# Patient Record
Sex: MALE | Race: WHITE | Employment: UNEMPLOYED | URBAN - METROPOLITAN AREA
[De-identification: names, ages, dates, MRNs, and addresses within clinical notes are randomized per-mention and may not be internally consistent; named-entity substitution may affect disease eponyms.]

---

## 2017-07-30 ENCOUNTER — HOSPITAL ENCOUNTER (INPATIENT)
Age: 37
LOS: 6 days | Discharge: HOME | DRG: 753 | End: 2017-08-05
Attending: PSYCHIATRY & NEUROLOGY | Admitting: PSYCHIATRY & NEUROLOGY
Payer: MEDICAID

## 2017-07-30 PROCEDURE — 6370000000 HC RX 637 (ALT 250 FOR IP): Performed by: PSYCHIATRY & NEUROLOGY

## 2017-07-30 PROCEDURE — 1240000000 HC EMOTIONAL WELLNESS R&B

## 2017-07-30 RX ORDER — M-VIT,TX,IRON,MINS/CALC/FOLIC 27MG-0.4MG
1 TABLET ORAL DAILY
Status: DISCONTINUED | OUTPATIENT
Start: 2017-07-30 | End: 2017-08-05 | Stop reason: HOSPADM

## 2017-07-30 RX ORDER — THIAMINE MONONITRATE (VIT B1) 100 MG
100 TABLET ORAL DAILY
Status: ON HOLD | COMMUNITY
End: 2017-08-04 | Stop reason: HOSPADM

## 2017-07-30 RX ORDER — MIRTAZAPINE 30 MG/1
15 TABLET, FILM COATED ORAL NIGHTLY
Status: DISCONTINUED | OUTPATIENT
Start: 2017-07-30 | End: 2017-07-30

## 2017-07-30 RX ORDER — BENZTROPINE MESYLATE 1 MG/ML
2 INJECTION INTRAMUSCULAR; INTRAVENOUS 2 TIMES DAILY PRN
Status: DISCONTINUED | OUTPATIENT
Start: 2017-07-30 | End: 2017-08-05 | Stop reason: HOSPADM

## 2017-07-30 RX ORDER — MIRTAZAPINE 15 MG/1
15 TABLET, FILM COATED ORAL NIGHTLY
Status: ON HOLD | COMMUNITY
End: 2017-08-04 | Stop reason: HOSPADM

## 2017-07-30 RX ORDER — DIPHENHYDRAMINE HCL 25 MG
25 TABLET ORAL NIGHTLY PRN
Status: DISCONTINUED | OUTPATIENT
Start: 2017-07-30 | End: 2017-08-05 | Stop reason: HOSPADM

## 2017-07-30 RX ORDER — MAGNESIUM HYDROXIDE/ALUMINUM HYDROXICE/SIMETHICONE 120; 1200; 1200 MG/30ML; MG/30ML; MG/30ML
30 SUSPENSION ORAL PRN
Status: DISCONTINUED | OUTPATIENT
Start: 2017-07-30 | End: 2017-08-05 | Stop reason: HOSPADM

## 2017-07-30 RX ORDER — CHLORDIAZEPOXIDE HYDROCHLORIDE 25 MG/1
25 CAPSULE, GELATIN COATED ORAL 3 TIMES DAILY
Status: DISCONTINUED | OUTPATIENT
Start: 2017-07-30 | End: 2017-08-01

## 2017-07-30 RX ORDER — CHLORDIAZEPOXIDE HYDROCHLORIDE 25 MG/1
25 CAPSULE, GELATIN COATED ORAL 2 TIMES DAILY
Status: DISCONTINUED | OUTPATIENT
Start: 2017-07-30 | End: 2017-07-30

## 2017-07-30 RX ORDER — FOLIC ACID 1 MG/1
1 TABLET ORAL DAILY
Status: ON HOLD | COMMUNITY
End: 2017-08-04 | Stop reason: HOSPADM

## 2017-07-30 RX ORDER — OLANZAPINE 10 MG/1
10 TABLET ORAL NIGHTLY
Status: DISCONTINUED | OUTPATIENT
Start: 2017-07-30 | End: 2017-08-05 | Stop reason: HOSPADM

## 2017-07-30 RX ORDER — ACETAMINOPHEN 325 MG/1
650 TABLET ORAL EVERY 4 HOURS PRN
Status: DISCONTINUED | OUTPATIENT
Start: 2017-07-30 | End: 2017-08-05 | Stop reason: HOSPADM

## 2017-07-30 RX ORDER — HYDROXYZINE HYDROCHLORIDE 25 MG/1
25 TABLET, FILM COATED ORAL 3 TIMES DAILY PRN
Status: DISCONTINUED | OUTPATIENT
Start: 2017-07-30 | End: 2017-08-05 | Stop reason: HOSPADM

## 2017-07-30 RX ORDER — TRAZODONE HYDROCHLORIDE 50 MG/1
50 TABLET ORAL NIGHTLY PRN
Status: DISCONTINUED | OUTPATIENT
Start: 2017-07-30 | End: 2017-08-05 | Stop reason: HOSPADM

## 2017-07-30 RX ORDER — THIAMINE MONONITRATE (VIT B1) 100 MG
100 TABLET ORAL DAILY
Status: DISCONTINUED | OUTPATIENT
Start: 2017-07-30 | End: 2017-08-05 | Stop reason: HOSPADM

## 2017-07-30 RX ORDER — FOLIC ACID 1 MG/1
1 TABLET ORAL DAILY
Status: DISCONTINUED | OUTPATIENT
Start: 2017-07-30 | End: 2017-08-05 | Stop reason: HOSPADM

## 2017-07-30 RX ADMIN — CHLORDIAZEPOXIDE HYDROCHLORIDE 25 MG: 25 CAPSULE ORAL at 20:23

## 2017-07-30 RX ADMIN — NICOTINE POLACRILEX 2 MG: 2 GUM, CHEWING BUCCAL at 17:35

## 2017-07-30 RX ADMIN — MULTIPLE VITAMINS W/ MINERALS TAB 1 TABLET: TAB at 12:01

## 2017-07-30 RX ADMIN — OLANZAPINE 10 MG: 10 TABLET, FILM COATED ORAL at 20:23

## 2017-07-30 RX ADMIN — DIPHENHYDRAMINE HCL 25 MG: 25 TABLET ORAL at 20:23

## 2017-07-30 RX ADMIN — ACETAMINOPHEN 650 MG: 325 TABLET ORAL at 08:40

## 2017-07-30 RX ADMIN — FOLIC ACID 1 MG: 1 TABLET ORAL at 12:01

## 2017-07-30 RX ADMIN — CHLORDIAZEPOXIDE HYDROCHLORIDE 25 MG: 25 CAPSULE ORAL at 13:59

## 2017-07-30 RX ADMIN — HYDROXYZINE HYDROCHLORIDE 25 MG: 25 TABLET, FILM COATED ORAL at 17:35

## 2017-07-30 RX ADMIN — NICOTINE POLACRILEX 2 MG: 2 GUM, CHEWING BUCCAL at 20:23

## 2017-07-30 RX ADMIN — HYDROXYZINE HYDROCHLORIDE 25 MG: 25 TABLET, FILM COATED ORAL at 12:01

## 2017-07-30 RX ADMIN — TRAZODONE HYDROCHLORIDE 50 MG: 50 TABLET ORAL at 21:45

## 2017-07-30 RX ADMIN — Medication 100 MG: at 12:00

## 2017-07-30 RX ADMIN — NICOTINE POLACRILEX 2 MG: 2 GUM, CHEWING BUCCAL at 15:50

## 2017-07-30 RX ADMIN — CHLORDIAZEPOXIDE HYDROCHLORIDE 25 MG: 25 CAPSULE ORAL at 08:40

## 2017-07-30 RX ADMIN — ACETAMINOPHEN 650 MG: 325 TABLET ORAL at 13:59

## 2017-07-30 ASSESSMENT — PATIENT HEALTH QUESTIONNAIRE - PHQ9: SUM OF ALL RESPONSES TO PHQ QUESTIONS 1-9: 15

## 2017-07-30 ASSESSMENT — SLEEP AND FATIGUE QUESTIONNAIRES
DIFFICULTY FALLING ASLEEP: YES
RESTFUL SLEEP: NO
DIFFICULTY STAYING ASLEEP: YES
SLEEP PATTERN: DISTURBED/INTERRUPTED SLEEP;RESTLESSNESS
DO YOU USE A SLEEP AID: COMMENT
DO YOU HAVE DIFFICULTY SLEEPING: YES
AVERAGE NUMBER OF SLEEP HOURS: 8
DIFFICULTY ARISING: NO

## 2017-07-30 ASSESSMENT — PAIN DESCRIPTION - FREQUENCY
FREQUENCY: INTERMITTENT
FREQUENCY: INTERMITTENT

## 2017-07-30 ASSESSMENT — PAIN DESCRIPTION - DESCRIPTORS
DESCRIPTORS: ACHING;DISCOMFORT
DESCRIPTORS: ACHING;DISCOMFORT

## 2017-07-30 ASSESSMENT — PAIN SCALES - GENERAL
PAINLEVEL_OUTOF10: 3
PAINLEVEL_OUTOF10: 2
PAINLEVEL_OUTOF10: 3
PAINLEVEL_OUTOF10: 1

## 2017-07-30 ASSESSMENT — PAIN DESCRIPTION - ONSET: ONSET: ON-GOING

## 2017-07-30 ASSESSMENT — PAIN DESCRIPTION - LOCATION
LOCATION: GENERALIZED
LOCATION: GENERALIZED

## 2017-07-30 ASSESSMENT — LIFESTYLE VARIABLES: HISTORY_ALCOHOL_USE: YES

## 2017-07-30 ASSESSMENT — PAIN DESCRIPTION - PAIN TYPE
TYPE: ACUTE PAIN
TYPE: ACUTE PAIN

## 2017-07-31 LAB
ABSOLUTE EOS #: 0.3 K/UL (ref 0–0.4)
ABSOLUTE LYMPH #: 2.1 K/UL (ref 1–4.8)
ABSOLUTE MONO #: 0.6 K/UL (ref 0.1–1.3)
ALBUMIN SERPL-MCNC: 3.7 G/DL (ref 3.5–5.2)
ALBUMIN/GLOBULIN RATIO: ABNORMAL (ref 1–2.5)
ALP BLD-CCNC: 65 U/L (ref 40–129)
ALT SERPL-CCNC: 21 U/L (ref 5–41)
ANION GAP SERPL CALCULATED.3IONS-SCNC: 14 MMOL/L (ref 9–17)
AST SERPL-CCNC: 23 U/L
BASOPHILS # BLD: 1 %
BASOPHILS ABSOLUTE: 0 K/UL (ref 0–0.2)
BILIRUB SERPL-MCNC: 1.17 MG/DL (ref 0.3–1.2)
BUN BLDV-MCNC: 9 MG/DL (ref 6–20)
BUN/CREAT BLD: ABNORMAL (ref 9–20)
CALCIUM SERPL-MCNC: 8.6 MG/DL (ref 8.6–10.4)
CHLORIDE BLD-SCNC: 101 MMOL/L (ref 98–107)
CO2: 21 MMOL/L (ref 20–31)
CREAT SERPL-MCNC: 0.63 MG/DL (ref 0.7–1.2)
DIFFERENTIAL TYPE: NORMAL
EOSINOPHILS RELATIVE PERCENT: 4 %
GFR AFRICAN AMERICAN: >60 ML/MIN
GFR NON-AFRICAN AMERICAN: >60 ML/MIN
GFR SERPL CREATININE-BSD FRML MDRD: ABNORMAL ML/MIN/{1.73_M2}
GFR SERPL CREATININE-BSD FRML MDRD: ABNORMAL ML/MIN/{1.73_M2}
GLUCOSE BLD-MCNC: 106 MG/DL (ref 70–99)
HCT VFR BLD CALC: 44.5 % (ref 41–53)
HEMOGLOBIN: 15.3 G/DL (ref 13.5–17.5)
LYMPHOCYTES # BLD: 33 %
MCH RBC QN AUTO: 31.7 PG (ref 26–34)
MCHC RBC AUTO-ENTMCNC: 34.4 G/DL (ref 31–37)
MCV RBC AUTO: 92.1 FL (ref 80–100)
MONOCYTES # BLD: 10 %
PDW BLD-RTO: 14.6 % (ref 11.5–14.9)
PLATELET # BLD: 197 K/UL (ref 150–450)
PLATELET ESTIMATE: NORMAL
PMV BLD AUTO: 7.7 FL (ref 6–12)
POTASSIUM SERPL-SCNC: 3.4 MMOL/L (ref 3.7–5.3)
RBC # BLD: 4.84 M/UL (ref 4.5–5.9)
RBC # BLD: NORMAL 10*6/UL
SEG NEUTROPHILS: 52 %
SEGMENTED NEUTROPHILS ABSOLUTE COUNT: 3.3 K/UL (ref 1.3–9.1)
SODIUM BLD-SCNC: 136 MMOL/L (ref 135–144)
TOTAL PROTEIN: 7.1 G/DL (ref 6.4–8.3)
WBC # BLD: 6.3 K/UL (ref 3.5–11)
WBC # BLD: NORMAL 10*3/UL

## 2017-07-31 PROCEDURE — 80053 COMPREHEN METABOLIC PANEL: CPT

## 2017-07-31 PROCEDURE — 36415 COLL VENOUS BLD VENIPUNCTURE: CPT

## 2017-07-31 PROCEDURE — 6370000000 HC RX 637 (ALT 250 FOR IP): Performed by: PSYCHIATRY & NEUROLOGY

## 2017-07-31 PROCEDURE — 1240000000 HC EMOTIONAL WELLNESS R&B

## 2017-07-31 PROCEDURE — 85025 COMPLETE CBC W/AUTO DIFF WBC: CPT

## 2017-07-31 RX ADMIN — NICOTINE POLACRILEX 2 MG: 2 GUM, CHEWING BUCCAL at 14:30

## 2017-07-31 RX ADMIN — MULTIPLE VITAMINS W/ MINERALS TAB 1 TABLET: TAB at 08:48

## 2017-07-31 RX ADMIN — HYDROXYZINE HYDROCHLORIDE 25 MG: 25 TABLET, FILM COATED ORAL at 14:30

## 2017-07-31 RX ADMIN — NICOTINE POLACRILEX 2 MG: 2 GUM, CHEWING BUCCAL at 19:06

## 2017-07-31 RX ADMIN — ACETAMINOPHEN 650 MG: 325 TABLET ORAL at 19:33

## 2017-07-31 RX ADMIN — NICOTINE POLACRILEX 2 MG: 2 GUM, CHEWING BUCCAL at 10:49

## 2017-07-31 RX ADMIN — HYDROXYZINE HYDROCHLORIDE 25 MG: 25 TABLET, FILM COATED ORAL at 21:07

## 2017-07-31 RX ADMIN — Medication 100 MG: at 08:48

## 2017-07-31 RX ADMIN — NICOTINE POLACRILEX 2 MG: 2 GUM, CHEWING BUCCAL at 21:09

## 2017-07-31 RX ADMIN — OLANZAPINE 10 MG: 10 TABLET, FILM COATED ORAL at 21:07

## 2017-07-31 RX ADMIN — CHLORDIAZEPOXIDE HYDROCHLORIDE 25 MG: 25 CAPSULE ORAL at 14:30

## 2017-07-31 RX ADMIN — CHLORDIAZEPOXIDE HYDROCHLORIDE 25 MG: 25 CAPSULE ORAL at 21:07

## 2017-07-31 RX ADMIN — FOLIC ACID 1 MG: 1 TABLET ORAL at 08:48

## 2017-07-31 RX ADMIN — HYDROXYZINE HYDROCHLORIDE 25 MG: 25 TABLET, FILM COATED ORAL at 08:48

## 2017-07-31 RX ADMIN — NICOTINE POLACRILEX 2 MG: 2 GUM, CHEWING BUCCAL at 19:33

## 2017-07-31 RX ADMIN — CHLORDIAZEPOXIDE HYDROCHLORIDE 25 MG: 25 CAPSULE ORAL at 08:48

## 2017-07-31 ASSESSMENT — PAIN DESCRIPTION - ORIENTATION: ORIENTATION: UPPER

## 2017-07-31 ASSESSMENT — PAIN SCALES - GENERAL
PAINLEVEL_OUTOF10: 4
PAINLEVEL_OUTOF10: 5

## 2017-07-31 ASSESSMENT — PAIN DESCRIPTION - LOCATION: LOCATION: BACK

## 2017-07-31 ASSESSMENT — PAIN DESCRIPTION - PAIN TYPE: TYPE: ACUTE PAIN

## 2017-08-01 PROCEDURE — 6370000000 HC RX 637 (ALT 250 FOR IP): Performed by: PSYCHIATRY & NEUROLOGY

## 2017-08-01 PROCEDURE — 1240000000 HC EMOTIONAL WELLNESS R&B

## 2017-08-01 RX ORDER — BUPROPION HYDROCHLORIDE 150 MG/1
150 TABLET ORAL DAILY
Status: DISCONTINUED | OUTPATIENT
Start: 2017-08-02 | End: 2017-08-05 | Stop reason: HOSPADM

## 2017-08-01 RX ORDER — CHLORDIAZEPOXIDE HYDROCHLORIDE 25 MG/1
25 CAPSULE, GELATIN COATED ORAL 3 TIMES DAILY
Status: COMPLETED | OUTPATIENT
Start: 2017-08-01 | End: 2017-08-01

## 2017-08-01 RX ORDER — CHLORDIAZEPOXIDE HYDROCHLORIDE 25 MG/1
25 CAPSULE, GELATIN COATED ORAL 2 TIMES DAILY
Status: COMPLETED | OUTPATIENT
Start: 2017-08-02 | End: 2017-08-04

## 2017-08-01 RX ADMIN — NICOTINE POLACRILEX 2 MG: 2 GUM, CHEWING BUCCAL at 21:38

## 2017-08-01 RX ADMIN — NICOTINE POLACRILEX 2 MG: 2 GUM, CHEWING BUCCAL at 19:29

## 2017-08-01 RX ADMIN — CHLORDIAZEPOXIDE HYDROCHLORIDE 25 MG: 25 CAPSULE ORAL at 14:17

## 2017-08-01 RX ADMIN — DIPHENHYDRAMINE HCL 25 MG: 25 TABLET ORAL at 21:38

## 2017-08-01 RX ADMIN — Medication 100 MG: at 09:25

## 2017-08-01 RX ADMIN — CHLORDIAZEPOXIDE HYDROCHLORIDE 25 MG: 25 CAPSULE ORAL at 20:57

## 2017-08-01 RX ADMIN — MULTIPLE VITAMINS W/ MINERALS TAB 1 TABLET: TAB at 09:25

## 2017-08-01 RX ADMIN — ACETAMINOPHEN 650 MG: 325 TABLET ORAL at 19:29

## 2017-08-01 RX ADMIN — HYDROXYZINE HYDROCHLORIDE 25 MG: 25 TABLET, FILM COATED ORAL at 14:16

## 2017-08-01 RX ADMIN — HYDROXYZINE HYDROCHLORIDE 25 MG: 25 TABLET, FILM COATED ORAL at 20:57

## 2017-08-01 RX ADMIN — CHLORDIAZEPOXIDE HYDROCHLORIDE 25 MG: 25 CAPSULE ORAL at 09:25

## 2017-08-01 RX ADMIN — FOLIC ACID 1 MG: 1 TABLET ORAL at 09:25

## 2017-08-01 RX ADMIN — NICOTINE POLACRILEX 2 MG: 2 GUM, CHEWING BUCCAL at 14:30

## 2017-08-01 RX ADMIN — OLANZAPINE 10 MG: 10 TABLET, FILM COATED ORAL at 20:57

## 2017-08-01 ASSESSMENT — PAIN SCALES - GENERAL
PAINLEVEL_OUTOF10: 5
PAINLEVEL_OUTOF10: 4
PAINLEVEL_OUTOF10: 0

## 2017-08-01 ASSESSMENT — PAIN DESCRIPTION - LOCATION: LOCATION: NECK

## 2017-08-02 PROCEDURE — 6370000000 HC RX 637 (ALT 250 FOR IP): Performed by: PSYCHIATRY & NEUROLOGY

## 2017-08-02 PROCEDURE — 1240000000 HC EMOTIONAL WELLNESS R&B

## 2017-08-02 RX ADMIN — NICOTINE POLACRILEX 2 MG: 2 GUM, CHEWING BUCCAL at 20:43

## 2017-08-02 RX ADMIN — OLANZAPINE 10 MG: 10 TABLET, FILM COATED ORAL at 20:43

## 2017-08-02 RX ADMIN — NICOTINE POLACRILEX 2 MG: 2 GUM, CHEWING BUCCAL at 17:51

## 2017-08-02 RX ADMIN — CHLORDIAZEPOXIDE HYDROCHLORIDE 25 MG: 25 CAPSULE ORAL at 08:33

## 2017-08-02 RX ADMIN — BUPROPION HYDROCHLORIDE 150 MG: 150 TABLET, FILM COATED, EXTENDED RELEASE ORAL at 08:33

## 2017-08-02 RX ADMIN — Medication 100 MG: at 08:33

## 2017-08-02 RX ADMIN — NICOTINE POLACRILEX 2 MG: 2 GUM, CHEWING BUCCAL at 13:33

## 2017-08-02 RX ADMIN — CHLORDIAZEPOXIDE HYDROCHLORIDE 25 MG: 25 CAPSULE ORAL at 20:43

## 2017-08-02 RX ADMIN — MULTIPLE VITAMINS W/ MINERALS TAB 1 TABLET: TAB at 08:33

## 2017-08-02 RX ADMIN — HYDROXYZINE HYDROCHLORIDE 25 MG: 25 TABLET, FILM COATED ORAL at 20:43

## 2017-08-02 RX ADMIN — NICOTINE POLACRILEX 2 MG: 2 GUM, CHEWING BUCCAL at 15:38

## 2017-08-02 RX ADMIN — FOLIC ACID 1 MG: 1 TABLET ORAL at 08:33

## 2017-08-02 RX ADMIN — NICOTINE POLACRILEX 2 MG: 2 GUM, CHEWING BUCCAL at 22:02

## 2017-08-02 RX ADMIN — DIPHENHYDRAMINE HCL 25 MG: 25 TABLET ORAL at 22:01

## 2017-08-02 RX ADMIN — HYDROXYZINE HYDROCHLORIDE 25 MG: 25 TABLET, FILM COATED ORAL at 15:38

## 2017-08-03 PROCEDURE — 6370000000 HC RX 637 (ALT 250 FOR IP): Performed by: PSYCHIATRY & NEUROLOGY

## 2017-08-03 PROCEDURE — 1240000000 HC EMOTIONAL WELLNESS R&B

## 2017-08-03 RX ADMIN — CHLORDIAZEPOXIDE HYDROCHLORIDE 25 MG: 25 CAPSULE ORAL at 08:20

## 2017-08-03 RX ADMIN — HYDROXYZINE HYDROCHLORIDE 25 MG: 25 TABLET, FILM COATED ORAL at 08:20

## 2017-08-03 RX ADMIN — CHLORDIAZEPOXIDE HYDROCHLORIDE 25 MG: 25 CAPSULE ORAL at 20:29

## 2017-08-03 RX ADMIN — NICOTINE POLACRILEX 2 MG: 2 GUM, CHEWING BUCCAL at 20:30

## 2017-08-03 RX ADMIN — NICOTINE POLACRILEX 2 MG: 2 GUM, CHEWING BUCCAL at 22:06

## 2017-08-03 RX ADMIN — BUPROPION HYDROCHLORIDE 150 MG: 150 TABLET, FILM COATED, EXTENDED RELEASE ORAL at 08:20

## 2017-08-03 RX ADMIN — HYDROXYZINE HYDROCHLORIDE 25 MG: 25 TABLET, FILM COATED ORAL at 15:25

## 2017-08-03 RX ADMIN — NICOTINE POLACRILEX 2 MG: 2 GUM, CHEWING BUCCAL at 15:25

## 2017-08-03 RX ADMIN — FOLIC ACID 1 MG: 1 TABLET ORAL at 08:20

## 2017-08-03 RX ADMIN — DIPHENHYDRAMINE HCL 25 MG: 25 TABLET ORAL at 22:06

## 2017-08-03 RX ADMIN — MULTIPLE VITAMINS W/ MINERALS TAB 1 TABLET: TAB at 08:20

## 2017-08-03 RX ADMIN — NICOTINE POLACRILEX 2 MG: 2 GUM, CHEWING BUCCAL at 13:31

## 2017-08-03 RX ADMIN — OLANZAPINE 10 MG: 10 TABLET, FILM COATED ORAL at 20:30

## 2017-08-03 RX ADMIN — NICOTINE POLACRILEX 2 MG: 2 GUM, CHEWING BUCCAL at 10:46

## 2017-08-03 RX ADMIN — Medication 100 MG: at 08:20

## 2017-08-03 RX ADMIN — HYDROXYZINE HYDROCHLORIDE 25 MG: 25 TABLET, FILM COATED ORAL at 20:30

## 2017-08-04 PROCEDURE — 6370000000 HC RX 637 (ALT 250 FOR IP): Performed by: PSYCHIATRY & NEUROLOGY

## 2017-08-04 PROCEDURE — 1240000000 HC EMOTIONAL WELLNESS R&B

## 2017-08-04 RX ORDER — TRAZODONE HYDROCHLORIDE 50 MG/1
50 TABLET ORAL NIGHTLY PRN
Qty: 30 TABLET | Refills: 0 | Status: SHIPPED | OUTPATIENT
Start: 2017-08-04 | End: 2018-01-21

## 2017-08-04 RX ORDER — OLANZAPINE 10 MG/1
10 TABLET ORAL NIGHTLY
Qty: 30 TABLET | Refills: 0 | Status: SHIPPED | OUTPATIENT
Start: 2017-08-04 | End: 2018-12-06

## 2017-08-04 RX ORDER — BUPROPION HYDROCHLORIDE 150 MG/1
150 TABLET ORAL DAILY
Qty: 30 TABLET | Refills: 0 | Status: ON HOLD | OUTPATIENT
Start: 2017-08-04 | End: 2017-12-30 | Stop reason: HOSPADM

## 2017-08-04 RX ORDER — HYDROXYZINE HYDROCHLORIDE 25 MG/1
25 TABLET, FILM COATED ORAL 3 TIMES DAILY PRN
Qty: 90 TABLET | Refills: 0 | Status: SHIPPED | OUTPATIENT
Start: 2017-08-04 | End: 2018-12-06

## 2017-08-04 RX ADMIN — HYDROXYZINE HYDROCHLORIDE 25 MG: 25 TABLET, FILM COATED ORAL at 08:38

## 2017-08-04 RX ADMIN — MULTIPLE VITAMINS W/ MINERALS TAB 1 TABLET: TAB at 08:38

## 2017-08-04 RX ADMIN — CHLORDIAZEPOXIDE HYDROCHLORIDE 25 MG: 25 CAPSULE ORAL at 21:01

## 2017-08-04 RX ADMIN — NICOTINE POLACRILEX 2 MG: 2 GUM, CHEWING BUCCAL at 19:51

## 2017-08-04 RX ADMIN — ACETAMINOPHEN 650 MG: 325 TABLET ORAL at 11:03

## 2017-08-04 RX ADMIN — OLANZAPINE 10 MG: 10 TABLET, FILM COATED ORAL at 21:01

## 2017-08-04 RX ADMIN — CHLORDIAZEPOXIDE HYDROCHLORIDE 25 MG: 25 CAPSULE ORAL at 08:38

## 2017-08-04 RX ADMIN — FOLIC ACID 1 MG: 1 TABLET ORAL at 08:38

## 2017-08-04 RX ADMIN — HYDROXYZINE HYDROCHLORIDE 25 MG: 25 TABLET, FILM COATED ORAL at 21:01

## 2017-08-04 RX ADMIN — NICOTINE POLACRILEX 2 MG: 2 GUM, CHEWING BUCCAL at 15:44

## 2017-08-04 RX ADMIN — HYDROXYZINE HYDROCHLORIDE 25 MG: 25 TABLET, FILM COATED ORAL at 15:44

## 2017-08-04 RX ADMIN — BUPROPION HYDROCHLORIDE 150 MG: 150 TABLET, FILM COATED, EXTENDED RELEASE ORAL at 08:38

## 2017-08-04 RX ADMIN — DIPHENHYDRAMINE HCL 25 MG: 25 TABLET ORAL at 21:01

## 2017-08-04 RX ADMIN — NICOTINE POLACRILEX 2 MG: 2 GUM, CHEWING BUCCAL at 21:01

## 2017-08-04 RX ADMIN — NICOTINE POLACRILEX 2 MG: 2 GUM, CHEWING BUCCAL at 08:38

## 2017-08-04 RX ADMIN — Medication 100 MG: at 08:38

## 2017-08-04 RX ADMIN — NICOTINE POLACRILEX 2 MG: 2 GUM, CHEWING BUCCAL at 11:03

## 2017-08-04 ASSESSMENT — PAIN DESCRIPTION - DESCRIPTORS: DESCRIPTORS: ACHING;HEADACHE

## 2017-08-04 ASSESSMENT — PAIN SCALES - GENERAL
PAINLEVEL_OUTOF10: 1
PAINLEVEL_OUTOF10: 3

## 2017-08-04 ASSESSMENT — PAIN DESCRIPTION - PAIN TYPE: TYPE: ACUTE PAIN

## 2017-08-04 ASSESSMENT — PAIN DESCRIPTION - LOCATION: LOCATION: HEAD

## 2017-08-04 ASSESSMENT — PAIN DESCRIPTION - FREQUENCY: FREQUENCY: INTERMITTENT

## 2017-08-05 VITALS
RESPIRATION RATE: 14 BRPM | HEIGHT: 69 IN | DIASTOLIC BLOOD PRESSURE: 62 MMHG | WEIGHT: 183 LBS | TEMPERATURE: 97.5 F | OXYGEN SATURATION: 99 % | BODY MASS INDEX: 27.11 KG/M2 | SYSTOLIC BLOOD PRESSURE: 108 MMHG | HEART RATE: 73 BPM

## 2017-08-05 PROCEDURE — 6370000000 HC RX 637 (ALT 250 FOR IP): Performed by: PSYCHIATRY & NEUROLOGY

## 2017-08-05 RX ADMIN — MULTIPLE VITAMINS W/ MINERALS TAB 1 TABLET: TAB at 09:02

## 2017-08-05 RX ADMIN — Medication 100 MG: at 09:02

## 2017-08-05 RX ADMIN — FOLIC ACID 1 MG: 1 TABLET ORAL at 09:02

## 2017-08-05 RX ADMIN — BUPROPION HYDROCHLORIDE 150 MG: 150 TABLET, FILM COATED, EXTENDED RELEASE ORAL at 09:02

## 2017-08-17 ENCOUNTER — OFFICE VISIT (OUTPATIENT)
Dept: FAMILY MEDICINE CLINIC | Age: 37
End: 2017-08-17
Payer: MEDICAID

## 2017-08-17 VITALS
HEART RATE: 76 BPM | HEIGHT: 70 IN | BODY MASS INDEX: 27.63 KG/M2 | TEMPERATURE: 98.2 F | WEIGHT: 193 LBS | OXYGEN SATURATION: 97 % | SYSTOLIC BLOOD PRESSURE: 126 MMHG | DIASTOLIC BLOOD PRESSURE: 74 MMHG

## 2017-08-17 DIAGNOSIS — R21 RASH: ICD-10-CM

## 2017-08-17 DIAGNOSIS — L30.9 DERMATITIS: Primary | ICD-10-CM

## 2017-08-17 DIAGNOSIS — B86 SCABIES: ICD-10-CM

## 2017-08-17 PROCEDURE — 99213 OFFICE O/P EST LOW 20 MIN: CPT | Performed by: FAMILY MEDICINE

## 2017-08-17 RX ORDER — PERMETHRIN 50 MG/G
CREAM TOPICAL
Qty: 1 TUBE | Refills: 0 | Status: SHIPPED | OUTPATIENT
Start: 2017-08-17 | End: 2018-01-21

## 2017-08-17 RX ORDER — TRIAMCINOLONE ACETONIDE 0.25 MG/G
CREAM TOPICAL
Qty: 1 TUBE | Refills: 0 | Status: SHIPPED | OUTPATIENT
Start: 2017-08-17 | End: 2018-01-21

## 2017-08-17 RX ORDER — CLONIDINE HYDROCHLORIDE 0.2 MG/1
0.2 TABLET ORAL 3 TIMES DAILY
COMMUNITY
End: 2018-12-06 | Stop reason: ALTCHOICE

## 2017-08-17 RX ORDER — METHYLPREDNISOLONE 4 MG/1
TABLET ORAL
Qty: 1 KIT | Refills: 0 | Status: SHIPPED | OUTPATIENT
Start: 2017-08-17 | End: 2018-01-21

## 2017-08-17 ASSESSMENT — ENCOUNTER SYMPTOMS
VOICE CHANGE: 0
RHINORRHEA: 0
EYE ITCHING: 0
EYE REDNESS: 0
COUGH: 0
NAUSEA: 0
CHEST TIGHTNESS: 0
DIARRHEA: 0
TROUBLE SWALLOWING: 0
BACK PAIN: 0
VOMITING: 0
EYE DISCHARGE: 0
SINUS PRESSURE: 0
WHEEZING: 0
SORE THROAT: 0
ABDOMINAL PAIN: 0
SHORTNESS OF BREATH: 0
CONSTIPATION: 0

## 2017-09-20 ENCOUNTER — HOSPITAL ENCOUNTER (OUTPATIENT)
Age: 37
Discharge: HOME | End: 2017-09-20
Payer: MEDICAID

## 2017-09-20 LAB — HIV AG/AB: NONREACTIVE

## 2017-09-20 PROCEDURE — 36415 COLL VENOUS BLD VENIPUNCTURE: CPT

## 2017-09-20 PROCEDURE — 87522 HEPATITIS C REVRS TRNSCRPJ: CPT

## 2017-09-20 PROCEDURE — 87389 HIV-1 AG W/HIV-1&-2 AB AG IA: CPT

## 2017-09-22 LAB
DIRECT EXAM: NORMAL
Lab: NORMAL
SPECIMEN DESCRIPTION: NORMAL
STATUS: NORMAL

## 2017-12-25 ENCOUNTER — HOSPITAL ENCOUNTER (INPATIENT)
Age: 37
LOS: 4 days | Discharge: HOME | DRG: 753 | End: 2017-12-30
Attending: EMERGENCY MEDICINE | Admitting: PSYCHIATRY & NEUROLOGY
Payer: MEDICAID

## 2017-12-25 DIAGNOSIS — R11.2 NON-INTRACTABLE VOMITING WITH NAUSEA, UNSPECIFIED VOMITING TYPE: ICD-10-CM

## 2017-12-25 DIAGNOSIS — R45.851 SUICIDAL IDEATION: ICD-10-CM

## 2017-12-25 DIAGNOSIS — R10.84 GENERALIZED ABDOMINAL PAIN: Primary | ICD-10-CM
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
## 2017-12-25 LAB
ABSOLUTE EOS #: 0 K/UL (ref 0–0.4)
ABSOLUTE IMMATURE GRANULOCYTE: ABNORMAL K/UL (ref 0–0.3)
ABSOLUTE LYMPH #: 4.1 K/UL (ref 1–4.8)
ABSOLUTE MONO #: 0.55 K/UL (ref 0.1–1.3)
ALBUMIN SERPL-MCNC: 4.7 G/DL (ref 3.5–5.2)
ALBUMIN/GLOBULIN RATIO: NORMAL (ref 1–2.5)
ALP BLD-CCNC: 75 U/L (ref 40–129)
ALT SERPL-CCNC: 20 U/L (ref 5–41)
AMPHETAMINE SCREEN URINE: NEGATIVE
ANION GAP SERPL CALCULATED.3IONS-SCNC: 17 MMOL/L (ref 9–17)
AST SERPL-CCNC: 25 U/L
ATYPICAL LYMPHOCYTE ABSOLUTE COUNT: 0.18 K/UL
ATYPICAL LYMPHOCYTES: 2 %
BARBITURATE SCREEN URINE: NEGATIVE
BASOPHILS # BLD: 0 % (ref 0–2)
BASOPHILS ABSOLUTE: 0 K/UL (ref 0–0.2)
BENZODIAZEPINE SCREEN, URINE: NEGATIVE
BILIRUB SERPL-MCNC: 0.64 MG/DL (ref 0.3–1.2)
BILIRUBIN DIRECT: 0.16 MG/DL
BILIRUBIN, INDIRECT: 0.48 MG/DL (ref 0–1)
BUN BLDV-MCNC: 11 MG/DL (ref 6–20)
BUN/CREAT BLD: ABNORMAL (ref 9–20)
BUPRENORPHINE URINE: NORMAL
CALCIUM SERPL-MCNC: 9.8 MG/DL (ref 8.6–10.4)
CANNABINOID SCREEN URINE: NEGATIVE
CHLORIDE BLD-SCNC: 96 MMOL/L (ref 98–107)
CO2: 27 MMOL/L (ref 20–31)
COCAINE METABOLITE, URINE: NEGATIVE
CREAT SERPL-MCNC: 0.7 MG/DL (ref 0.7–1.2)
DIFFERENTIAL TYPE: ABNORMAL
EOSINOPHILS RELATIVE PERCENT: 0 % (ref 0–4)
ETHANOL PERCENT: 0.32 %
ETHANOL: 320 MG/DL
GFR AFRICAN AMERICAN: >60 ML/MIN
GFR NON-AFRICAN AMERICAN: >60 ML/MIN
GFR SERPL CREATININE-BSD FRML MDRD: ABNORMAL ML/MIN/{1.73_M2}
GFR SERPL CREATININE-BSD FRML MDRD: ABNORMAL ML/MIN/{1.73_M2}
GLOBULIN: NORMAL G/DL (ref 1.5–3.8)
GLUCOSE BLD-MCNC: 135 MG/DL (ref 70–99)
HCT VFR BLD CALC: 49.4 % (ref 41–53)
HEMOGLOBIN: 17.4 G/DL (ref 13.5–17.5)
IMMATURE GRANULOCYTES: ABNORMAL %
LIPASE: 12 U/L (ref 13–60)
LYMPHOCYTES # BLD: 45 % (ref 24–44)
MCH RBC QN AUTO: 31.2 PG (ref 26–34)
MCHC RBC AUTO-ENTMCNC: 35.2 G/DL (ref 31–37)
MCV RBC AUTO: 88.6 FL (ref 80–100)
MDMA URINE: NORMAL
METHADONE SCREEN, URINE: NEGATIVE
METHAMPHETAMINE, URINE: NORMAL
MONOCYTES # BLD: 6 % (ref 1–7)
MORPHOLOGY: NORMAL
OPIATES, URINE: NEGATIVE
OXYCODONE SCREEN URINE: NEGATIVE
PDW BLD-RTO: 14.3 % (ref 11.5–14.9)
PHENCYCLIDINE, URINE: NEGATIVE
PLATELET # BLD: 284 K/UL (ref 150–450)
PLATELET ESTIMATE: ABNORMAL
PMV BLD AUTO: 7.4 FL (ref 6–12)
POTASSIUM SERPL-SCNC: 3.6 MMOL/L (ref 3.7–5.3)
PROPOXYPHENE, URINE: NORMAL
RBC # BLD: 5.57 M/UL (ref 4.5–5.9)
RBC # BLD: ABNORMAL 10*6/UL
SEG NEUTROPHILS: 47 % (ref 36–66)
SEGMENTED NEUTROPHILS ABSOLUTE COUNT: 4.27 K/UL (ref 1.3–9.1)
SODIUM BLD-SCNC: 140 MMOL/L (ref 135–144)
TEST INFORMATION: NORMAL
TOTAL PROTEIN: 8.3 G/DL (ref 6.4–8.3)
TRICYCLIC ANTIDEPRESSANTS, UR: NORMAL
WBC # BLD: 9.1 K/UL (ref 3.5–11)
WBC # BLD: ABNORMAL 10*3/UL

## 2017-12-25 PROCEDURE — 80048 BASIC METABOLIC PNL TOTAL CA: CPT

## 2017-12-25 PROCEDURE — 96374 THER/PROPH/DIAG INJ IV PUSH: CPT

## 2017-12-25 PROCEDURE — 99284 EMERGENCY DEPT VISIT MOD MDM: CPT

## 2017-12-25 PROCEDURE — 6360000002 HC RX W HCPCS: Performed by: EMERGENCY MEDICINE

## 2017-12-25 PROCEDURE — 2580000003 HC RX 258: Performed by: EMERGENCY MEDICINE

## 2017-12-25 PROCEDURE — 36415 COLL VENOUS BLD VENIPUNCTURE: CPT

## 2017-12-25 PROCEDURE — 85025 COMPLETE CBC W/AUTO DIFF WBC: CPT

## 2017-12-25 PROCEDURE — 96375 TX/PRO/DX INJ NEW DRUG ADDON: CPT

## 2017-12-25 PROCEDURE — 96376 TX/PRO/DX INJ SAME DRUG ADON: CPT

## 2017-12-25 PROCEDURE — 80307 DRUG TEST PRSMV CHEM ANLYZR: CPT

## 2017-12-25 PROCEDURE — 6370000000 HC RX 637 (ALT 250 FOR IP): Performed by: EMERGENCY MEDICINE

## 2017-12-25 PROCEDURE — 80076 HEPATIC FUNCTION PANEL: CPT

## 2017-12-25 PROCEDURE — G0480 DRUG TEST DEF 1-7 CLASSES: HCPCS

## 2017-12-25 PROCEDURE — 83690 ASSAY OF LIPASE: CPT

## 2017-12-25 RX ORDER — PROMETHAZINE HYDROCHLORIDE 25 MG/ML
12.5 INJECTION, SOLUTION INTRAMUSCULAR; INTRAVENOUS ONCE
Status: COMPLETED | OUTPATIENT
Start: 2017-12-25 | End: 2017-12-25

## 2017-12-25 RX ORDER — HALOPERIDOL 5 MG/ML
5 INJECTION INTRAMUSCULAR ONCE
Status: COMPLETED | OUTPATIENT
Start: 2017-12-25 | End: 2017-12-25

## 2017-12-25 RX ORDER — DIPHENHYDRAMINE HYDROCHLORIDE 50 MG/ML
25 INJECTION INTRAMUSCULAR; INTRAVENOUS ONCE
Status: COMPLETED | OUTPATIENT
Start: 2017-12-25 | End: 2017-12-25

## 2017-12-25 RX ORDER — 0.9 % SODIUM CHLORIDE 0.9 %
1000 INTRAVENOUS SOLUTION INTRAVENOUS ONCE
Status: COMPLETED | OUTPATIENT
Start: 2017-12-25 | End: 2017-12-25

## 2017-12-25 RX ORDER — CALCIUM CARBONATE 200(500)MG
500 TABLET,CHEWABLE ORAL ONCE
Status: COMPLETED | OUTPATIENT
Start: 2017-12-25 | End: 2017-12-25

## 2017-12-25 RX ORDER — ONDANSETRON 2 MG/ML
4 INJECTION INTRAMUSCULAR; INTRAVENOUS ONCE
Status: COMPLETED | OUTPATIENT
Start: 2017-12-25 | End: 2017-12-25

## 2017-12-25 RX ORDER — 0.9 % SODIUM CHLORIDE 0.9 %
1000 INTRAVENOUS SOLUTION INTRAVENOUS ONCE
Status: COMPLETED | OUTPATIENT
Start: 2017-12-25 | End: 2017-12-26

## 2017-12-25 RX ORDER — CALCIUM CARBONATE 200(500)MG
500 TABLET,CHEWABLE ORAL 3 TIMES DAILY PRN
Status: DISCONTINUED | OUTPATIENT
Start: 2017-12-25 | End: 2017-12-25

## 2017-12-25 RX ADMIN — SODIUM CHLORIDE 1000 ML: 9 INJECTION, SOLUTION INTRAVENOUS at 16:00

## 2017-12-25 RX ADMIN — ANTACID TABLETS 500 MG: 500 TABLET, CHEWABLE ORAL at 17:58

## 2017-12-25 RX ADMIN — SODIUM CHLORIDE 1000 ML: 9 INJECTION, SOLUTION INTRAVENOUS at 23:10

## 2017-12-25 RX ADMIN — DIPHENHYDRAMINE HYDROCHLORIDE 25 MG: 50 INJECTION, SOLUTION INTRAMUSCULAR; INTRAVENOUS at 23:10

## 2017-12-25 RX ADMIN — ANTACID TABLETS 500 MG: 500 TABLET, CHEWABLE ORAL at 21:42

## 2017-12-25 RX ADMIN — PROMETHAZINE HYDROCHLORIDE 12.5 MG: 25 INJECTION INTRAMUSCULAR; INTRAVENOUS at 23:09

## 2017-12-25 RX ADMIN — ONDANSETRON 4 MG: 2 INJECTION INTRAMUSCULAR; INTRAVENOUS at 16:00

## 2017-12-25 RX ADMIN — HALOPERIDOL LACTATE 5 MG: 5 INJECTION, SOLUTION INTRAMUSCULAR at 17:40

## 2017-12-25 RX ADMIN — DIPHENHYDRAMINE HYDROCHLORIDE 25 MG: 50 INJECTION, SOLUTION INTRAMUSCULAR; INTRAVENOUS at 18:07

## 2017-12-25 ASSESSMENT — PAIN DESCRIPTION - LOCATION: LOCATION: ABDOMEN

## 2017-12-25 ASSESSMENT — ENCOUNTER SYMPTOMS
NAUSEA: 1
SHORTNESS OF BREATH: 0
ABDOMINAL PAIN: 1
VOMITING: 1
DIARRHEA: 0
RHINORRHEA: 0

## 2017-12-25 ASSESSMENT — PAIN DESCRIPTION - PAIN TYPE: TYPE: ACUTE PAIN

## 2017-12-25 ASSESSMENT — PAIN SCALES - GENERAL: PAINLEVEL_OUTOF10: 7

## 2017-12-25 ASSESSMENT — PAIN DESCRIPTION - DESCRIPTORS: DESCRIPTORS: ACHING

## 2017-12-25 NOTE — ED NOTES
Phone call to [facility] for a possible bed for pt. Zabrina Pinzon is asking for all testing/labs/notes on pt. To be faxed to [phone]. Dr. Arun Hancock notified.

## 2017-12-25 NOTE — ED PROVIDER NOTES
[address] ED  Emergency Department Encounter  Emergency Medicine Resident     Pt Name: Sofia Olmos  MRN: [id number]  [place] 1980  Date of evaluation: 12/25/17  PCP:  No primary care provider on file. CHIEF COMPLAINT       Chief Complaint   Patient presents with    Mental Health Problem    Abdominal Pain    Emesis    Nausea    Alcohol Problem       HISTORY OF PRESENT ILLNESS  (Location/Symptom, Timing/Onset, Context/Setting, Quality, Duration, Modifying Factors, Severity, Associated signs/symptoms)     Sofia Olmos is a 40 y.o. male who presents with complaints of history of generalized abdominal pain associated with nausea and vomiting. Also stating he like to be evaluated by psychiatry because he feels like very depressed. However is denying any suicidal or homicidal ideation at this time. Denies any visual or auditory hallucinations. No illicit drug use. .  Does admit that he has been drinking alcohol today as a supplementation for not taking his psychiatric medications. Is concerned that he may go into alcohol withdrawals as well. Like to be evaluated and treated for that. Denying any complete at the time including any chest pain, shortness of breath, numbness, weakness, tingling. PAST MEDICAL / SURGICAL / SOCIAL / FAMILY HISTORY      has a past medical history of Alcoholism ([address] Anxiety; Depression; Headache; Hepatitis C; History of atrial fibrillation; Mental and behavioral disorders d/t use of alcohol, acute intoxication ([address] Palpitations; and Seizures ([address] has a past surgical history that includes Tonsillectomy and Finger surgery (Left). Social History     Social History    Marital status: Single     Spouse name: N/A    Number of children: 2    Years of education: N/A     Occupational History    Not on file.      Social History Main Topics    Smoking status: Current Every Day Smoker     Packs/day: 0.50     Years: 20.00     Types: Cigarettes    Smokeless tobacco: Former User     Types: Snuff, Chew    Alcohol use No      Comment: 6 days sobriety    Drug use: Yes      Comment: history of crack cocaine abuse 8 years ago    Sexual activity: Not Currently     Other Topics Concern    Not on file     Social History Narrative    No narrative on file       Family History   Problem Relation Age of Onset    Mental Illness Mother     Depression Mother     High Blood Pressure Father     High Cholesterol Father     Substance Abuse Brother     Breast Cancer Maternal Aunt     Breast Cancer Maternal Grandmother        Allergies:  Dicloxacillin and Pcn [penicillins]    Home Medications:  Prior to Admission medications    Medication Sig Start Date End Date Taking? Authorizing Provider   cloNIDine (CATAPRES) 0.2 MG tablet Take 0.2 mg by mouth 3 times daily    Historical Provider, MD   permethrin (ELIMITE) 5 % cream Apply topically as directed 8/17/17   Shaista Carpenter MD   triamcinolone (KENALOG) 0.025 % cream Apply Topically twice a day as needed 8/17/17   Shaista Carpenter MD   methylPREDNISolone (MEDROL, GRISELDA,) 4 MG tablet Take by mouth. 8/17/17   Shaista Carpenter MD   hydrOXYzine (ATARAX) 25 MG tablet Take 1 tablet by mouth 3 times daily as needed for Anxiety (Can have sooner than every 8 hours as long as max 3 doses per day) 8/4/17   Obdulio Flores MD   buPROPion (WELLBUTRIN XL) 150 MG extended release tablet Take 1 tablet by mouth daily 8/4/17   Obdulio Flores MD   traZODone (DESYREL) 50 MG tablet Take 1 tablet by mouth nightly as needed (Difficulty staying asleep) 8/4/17   Obdulio Flores MD   OLANZapine (ZYPREXA) 10 MG tablet Take 1 tablet by mouth nightly  Patient taking differently: Take 5 mg by mouth nightly  8/4/17   Obdulio Flores MD       REVIEW OF SYSTEMS    (2-9 systems for level 4, 10 or more for level 5)      Review of Systems   Constitutional: Negative for chills and fever. HENT: Negative for congestion and rhinorrhea.     Eyes: Negative for DISCONTD: calcium carbonate (TUMS) chewable tablet 500 mg    diphenhydrAMINE (BENADRYL) injection 25 mg    calcium carbonate (TUMS) chewable tablet 500 mg    0.9 % sodium chloride bolus    promethazine (PHENERGAN) injection 12.5 mg    diphenhydrAMINE (BENADRYL) injection 25 mg     DIAGNOSTIC RESULTS / EMERGENCY DEPARTMENT COURSE / MDM     LABS:  Results for orders placed or performed during the hospital encounter of 10/21/38   Basic Metabolic Panel   Result Value Ref Range    Glucose 135 (H) 70 - 99 mg/dL    BUN 11 6 - 20 mg/dL    CREATININE 0.70 0.70 - 1.20 mg/dL    Bun/Cre Ratio NOT REPORTED 9 - 20    Calcium 9.8 8.6 - 10.4 mg/dL    Sodium 140 135 - 144 mmol/L    Potassium 3.6 (L) 3.7 - 5.3 mmol/L    Chloride 96 (L) 98 - 107 mmol/L    CO2 27 20 - 31 mmol/L    Anion Gap 17 9 - 17 mmol/L    GFR Non-African American >60 >60 mL/min    GFR African American >60 >60 mL/min    GFR Comment          GFR Staging NOT REPORTED    CBC Auto Differential   Result Value Ref Range    WBC 9.1 3.5 - 11.0 k/uL    RBC 5.57 4.5 - 5.9 m/uL    Hemoglobin 17.4 13.5 - 17.5 g/dL    Hematocrit 49.4 41 - 53 %    MCV 88.6 80 - 100 fL    MCH 31.2 26 - 34 pg    MCHC 35.2 31 - 37 g/dL    RDW 14.3 11.5 - 14.9 %    Platelets 310 848 - 959 k/uL    MPV 7.4 6.0 - 12.0 fL    Differential Type NOT REPORTED     Immature Granulocytes NOT REPORTED 0 %    Absolute Immature Granulocyte NOT REPORTED 0.00 - 0.30 k/uL    WBC Morphology NOT REPORTED     RBC Morphology NOT REPORTED     Platelet Estimate NOT REPORTED     Seg Neutrophils 47 36 - 66 %    Lymphocytes 45 (H) 24 - 44 %    Atypical Lymphocytes 2 %    Monocytes 6 1 - 7 %    Eosinophils % 0 0 - 4 %    Basophils 0 0 - 2 %    Segs Absolute 4.27 1.3 - 9.1 k/uL    Absolute Lymph # 4.10 1.0 - 4.8 k/uL    Atypical Lymphocytes Absolute 0.18 k/uL    Absolute Mono # 0.55 0.1 - 1.3 k/uL    Absolute Eos # 0.00 0.0 - 0.4 k/uL    Basophils # 0.00 0.0 - 0.2 k/uL    Morphology Normal    Hepatic Function Panel

## 2017-12-26 PROBLEM — F31.9 BIPOLAR AFFECTIVE DISORDER (HCC): Status: ACTIVE | Noted: 2017-12-26

## 2017-12-26 PROCEDURE — 1240000000 HC EMOTIONAL WELLNESS R&B

## 2017-12-26 PROCEDURE — 6370000000 HC RX 637 (ALT 250 FOR IP): Performed by: PSYCHIATRY & NEUROLOGY

## 2017-12-26 RX ORDER — BUPROPION HYDROCHLORIDE 150 MG/1
150 TABLET, EXTENDED RELEASE ORAL DAILY
Status: DISCONTINUED | OUTPATIENT
Start: 2017-12-26 | End: 2017-12-28

## 2017-12-26 RX ORDER — M-VIT,TX,IRON,MINS/CALC/FOLIC 27MG-0.4MG
1 TABLET ORAL DAILY
Status: DISCONTINUED | OUTPATIENT
Start: 2017-12-26 | End: 2017-12-30 | Stop reason: HOSPADM

## 2017-12-26 RX ORDER — TRAZODONE HYDROCHLORIDE 50 MG/1
50 TABLET ORAL NIGHTLY PRN
Status: DISCONTINUED | OUTPATIENT
Start: 2017-12-26 | End: 2017-12-30 | Stop reason: HOSPADM

## 2017-12-26 RX ORDER — MAGNESIUM HYDROXIDE/ALUMINUM HYDROXICE/SIMETHICONE 120; 1200; 1200 MG/30ML; MG/30ML; MG/30ML
30 SUSPENSION ORAL 2 TIMES DAILY PRN
Status: DISCONTINUED | OUTPATIENT
Start: 2017-12-26 | End: 2017-12-30 | Stop reason: HOSPADM

## 2017-12-26 RX ORDER — HYDROXYZINE HYDROCHLORIDE 25 MG/1
25 TABLET, FILM COATED ORAL 3 TIMES DAILY PRN
Status: DISCONTINUED | OUTPATIENT
Start: 2017-12-26 | End: 2017-12-30 | Stop reason: HOSPADM

## 2017-12-26 RX ORDER — CHLORDIAZEPOXIDE HYDROCHLORIDE 25 MG/1
25 CAPSULE, GELATIN COATED ORAL EVERY 4 HOURS PRN
Status: DISPENSED | OUTPATIENT
Start: 2017-12-26 | End: 2017-12-29

## 2017-12-26 RX ORDER — HALOPERIDOL 5 MG/ML
5 INJECTION INTRAMUSCULAR 3 TIMES DAILY PRN
Status: DISCONTINUED | OUTPATIENT
Start: 2017-12-26 | End: 2017-12-30 | Stop reason: HOSPADM

## 2017-12-26 RX ORDER — FOLIC ACID 1 MG/1
1 TABLET ORAL DAILY
Status: DISCONTINUED | OUTPATIENT
Start: 2017-12-26 | End: 2017-12-30 | Stop reason: HOSPADM

## 2017-12-26 RX ORDER — CLONIDINE HYDROCHLORIDE 0.2 MG/1
0.2 TABLET ORAL
COMMUNITY
End: 2018-12-06 | Stop reason: ALTCHOICE

## 2017-12-26 RX ORDER — ONDANSETRON 4 MG/1
4 TABLET, FILM COATED ORAL 2 TIMES DAILY PRN
Status: DISCONTINUED | OUTPATIENT
Start: 2017-12-26 | End: 2017-12-30 | Stop reason: HOSPADM

## 2017-12-26 RX ORDER — OLANZAPINE 10 MG/1
10 TABLET ORAL NIGHTLY
Status: DISCONTINUED | OUTPATIENT
Start: 2017-12-26 | End: 2017-12-28

## 2017-12-26 RX ORDER — ACETAMINOPHEN 325 MG/1
650 TABLET ORAL EVERY 6 HOURS PRN
Status: DISCONTINUED | OUTPATIENT
Start: 2017-12-26 | End: 2017-12-30 | Stop reason: HOSPADM

## 2017-12-26 RX ORDER — BENZTROPINE MESYLATE 1 MG/ML
2 INJECTION INTRAMUSCULAR; INTRAVENOUS DAILY PRN
Status: DISCONTINUED | OUTPATIENT
Start: 2017-12-26 | End: 2017-12-30 | Stop reason: HOSPADM

## 2017-12-26 RX ORDER — THIAMINE MONONITRATE (VIT B1) 100 MG
100 TABLET ORAL DAILY
Status: DISCONTINUED | OUTPATIENT
Start: 2017-12-26 | End: 2017-12-30 | Stop reason: HOSPADM

## 2017-12-26 RX ORDER — LORAZEPAM 1 MG/1
1 TABLET ORAL EVERY 6 HOURS PRN
Status: COMPLETED | OUTPATIENT
Start: 2017-12-26 | End: 2017-12-27

## 2017-12-26 RX ORDER — ONDANSETRON 2 MG/ML
4 INJECTION INTRAMUSCULAR; INTRAVENOUS 2 TIMES DAILY PRN
Status: DISCONTINUED | OUTPATIENT
Start: 2017-12-26 | End: 2017-12-30 | Stop reason: HOSPADM

## 2017-12-26 RX ORDER — HALOPERIDOL 5 MG
5 TABLET ORAL 3 TIMES DAILY PRN
Status: DISCONTINUED | OUTPATIENT
Start: 2017-12-26 | End: 2017-12-30 | Stop reason: HOSPADM

## 2017-12-26 RX ORDER — FAMOTIDINE 20 MG/1
20 TABLET, FILM COATED ORAL 2 TIMES DAILY
Status: COMPLETED | OUTPATIENT
Start: 2017-12-26 | End: 2017-12-28

## 2017-12-26 RX ADMIN — NICOTINE POLACRILEX 2 MG: 2 GUM, CHEWING BUCCAL at 13:29

## 2017-12-26 RX ADMIN — ALUMINUM HYDROXIDE, MAGNESIUM HYDROXIDE, AND SIMETHICONE 30 ML: 200; 200; 20 SUSPENSION ORAL at 10:14

## 2017-12-26 RX ADMIN — NICOTINE POLACRILEX 2 MG: 2 GUM, CHEWING BUCCAL at 19:11

## 2017-12-26 RX ADMIN — OLANZAPINE 10 MG: 10 TABLET, FILM COATED ORAL at 21:17

## 2017-12-26 RX ADMIN — FOLIC ACID 1 MG: 1 TABLET ORAL at 09:02

## 2017-12-26 RX ADMIN — CHLORDIAZEPOXIDE HYDROCHLORIDE 25 MG: 25 CAPSULE ORAL at 10:02

## 2017-12-26 RX ADMIN — HYDROXYZINE HYDROCHLORIDE 25 MG: 25 TABLET, FILM COATED ORAL at 21:17

## 2017-12-26 RX ADMIN — TRAZODONE HYDROCHLORIDE 50 MG: 50 TABLET ORAL at 21:17

## 2017-12-26 RX ADMIN — NICOTINE POLACRILEX 2 MG: 2 GUM, CHEWING BUCCAL at 21:17

## 2017-12-26 RX ADMIN — BUPROPION HYDROCHLORIDE 150 MG: 150 TABLET, FILM COATED, EXTENDED RELEASE ORAL at 09:02

## 2017-12-26 RX ADMIN — CHLORDIAZEPOXIDE HYDROCHLORIDE 25 MG: 25 CAPSULE ORAL at 02:16

## 2017-12-26 RX ADMIN — LORAZEPAM 1 MG: 1 TABLET ORAL at 21:17

## 2017-12-26 RX ADMIN — LORAZEPAM 1 MG: 1 TABLET ORAL at 02:16

## 2017-12-26 RX ADMIN — MULTIPLE VITAMINS W/ MINERALS TAB 1 TABLET: TAB at 09:02

## 2017-12-26 RX ADMIN — FAMOTIDINE 20 MG: 20 TABLET ORAL at 02:18

## 2017-12-26 RX ADMIN — NICOTINE POLACRILEX 2 MG: 2 GUM, CHEWING BUCCAL at 17:53

## 2017-12-26 RX ADMIN — FAMOTIDINE 20 MG: 20 TABLET ORAL at 09:02

## 2017-12-26 RX ADMIN — FAMOTIDINE 20 MG: 20 TABLET ORAL at 21:17

## 2017-12-26 RX ADMIN — NICOTINE POLACRILEX 2 MG: 2 GUM, CHEWING BUCCAL at 15:17

## 2017-12-26 RX ADMIN — CHLORDIAZEPOXIDE HYDROCHLORIDE 25 MG: 25 CAPSULE ORAL at 14:19

## 2017-12-26 RX ADMIN — ALUMINUM HYDROXIDE, MAGNESIUM HYDROXIDE, AND SIMETHICONE 30 ML: 200; 200; 20 SUSPENSION ORAL at 21:17

## 2017-12-26 RX ADMIN — Medication 100 MG: at 09:02

## 2017-12-26 RX ADMIN — HYDROXYZINE HYDROCHLORIDE 25 MG: 25 TABLET, FILM COATED ORAL at 02:16

## 2017-12-26 RX ADMIN — CHLORDIAZEPOXIDE HYDROCHLORIDE 25 MG: 25 CAPSULE ORAL at 18:22

## 2017-12-26 ASSESSMENT — SLEEP AND FATIGUE QUESTIONNAIRES
DO YOU HAVE DIFFICULTY SLEEPING: NO
AVERAGE NUMBER OF SLEEP HOURS: 8
DO YOU USE A SLEEP AID: NO

## 2017-12-26 ASSESSMENT — LIFESTYLE VARIABLES
HISTORY_ALCOHOL_USE: YES
HISTORY_ALCOHOL_USE: YES

## 2017-12-26 ASSESSMENT — PATIENT HEALTH QUESTIONNAIRE - PHQ9: SUM OF ALL RESPONSES TO PHQ QUESTIONS 1-9: 12

## 2017-12-26 NOTE — BH NOTE
[facility] staff present to transport patient to [facility]. INT to right anticub removed. Dry dressing applied.

## 2017-12-26 NOTE — ED NOTES
Writer talking to pt about his concerns about discharge. Pt tearful and very upset. Pt stated to writer \"No one is helping me. I am sick and suicidal and no one is listening to me. \" Writer explained that  and doctor evaluated and did not believe he had criteria for admission. Pt then stated \"If you discharge me then I will run out in traffic or I got new razors that I will use to kill myself. \" Writer notified Dr. Maryellen Pinzon. Pt medically cleared and pt being moved to [facility] for further evaluation.          Noemy Mcgraw RN  12/25/17 3100

## 2017-12-26 NOTE — PROGRESS NOTES
Psychiatric Admission Note            Referred by ED       Chief Complaint;                          can not stop drinking and unable to contract for safety \"i was thinking bad things like cutting myself\"    HX of present illness[de-identified]    Pt is a 40year old  male who presents to the ED for help with alcohol detox and rehab and he said he his friend to take him to ED because he was not feeling good physically but actually he was \"thinking bad things like cutting himself\". Pt is linked with [facility] and is not taking any psychiatric medications. Pt reports that he is working and has income. Pt reports a history of Bi-Polar Disorder. Pt is homeless. Pt reports that he drinks vodka and beer on a daily basis. . Pt denies homicidal ideations. Pt denies all hallucinations. Pt denies issues with drug use. Pt wants help to stop drinking. Rivas Steel is a 40 y.o. male who presented with general plan to harm self. . The patient also presents with feelings of being down, depressed or hopelessness. , sleep disturbance difficulty getting to sleep and feelings of guilt, worthlessness or loss of self confidence. racing thoughts and mood swings, and no hallucinations were present. Allergies:  Dicloxacillin and Pcn [penicillins]       History of Substance Abuse:    Patient did  present with substance use, being positive for  alcohol . Past Psychiatric History:     Patient admits to past psychiatric treatment. Family History of psychiatric disorders:    Family history: negative mood disorders. Medical History:     Past Medical History:   Diagnosis Date    [address]ism [address]     Anxiety     Depression     Headache     Hepatitis C     pt states he tested + for antibodies.  History of atrial fibrillation     pt states after a suicide attempt, overdose oxycontin pt developed atrial fib x 5-6 months.      Mental and behavioral disorders d/t use of alcohol, acute intoxication ([facility]    

## 2017-12-26 NOTE — BH NOTE
`[facility]  Admission Note     Admission Type:   Admission Type: Voluntary    Reason for admission:  Reason for Admission: SI to cut self with a razor    PATIENT STRENGTHS:  Strengths: Communication, Positive Support    Patient Strengths and Limitations:  Limitations: Inappropriate/potentially harmful leisure interests    Addictive Behavior:   Addictive Behavior  In the past 3 months, have you felt or has someone told you that you have a problem with:  : None  Do you have a history of Chemical Use?: No  Do you have a history of Alcohol Use?: Yes  Do you have a history of Street Drug Abuse?: No  Histroy of Prescripton Drug Abuse?: No    Medical Problems:   Past Medical History:   Diagnosis Date    [address] [address]     Anxiety     Depression     Headache     Hepatitis C     pt states he tested + for antibodies.  History of atrial fibrillation     pt states after a suicide attempt, overdose oxycontin pt developed atrial fib x 5-6 months.      Mental and behavioral disorders d/t use of alcohol, acute intoxication ([facility]     Palpitations     Seizures ([facility])     during alcohol detox        Status EXAM:  Status and Exam  Normal: No  Facial Expression: Flat  Affect: Appropriate  Level of Consciousness: Alert  Mood:Normal: No  Mood: Depressed, Anxious  Motor Activity:Normal: Yes  Interview Behavior: Cooperative  Preception: [place] to Person, Gemma Ao to Time, [place] to Place, [place] to Situation  Attention:Normal: Yes  Thought Processes: Circumstantial  Thought Content:Normal: No  Thought Content: Preoccupations  Hallucinations: None  Delusions: No  Memory:Normal: Yes  Insight and Judgment: No  Insight and Judgment: Poor Judgment, Poor Insight  Present Suicidal Ideation: Yes  Present Homicidal Ideation: No    Tobacco Screening:  Practical Counseling, on admission, esthela X, if applicable and completed (first 3 are required if patient doesn't refuse):            ( )  Recognizing danger situations

## 2017-12-26 NOTE — CARE COORDINATION
BHI Biopsychosocial Assessment    Current Level of Psychosocial Functioning     Independent   Dependent    Minimal Assist X    Comments:    Psychosocial High Risk Factors (check all that apply)    Unable to obtain meds   Chronic illness/pain    Substance abuse X  Lack of Family Support X  Financial stress   Isolation X  Inadequate Community Resources  Suicide attempt(s)  Not taking medications X  Victim of crime   Developmental Delay  Unable to manage personal needs    Age 72 or older    Homeless X  No transportation   Readmission within 30 days  Unemployment  Traumatic Event    Psychiatric Advanced Directives: none reported     Family to Involve in Treatment: lack of family support    Sexual Orientation:  CORNELIO    Patient Strengths: Pt is linked with [facility] for mental health treatment, has income, insurance    Patient Barriers: history of ETOH abuse, non-compliant with medications, homeless, poor judgement, insight, and coping skills. Presenting on admission with suicidal ideation with a plan to cut himself or run into traffic. Opiate Education Provided: N/A PT does not have any documented history of Heroin or opiate use. CMHC/mental health history: PT is linked with [facility] for mental Health treatment. Plan of Care   medication management, group/individual therapies, family meetings, psycho -education, treatment team meetings to assist with stabilization, referral to community resources. Initial Discharge Plan:  PT reports a plan to explore treatment centers and sober living facilities. SW will assist PT in exploring substance abuse treatment. Clinical Summary:  Pt is a 40year old  male who presents to the ED for help with alcohol detox and rehab. Pt is linked with [facility] and is not taking any psychiatric medications. Pt reports that he is working and has income. Pt reports a history of Bi-Polar Disorder. Pt is homeless. Pt reports that he drinks vodka and beer on a daily basis.   Pt denies suicidal ideations. Pt denies homicidal ideations. Pt denies all hallucinations. Pt denies issues with drug use. Pt wants help to stop drinking. Patient reports anxiety and depression 10/10. Tearful at times. Reports recent relapse of alcohol use. States he was living with a friend while waiting for a sober living place. States if he is discharged tonight, \"I will just go home and drink and then do something stupid. I did just buy a bunch of new razors and I think I will use them to just slit my wrists and throat. \" Denies auditory and visual hallucinations at this time. Calm and cooperative during 1:1 interview. Dr. Glory Reynolds called. Admit order given per Dr. Glory Reynolds. Beena Shah notified. JOANN MOMIN [facility] called for report.  Patient aware of admit status and signs in voluntary.

## 2017-12-26 NOTE — CARE COORDINATION
Brief Intervention and Referral to Treatment Summary    Patient was provided PHQ-9, AUDIT and DAST Screening:      PHQ-9 Score:  AUDIT Score: 16 harmful Brief Intervention/ Brief treatment  DAST Score:    Patients substance use is considered    Low Risk/Healthy  Risk  Harmful X AUDIT  Dependent    Patients depression is considered:    Minimal  Mild   Moderate X  Moderately Severe  Severe    Brief Education was provided: CORNELIO    Patient was receptive  Patient was not receptive      Brief Intervention Is Provided (Only for AUDIT or DAST, there is no brief intervention for Depression) CORNELIO    Patient reports readiness to decrease and/or stop use and a plan was discussed   Patient denies readiness to decrease and/or stop use and a plan was not discussed      Recommendations/Referrals for Brief and/or Specialized Treatment Provided to Patient  SW will provide education and intervention as needed and assist PT with exploring substance abuse treatment centers.

## 2017-12-26 NOTE — BH NOTE
Psychoeducation Group Note    Date: 12/26/17  Start Time: 1340  End Time: 1420    Number Participants in Group:  7/13    Goal:  Patient will demonstrate increased interpersonal interaction   Topic: cognitive skills group: communication    Discipline Responsible:   OT  AT  [facility]. x RT  Other       Participation Level:     None  Minimal   x Active Listener x Interactive    Monopolizing         Participation Quality:   Appropriate  Inappropriate   x       Attentive        Intrusive   x       Sharing        Resistant   x       Supportive        Lethargic       Affective:   x Congruent  Incongruent  Blunted  Flat    Constricted  Anxious  Elated  Angry    Labile  Depressed  Other x bright       Cognitive:  x Alert x Oriented PPTP     Concentration x G  F  P   Attention Span x G  F  P   Short-Term Memory  G  F  P   Long-Term Memory  G  F  P   ProblemSolving/  Decision Making  G x F  P   Ability to Process  Information x G  F  P      Contributing Factors             Delusional             Hallucinating             Flight of Ideas             Other:       Modes of Intervention:  x Education x Support x Exploration   x Clarifying x Problem Solving  Confrontation   x Socialization  Limit Setting  Reality Testing   x Activity  Movement  Media    Other:            Response to Learning:  x Able to verbalize current knowledge/experience   x Able to verbalize/acknowledge new learning   x Able to retain information    Capable of insight    Able to change behavior   x Progressing to goal    Other:        Comments:

## 2017-12-26 NOTE — BH NOTE

## 2017-12-27 LAB
ABSOLUTE EOS #: 0.2 K/UL (ref 0–0.4)
ABSOLUTE IMMATURE GRANULOCYTE: ABNORMAL K/UL (ref 0–0.3)
ABSOLUTE LYMPH #: 2.3 K/UL (ref 1–4.8)
ABSOLUTE MONO #: 0.7 K/UL (ref 0.1–1.3)
ALBUMIN SERPL-MCNC: 3.9 G/DL (ref 3.5–5.2)
ALBUMIN/GLOBULIN RATIO: ABNORMAL (ref 1–2.5)
ALP BLD-CCNC: 65 U/L (ref 40–129)
ALT SERPL-CCNC: 16 U/L (ref 5–41)
ANION GAP SERPL CALCULATED.3IONS-SCNC: 12 MMOL/L (ref 9–17)
AST SERPL-CCNC: 19 U/L
BASOPHILS # BLD: 0 % (ref 0–2)
BASOPHILS ABSOLUTE: 0 K/UL (ref 0–0.2)
BILIRUB SERPL-MCNC: 1.79 MG/DL (ref 0.3–1.2)
BUN BLDV-MCNC: 13 MG/DL (ref 6–20)
BUN/CREAT BLD: ABNORMAL (ref 9–20)
CALCIUM SERPL-MCNC: 8.5 MG/DL (ref 8.6–10.4)
CHLORIDE BLD-SCNC: 100 MMOL/L (ref 98–107)
CO2: 26 MMOL/L (ref 20–31)
CREAT SERPL-MCNC: 0.82 MG/DL (ref 0.7–1.2)
DIFFERENTIAL TYPE: ABNORMAL
EOSINOPHILS RELATIVE PERCENT: 3 % (ref 0–4)
GFR AFRICAN AMERICAN: >60 ML/MIN
GFR NON-AFRICAN AMERICAN: >60 ML/MIN
GFR SERPL CREATININE-BSD FRML MDRD: ABNORMAL ML/MIN/{1.73_M2}
GFR SERPL CREATININE-BSD FRML MDRD: ABNORMAL ML/MIN/{1.73_M2}
GLUCOSE BLD-MCNC: 112 MG/DL (ref 70–99)
HCT VFR BLD CALC: 43.4 % (ref 41–53)
HEMOGLOBIN: 15 G/DL (ref 13.5–17.5)
IMMATURE GRANULOCYTES: ABNORMAL %
LYMPHOCYTES # BLD: 37 % (ref 24–44)
MCH RBC QN AUTO: 30.9 PG (ref 26–34)
MCHC RBC AUTO-ENTMCNC: 34.5 G/DL (ref 31–37)
MCV RBC AUTO: 89.6 FL (ref 80–100)
MONOCYTES # BLD: 12 % (ref 1–7)
PDW BLD-RTO: 13.9 % (ref 11.5–14.9)
PLATELET # BLD: 164 K/UL (ref 150–450)
PLATELET ESTIMATE: ABNORMAL
PMV BLD AUTO: 7.2 FL (ref 6–12)
POTASSIUM SERPL-SCNC: 3.7 MMOL/L (ref 3.7–5.3)
RBC # BLD: 4.84 M/UL (ref 4.5–5.9)
RBC # BLD: ABNORMAL 10*6/UL
SEG NEUTROPHILS: 48 % (ref 36–66)
SEGMENTED NEUTROPHILS ABSOLUTE COUNT: 3 K/UL (ref 1.3–9.1)
SODIUM BLD-SCNC: 138 MMOL/L (ref 135–144)
TOTAL PROTEIN: 6.7 G/DL (ref 6.4–8.3)
WBC # BLD: 6.1 K/UL (ref 3.5–11)
WBC # BLD: ABNORMAL 10*3/UL

## 2017-12-27 PROCEDURE — 80053 COMPREHEN METABOLIC PANEL: CPT

## 2017-12-27 PROCEDURE — 85025 COMPLETE CBC W/AUTO DIFF WBC: CPT

## 2017-12-27 PROCEDURE — 36415 COLL VENOUS BLD VENIPUNCTURE: CPT

## 2017-12-27 PROCEDURE — 1240000000 HC EMOTIONAL WELLNESS R&B

## 2017-12-27 PROCEDURE — 6370000000 HC RX 637 (ALT 250 FOR IP): Performed by: PSYCHIATRY & NEUROLOGY

## 2017-12-27 RX ADMIN — FOLIC ACID 1 MG: 1 TABLET ORAL at 08:56

## 2017-12-27 RX ADMIN — LORAZEPAM 1 MG: 1 TABLET ORAL at 12:35

## 2017-12-27 RX ADMIN — NICOTINE POLACRILEX 2 MG: 2 GUM, CHEWING BUCCAL at 22:11

## 2017-12-27 RX ADMIN — NICOTINE POLACRILEX 2 MG: 2 GUM, CHEWING BUCCAL at 12:34

## 2017-12-27 RX ADMIN — HYDROXYZINE HYDROCHLORIDE 25 MG: 25 TABLET, FILM COATED ORAL at 22:07

## 2017-12-27 RX ADMIN — TRAZODONE HYDROCHLORIDE 50 MG: 50 TABLET ORAL at 22:07

## 2017-12-27 RX ADMIN — MULTIPLE VITAMINS W/ MINERALS TAB 1 TABLET: TAB at 08:56

## 2017-12-27 RX ADMIN — CHLORDIAZEPOXIDE HYDROCHLORIDE 25 MG: 25 CAPSULE ORAL at 13:31

## 2017-12-27 RX ADMIN — FAMOTIDINE 20 MG: 20 TABLET ORAL at 09:00

## 2017-12-27 RX ADMIN — FAMOTIDINE 20 MG: 20 TABLET ORAL at 22:07

## 2017-12-27 RX ADMIN — CHLORDIAZEPOXIDE HYDROCHLORIDE 25 MG: 25 CAPSULE ORAL at 17:51

## 2017-12-27 RX ADMIN — BUPROPION HYDROCHLORIDE 150 MG: 150 TABLET, FILM COATED, EXTENDED RELEASE ORAL at 08:56

## 2017-12-27 RX ADMIN — CHLORDIAZEPOXIDE HYDROCHLORIDE 25 MG: 25 CAPSULE ORAL at 22:07

## 2017-12-27 RX ADMIN — OLANZAPINE 10 MG: 10 TABLET, FILM COATED ORAL at 22:07

## 2017-12-27 RX ADMIN — NICOTINE POLACRILEX 2 MG: 2 GUM, CHEWING BUCCAL at 19:19

## 2017-12-27 RX ADMIN — Medication 100 MG: at 08:56

## 2017-12-27 RX ADMIN — CHLORDIAZEPOXIDE HYDROCHLORIDE 25 MG: 25 CAPSULE ORAL at 08:56

## 2017-12-28 PROCEDURE — 6370000000 HC RX 637 (ALT 250 FOR IP): Performed by: PSYCHIATRY & NEUROLOGY

## 2017-12-28 PROCEDURE — 6360000002 HC RX W HCPCS: Performed by: PSYCHIATRY & NEUROLOGY

## 2017-12-28 PROCEDURE — 1240000000 HC EMOTIONAL WELLNESS R&B

## 2017-12-28 RX ORDER — GABAPENTIN 300 MG/1
300 CAPSULE ORAL 3 TIMES DAILY
Status: DISCONTINUED | OUTPATIENT
Start: 2017-12-28 | End: 2017-12-30 | Stop reason: HOSPADM

## 2017-12-28 RX ORDER — BUPROPION HYDROCHLORIDE 100 MG/1
200 TABLET, EXTENDED RELEASE ORAL DAILY
Status: DISCONTINUED | OUTPATIENT
Start: 2017-12-29 | End: 2017-12-30 | Stop reason: HOSPADM

## 2017-12-28 RX ORDER — LOPERAMIDE HYDROCHLORIDE 2 MG/1
2 CAPSULE ORAL 4 TIMES DAILY PRN
Status: DISCONTINUED | OUTPATIENT
Start: 2017-12-28 | End: 2017-12-30 | Stop reason: HOSPADM

## 2017-12-28 RX ORDER — OLANZAPINE 15 MG/1
15 TABLET ORAL NIGHTLY
Status: DISCONTINUED | OUTPATIENT
Start: 2017-12-28 | End: 2017-12-30 | Stop reason: HOSPADM

## 2017-12-28 RX ADMIN — HALOPERIDOL 5 MG: 5 TABLET ORAL at 20:36

## 2017-12-28 RX ADMIN — HYDROXYZINE HYDROCHLORIDE 25 MG: 25 TABLET, FILM COATED ORAL at 22:20

## 2017-12-28 RX ADMIN — GABAPENTIN 300 MG: 300 CAPSULE ORAL at 14:19

## 2017-12-28 RX ADMIN — NICOTINE POLACRILEX 2 MG: 2 GUM, CHEWING BUCCAL at 20:36

## 2017-12-28 RX ADMIN — ONDANSETRON HYDROCHLORIDE 4 MG: 4 TABLET, FILM COATED ORAL at 00:42

## 2017-12-28 RX ADMIN — NICOTINE POLACRILEX 2 MG: 2 GUM, CHEWING BUCCAL at 14:19

## 2017-12-28 RX ADMIN — LOPERAMIDE HYDROCHLORIDE 2 MG: 2 CAPSULE ORAL at 17:31

## 2017-12-28 RX ADMIN — LOPERAMIDE HYDROCHLORIDE 2 MG: 2 CAPSULE ORAL at 12:52

## 2017-12-28 RX ADMIN — HYDROXYZINE HYDROCHLORIDE 25 MG: 25 TABLET, FILM COATED ORAL at 15:52

## 2017-12-28 RX ADMIN — MULTIPLE VITAMINS W/ MINERALS TAB 1 TABLET: TAB at 09:15

## 2017-12-28 RX ADMIN — TRAZODONE HYDROCHLORIDE 50 MG: 50 TABLET ORAL at 22:25

## 2017-12-28 RX ADMIN — CHLORDIAZEPOXIDE HYDROCHLORIDE 25 MG: 25 CAPSULE ORAL at 09:15

## 2017-12-28 RX ADMIN — BUPROPION HYDROCHLORIDE 150 MG: 150 TABLET, FILM COATED, EXTENDED RELEASE ORAL at 09:15

## 2017-12-28 RX ADMIN — NICOTINE POLACRILEX 2 MG: 2 GUM, CHEWING BUCCAL at 22:20

## 2017-12-28 RX ADMIN — CHLORDIAZEPOXIDE HYDROCHLORIDE 25 MG: 25 CAPSULE ORAL at 20:36

## 2017-12-28 RX ADMIN — NICOTINE POLACRILEX 2 MG: 2 GUM, CHEWING BUCCAL at 17:31

## 2017-12-28 RX ADMIN — FAMOTIDINE 20 MG: 20 TABLET ORAL at 09:15

## 2017-12-28 RX ADMIN — HYDROXYZINE HYDROCHLORIDE 25 MG: 25 TABLET, FILM COATED ORAL at 09:15

## 2017-12-28 RX ADMIN — GABAPENTIN 300 MG: 300 CAPSULE ORAL at 22:20

## 2017-12-28 RX ADMIN — CHLORDIAZEPOXIDE HYDROCHLORIDE 25 MG: 25 CAPSULE ORAL at 14:19

## 2017-12-28 RX ADMIN — OLANZAPINE 15 MG: 15 TABLET, FILM COATED ORAL at 22:20

## 2017-12-28 RX ADMIN — FOLIC ACID 1 MG: 1 TABLET ORAL at 09:15

## 2017-12-28 RX ADMIN — Medication 100 MG: at 09:15

## 2017-12-29 PROCEDURE — 1240000000 HC EMOTIONAL WELLNESS R&B

## 2017-12-29 PROCEDURE — 6370000000 HC RX 637 (ALT 250 FOR IP): Performed by: PSYCHIATRY & NEUROLOGY

## 2017-12-29 RX ADMIN — GABAPENTIN 300 MG: 300 CAPSULE ORAL at 08:51

## 2017-12-29 RX ADMIN — NICOTINE POLACRILEX 2 MG: 2 GUM, CHEWING BUCCAL at 14:34

## 2017-12-29 RX ADMIN — BUPROPION HYDROCHLORIDE 200 MG: 100 TABLET, FILM COATED, EXTENDED RELEASE ORAL at 08:54

## 2017-12-29 RX ADMIN — HALOPERIDOL 5 MG: 5 TABLET ORAL at 19:50

## 2017-12-29 RX ADMIN — GABAPENTIN 300 MG: 300 CAPSULE ORAL at 21:39

## 2017-12-29 RX ADMIN — MULTIPLE VITAMINS W/ MINERALS TAB 1 TABLET: TAB at 08:51

## 2017-12-29 RX ADMIN — HYDROXYZINE HYDROCHLORIDE 25 MG: 25 TABLET, FILM COATED ORAL at 21:39

## 2017-12-29 RX ADMIN — Medication 100 MG: at 08:51

## 2017-12-29 RX ADMIN — FOLIC ACID 1 MG: 1 TABLET ORAL at 08:51

## 2017-12-29 RX ADMIN — TRAZODONE HYDROCHLORIDE 50 MG: 50 TABLET ORAL at 22:18

## 2017-12-29 RX ADMIN — GABAPENTIN 300 MG: 300 CAPSULE ORAL at 15:27

## 2017-12-29 RX ADMIN — HYDROXYZINE HYDROCHLORIDE 25 MG: 25 TABLET, FILM COATED ORAL at 15:30

## 2017-12-29 RX ADMIN — NICOTINE POLACRILEX 2 MG: 2 GUM, CHEWING BUCCAL at 19:50

## 2017-12-29 RX ADMIN — OLANZAPINE 15 MG: 15 TABLET, FILM COATED ORAL at 21:39

## 2017-12-29 NOTE — PLAN OF CARE
Problem: Altered Mood, Depressive Behavior  Goal: LTG-Able to verbalize and/or display a decrease in depressive symptoms  Outcome: Not Met This Shift  Psychoeducation Group Note    Date: 12/28/2017  Start Time: 1100  End Time: 1145    Number Participants in Group:  7    Goal:  Patient will demonstrate increased interpersonal interaction   Topic: Leisure/ cognitive skills    Discipline Responsible:   OT  AT  [facility]. x RT  Other       Participation Level:     None  Minimal   x Active Listener x Interactive    Monopolizing         Participation Quality:  x Appropriate  Inappropriate   x       Attentive        Intrusive   x       Sharing        Resistant          Supportive        Lethargic       Affective:   x Congruent  Incongruent  Blunted  Flat    Constricted  Anxious  Elated  Angry    Labile  Depressed  Other         Cognitive:  x Alert x Oriented PPTP     Concentration x G  F  P   Attention Span x G  F  P   Short-Term Memory x G  F  P   Long-Term Memory x G  F  P   ProblemSolving/  Decision Making x G  F  P   Ability to Process  Information x G  F  P      Contributing Factors             Delusional             Hallucinating             Flight of Ideas             Other:       Modes of Intervention:  x Education x Support x Exploration   x Clarifying x Problem Solving  Confrontation   x Socialization  Limit Setting x Reality Testing   x Activity  Movement  Media    Other:            Response to Learning:  x Able to verbalize current knowledge/experience   x Able to verbalize/acknowledge new learning   x Able to retain information    Capable of insight    Able to change behavior   x Progressing to goal    Other:        Comments:
Problem: Altered Mood, Depressive Behavior  Goal: LTG-Able to verbalize and/or display a decrease in depressive symptoms  Outcome: Ongoing  PSYCHOEDUCATION GROUP NOTE    Date: 12/26/17  Start Time: 1430  End Time: 1515    Number Participants in Group:  9    Goal:  Patient will demonstrate increased interpersonal interaction   Topic: Communication and socialization    Discipline Responsible:   OT  AT  [facility]. x RT [facility] Other       Participation Level:     None  Minimal    Active Listener x Interactive    Monopolizing         Participation Quality:  x Appropriate  Inappropriate   x       Attentive        Intrusive   x       Sharing        Resistant   x       Supportive        Lethargic       Affective:   x Congruent  Incongruent  Blunted  Flat    Constricted  Anxious  Elated  Angry    Labile  Depressed  Other         Cognitive:  x Alert x Oriented PPTP     Concentration  G x F  P   Attention Span x G  F  P   Short-Term Memory x G  F  P   Long-Term Memory x G  F  P   ProblemSolving/  Decision Making  G x F  P   Ability to Process  Information x G  F  P      Contributing Factors             Delusional             Hallucinating             Flight of Ideas             Other:       Modes of Intervention:  x Education x Support x Exploration    Clarifying x Problem Solving  Confrontation   x Socialization  Limit Setting  Reality Testing   x Activity  Movement  Media    Other:            Response to Learning:  x Able to verbalize current knowledge/experience   x Able to verbalize/acknowledge new learning   x Able to retain information   x Capable of insight   x Able to change behavior   x Progressing to goal    Other:        Comments: Pt attended group and participated.
Problem: Altered Mood, Depressive Behavior  Goal: LTG-Able to verbalize and/or display a decrease in depressive symptoms  Outcome: Ongoing  Psychoeducation Group Note    Date: 12/29/2017  Start Time: 1430  End Time: 1520    Number Participants in Group:  9    Goal:  Patient will demonstrate increased interpersonal interaction   Topic: Leisure/ cognitive skills    Discipline Responsible:   OT  AT  [facility]. x RT  Other       Participation Level:     None  Minimal   x Active Listener x Interactive    Monopolizing         Participation Quality:  x Appropriate  Inappropriate   x       Attentive        Intrusive   x       Sharing        Resistant          Supportive        Lethargic       Affective:   x Congruent  Incongruent  Blunted  Flat    Constricted  Anxious  Elated  Angry    Labile  Depressed  Other         Cognitive:  x Alert x Oriented PPTP     Concentration x G  F  P   Attention Span x G  F  P   Short-Term Memory x G  F  P   Long-Term Memory x G  F  P   ProblemSolving/  Decision Making x G  F  P   Ability to Process  Information x G  F  P      Contributing Factors             Delusional             Hallucinating             Flight of Ideas             Other:       Modes of Intervention:  x Education x Support x Exploration   x Clarifying x Problem Solving  Confrontation   x Socialization  Limit Setting x Reality Testing   x Activity  Movement  Media    Other:            Response to Learning:  x Able to verbalize current knowledge/experience   x Able to verbalize/acknowledge new learning   x Able to retain information    Capable of insight    Able to change behavior   x Progressing to goal    Other:        Comments:
Problem: Altered Mood, Depressive Behavior  Goal: LTG-Able to verbalize and/or display a decrease in depressive symptoms  Outcome: Ongoing  Pt reports high level of depression and anxiety, both at 10/10 at this time. Pt states high level of depression and anxiety is due to a news that there is no available room for him at [address] [facility],  and to check back on Sunday. Denies SI, HI, denies any hallucinations. Out in the day area, social with peers, attends groups. Pleasant and cooperative, med compliant, no behavior issues. Encouraged to discuss feelings with staff and continue to attend unit group programming. Provided feedback and reassurance as needed. Goal: STG-Able to verbalize suicidal ideations  Outcome: Ongoing  Pt denies any suicidal ideation at this time. Goal: LTG-Absence of self-harm  Outcome: Ongoing  Pt remains free of harm. Safe environment maintained. Q15 minute checks for safety continued per unit policy. Will continue to monitor for safety and provide support and reassurance as needed. Problem: Suicide risk  Goal: Provide patient with safe environment  Provide patient with safe environment   Safe environment maintained. Q15 minute checks for safety continued per unit policy. Will continue to monitor for safety and provide support and reassurance as needed.
Problem: Altered Mood, Depressive Behavior  Goal: LTG-Absence of self-harm  Outcome: Ongoing  Pt did not participate in community meeting/ goals group at [address] [place] [address] despite staff encouragement to attend.
Problem: Altered Mood, Depressive Behavior  Goal: LTG-Absence of self-harm  Outcome: Ongoing  Pt remains free from self harm
Problem: Altered Mood, Depressive Behavior  Goal: STG-Able to verbalize suicidal ideations  Outcome: Ongoing  Pt denies any suicidal thoughts this shift  Goal: LTG-Absence of self-harm  Outcome: Ongoing  Pt remains free from self harm this shift and agrees to seek staff should he start to have these thoughts.  Q 15 minutes checks continue per unit policy
Problem: Altered Mood, Depressive Behavior  Intervention: Environmental safety management-behavioral health  Environment maintained for safety, non slip socks in place, visual checks maintained. Goal: LTG-Absence of self-harm  Outcome: Ongoing  Pt denies thoughts of self harm at this time, pt denies A/V hallucinations at this time. continues to report increase anxiety worrying about living situation and maintaining sobriety. Encouraged to express feelings during 1:1 and as needed. Pt is med compliant. Pt reports being able to eat more this morning, and is able to maintain fluid intake. Pt sleeping better. Appropriate ADLs. q15 minute safety checks maintained.
Problem: Altered Mood, Depressive Behavior  Intervention: Environmental safety management-behavioral health  Non slip socks in place, environment maintained for safety. Visual checks maintained. Intervention: Medication intake supervison-behavioral health  Medications tolerated is ordered. Goal: LTG-Absence of self-harm  Outcome: Ongoing  Pt reports to have fleeting thoughts of self harm, no plan. Remains flat and evasive during 1:1, reports upset about alcohol , and not taking medications and missing appointments. Instructed on and encouraged pt to participate in programming. Instructed on maintaining safety and seeking help when needed. Pt verbalizes understanding.
Problem: Altered Mood, Depressive Behavior  Intervention: Group therapy participation per treatment plan to increase activity level  Pt declined to attend psychotherapy at 1000 am despite encouragement. Pt offered 1:1 and refused.
Time, [place] to Place, [place] to Situation  Attention:Normal: No  Attention: Distractible  Thought Processes: Circumstantial  Thought Content:Normal: No  Thought Content: Preoccupations  Hallucinations: None  Delusions: No  Memory:Normal: No  Memory: Poor Recent, Poor Remote  Insight and Judgment: No  Insight and Judgment: Poor Judgment, Poor Insight  Present Suicidal Ideation: No  Present Homicidal Ideation: No    Daily Assessment Last Entry:   Daily Sleep (WDL): Within Defined Limits         Patient Currently in Pain: Denies  Daily Nutrition (WDL): Within Defined Limits    Patient Monitoring:  Frequency of Checks: 4 times per hour, close    Psychiatric Symptoms:   Depression Symptoms  Depression Symptoms: Impaired concentration  Anxiety Symptoms  Anxiety Symptoms: Generalized  Gunjan Symptoms  Gunjan Symptoms: No problems reported or observed. Psychosis Symptoms  Delusion Type: No problems reported or observed. Suicide Risk CSSR-S:  1) Within the past month, have you wished you were dead or wished you could go to sleep and not wake up? : YES  2) Within the past month, have you actually had any thoughts of killing yourself? : YES  3) Within the past month, have you been thinking about how you might kill yourself? : YES  5) Within the past month, have you started to work out or worked out the details of how to kill yourself?  Do you intend to carry out this plan? : NO  6)  Have you ever done anything, started to do anything, or prepared to do anything to end your life?: YES  Change in Result  Change in Plan of care       EDUCATION:   EDUCATION:   Learner Progress Toward Treatment Goals: Reviewed results and recommendations of this team, Reviewed group plan and strategies, Reviewed signs, symptoms and risk of self harm and violent behavior, Reviewed goals and plan of care    Method:small group, individual verbal education    Outcome:verbalized by patient, but needs reinforcement to obtain goals    PATIENT

## 2017-12-29 NOTE — PROGRESS NOTES
Psychiatry Progress  Note     CHIEF  COMPLAINT     Bipolar affective disorder ([facility]        SUBJECT FRANCISCO AND OBJECT FRANCISCO:    Lay Mendez is presenting with persistant depressed moods and less depressed mood than yesterday moods fluctuating. with no hallucinations and thoughts are goal directed. The symptoms still   present are marked in severity    . ASSESSMENT AND PLAN    Symptoms of illness and medications  discussed with pt and support provided. Progress of treatment discussed with staff     Encouraged to participate in activies and groups. His   Bipolar affective disorder ([facility]  is gradually improving.        gabapentin  300 mg Oral TID    OLANZapine  15 mg Oral Nightly    buPROPion  200 mg Oral Daily    vitamin B-1  100 mg Oral Daily    folic acid  1 mg Oral Daily    therapeutic multivitamin-minerals  1 tablet Oral Daily       loperamide, ondansetron **OR** ondansetron, acetaminophen, traZODone, benztropine mesylate, magnesium hydroxide, aluminum & magnesium hydroxide-simethicone, nicotine polacrilex, haloperidol lactate **OR** haloperidol, hydrOXYzine       Genoveva Lopez MD  Psychiatry

## 2017-12-30 VITALS
OXYGEN SATURATION: 96 % | DIASTOLIC BLOOD PRESSURE: 89 MMHG | TEMPERATURE: 97.5 F | RESPIRATION RATE: 16 BRPM | WEIGHT: 190 LBS | BODY MASS INDEX: 28.14 KG/M2 | SYSTOLIC BLOOD PRESSURE: 131 MMHG | HEART RATE: 81 BPM | HEIGHT: 69 IN

## 2017-12-30 PROCEDURE — 6370000000 HC RX 637 (ALT 250 FOR IP): Performed by: PSYCHIATRY & NEUROLOGY

## 2017-12-30 PROCEDURE — 5130000000 HC BRIDGE APPOINTMENT

## 2017-12-30 RX ORDER — HYDROXYZINE HYDROCHLORIDE 25 MG/1
25 TABLET, FILM COATED ORAL 3 TIMES DAILY PRN
Qty: 30 TABLET | Refills: 1 | Status: SHIPPED | OUTPATIENT
Start: 2017-12-30 | End: 2018-01-09

## 2017-12-30 RX ORDER — BUPROPION HYDROCHLORIDE 200 MG/1
200 TABLET, EXTENDED RELEASE ORAL DAILY
Qty: 30 TABLET | Refills: 0 | Status: SHIPPED | OUTPATIENT
Start: 2017-12-31 | End: 2018-12-06 | Stop reason: ALTCHOICE

## 2017-12-30 RX ORDER — TRAZODONE HYDROCHLORIDE 50 MG/1
50 TABLET ORAL NIGHTLY PRN
Qty: 30 TABLET | Refills: 0 | Status: SHIPPED | OUTPATIENT
Start: 2017-12-30 | End: 2018-12-06 | Stop reason: ALTCHOICE

## 2017-12-30 RX ORDER — GABAPENTIN 300 MG/1
300 CAPSULE ORAL 3 TIMES DAILY
Qty: 90 CAPSULE | Refills: 0 | Status: SHIPPED | OUTPATIENT
Start: 2017-12-30 | End: 2018-12-06 | Stop reason: ALTCHOICE

## 2017-12-30 RX ORDER — OLANZAPINE 15 MG/1
15 TABLET ORAL NIGHTLY
Qty: 30 TABLET | Refills: 0 | Status: SHIPPED | OUTPATIENT
Start: 2017-12-30 | End: 2018-12-06 | Stop reason: ALTCHOICE

## 2017-12-30 RX ADMIN — GABAPENTIN 300 MG: 300 CAPSULE ORAL at 09:20

## 2017-12-30 RX ADMIN — HYDROXYZINE HYDROCHLORIDE 25 MG: 25 TABLET, FILM COATED ORAL at 09:20

## 2017-12-30 RX ADMIN — Medication 100 MG: at 09:20

## 2017-12-30 RX ADMIN — MULTIPLE VITAMINS W/ MINERALS TAB 1 TABLET: TAB at 09:20

## 2017-12-30 RX ADMIN — FOLIC ACID 1 MG: 1 TABLET ORAL at 09:20

## 2017-12-30 RX ADMIN — NICOTINE POLACRILEX 2 MG: 2 GUM, CHEWING BUCCAL at 10:01

## 2017-12-30 RX ADMIN — BUPROPION HYDROCHLORIDE 200 MG: 100 TABLET, FILM COATED, EXTENDED RELEASE ORAL at 09:20

## 2017-12-30 NOTE — DISCHARGE INSTR - DIET

## 2017-12-30 NOTE — BH NOTE
PRN note: patient c/o anxiety AEB increased activity and complaints of racing thoughts. Patient requested prn haldol, and was given at [facility]. Patient resting quietly at this time.

## 2017-12-30 NOTE — BH NOTE
[facility]  Discharge Note    Pt discharged with followings belongings:   Dentures: None  Vision - Corrective Lenses: Glasses  Hearing Aid: None  Jewelry: Earrings, Watch  Body Piercings Removed: No  Clothing: Footwear, Jacket / coat, Pants, Shirt, Socks, Undergarments (Comment)  Were All Patient Medications Collected?: No  Other Valuables: Cell phone, Money (Comment), [employer] 1923, [address] sent home with Patient. . Valuables retrieved from safe, Security envelope number:  [id number], [id number],  and returned to patient. Patient left department with Departure Mode: Family member via Mobility at Departure: Ambulatory, discharged to Discharged to: Private Residence. Patient education on aftercare instructions: Given.  Information faxed to  [facility]  by staff Patient verbalize understanding of AVS:  Yes    Status EXAM upon discharge:  Status and Exam  Normal: No  Facial Expression: Worried  Affect: Appropriate  Level of Consciousness: Alert  Mood:Normal: No  Mood: Anxious  Motor Activity:Normal: Yes  Interview Behavior: Cooperative  Preception: [place] to Person, Mariam Care to Time, [place] to Place, [place] to Situation  Attention:Normal: No  Attention: Distractible  Thought Processes: Blocking  Thought Content:Normal: No  Thought Content: Preoccupations  Hallucinations: None  Delusions: No  Memory:Normal: No  Memory: Poor Recent, Poor Remote  Insight and Judgment: No  Insight and Judgment: Poor Judgment, Poor Insight  Present Suicidal Ideation: No  Present Homicidal Ideation: No    Ling Brody, RN

## 2017-12-31 NOTE — CARE COORDINATION
Bridge Appointment completed: Reviewed Discharge Instructions with patient. Patient verbalizes understanding and agreement with the discharge plan using the teachback method. Discharge Arrangements: [facility], [address] [facility] [place], [address] [place] [address], Both locations Monday - Friday 10:40am & 1:00pm only for Open Access for a Diagnostic Evaluation as well as other services offered. Guardian notified: PT is own guardian.   Discharge destination/address:  [facility] [place], [address] [place] [address]., [place], [address] [place] [address], Arabella Blake is approved Susan Laws can enter the [employer]  Transported by:  PT's mother picked PT up at discharge

## 2018-01-02 NOTE — CARE COORDINATION
[facility] [place]  [id number]    Go on 12/30/2017  Pratima Bullard is approved Shamar Bryant can enter the [employer]

## 2018-01-21 ENCOUNTER — APPOINTMENT (OUTPATIENT)
Dept: GENERAL RADIOLOGY | Age: 38
End: 2018-01-21
Payer: MEDICAID

## 2018-01-21 ENCOUNTER — HOSPITAL ENCOUNTER (EMERGENCY)
Age: 38
Discharge: HOME | End: 2018-01-22
Attending: EMERGENCY MEDICINE
Payer: MEDICAID

## 2018-01-21 VITALS
RESPIRATION RATE: 18 BRPM | DIASTOLIC BLOOD PRESSURE: 91 MMHG | HEART RATE: 106 BPM | SYSTOLIC BLOOD PRESSURE: 137 MMHG | OXYGEN SATURATION: 95 % | TEMPERATURE: 97.3 F

## 2018-01-21 DIAGNOSIS — F10.929 ACUTE ALCOHOLIC INTOXICATION WITH COMPLICATION (HCC): Primary | ICD-10-CM

## 2018-01-21 LAB
EKG ATRIAL RATE: 101 BPM
EKG P AXIS: 41 DEGREES
EKG P-R INTERVAL: 156 MS
EKG Q-T INTERVAL: 346 MS
EKG QRS DURATION: 94 MS
EKG QTC CALCULATION (BAZETT): 448 MS
EKG R AXIS: -20 DEGREES
EKG T AXIS: 46 DEGREES
EKG VENTRICULAR RATE: 101 BPM
ETHANOL PERCENT: 0.48 %
ETHANOL: 481 MG/DL

## 2018-01-21 PROCEDURE — 71046 X-RAY EXAM CHEST 2 VIEWS: CPT

## 2018-01-21 PROCEDURE — 93005 ELECTROCARDIOGRAM TRACING: CPT

## 2018-01-21 PROCEDURE — 96374 THER/PROPH/DIAG INJ IV PUSH: CPT

## 2018-01-21 PROCEDURE — 99284 EMERGENCY DEPT VISIT MOD MDM: CPT

## 2018-01-21 PROCEDURE — 6360000002 HC RX W HCPCS: Performed by: EMERGENCY MEDICINE

## 2018-01-21 PROCEDURE — G0480 DRUG TEST DEF 1-7 CLASSES: HCPCS

## 2018-01-21 RX ORDER — LORAZEPAM 2 MG/ML
1 INJECTION INTRAMUSCULAR ONCE
Status: COMPLETED | OUTPATIENT
Start: 2018-01-21 | End: 2018-01-21

## 2018-01-21 RX ORDER — LORAZEPAM 2 MG/ML
INJECTION INTRAMUSCULAR
Status: DISCONTINUED
Start: 2018-01-21 | End: 2018-01-22 | Stop reason: HOSPADM

## 2018-01-21 RX ORDER — LORAZEPAM 0.5 MG/1
1 TABLET ORAL ONCE
Status: DISCONTINUED | OUTPATIENT
Start: 2018-01-21 | End: 2018-01-21

## 2018-01-21 RX ADMIN — LORAZEPAM 1 MG: 2 INJECTION INTRAMUSCULAR; INTRAVENOUS at 22:00

## 2018-01-21 ASSESSMENT — ENCOUNTER SYMPTOMS
NAUSEA: 0
ABDOMINAL PAIN: 0
SHORTNESS OF BREATH: 0

## 2018-01-21 NOTE — ED NOTES
Pt came in with SI and withdrawal of ETOH. Pt states last drink today. Pt show sno signs of withdrawal. Pt states feeling hopeless. On arrivla pt admits to crack usage last night and had CP. On arrival ekg done. Blood sent off. Pt resting in bed with no CP.       Maverick Gibbs RN  01/21/18 3157

## 2018-01-21 NOTE — ED PROVIDER NOTES
day) 8/4/17   Ludy Eli MD   traZODone (DESYREL) 50 MG tablet Take 1 tablet by mouth nightly as needed (Difficulty staying asleep) 8/4/17   Ludy Eli MD   OLANZapine (ZYPREXA) 10 MG tablet Take 1 tablet by mouth nightly  Patient taking differently: Take 5 mg by mouth nightly  8/4/17   Ludy Eli MD       REVIEW OF SYSTEMS    (2-9 systems for level 4, 10 or more for level 5)      Review of Systems   Constitutional: Negative for chills, diaphoresis and fever. Eyes: Negative for visual disturbance. Respiratory: Negative for shortness of breath. Cardiovascular: Positive for chest pain. Gastrointestinal: Negative for abdominal pain and nausea. Genitourinary: Negative for difficulty urinating. Skin: Negative for rash. Neurological: Positive for tremors. Negative for light-headedness and headaches. Psychiatric/Behavioral: Positive for suicidal ideas. Negative for agitation, confusion and self-injury. The patient is nervous/anxious. PHYSICAL EXAM   (up to 7 for level 4, 8 or more for level 5)      INITIAL VITALS:   BP (!) 137/91   Pulse 106   Temp 97.3 °F (36.3 °C) (Oral)   SpO2 95%     Physical Exam   Constitutional: He is oriented to person, place, and time. He appears well-developed and well-nourished. No distress. HENT:   Head: Normocephalic and atraumatic. Eyes: Pupils are equal, round, and reactive to light. Neck: Normal range of motion. Neck supple. Cardiovascular: Regular rhythm, normal heart sounds and intact distal pulses. No murmur heard. Tachycardic. No murmurs. Pulmonary/Chest: Effort normal and breath sounds normal. No respiratory distress. He has no wheezes. He has no rales. He exhibits no tenderness. Abdominal: Soft. He exhibits no distension. There is no tenderness. There is no rebound and no guarding. Musculoskeletal: Normal range of motion. He exhibits no edema, tenderness or deformity. No midline cervical tenderness to palpation.   No step-off or deformities. No other signs of injury or trauma to his body. Neurological: He is alert and oriented to person, place, and time. Alert, speaking in full sentences. He is not slurring speech. Steady gait. Skin: Skin is warm and dry. No rash noted. He is not diaphoretic. No erythema. Psychiatric: He has a normal mood and affect. His behavior is normal.   Nursing note and vitals reviewed. DIFFERENTIAL  DIAGNOSIS     PLAN (LABS / IMAGING / EKG):  Orders Placed This Encounter   Procedures    XR CHEST STANDARD (2 VW)    ETHANOL    EKG 12 Lead    Insert peripheral IV    Suicide precautions       MEDICATIONS ORDERED:  Orders Placed This Encounter   Medications    DISCONTD: LORazepam (ATIVAN) tablet 1 mg    LORazepam (ATIVAN) 2 MG/ML injection     SWATHI LACEY: cabinet override    LORazepam (ATIVAN) injection 1 mg       DDX: alcohol intoxication, alcohol withdrawal, cocaine use, suicidal ideations    DIAGNOSTIC RESULTS / EMERGENCY DEPARTMENT COURSE / MDM     LABS:  Results for orders placed or performed during the hospital encounter of 01/21/18   ETHANOL   Result Value Ref Range    Ethanol 481 (HH) <10 mg/dL    Ethanol percent 0.481 %       IMPRESSION: 60-year-old male presenting with alcohol intoxication, suicidal ideations, chest pain. Patient states he smoked crack cocaine last night. States he has alcohol withdrawal seizures. Patient's he already  feels like he is withdrawing. He denies any trauma or falls. On arrival, blood pressure slightly elevated. He is slightly tachycardic. Patient does appear to be a little bit shaky but he is not slurring his speech, ambulatory with a steady gait. There is no signs of trauma. Physical examination otherwise benign. We'll do an EKG, ethanol level, chest x-ray. We'll reassess when sober. RADIOLOGY:  Xr Chest Standard (2 Vw)    Result Date: 1/21/2018  EXAMINATION: TWO VIEWS OF THE CHEST 1/21/2018 6:10 pm COMPARISON: None.  HISTORY: ORDERING SYSTEM PROVIDED HISTORY: CP TECHNOLOGIST PROVIDED HISTORY: Reason for exam:->CP FINDINGS: The lungs are without acute focal process. There is no effusion or pneumothorax. The cardiomediastinal silhouette is without acute process. The osseous structures are without acute process. No acute process. EKG  EKG Interpretation    Interpreted by me    Rhythm: Sinus tachycardia   Rate: Tachycardic at 101  Axis: normal  Ectopy: none  Conduction: normal  ST Segments: no acute change  T Waves: no acute change  Q Waves: none    Clinical Impression: Sinus tachycardia    EMERGENCY DEPARTMENT COURSE:  Ethanol level 481. Sober time titrated to be approximately 13 hours. Patient given Ativan because he feels he is shaking. We'll reassess suicidality when he is sober. Signed out to Dr. Agustín Burks:  None    CONSULTS:  None    CRITICAL CARE:  None    FINAL IMPRESSION      1. Acute alcoholic intoxication with complication ([facility]          DISPOSITION / PLAN     DISPOSITION  pending reassessment       PATIENT REFERRED TO:  No follow-up provider specified.     DISCHARGE MEDICATIONS:  New Prescriptions    No medications on file       Claudean Jan, MD  Emergency Medicine Resident    (Please note that portions of this note were completed with a voice recognition program.  Efforts were made to edit the dictations but occasionally words are mis-transcribed.)        Claudean Jan, MD  Resident  01/22/18 3345

## 2018-01-21 NOTE — ED PROVIDER NOTES
voltage criteria for left ventricular hypertrophy, left axis deviation    Gregory P. Leva Curling, MD, [facility]  Attending Emergency Medicine Physician        Preet Haynes MD  01/21/18 5195

## 2018-01-22 PROCEDURE — 96376 TX/PRO/DX INJ SAME DRUG ADON: CPT

## 2018-01-22 PROCEDURE — 6360000002 HC RX W HCPCS: Performed by: EMERGENCY MEDICINE

## 2018-01-22 RX ORDER — LORAZEPAM 2 MG/ML
1 INJECTION INTRAMUSCULAR ONCE
Status: COMPLETED | OUTPATIENT
Start: 2018-01-22 | End: 2018-01-22

## 2018-01-22 RX ORDER — CHLORDIAZEPOXIDE HYDROCHLORIDE 25 MG/1
25 CAPSULE, GELATIN COATED ORAL 4 TIMES DAILY PRN
Qty: 8 CAPSULE | Refills: 0 | Status: SHIPPED | OUTPATIENT
Start: 2018-01-22 | End: 2018-01-24

## 2018-01-22 RX ADMIN — LORAZEPAM 1 MG: 2 INJECTION INTRAMUSCULAR; INTRAVENOUS at 01:00

## 2018-01-22 NOTE — ED PROVIDER NOTES
this time. He states that he would like for the prescription for something to stop the shakes. Patient given a small dose of Librium. Patient discharged in stable condition    DISPOSITION:  [x]  Discharge   []  Transfer -    []  Admission -     []  Against Medical Advice   []  Eloped   FOLLOW-UP: No follow-up provider specified.    DISCHARGE MEDICATIONS: New Prescriptions    No medications on file          [address] DO  Emergency Medicine Resident  [facility]     [address] [place]  Resident  01/22/18 5520

## 2018-01-22 NOTE — ED NOTES
IV placed in PT. PT tolerated well. NAD noted. RR even and unlabored.        Denver Bermeo RN  01/22/18 9542

## 2018-01-22 NOTE — ED NOTES
PT sleeping in bed. RR even and unlabored. NAD noted. Will continue to monitor.        Denver Bermeo RN  01/22/18 6173

## 2018-10-09 ENCOUNTER — HOSPITAL ENCOUNTER (EMERGENCY)
Age: 38
Discharge: HOME | End: 2018-10-09
Attending: EMERGENCY MEDICINE
Payer: MEDICAID

## 2018-10-09 VITALS
OXYGEN SATURATION: 98 % | TEMPERATURE: 97.3 F | WEIGHT: 190 LBS | HEIGHT: 69 IN | DIASTOLIC BLOOD PRESSURE: 100 MMHG | BODY MASS INDEX: 28.14 KG/M2 | HEART RATE: 100 BPM | RESPIRATION RATE: 18 BRPM | SYSTOLIC BLOOD PRESSURE: 149 MMHG

## 2018-10-09 DIAGNOSIS — R74.8 ELEVATED CK: ICD-10-CM

## 2018-10-09 DIAGNOSIS — R31.9 HEMATURIA, UNSPECIFIED TYPE: Primary | ICD-10-CM

## 2018-10-09 LAB
-: NORMAL
ABSOLUTE EOS #: 0.35 K/UL (ref 0–0.44)
ABSOLUTE IMMATURE GRANULOCYTE: <0.03 K/UL (ref 0–0.3)
ABSOLUTE LYMPH #: 2.96 K/UL (ref 1.1–3.7)
ABSOLUTE MONO #: 1.05 K/UL (ref 0.1–1.2)
ALBUMIN SERPL-MCNC: 4.1 G/DL (ref 3.5–5.2)
ALBUMIN/GLOBULIN RATIO: 1.5 (ref 1–2.5)
ALP BLD-CCNC: 74 U/L (ref 40–129)
ALT SERPL-CCNC: 29 U/L (ref 5–41)
AMORPHOUS: NORMAL
ANION GAP SERPL CALCULATED.3IONS-SCNC: 14 MMOL/L (ref 9–17)
AST SERPL-CCNC: 31 U/L
BACTERIA: NORMAL
BASOPHILS # BLD: 0 % (ref 0–2)
BASOPHILS ABSOLUTE: 0.03 K/UL (ref 0–0.2)
BILIRUB SERPL-MCNC: 0.35 MG/DL (ref 0.3–1.2)
BILIRUBIN URINE: NEGATIVE
BUN BLDV-MCNC: 16 MG/DL (ref 6–20)
BUN/CREAT BLD: NORMAL (ref 9–20)
CALCIUM SERPL-MCNC: 8.9 MG/DL (ref 8.6–10.4)
CASTS UA: NORMAL /LPF (ref 0–8)
CHLORIDE BLD-SCNC: 102 MMOL/L (ref 98–107)
CO2: 26 MMOL/L (ref 20–31)
COLOR: YELLOW
COMMENT UA: ABNORMAL
CREAT SERPL-MCNC: 0.77 MG/DL (ref 0.7–1.2)
CRYSTALS, UA: NORMAL /HPF
DIFFERENTIAL TYPE: NORMAL
EOSINOPHILS RELATIVE PERCENT: 4 % (ref 1–4)
EPITHELIAL CELLS UA: NORMAL /HPF (ref 0–5)
GFR AFRICAN AMERICAN: >60 ML/MIN
GFR NON-AFRICAN AMERICAN: >60 ML/MIN
GFR SERPL CREATININE-BSD FRML MDRD: NORMAL ML/MIN/{1.73_M2}
GFR SERPL CREATININE-BSD FRML MDRD: NORMAL ML/MIN/{1.73_M2}
GLUCOSE BLD-MCNC: 83 MG/DL (ref 70–99)
GLUCOSE URINE: NEGATIVE
HCT VFR BLD CALC: 45.3 % (ref 40.7–50.3)
HEMOGLOBIN: 14.8 G/DL (ref 13–17)
IMMATURE GRANULOCYTES: 0 %
KETONES, URINE: NEGATIVE
LEUKOCYTE ESTERASE, URINE: NEGATIVE
LYMPHOCYTES # BLD: 31 % (ref 24–43)
MCH RBC QN AUTO: 28.6 PG (ref 25.2–33.5)
MCHC RBC AUTO-ENTMCNC: 32.7 G/DL (ref 28.4–34.8)
MCV RBC AUTO: 87.6 FL (ref 82.6–102.9)
MONOCYTES # BLD: 11 % (ref 3–12)
MUCUS: NORMAL
MYOGLOBIN: 89 NG/ML (ref 28–72)
NITRITE, URINE: NEGATIVE
NRBC AUTOMATED: 0 PER 100 WBC
OTHER OBSERVATIONS UA: NORMAL
PDW BLD-RTO: 13.3 % (ref 11.8–14.4)
PH UA: 8.5 (ref 5–8)
PLATELET # BLD: 253 K/UL (ref 138–453)
PLATELET ESTIMATE: NORMAL
PMV BLD AUTO: 10 FL (ref 8.1–13.5)
POTASSIUM SERPL-SCNC: 4.2 MMOL/L (ref 3.7–5.3)
PROTEIN UA: NEGATIVE
RBC # BLD: 5.17 M/UL (ref 4.21–5.77)
RBC # BLD: NORMAL 10*6/UL
RBC UA: NORMAL /HPF (ref 0–4)
RENAL EPITHELIAL, UA: NORMAL /HPF
SEG NEUTROPHILS: 54 % (ref 36–65)
SEGMENTED NEUTROPHILS ABSOLUTE COUNT: 5.26 K/UL (ref 1.5–8.1)
SODIUM BLD-SCNC: 142 MMOL/L (ref 135–144)
SPECIFIC GRAVITY UA: 1.01 (ref 1–1.03)
TOTAL CK: 390 U/L (ref 39–308)
TOTAL PROTEIN: 6.8 G/DL (ref 6.4–8.3)
TRICHOMONAS: NORMAL
TURBIDITY: CLEAR
URINE HGB: ABNORMAL
UROBILINOGEN, URINE: NORMAL
WBC # BLD: 9.7 K/UL (ref 3.5–11.3)
WBC # BLD: NORMAL 10*3/UL
WBC UA: NORMAL /HPF (ref 0–5)
YEAST: NORMAL

## 2018-10-09 PROCEDURE — 2580000003 HC RX 258: Performed by: STUDENT IN AN ORGANIZED HEALTH CARE EDUCATION/TRAINING PROGRAM

## 2018-10-09 PROCEDURE — 82550 ASSAY OF CK (CPK): CPT

## 2018-10-09 PROCEDURE — 96360 HYDRATION IV INFUSION INIT: CPT

## 2018-10-09 PROCEDURE — 81001 URINALYSIS AUTO W/SCOPE: CPT

## 2018-10-09 PROCEDURE — G0382 LEV 3 HOSP TYPE B ED VISIT: HCPCS

## 2018-10-09 PROCEDURE — 85025 COMPLETE CBC W/AUTO DIFF WBC: CPT

## 2018-10-09 PROCEDURE — 83874 ASSAY OF MYOGLOBIN: CPT

## 2018-10-09 PROCEDURE — 80053 COMPREHEN METABOLIC PANEL: CPT

## 2018-10-09 RX ORDER — 0.9 % SODIUM CHLORIDE 0.9 %
1000 INTRAVENOUS SOLUTION INTRAVENOUS ONCE
Status: DISCONTINUED | OUTPATIENT
Start: 2018-10-09 | End: 2018-10-09

## 2018-10-09 RX ORDER — 0.9 % SODIUM CHLORIDE 0.9 %
1000 INTRAVENOUS SOLUTION INTRAVENOUS ONCE
Status: COMPLETED | OUTPATIENT
Start: 2018-10-09 | End: 2018-10-09

## 2018-10-09 RX ADMIN — SODIUM CHLORIDE 1000 ML: 9 INJECTION, SOLUTION INTRAVENOUS at 12:52

## 2018-10-09 ASSESSMENT — ENCOUNTER SYMPTOMS
WHEEZING: 0
CHEST TIGHTNESS: 0
NAUSEA: 0
TROUBLE SWALLOWING: 0
ABDOMINAL PAIN: 0
SHORTNESS OF BREATH: 0
COUGH: 0
VOMITING: 0
BACK PAIN: 0
PHOTOPHOBIA: 0
SORE THROAT: 0

## 2018-10-09 NOTE — ED PROVIDER NOTES
pulmonary clear to auscultation bilaterally abdomen is soft nontender nondistended, no CVA tenderness.     Impression: Urine discoloration concerning for possible rhabdomyolysis, suspect dehydration    Plan: Fluids, urine and basic labs, CK and myoglobin      Fadumo Ramirez MD  Attending Emergency Physician        Gutierrez Fonseca MD  10/09/18 1099

## 2018-10-30 ENCOUNTER — OFFICE VISIT (OUTPATIENT)
Dept: INTERNAL MEDICINE | Age: 38
End: 2018-10-30
Payer: MEDICAID

## 2018-10-30 VITALS
DIASTOLIC BLOOD PRESSURE: 90 MMHG | HEIGHT: 69 IN | SYSTOLIC BLOOD PRESSURE: 130 MMHG | BODY MASS INDEX: 29.33 KG/M2 | WEIGHT: 198 LBS | HEART RATE: 89 BPM

## 2018-10-30 DIAGNOSIS — R31.9 HEMATURIA, UNSPECIFIED TYPE: Primary | ICD-10-CM

## 2018-10-30 DIAGNOSIS — F10.29 ALCOHOL DEPENDENCE WITH UNSPECIFIED ALCOHOL-INDUCED DISORDER (HCC): ICD-10-CM

## 2018-10-30 DIAGNOSIS — G44.229 CHRONIC TENSION-TYPE HEADACHE, NOT INTRACTABLE: ICD-10-CM

## 2018-10-30 DIAGNOSIS — F31.9 BIPOLAR 1 DISORDER (HCC): ICD-10-CM

## 2018-10-30 DIAGNOSIS — R03.0 ELEVATED BLOOD PRESSURE READING IN OFFICE WITHOUT DIAGNOSIS OF HYPERTENSION: ICD-10-CM

## 2018-10-30 PROCEDURE — 99211 OFF/OP EST MAY X REQ PHY/QHP: CPT | Performed by: INTERNAL MEDICINE

## 2018-10-30 PROCEDURE — G8484 FLU IMMUNIZE NO ADMIN: HCPCS | Performed by: STUDENT IN AN ORGANIZED HEALTH CARE EDUCATION/TRAINING PROGRAM

## 2018-10-30 PROCEDURE — G8427 DOCREV CUR MEDS BY ELIG CLIN: HCPCS | Performed by: STUDENT IN AN ORGANIZED HEALTH CARE EDUCATION/TRAINING PROGRAM

## 2018-10-30 PROCEDURE — 99203 OFFICE O/P NEW LOW 30 MIN: CPT | Performed by: STUDENT IN AN ORGANIZED HEALTH CARE EDUCATION/TRAINING PROGRAM

## 2018-10-30 PROCEDURE — 4004F PT TOBACCO SCREEN RCVD TLK: CPT | Performed by: STUDENT IN AN ORGANIZED HEALTH CARE EDUCATION/TRAINING PROGRAM

## 2018-10-30 PROCEDURE — G8419 CALC BMI OUT NRM PARAM NOF/U: HCPCS | Performed by: STUDENT IN AN ORGANIZED HEALTH CARE EDUCATION/TRAINING PROGRAM

## 2018-10-30 RX ORDER — MEDICAL SUPPLY, MISCELLANEOUS
EACH MISCELLANEOUS
Qty: 1 EACH | Refills: 0 | Status: SHIPPED | OUTPATIENT
Start: 2018-10-30 | End: 2018-12-18 | Stop reason: SDUPTHER

## 2018-10-30 RX ORDER — ATOMOXETINE 60 MG/1
60 CAPSULE ORAL DAILY
Status: ON HOLD | COMMUNITY
End: 2019-01-23 | Stop reason: HOSPADM

## 2018-10-30 RX ORDER — BUSPIRONE HYDROCHLORIDE 15 MG/1
7.5 TABLET ORAL 3 TIMES DAILY
Status: ON HOLD | COMMUNITY
End: 2019-01-23 | Stop reason: HOSPADM

## 2018-10-30 NOTE — PROGRESS NOTES
[facility]    New Patient Note/History and Physical    Date of patient's visit: 10/30/2018    Name:  Mariana Genao      YOB: 1980    Patient Care Team:  Sukhdeep Nieto MD as PCP - General (Internal Medicine)    REASON FOR VISIT: 1. First Visit, establish care        2. Intermittent passage of dark colored urine      3. Headache    HISTORY OF PRESENTING ILLNESS:    History was obtained from the patient. Mariana Genao is a 45 y.o. is here to establish care. He has not followed with a primary care doctor for years. He reports having dull and constant headache for the past few days without coryza, fever, neck stiffness. He also reports having intermittent passage of dark colored urine for the past 2 and half months. These episodes last a few days at a time, are painless, not associated with abdominal discomfort, fever, chills,yellowing or the eyes, nausea, vomiting, diarrhea. He denies any cold symptoms. He denies rigorous physical activity, iv drug use, sexual activity in the last year. He was found to have Hep C antibodies last year but no viral RNA. He reports receiving Hep B and Hep A vaccine in college. He has not taken alcohol in the past 9 months and only takes prescribed medications, including Buspar, atomoxetine and trazodone. He presented to the ED about two weeks ago and was found to have mildly elevated blood levels of CK and myoglobin, and hematuria with numerous RBCs and high levels of urine hemoglobin. PAST MEDICAL AND SURGICAL HISTORY:          Diagnosis Date    Alcoholism ([address]     Anxiety     Depression     Headache     Hepatitis C     pt states he tested + for antibodies.  History of atrial fibrillation     pt states after a suicide attempt, overdose oxycontin pt developed atrial fib x 5-6 months.      Mental and behavioral disorders d/t use of alcohol, acute intoxication ([address]     Palpitations     Seizures ([address] diarrhea, constipation, bloating, or abdominal pain. No bloody or black stools. · Genito-Urinary: no urinary urgency, frequency, dysuria, nocturia, hesitancy, incontinence, or pain. No hematuria  · Musculoskeletal: no arthralgia, myalgia, weakness, or morning stiffness  · Neurologic: no TIA or stroke symptoms. no paralysis, or frequent or severe headaches  · Hematologic/Lymphatic/Immunologic: no abnormal bleeding/bruising, fever, chills, night sweats orswollen glands.   · Endocrine: no heat or cold intolerance and no polyuria        PHYSICAL EXAM:      Vitals:    10/30/18 1404 10/30/18 1509 10/30/18 1532   BP: (!) 139/97 (!) 152/103 (!) 130/90   Site: Left Upper Arm Right Upper Arm    Position: Sitting Sitting    Cuff Size: Medium Adult Medium Adult    Pulse: 97 89    Weight: 198 lb (89.8 kg)     Height: 5' 9\" (1.753 m)       General appearance - alert, well appearing, and in no distress  Mental status - alert, oriented to person, place, and time  Eyes - pupils equal and reactive, extraocular eye movements intact  Nose - normal and patent, no erythema, discharge or polyps  Mouth - mucous membranes moist, pharynx normal without lesions  Lymphatics - no palpable lymphadenopathy, no hepatosplenomegaly  Chest - clear to auscultation, no wheezes, rales or rhonchi, symmetric air entry  Heart - normal rate, regular rhythm, normal S1, S2, no murmurs, rubs, clicks or gallops  Abdomen - soft, nontender, nondistended, no masses or organomegaly  Extremities - peripheral pulses normal, no pedal edema, no clubbing or cyanosis    LABORATORY FINDINGS:    CBC:   Lab Results   Component Value Date    WBC 9.7 10/09/2018    HGB 14.8 10/09/2018     10/09/2018     BMP:    Lab Results   Component Value Date     10/09/2018    K 4.2 10/09/2018     10/09/2018    CO2 26 10/09/2018    BUN 16 10/09/2018    CREATININE 0.77 10/09/2018    GLUCOSE 83 10/09/2018     Hemoglobin A1C:   Lab Results   Component Value Date    LABA1C 5.2 02/02/2017     Lipid profile: No results found for: CHOL, TRIG, HDL  Thyroid functions:   Lab Results   Component Value Date    TSH 0.82 02/02/2017      Hepatic functions:   Lab Results   Component Value Date    ALT 29 10/09/2018    AST 31 10/09/2018    PROT 6.8 10/09/2018    BILITOT 0.35 10/09/2018    BILIDIR 0.16 12/25/2017    LABALBU 4.1 10/09/2018     ASSESSMENT AND PLAN:   Juan A Santo was seen today for establish care and headache. Diagnoses and all orders for this visit:    Hematuria, unspecified type  - repeat UA and microscopy. - repeat serum CK and myoglobin to establish downward trend  - Renal ultrasound    Elevated blood pressure reading in office:  - lifestyle modification, including smoking cessation, diet and exercise. Reinforce alcohol cessation    Other orders:  Lipid panel, HbA1C. INSTRUCTIONS:   Return in about 4 weeks (around 11/27/2018). · Juan A Santo received counseling on the following healthy behaviors: tobacco cessation    · Reviewed prior labs and health maintenance. · Discussed use, benefit, and side effects of prescribed medications. Barriers to medication compliance addressed. All patient questions answered. Pt voiced understanding.      · Patient given educational materials - see patient instructions    Emanuel Stahl MD  PGY-1 Resident  Internal Medicine  [facility]    10/30/2018  3:35 PM

## 2018-11-06 ENCOUNTER — HOSPITAL ENCOUNTER (OUTPATIENT)
Age: 38
Discharge: HOME | End: 2018-11-06
Payer: MEDICAID

## 2018-11-06 DIAGNOSIS — R31.9 HEMATURIA, UNSPECIFIED TYPE: ICD-10-CM

## 2018-11-06 DIAGNOSIS — F10.29 ALCOHOL DEPENDENCE WITH UNSPECIFIED ALCOHOL-INDUCED DISORDER (HCC): ICD-10-CM

## 2018-11-06 LAB
ALBUMIN SERPL-MCNC: 4.4 G/DL (ref 3.5–5.2)
ALBUMIN/GLOBULIN RATIO: 1.5 (ref 1–2.5)
ALP BLD-CCNC: 67 U/L (ref 40–129)
ALT SERPL-CCNC: 17 U/L (ref 5–41)
ANION GAP SERPL CALCULATED.3IONS-SCNC: 17 MMOL/L (ref 9–17)
AST SERPL-CCNC: 13 U/L
BILIRUB SERPL-MCNC: 0.82 MG/DL (ref 0.3–1.2)
BILIRUBIN URINE: NEGATIVE
BUN BLDV-MCNC: 14 MG/DL (ref 6–20)
BUN/CREAT BLD: ABNORMAL (ref 9–20)
CALCIUM SERPL-MCNC: 9.3 MG/DL (ref 8.6–10.4)
CHLORIDE BLD-SCNC: 104 MMOL/L (ref 98–107)
CHOLESTEROL/HDL RATIO: 3.6
CHOLESTEROL: 129 MG/DL
CO2: 20 MMOL/L (ref 20–31)
COLOR: YELLOW
COMMENT UA: NORMAL
CREAT SERPL-MCNC: 0.92 MG/DL (ref 0.7–1.2)
ESTIMATED AVERAGE GLUCOSE: 100 MG/DL
GFR AFRICAN AMERICAN: >60 ML/MIN
GFR NON-AFRICAN AMERICAN: >60 ML/MIN
GFR SERPL CREATININE-BSD FRML MDRD: ABNORMAL ML/MIN/{1.73_M2}
GFR SERPL CREATININE-BSD FRML MDRD: ABNORMAL ML/MIN/{1.73_M2}
GLUCOSE BLD-MCNC: 100 MG/DL (ref 70–99)
GLUCOSE URINE: NEGATIVE
HBA1C MFR BLD: 5.1 % (ref 4–6)
HDLC SERPL-MCNC: 36 MG/DL
KETONES, URINE: NEGATIVE
LDL CHOLESTEROL: 75 MG/DL (ref 0–130)
LEUKOCYTE ESTERASE, URINE: NEGATIVE
NITRITE, URINE: NEGATIVE
PH UA: 5.5 (ref 5–8)
POTASSIUM SERPL-SCNC: 4.5 MMOL/L (ref 3.7–5.3)
PROTEIN UA: NEGATIVE
SODIUM BLD-SCNC: 141 MMOL/L (ref 135–144)
SPECIFIC GRAVITY UA: 1.02 (ref 1–1.03)
TOTAL CK: 168 U/L (ref 39–308)
TOTAL PROTEIN: 7.4 G/DL (ref 6.4–8.3)
TRIGL SERPL-MCNC: 89 MG/DL
TURBIDITY: CLEAR
URINE HGB: NEGATIVE
UROBILINOGEN, URINE: NORMAL
VLDLC SERPL CALC-MCNC: ABNORMAL MG/DL (ref 1–30)

## 2018-11-06 PROCEDURE — 36415 COLL VENOUS BLD VENIPUNCTURE: CPT

## 2018-11-06 PROCEDURE — 83036 HEMOGLOBIN GLYCOSYLATED A1C: CPT

## 2018-11-06 PROCEDURE — 82550 ASSAY OF CK (CPK): CPT

## 2018-11-06 PROCEDURE — 80061 LIPID PANEL: CPT

## 2018-11-06 PROCEDURE — 81003 URINALYSIS AUTO W/O SCOPE: CPT

## 2018-11-06 PROCEDURE — 80053 COMPREHEN METABOLIC PANEL: CPT

## 2018-11-07 ENCOUNTER — HOSPITAL ENCOUNTER (OUTPATIENT)
Dept: ULTRASOUND IMAGING | Age: 38
Discharge: HOME | End: 2018-11-09
Payer: MEDICAID

## 2018-11-07 DIAGNOSIS — R31.9 HEMATURIA, UNSPECIFIED TYPE: ICD-10-CM

## 2018-11-07 PROCEDURE — 76770 US EXAM ABDO BACK WALL COMP: CPT

## 2018-11-20 DIAGNOSIS — N20.0 RENAL STONE: Primary | ICD-10-CM

## 2018-12-06 ENCOUNTER — OFFICE VISIT (OUTPATIENT)
Dept: UROLOGY | Age: 38
End: 2018-12-06
Payer: MEDICAID

## 2018-12-06 ENCOUNTER — TELEPHONE (OUTPATIENT)
Dept: UROLOGY | Age: 38
End: 2018-12-06

## 2018-12-06 ENCOUNTER — HOSPITAL ENCOUNTER (OUTPATIENT)
Dept: GENERAL RADIOLOGY | Age: 38
Discharge: HOME | End: 2018-12-08
Payer: MEDICAID

## 2018-12-06 ENCOUNTER — HOSPITAL ENCOUNTER (OUTPATIENT)
Age: 38
Discharge: HOME | End: 2018-12-08
Payer: MEDICAID

## 2018-12-06 VITALS
DIASTOLIC BLOOD PRESSURE: 88 MMHG | HEIGHT: 69 IN | SYSTOLIC BLOOD PRESSURE: 138 MMHG | HEART RATE: 86 BPM | BODY MASS INDEX: 29.18 KG/M2 | WEIGHT: 197 LBS | TEMPERATURE: 97.6 F

## 2018-12-06 DIAGNOSIS — R31.0 GROSS HEMATURIA: Primary | ICD-10-CM

## 2018-12-06 DIAGNOSIS — N20.0 RENAL STONE: ICD-10-CM

## 2018-12-06 PROCEDURE — 99204 OFFICE O/P NEW MOD 45 MIN: CPT | Performed by: UROLOGY

## 2018-12-06 PROCEDURE — 4004F PT TOBACCO SCREEN RCVD TLK: CPT | Performed by: UROLOGY

## 2018-12-06 PROCEDURE — G8484 FLU IMMUNIZE NO ADMIN: HCPCS | Performed by: UROLOGY

## 2018-12-06 PROCEDURE — G8419 CALC BMI OUT NRM PARAM NOF/U: HCPCS | Performed by: UROLOGY

## 2018-12-06 PROCEDURE — 74018 RADEX ABDOMEN 1 VIEW: CPT

## 2018-12-06 PROCEDURE — G8427 DOCREV CUR MEDS BY ELIG CLIN: HCPCS | Performed by: UROLOGY

## 2018-12-06 RX ORDER — BUSPIRONE HYDROCHLORIDE 30 MG/1
1 TABLET ORAL 3 TIMES DAILY
COMMUNITY
Start: 2018-11-20 | End: 2018-12-06 | Stop reason: ALTCHOICE

## 2018-12-06 ASSESSMENT — ENCOUNTER SYMPTOMS
COLOR CHANGE: 0
EYE PAIN: 0
EYE REDNESS: 0
ABDOMINAL PAIN: 0
BACK PAIN: 0
COUGH: 0
VOMITING: 0
WHEEZING: 0
SHORTNESS OF BREATH: 0
NAUSEA: 0

## 2018-12-18 ENCOUNTER — OFFICE VISIT (OUTPATIENT)
Dept: INTERNAL MEDICINE | Age: 38
End: 2018-12-18
Payer: MEDICAID

## 2018-12-18 ENCOUNTER — TELEPHONE (OUTPATIENT)
Dept: UROLOGY | Age: 38
End: 2018-12-18

## 2018-12-18 ENCOUNTER — ANESTHESIA EVENT (OUTPATIENT)
Dept: OPERATING ROOM | Age: 38
End: 2018-12-18
Payer: MEDICAID

## 2018-12-18 VITALS
HEART RATE: 106 BPM | BODY MASS INDEX: 29.09 KG/M2 | DIASTOLIC BLOOD PRESSURE: 80 MMHG | SYSTOLIC BLOOD PRESSURE: 124 MMHG | WEIGHT: 197 LBS

## 2018-12-18 DIAGNOSIS — E66.3 OVERWEIGHT (BMI 25.0-29.9): ICD-10-CM

## 2018-12-18 DIAGNOSIS — I10 HYPERTENSION, UNSPECIFIED TYPE: Primary | ICD-10-CM

## 2018-12-18 DIAGNOSIS — R31.9 HEMATURIA OF UNDIAGNOSED CAUSE: ICD-10-CM

## 2018-12-18 PROCEDURE — 99213 OFFICE O/P EST LOW 20 MIN: CPT | Performed by: STUDENT IN AN ORGANIZED HEALTH CARE EDUCATION/TRAINING PROGRAM

## 2018-12-18 PROCEDURE — G8419 CALC BMI OUT NRM PARAM NOF/U: HCPCS | Performed by: STUDENT IN AN ORGANIZED HEALTH CARE EDUCATION/TRAINING PROGRAM

## 2018-12-18 PROCEDURE — G8484 FLU IMMUNIZE NO ADMIN: HCPCS | Performed by: STUDENT IN AN ORGANIZED HEALTH CARE EDUCATION/TRAINING PROGRAM

## 2018-12-18 PROCEDURE — 4004F PT TOBACCO SCREEN RCVD TLK: CPT | Performed by: STUDENT IN AN ORGANIZED HEALTH CARE EDUCATION/TRAINING PROGRAM

## 2018-12-18 PROCEDURE — 99211 OFF/OP EST MAY X REQ PHY/QHP: CPT | Performed by: INTERNAL MEDICINE

## 2018-12-18 PROCEDURE — G8427 DOCREV CUR MEDS BY ELIG CLIN: HCPCS | Performed by: STUDENT IN AN ORGANIZED HEALTH CARE EDUCATION/TRAINING PROGRAM

## 2018-12-18 RX ORDER — TRAZODONE HYDROCHLORIDE 50 MG/1
50 TABLET ORAL DAILY PRN
Status: ON HOLD | COMMUNITY
Start: 2018-11-20 | End: 2019-01-19

## 2018-12-18 ASSESSMENT — PATIENT HEALTH QUESTIONNAIRE - PHQ9: DEPRESSION UNABLE TO ASSESS: PT REFUSES

## 2018-12-19 ENCOUNTER — TELEPHONE (OUTPATIENT)
Dept: UROLOGY | Age: 38
End: 2018-12-19

## 2018-12-19 ENCOUNTER — ANESTHESIA (OUTPATIENT)
Dept: OPERATING ROOM | Age: 38
End: 2018-12-19
Payer: MEDICAID

## 2018-12-19 ENCOUNTER — HOSPITAL ENCOUNTER (OUTPATIENT)
Age: 38
Setting detail: OUTPATIENT SURGERY
Discharge: HOME | End: 2018-12-19
Attending: UROLOGY | Admitting: UROLOGY
Payer: MEDICAID

## 2018-12-19 ENCOUNTER — APPOINTMENT (OUTPATIENT)
Dept: GENERAL RADIOLOGY | Age: 38
End: 2018-12-19
Attending: UROLOGY
Payer: MEDICAID

## 2018-12-19 VITALS
RESPIRATION RATE: 9 BRPM | OXYGEN SATURATION: 98 % | TEMPERATURE: 95.2 F | SYSTOLIC BLOOD PRESSURE: 119 MMHG | DIASTOLIC BLOOD PRESSURE: 81 MMHG

## 2018-12-19 VITALS
HEIGHT: 69 IN | TEMPERATURE: 97 F | RESPIRATION RATE: 23 BRPM | WEIGHT: 200 LBS | SYSTOLIC BLOOD PRESSURE: 146 MMHG | HEART RATE: 87 BPM | DIASTOLIC BLOOD PRESSURE: 97 MMHG | BODY MASS INDEX: 29.62 KG/M2 | OXYGEN SATURATION: 98 %

## 2018-12-19 PROCEDURE — 3600000002 HC SURGERY LEVEL 2 BASE: Performed by: UROLOGY

## 2018-12-19 PROCEDURE — C1769 GUIDE WIRE: HCPCS | Performed by: UROLOGY

## 2018-12-19 PROCEDURE — 6360000004 HC RX CONTRAST MEDICATION: Performed by: UROLOGY

## 2018-12-19 PROCEDURE — 3700000000 HC ANESTHESIA ATTENDED CARE: Performed by: UROLOGY

## 2018-12-19 PROCEDURE — 7100000001 HC PACU RECOVERY - ADDTL 15 MIN: Performed by: UROLOGY

## 2018-12-19 PROCEDURE — 74420 UROGRAPHY RTRGR +-KUB: CPT

## 2018-12-19 PROCEDURE — 3700000001 HC ADD 15 MINUTES (ANESTHESIA): Performed by: UROLOGY

## 2018-12-19 PROCEDURE — 6360000002 HC RX W HCPCS: Performed by: NURSE ANESTHETIST, CERTIFIED REGISTERED

## 2018-12-19 PROCEDURE — 7100000010 HC PHASE II RECOVERY - FIRST 15 MIN: Performed by: UROLOGY

## 2018-12-19 PROCEDURE — 2580000003 HC RX 258: Performed by: UROLOGY

## 2018-12-19 PROCEDURE — 6360000002 HC RX W HCPCS: Performed by: UROLOGY

## 2018-12-19 PROCEDURE — 7100000011 HC PHASE II RECOVERY - ADDTL 15 MIN: Performed by: UROLOGY

## 2018-12-19 PROCEDURE — C1758 CATHETER, URETERAL: HCPCS | Performed by: UROLOGY

## 2018-12-19 PROCEDURE — 2709999900 HC NON-CHARGEABLE SUPPLY: Performed by: UROLOGY

## 2018-12-19 PROCEDURE — 7100000000 HC PACU RECOVERY - FIRST 15 MIN: Performed by: UROLOGY

## 2018-12-19 PROCEDURE — 3600000012 HC SURGERY LEVEL 2 ADDTL 15MIN: Performed by: UROLOGY

## 2018-12-19 PROCEDURE — 6360000002 HC RX W HCPCS

## 2018-12-19 PROCEDURE — 2580000003 HC RX 258: Performed by: ANESTHESIOLOGY

## 2018-12-19 PROCEDURE — 6370000000 HC RX 637 (ALT 250 FOR IP): Performed by: UROLOGY

## 2018-12-19 PROCEDURE — 2500000003 HC RX 250 WO HCPCS: Performed by: NURSE ANESTHETIST, CERTIFIED REGISTERED

## 2018-12-19 RX ORDER — SODIUM CHLORIDE, SODIUM LACTATE, POTASSIUM CHLORIDE, CALCIUM CHLORIDE 600; 310; 30; 20 MG/100ML; MG/100ML; MG/100ML; MG/100ML
INJECTION, SOLUTION INTRAVENOUS CONTINUOUS
Status: DISCONTINUED | OUTPATIENT
Start: 2018-12-19 | End: 2018-12-19 | Stop reason: HOSPADM

## 2018-12-19 RX ORDER — FENTANYL CITRATE 50 UG/ML
25 INJECTION, SOLUTION INTRAMUSCULAR; INTRAVENOUS EVERY 5 MIN PRN
Status: DISCONTINUED | OUTPATIENT
Start: 2018-12-19 | End: 2018-12-19 | Stop reason: HOSPADM

## 2018-12-19 RX ORDER — ULTRASOUND COUPLING MEDIUM
GEL (GRAM) TOPICAL PRN
Status: DISCONTINUED | OUTPATIENT
Start: 2018-12-19 | End: 2018-12-19 | Stop reason: HOSPADM

## 2018-12-19 RX ORDER — DEXAMETHASONE SODIUM PHOSPHATE 10 MG/ML
INJECTION INTRAMUSCULAR; INTRAVENOUS PRN
Status: DISCONTINUED | OUTPATIENT
Start: 2018-12-19 | End: 2018-12-19 | Stop reason: SDUPTHER

## 2018-12-19 RX ORDER — PROPOFOL 10 MG/ML
INJECTION, EMULSION INTRAVENOUS PRN
Status: DISCONTINUED | OUTPATIENT
Start: 2018-12-19 | End: 2018-12-19 | Stop reason: SDUPTHER

## 2018-12-19 RX ORDER — MIDAZOLAM HYDROCHLORIDE 1 MG/ML
INJECTION INTRAMUSCULAR; INTRAVENOUS
Status: COMPLETED
Start: 2018-12-19 | End: 2018-12-19

## 2018-12-19 RX ORDER — ONDANSETRON 2 MG/ML
4 INJECTION INTRAMUSCULAR; INTRAVENOUS
Status: DISCONTINUED | OUTPATIENT
Start: 2018-12-19 | End: 2018-12-19 | Stop reason: HOSPADM

## 2018-12-19 RX ORDER — ONDANSETRON 2 MG/ML
INJECTION INTRAMUSCULAR; INTRAVENOUS PRN
Status: DISCONTINUED | OUTPATIENT
Start: 2018-12-19 | End: 2018-12-19 | Stop reason: SDUPTHER

## 2018-12-19 RX ORDER — OXYCODONE HYDROCHLORIDE AND ACETAMINOPHEN 5; 325 MG/1; MG/1
2 TABLET ORAL PRN
Status: DISCONTINUED | OUTPATIENT
Start: 2018-12-19 | End: 2018-12-19 | Stop reason: HOSPADM

## 2018-12-19 RX ORDER — LABETALOL HYDROCHLORIDE 5 MG/ML
5 INJECTION, SOLUTION INTRAVENOUS EVERY 10 MIN PRN
Status: DISCONTINUED | OUTPATIENT
Start: 2018-12-19 | End: 2018-12-19 | Stop reason: HOSPADM

## 2018-12-19 RX ORDER — MAGNESIUM HYDROXIDE 1200 MG/15ML
LIQUID ORAL PRN
Status: DISCONTINUED | OUTPATIENT
Start: 2018-12-19 | End: 2018-12-19 | Stop reason: HOSPADM

## 2018-12-19 RX ORDER — MORPHINE SULFATE 2 MG/ML
2 INJECTION, SOLUTION INTRAMUSCULAR; INTRAVENOUS EVERY 5 MIN PRN
Status: DISCONTINUED | OUTPATIENT
Start: 2018-12-19 | End: 2018-12-19 | Stop reason: HOSPADM

## 2018-12-19 RX ORDER — DIPHENHYDRAMINE HYDROCHLORIDE 50 MG/ML
12.5 INJECTION INTRAMUSCULAR; INTRAVENOUS
Status: DISCONTINUED | OUTPATIENT
Start: 2018-12-19 | End: 2018-12-19 | Stop reason: HOSPADM

## 2018-12-19 RX ORDER — OXYCODONE HYDROCHLORIDE AND ACETAMINOPHEN 5; 325 MG/1; MG/1
1 TABLET ORAL PRN
Status: DISCONTINUED | OUTPATIENT
Start: 2018-12-19 | End: 2018-12-19 | Stop reason: HOSPADM

## 2018-12-19 RX ORDER — MEPERIDINE HYDROCHLORIDE 50 MG/ML
12.5 INJECTION INTRAMUSCULAR; INTRAVENOUS; SUBCUTANEOUS EVERY 5 MIN PRN
Status: DISCONTINUED | OUTPATIENT
Start: 2018-12-19 | End: 2018-12-19 | Stop reason: HOSPADM

## 2018-12-19 RX ORDER — HYDRALAZINE HYDROCHLORIDE 20 MG/ML
5 INJECTION INTRAMUSCULAR; INTRAVENOUS EVERY 10 MIN PRN
Status: DISCONTINUED | OUTPATIENT
Start: 2018-12-19 | End: 2018-12-19 | Stop reason: HOSPADM

## 2018-12-19 RX ORDER — FENTANYL CITRATE 50 UG/ML
50 INJECTION, SOLUTION INTRAMUSCULAR; INTRAVENOUS EVERY 5 MIN PRN
Status: DISCONTINUED | OUTPATIENT
Start: 2018-12-19 | End: 2018-12-19 | Stop reason: HOSPADM

## 2018-12-19 RX ORDER — LIDOCAINE HYDROCHLORIDE 10 MG/ML
INJECTION, SOLUTION EPIDURAL; INFILTRATION; INTRACAUDAL; PERINEURAL PRN
Status: DISCONTINUED | OUTPATIENT
Start: 2018-12-19 | End: 2018-12-19 | Stop reason: SDUPTHER

## 2018-12-19 RX ORDER — FENTANYL CITRATE 50 UG/ML
INJECTION, SOLUTION INTRAMUSCULAR; INTRAVENOUS PRN
Status: DISCONTINUED | OUTPATIENT
Start: 2018-12-19 | End: 2018-12-19 | Stop reason: SDUPTHER

## 2018-12-19 RX ADMIN — Medication 2 G: at 09:40

## 2018-12-19 RX ADMIN — FENTANYL CITRATE 100 MCG: 50 INJECTION INTRAMUSCULAR; INTRAVENOUS at 09:36

## 2018-12-19 RX ADMIN — DEXAMETHASONE SODIUM PHOSPHATE 10 MG: 10 INJECTION INTRAMUSCULAR; INTRAVENOUS at 09:40

## 2018-12-19 RX ADMIN — PROPOFOL 200 MG: 10 INJECTION, EMULSION INTRAVENOUS at 09:36

## 2018-12-19 RX ADMIN — ONDANSETRON 4 MG: 2 INJECTION INTRAMUSCULAR; INTRAVENOUS at 09:46

## 2018-12-19 RX ADMIN — SODIUM CHLORIDE, POTASSIUM CHLORIDE, SODIUM LACTATE AND CALCIUM CHLORIDE: 600; 310; 30; 20 INJECTION, SOLUTION INTRAVENOUS at 09:23

## 2018-12-19 RX ADMIN — LIDOCAINE HYDROCHLORIDE 50 MG: 10 INJECTION, SOLUTION EPIDURAL; INFILTRATION; INTRACAUDAL; PERINEURAL at 09:36

## 2018-12-19 RX ADMIN — MIDAZOLAM HYDROCHLORIDE 2 MG: 1 INJECTION, SOLUTION INTRAMUSCULAR; INTRAVENOUS at 09:26

## 2018-12-19 ASSESSMENT — PULMONARY FUNCTION TESTS
PIF_VALUE: 31
PIF_VALUE: 0
PIF_VALUE: 19
PIF_VALUE: 15
PIF_VALUE: 0
PIF_VALUE: 19
PIF_VALUE: 27
PIF_VALUE: 28
PIF_VALUE: 0
PIF_VALUE: 1
PIF_VALUE: 22
PIF_VALUE: 3
PIF_VALUE: 0
PIF_VALUE: 1
PIF_VALUE: 25
PIF_VALUE: 19
PIF_VALUE: 26
PIF_VALUE: 25
PIF_VALUE: 34
PIF_VALUE: 2
PIF_VALUE: 19
PIF_VALUE: 19
PIF_VALUE: 0
PIF_VALUE: 19
PIF_VALUE: 25
PIF_VALUE: 19
PIF_VALUE: 20
PIF_VALUE: 19
PIF_VALUE: 19

## 2018-12-19 ASSESSMENT — PAIN SCALES - GENERAL
PAINLEVEL_OUTOF10: 0

## 2018-12-19 ASSESSMENT — PAIN - FUNCTIONAL ASSESSMENT: PAIN_FUNCTIONAL_ASSESSMENT: 0-10

## 2018-12-26 ENCOUNTER — TELEPHONE (OUTPATIENT)
Dept: UROLOGY | Age: 38
End: 2018-12-26

## 2019-01-04 ENCOUNTER — TELEPHONE (OUTPATIENT)
Dept: UROLOGY | Age: 39
End: 2019-01-04

## 2019-01-18 ENCOUNTER — HOSPITAL ENCOUNTER (INPATIENT)
Age: 39
LOS: 4 days | Discharge: NURSING, REHAB OR LONG-TERM CARE | DRG: 751 | End: 2019-01-23
Attending: EMERGENCY MEDICINE | Admitting: PSYCHIATRY & NEUROLOGY
Payer: MEDICAID

## 2019-01-18 DIAGNOSIS — R45.851 SUICIDAL IDEATION: Primary | ICD-10-CM

## 2019-01-18 DIAGNOSIS — F19.10 POLYSUBSTANCE ABUSE (HCC): ICD-10-CM

## 2019-01-18 DIAGNOSIS — F32.A DEPRESSION WITH SUICIDAL IDEATION: ICD-10-CM

## 2019-01-18 DIAGNOSIS — R45.851 DEPRESSION WITH SUICIDAL IDEATION: ICD-10-CM

## 2019-01-18 LAB
-: ABNORMAL
ABSOLUTE EOS #: 0.1 K/UL (ref 0–0.4)
ABSOLUTE IMMATURE GRANULOCYTE: NORMAL K/UL (ref 0–0.3)
ABSOLUTE LYMPH #: 4.6 K/UL (ref 1–4.8)
ABSOLUTE MONO #: 0.7 K/UL (ref 0.1–1.3)
ACETAMINOPHEN LEVEL: <5 UG/ML (ref 10–30)
ALBUMIN SERPL-MCNC: 4.5 G/DL (ref 3.5–5.2)
ALBUMIN/GLOBULIN RATIO: ABNORMAL (ref 1–2.5)
ALP BLD-CCNC: 86 U/L (ref 40–129)
ALT SERPL-CCNC: 16 U/L (ref 5–41)
AMORPHOUS: ABNORMAL
AMPHETAMINE SCREEN URINE: NEGATIVE
ANION GAP SERPL CALCULATED.3IONS-SCNC: 17 MMOL/L (ref 9–17)
AST SERPL-CCNC: 13 U/L
BACTERIA: ABNORMAL
BARBITURATE SCREEN URINE: NEGATIVE
BASOPHILS # BLD: 1 % (ref 0–2)
BASOPHILS ABSOLUTE: 0.1 K/UL (ref 0–0.2)
BENZODIAZEPINE SCREEN, URINE: POSITIVE
BILIRUB SERPL-MCNC: 0.43 MG/DL (ref 0.3–1.2)
BILIRUBIN URINE: NEGATIVE
BUN BLDV-MCNC: 11 MG/DL (ref 6–20)
BUN/CREAT BLD: ABNORMAL (ref 9–20)
BUPRENORPHINE URINE: ABNORMAL
CALCIUM SERPL-MCNC: 8.6 MG/DL (ref 8.6–10.4)
CANNABINOID SCREEN URINE: NEGATIVE
CASTS UA: ABNORMAL /LPF
CHLORIDE BLD-SCNC: 99 MMOL/L (ref 98–107)
CO2: 22 MMOL/L (ref 20–31)
COCAINE METABOLITE, URINE: POSITIVE
COLOR: YELLOW
COMMENT UA: ABNORMAL
CREAT SERPL-MCNC: 0.69 MG/DL (ref 0.7–1.2)
CRYSTALS, UA: ABNORMAL /HPF
DIFFERENTIAL TYPE: NORMAL
EKG ATRIAL RATE: 108 BPM
EKG P AXIS: 65 DEGREES
EKG P-R INTERVAL: 150 MS
EKG Q-T INTERVAL: 318 MS
EKG QRS DURATION: 66 MS
EKG QTC CALCULATION (BAZETT): 426 MS
EKG R AXIS: -15 DEGREES
EKG T AXIS: 38 DEGREES
EKG VENTRICULAR RATE: 108 BPM
EOSINOPHILS RELATIVE PERCENT: 1 % (ref 0–4)
EPITHELIAL CELLS UA: ABNORMAL /HPF
ETHANOL PERCENT: 0.32 %
ETHANOL: 318 MG/DL
GFR AFRICAN AMERICAN: >60 ML/MIN
GFR NON-AFRICAN AMERICAN: >60 ML/MIN
GFR SERPL CREATININE-BSD FRML MDRD: ABNORMAL ML/MIN/{1.73_M2}
GFR SERPL CREATININE-BSD FRML MDRD: ABNORMAL ML/MIN/{1.73_M2}
GLUCOSE BLD-MCNC: 99 MG/DL (ref 70–99)
GLUCOSE URINE: NEGATIVE
HCT VFR BLD CALC: 47.4 % (ref 41–53)
HEMOGLOBIN: 16.5 G/DL (ref 13.5–17.5)
IMMATURE GRANULOCYTES: NORMAL %
KETONES, URINE: NEGATIVE
LEUKOCYTE ESTERASE, URINE: ABNORMAL
LYMPHOCYTES # BLD: 43 % (ref 24–44)
MCH RBC QN AUTO: 30.7 PG (ref 26–34)
MCHC RBC AUTO-ENTMCNC: 34.9 G/DL (ref 31–37)
MCV RBC AUTO: 88.1 FL (ref 80–100)
MDMA URINE: ABNORMAL
METHADONE SCREEN, URINE: NEGATIVE
METHAMPHETAMINE, URINE: ABNORMAL
MONOCYTES # BLD: 7 % (ref 1–7)
MUCUS: ABNORMAL
NITRITE, URINE: NEGATIVE
NRBC AUTOMATED: NORMAL PER 100 WBC
OPIATES, URINE: NEGATIVE
OTHER OBSERVATIONS UA: ABNORMAL
OXYCODONE SCREEN URINE: NEGATIVE
PDW BLD-RTO: 13.9 % (ref 11.5–14.9)
PH UA: 5.5 (ref 5–8)
PHENCYCLIDINE, URINE: NEGATIVE
PLATELET # BLD: 259 K/UL (ref 150–450)
PLATELET ESTIMATE: NORMAL
PMV BLD AUTO: 7.7 FL (ref 6–12)
POTASSIUM SERPL-SCNC: 3.6 MMOL/L (ref 3.7–5.3)
PROPOXYPHENE, URINE: ABNORMAL
PROTEIN UA: NEGATIVE
RBC # BLD: 5.38 M/UL (ref 4.5–5.9)
RBC # BLD: NORMAL 10*6/UL
RBC UA: ABNORMAL /HPF
RENAL EPITHELIAL, UA: ABNORMAL /HPF
SALICYLATE LEVEL: <1 MG/DL (ref 3–10)
SEG NEUTROPHILS: 48 % (ref 36–66)
SEGMENTED NEUTROPHILS ABSOLUTE COUNT: 5.3 K/UL (ref 1.3–9.1)
SODIUM BLD-SCNC: 138 MMOL/L (ref 135–144)
SPECIFIC GRAVITY UA: 1.01 (ref 1–1.03)
TEST INFORMATION: ABNORMAL
TOTAL PROTEIN: 8 G/DL (ref 6.4–8.3)
TRICHOMONAS: ABNORMAL
TRICYCLIC ANTIDEPRESSANTS, UR: ABNORMAL
TURBIDITY: CLEAR
URINE HGB: NEGATIVE
UROBILINOGEN, URINE: NORMAL
WBC # BLD: 10.8 K/UL (ref 3.5–11)
WBC # BLD: NORMAL 10*3/UL
WBC UA: ABNORMAL /HPF
YEAST: ABNORMAL

## 2019-01-18 PROCEDURE — 85025 COMPLETE CBC W/AUTO DIFF WBC: CPT

## 2019-01-18 PROCEDURE — G0480 DRUG TEST DEF 1-7 CLASSES: HCPCS

## 2019-01-18 PROCEDURE — 81001 URINALYSIS AUTO W/SCOPE: CPT

## 2019-01-18 PROCEDURE — 80053 COMPREHEN METABOLIC PANEL: CPT

## 2019-01-18 PROCEDURE — 80307 DRUG TEST PRSMV CHEM ANLYZR: CPT

## 2019-01-18 PROCEDURE — 99285 EMERGENCY DEPT VISIT HI MDM: CPT

## 2019-01-18 PROCEDURE — 36415 COLL VENOUS BLD VENIPUNCTURE: CPT

## 2019-01-19 PROBLEM — F19.20 POLYSUBSTANCE DEPENDENCE (HCC): Chronic | Status: ACTIVE | Noted: 2019-01-19

## 2019-01-19 PROBLEM — F32.2 MAJOR DEPRESSIVE DISORDER, SEVERE (HCC): Status: ACTIVE | Noted: 2019-01-19

## 2019-01-19 PROBLEM — F33.2 SEVERE RECURRENT MAJOR DEPRESSION WITHOUT PSYCHOTIC FEATURES (HCC): Status: ACTIVE | Noted: 2019-01-19

## 2019-01-19 PROBLEM — F32.2 MAJOR DEPRESSIVE DISORDER, SEVERE (HCC): Status: RESOLVED | Noted: 2019-01-19 | Resolved: 2019-01-19

## 2019-01-19 LAB
ETHANOL PERCENT: 0.18 %
ETHANOL: 181 MG/DL

## 2019-01-19 PROCEDURE — 6370000000 HC RX 637 (ALT 250 FOR IP): Performed by: EMERGENCY MEDICINE

## 2019-01-19 PROCEDURE — 1240000000 HC EMOTIONAL WELLNESS R&B

## 2019-01-19 PROCEDURE — 6370000000 HC RX 637 (ALT 250 FOR IP): Performed by: PSYCHIATRY & NEUROLOGY

## 2019-01-19 PROCEDURE — 93005 ELECTROCARDIOGRAM TRACING: CPT

## 2019-01-19 PROCEDURE — 36415 COLL VENOUS BLD VENIPUNCTURE: CPT

## 2019-01-19 PROCEDURE — G0480 DRUG TEST DEF 1-7 CLASSES: HCPCS

## 2019-01-19 RX ORDER — IBUPROFEN 600 MG/1
600 TABLET ORAL ONCE
Status: COMPLETED | OUTPATIENT
Start: 2019-01-19 | End: 2019-01-19

## 2019-01-19 RX ORDER — ACETAMINOPHEN 325 MG/1
650 TABLET ORAL EVERY 4 HOURS PRN
Status: DISCONTINUED | OUTPATIENT
Start: 2019-01-19 | End: 2019-01-23 | Stop reason: HOSPADM

## 2019-01-19 RX ORDER — NICOTINE 21 MG/24HR
1 PATCH, TRANSDERMAL 24 HOURS TRANSDERMAL DAILY
Status: DISCONTINUED | OUTPATIENT
Start: 2019-01-19 | End: 2019-01-19

## 2019-01-19 RX ORDER — HYDROXYZINE 50 MG/1
50 TABLET, FILM COATED ORAL 3 TIMES DAILY PRN
Status: DISCONTINUED | OUTPATIENT
Start: 2019-01-19 | End: 2019-01-23 | Stop reason: HOSPADM

## 2019-01-19 RX ORDER — CHLORDIAZEPOXIDE HYDROCHLORIDE 25 MG/1
25 CAPSULE, GELATIN COATED ORAL EVERY 6 HOURS PRN
Status: DISCONTINUED | OUTPATIENT
Start: 2019-01-19 | End: 2019-01-23 | Stop reason: HOSPADM

## 2019-01-19 RX ORDER — MAGNESIUM HYDROXIDE/ALUMINUM HYDROXICE/SIMETHICONE 120; 1200; 1200 MG/30ML; MG/30ML; MG/30ML
30 SUSPENSION ORAL EVERY 6 HOURS PRN
Status: DISCONTINUED | OUTPATIENT
Start: 2019-01-19 | End: 2019-01-23 | Stop reason: HOSPADM

## 2019-01-19 RX ORDER — THIAMINE MONONITRATE (VIT B1) 100 MG
100 TABLET ORAL DAILY
Status: DISCONTINUED | OUTPATIENT
Start: 2019-01-19 | End: 2019-01-23 | Stop reason: HOSPADM

## 2019-01-19 RX ORDER — CHLORDIAZEPOXIDE HYDROCHLORIDE 25 MG/1
50 CAPSULE, GELATIN COATED ORAL EVERY 6 HOURS PRN
Status: DISCONTINUED | OUTPATIENT
Start: 2019-01-19 | End: 2019-01-19

## 2019-01-19 RX ORDER — TRAZODONE HYDROCHLORIDE 50 MG/1
50 TABLET ORAL NIGHTLY PRN
Status: DISCONTINUED | OUTPATIENT
Start: 2019-01-19 | End: 2019-01-23 | Stop reason: HOSPADM

## 2019-01-19 RX ORDER — BENZTROPINE MESYLATE 1 MG/ML
2 INJECTION INTRAMUSCULAR; INTRAVENOUS 2 TIMES DAILY PRN
Status: DISCONTINUED | OUTPATIENT
Start: 2019-01-19 | End: 2019-01-23 | Stop reason: HOSPADM

## 2019-01-19 RX ORDER — LANOLIN ALCOHOL/MO/W.PET/CERES
1000 CREAM (GRAM) TOPICAL DAILY
COMMUNITY
End: 2019-10-28

## 2019-01-19 RX ORDER — FOLIC ACID 1 MG/1
1 TABLET ORAL DAILY
Status: DISCONTINUED | OUTPATIENT
Start: 2019-01-19 | End: 2019-01-23 | Stop reason: HOSPADM

## 2019-01-19 RX ADMIN — CHLORDIAZEPOXIDE HYDROCHLORIDE 25 MG: 25 CAPSULE ORAL at 17:03

## 2019-01-19 RX ADMIN — CHLORDIAZEPOXIDE HYDROCHLORIDE 50 MG: 25 CAPSULE ORAL at 09:04

## 2019-01-19 RX ADMIN — IBUPROFEN 600 MG: 600 TABLET ORAL at 12:30

## 2019-01-19 RX ADMIN — HYDROXYZINE HYDROCHLORIDE 50 MG: 50 TABLET, FILM COATED ORAL at 17:03

## 2019-01-19 RX ADMIN — NICOTINE POLACRILEX 2 MG: 2 GUM, CHEWING BUCCAL at 21:03

## 2019-01-19 RX ADMIN — Medication 100 MG: at 17:03

## 2019-01-19 RX ADMIN — FOLIC ACID 1 MG: 1 TABLET ORAL at 17:03

## 2019-01-19 RX ADMIN — NICOTINE POLACRILEX 2 MG: 2 GUM, CHEWING BUCCAL at 17:44

## 2019-01-19 ASSESSMENT — SLEEP AND FATIGUE QUESTIONNAIRES
AVERAGE NUMBER OF SLEEP HOURS: 4
DIFFICULTY FALLING ASLEEP: YES
RESTFUL SLEEP: NO
DO YOU USE A SLEEP AID: NO
DIFFICULTY ARISING: NO
DO YOU HAVE DIFFICULTY SLEEPING: YES
SLEEP PATTERN: DIFFICULTY FALLING ASLEEP;DISTURBED/INTERRUPTED SLEEP;EARLY AWAKENING
DIFFICULTY STAYING ASLEEP: YES

## 2019-01-19 ASSESSMENT — PAIN SCALES - GENERAL
PAINLEVEL_OUTOF10: 5
PAINLEVEL_OUTOF10: 0

## 2019-01-19 ASSESSMENT — PATIENT HEALTH QUESTIONNAIRE - PHQ9: SUM OF ALL RESPONSES TO PHQ QUESTIONS 1-9: 9

## 2019-01-19 ASSESSMENT — LIFESTYLE VARIABLES: HISTORY_ALCOHOL_USE: YES

## 2019-01-20 LAB
ABSOLUTE EOS #: 0.2 K/UL (ref 0–0.4)
ABSOLUTE IMMATURE GRANULOCYTE: ABNORMAL K/UL (ref 0–0.3)
ABSOLUTE LYMPH #: 2.8 K/UL (ref 1–4.8)
ABSOLUTE MONO #: 0.9 K/UL (ref 0.1–1.3)
ALBUMIN SERPL-MCNC: 3.7 G/DL (ref 3.5–5.2)
ALBUMIN/GLOBULIN RATIO: ABNORMAL (ref 1–2.5)
ALP BLD-CCNC: 70 U/L (ref 40–129)
ALT SERPL-CCNC: 15 U/L (ref 5–41)
ANION GAP SERPL CALCULATED.3IONS-SCNC: 10 MMOL/L (ref 9–17)
AST SERPL-CCNC: 13 U/L
BASOPHILS # BLD: 1 % (ref 0–2)
BASOPHILS ABSOLUTE: 0 K/UL (ref 0–0.2)
BILIRUB SERPL-MCNC: 0.71 MG/DL (ref 0.3–1.2)
BUN BLDV-MCNC: 20 MG/DL (ref 6–20)
BUN/CREAT BLD: ABNORMAL (ref 9–20)
CALCIUM SERPL-MCNC: 9.2 MG/DL (ref 8.6–10.4)
CHLORIDE BLD-SCNC: 102 MMOL/L (ref 98–107)
CHOLESTEROL/HDL RATIO: 2.3
CHOLESTEROL: 123 MG/DL
CO2: 26 MMOL/L (ref 20–31)
CREAT SERPL-MCNC: 0.68 MG/DL (ref 0.7–1.2)
DIFFERENTIAL TYPE: ABNORMAL
EOSINOPHILS RELATIVE PERCENT: 3 % (ref 0–4)
ESTIMATED AVERAGE GLUCOSE: 108 MG/DL
GFR AFRICAN AMERICAN: >60 ML/MIN
GFR NON-AFRICAN AMERICAN: >60 ML/MIN
GFR SERPL CREATININE-BSD FRML MDRD: ABNORMAL ML/MIN/{1.73_M2}
GFR SERPL CREATININE-BSD FRML MDRD: ABNORMAL ML/MIN/{1.73_M2}
GLUCOSE BLD-MCNC: 107 MG/DL (ref 70–99)
HBA1C MFR BLD: 5.4 % (ref 4–6)
HCT VFR BLD CALC: 46.5 % (ref 41–53)
HDLC SERPL-MCNC: 53 MG/DL
HEMOGLOBIN: 15.8 G/DL (ref 13.5–17.5)
IMMATURE GRANULOCYTES: ABNORMAL %
LDL CHOLESTEROL: 55 MG/DL (ref 0–130)
LYMPHOCYTES # BLD: 43 % (ref 24–44)
MCH RBC QN AUTO: 30.5 PG (ref 26–34)
MCHC RBC AUTO-ENTMCNC: 33.9 G/DL (ref 31–37)
MCV RBC AUTO: 90 FL (ref 80–100)
MONOCYTES # BLD: 14 % (ref 1–7)
NRBC AUTOMATED: ABNORMAL PER 100 WBC
PDW BLD-RTO: 13.8 % (ref 11.5–14.9)
PLATELET # BLD: 197 K/UL (ref 150–450)
PLATELET ESTIMATE: ABNORMAL
PMV BLD AUTO: 8.3 FL (ref 6–12)
POTASSIUM SERPL-SCNC: 4 MMOL/L (ref 3.7–5.3)
RBC # BLD: 5.17 M/UL (ref 4.5–5.9)
RBC # BLD: ABNORMAL 10*6/UL
SEG NEUTROPHILS: 39 % (ref 36–66)
SEGMENTED NEUTROPHILS ABSOLUTE COUNT: 2.5 K/UL (ref 1.3–9.1)
SODIUM BLD-SCNC: 138 MMOL/L (ref 135–144)
THYROXINE, FREE: 1.18 NG/DL (ref 0.93–1.7)
TOTAL PROTEIN: 6.7 G/DL (ref 6.4–8.3)
TRIGL SERPL-MCNC: 73 MG/DL
TSH SERPL DL<=0.05 MIU/L-ACNC: 0.76 MIU/L (ref 0.3–5)
VLDLC SERPL CALC-MCNC: NORMAL MG/DL (ref 1–30)
WBC # BLD: 6.5 K/UL (ref 3.5–11)
WBC # BLD: ABNORMAL 10*3/UL

## 2019-01-20 PROCEDURE — 80053 COMPREHEN METABOLIC PANEL: CPT

## 2019-01-20 PROCEDURE — 6370000000 HC RX 637 (ALT 250 FOR IP): Performed by: PSYCHIATRY & NEUROLOGY

## 2019-01-20 PROCEDURE — 99223 1ST HOSP IP/OBS HIGH 75: CPT | Performed by: PSYCHIATRY & NEUROLOGY

## 2019-01-20 PROCEDURE — 84443 ASSAY THYROID STIM HORMONE: CPT

## 2019-01-20 PROCEDURE — 80061 LIPID PANEL: CPT

## 2019-01-20 PROCEDURE — 83036 HEMOGLOBIN GLYCOSYLATED A1C: CPT

## 2019-01-20 PROCEDURE — 85025 COMPLETE CBC W/AUTO DIFF WBC: CPT

## 2019-01-20 PROCEDURE — 36415 COLL VENOUS BLD VENIPUNCTURE: CPT

## 2019-01-20 PROCEDURE — 84439 ASSAY OF FREE THYROXINE: CPT

## 2019-01-20 PROCEDURE — 1240000000 HC EMOTIONAL WELLNESS R&B

## 2019-01-20 RX ORDER — BUSPIRONE HYDROCHLORIDE 15 MG/1
7.5 TABLET ORAL 3 TIMES DAILY
Status: DISCONTINUED | OUTPATIENT
Start: 2019-01-20 | End: 2019-01-21 | Stop reason: CLARIF

## 2019-01-20 RX ORDER — ATOMOXETINE 60 MG/1
60 CAPSULE ORAL DAILY
Status: DISCONTINUED | OUTPATIENT
Start: 2019-01-20 | End: 2019-01-23 | Stop reason: HOSPADM

## 2019-01-20 RX ORDER — OLANZAPINE 5 MG/1
5 TABLET ORAL NIGHTLY
Status: DISCONTINUED | OUTPATIENT
Start: 2019-01-20 | End: 2019-01-23 | Stop reason: HOSPADM

## 2019-01-20 RX ADMIN — CHLORDIAZEPOXIDE HYDROCHLORIDE 25 MG: 25 CAPSULE ORAL at 17:48

## 2019-01-20 RX ADMIN — ATOMOXETINE 60 MG: 60 CAPSULE ORAL at 16:23

## 2019-01-20 RX ADMIN — NICOTINE POLACRILEX 2 MG: 2 GUM, CHEWING BUCCAL at 12:19

## 2019-01-20 RX ADMIN — NICOTINE POLACRILEX 2 MG: 2 GUM, CHEWING BUCCAL at 21:20

## 2019-01-20 RX ADMIN — HYDROXYZINE HYDROCHLORIDE 50 MG: 50 TABLET, FILM COATED ORAL at 17:48

## 2019-01-20 RX ADMIN — BUSPIRONE HYDROCHLORIDE 7.5 MG: 15 TABLET ORAL at 21:18

## 2019-01-20 RX ADMIN — CHLORDIAZEPOXIDE HYDROCHLORIDE 25 MG: 25 CAPSULE ORAL at 12:18

## 2019-01-20 RX ADMIN — NICOTINE POLACRILEX 2 MG: 2 GUM, CHEWING BUCCAL at 14:30

## 2019-01-20 RX ADMIN — NICOTINE POLACRILEX 2 MG: 2 GUM, CHEWING BUCCAL at 16:24

## 2019-01-20 RX ADMIN — FOLIC ACID 1 MG: 1 TABLET ORAL at 12:18

## 2019-01-20 RX ADMIN — Medication 100 MG: at 12:18

## 2019-01-20 RX ADMIN — HYDROXYZINE HYDROCHLORIDE 50 MG: 50 TABLET, FILM COATED ORAL at 12:18

## 2019-01-20 RX ADMIN — NICOTINE POLACRILEX 2 MG: 2 GUM, CHEWING BUCCAL at 17:48

## 2019-01-20 RX ADMIN — NICOTINE POLACRILEX 2 MG: 2 GUM, CHEWING BUCCAL at 20:43

## 2019-01-20 RX ADMIN — BUSPIRONE HYDROCHLORIDE 7.5 MG: 15 TABLET ORAL at 17:09

## 2019-01-20 RX ADMIN — OLANZAPINE 5 MG: 5 TABLET, FILM COATED ORAL at 20:41

## 2019-01-20 ASSESSMENT — ENCOUNTER SYMPTOMS
COUGH: 0
WHEEZING: 0
CHEST TIGHTNESS: 0
NAUSEA: 0
SHORTNESS OF BREATH: 0
VOMITING: 0
ABDOMINAL PAIN: 0
DIARRHEA: 0
CONSTIPATION: 0
TROUBLE SWALLOWING: 0
RHINORRHEA: 0

## 2019-01-20 ASSESSMENT — LIFESTYLE VARIABLES: HISTORY_ALCOHOL_USE: YES

## 2019-01-21 PROCEDURE — 6370000000 HC RX 637 (ALT 250 FOR IP): Performed by: PSYCHIATRY & NEUROLOGY

## 2019-01-21 PROCEDURE — 99232 SBSQ HOSP IP/OBS MODERATE 35: CPT | Performed by: PSYCHIATRY & NEUROLOGY

## 2019-01-21 PROCEDURE — 1240000000 HC EMOTIONAL WELLNESS R&B

## 2019-01-21 RX ORDER — BUSPIRONE HYDROCHLORIDE 7.5 MG/1
7.5 TABLET ORAL 3 TIMES DAILY
Status: DISCONTINUED | OUTPATIENT
Start: 2019-01-21 | End: 2019-01-22

## 2019-01-21 RX ADMIN — FOLIC ACID 1 MG: 1 TABLET ORAL at 09:58

## 2019-01-21 RX ADMIN — NICOTINE POLACRILEX 2 MG: 2 GUM, CHEWING BUCCAL at 17:52

## 2019-01-21 RX ADMIN — NICOTINE POLACRILEX 2 MG: 2 GUM, CHEWING BUCCAL at 14:09

## 2019-01-21 RX ADMIN — ATOMOXETINE 60 MG: 60 CAPSULE ORAL at 09:58

## 2019-01-21 RX ADMIN — OLANZAPINE 5 MG: 5 TABLET, FILM COATED ORAL at 21:04

## 2019-01-21 RX ADMIN — HYDROXYZINE HYDROCHLORIDE 50 MG: 50 TABLET, FILM COATED ORAL at 21:07

## 2019-01-21 RX ADMIN — BUSPIRONE HYDROCHLORIDE 7.5 MG: 7.5 TABLET ORAL at 14:09

## 2019-01-21 RX ADMIN — NICOTINE POLACRILEX 2 MG: 2 GUM, CHEWING BUCCAL at 09:58

## 2019-01-21 RX ADMIN — Medication 100 MG: at 09:58

## 2019-01-21 RX ADMIN — BUSPIRONE HYDROCHLORIDE 7.5 MG: 7.5 TABLET ORAL at 21:04

## 2019-01-21 RX ADMIN — NICOTINE POLACRILEX 2 MG: 2 GUM, CHEWING BUCCAL at 21:04

## 2019-01-21 RX ADMIN — HYDROXYZINE HYDROCHLORIDE 50 MG: 50 TABLET, FILM COATED ORAL at 09:58

## 2019-01-21 RX ADMIN — ACETAMINOPHEN 650 MG: 325 TABLET, FILM COATED ORAL at 19:06

## 2019-01-21 ASSESSMENT — PAIN SCALES - GENERAL
PAINLEVEL_OUTOF10: 4
PAINLEVEL_OUTOF10: 0

## 2019-01-22 PROCEDURE — 1240000000 HC EMOTIONAL WELLNESS R&B

## 2019-01-22 PROCEDURE — 99232 SBSQ HOSP IP/OBS MODERATE 35: CPT | Performed by: PSYCHIATRY & NEUROLOGY

## 2019-01-22 PROCEDURE — 6370000000 HC RX 637 (ALT 250 FOR IP): Performed by: PSYCHIATRY & NEUROLOGY

## 2019-01-22 RX ORDER — BUSPIRONE HYDROCHLORIDE 7.5 MG/1
15 TABLET ORAL 2 TIMES DAILY
Status: DISCONTINUED | OUTPATIENT
Start: 2019-01-22 | End: 2019-01-22

## 2019-01-22 RX ORDER — BUSPIRONE HYDROCHLORIDE 15 MG/1
15 TABLET ORAL 2 TIMES DAILY
Status: DISCONTINUED | OUTPATIENT
Start: 2019-01-22 | End: 2019-01-22 | Stop reason: SDUPTHER

## 2019-01-22 RX ORDER — BUSPIRONE HYDROCHLORIDE 15 MG/1
15 TABLET ORAL 3 TIMES DAILY
Status: DISCONTINUED | OUTPATIENT
Start: 2019-01-22 | End: 2019-01-23 | Stop reason: HOSPADM

## 2019-01-22 RX ADMIN — NICOTINE POLACRILEX 2 MG: 2 GUM, CHEWING BUCCAL at 15:59

## 2019-01-22 RX ADMIN — BUSPIRONE HYDROCHLORIDE 15 MG: 15 TABLET ORAL at 21:04

## 2019-01-22 RX ADMIN — OLANZAPINE 5 MG: 5 TABLET, FILM COATED ORAL at 21:05

## 2019-01-22 RX ADMIN — BUSPIRONE HYDROCHLORIDE 15 MG: 15 TABLET ORAL at 11:39

## 2019-01-22 RX ADMIN — FOLIC ACID 1 MG: 1 TABLET ORAL at 11:36

## 2019-01-22 RX ADMIN — NICOTINE POLACRILEX 2 MG: 2 GUM, CHEWING BUCCAL at 12:17

## 2019-01-22 RX ADMIN — HYDROXYZINE HYDROCHLORIDE 50 MG: 50 TABLET, FILM COATED ORAL at 11:38

## 2019-01-22 RX ADMIN — NICOTINE POLACRILEX 2 MG: 2 GUM, CHEWING BUCCAL at 21:07

## 2019-01-22 RX ADMIN — ATOMOXETINE 60 MG: 60 CAPSULE ORAL at 11:35

## 2019-01-22 RX ADMIN — HYDROXYZINE HYDROCHLORIDE 50 MG: 50 TABLET, FILM COATED ORAL at 21:05

## 2019-01-22 RX ADMIN — Medication 100 MG: at 11:36

## 2019-01-22 RX ADMIN — NICOTINE POLACRILEX 2 MG: 2 GUM, CHEWING BUCCAL at 18:35

## 2019-01-22 ASSESSMENT — PAIN SCALES - GENERAL: PAINLEVEL_OUTOF10: 0

## 2019-01-22 ASSESSMENT — PAIN - FUNCTIONAL ASSESSMENT: PAIN_FUNCTIONAL_ASSESSMENT: 0-10

## 2019-01-23 VITALS
OXYGEN SATURATION: 99 % | WEIGHT: 200 LBS | BODY MASS INDEX: 29.62 KG/M2 | DIASTOLIC BLOOD PRESSURE: 76 MMHG | HEIGHT: 69 IN | TEMPERATURE: 97.3 F | RESPIRATION RATE: 15 BRPM | HEART RATE: 81 BPM | SYSTOLIC BLOOD PRESSURE: 144 MMHG

## 2019-01-23 PROCEDURE — 6370000000 HC RX 637 (ALT 250 FOR IP): Performed by: PSYCHIATRY & NEUROLOGY

## 2019-01-23 PROCEDURE — 99239 HOSP IP/OBS DSCHRG MGMT >30: CPT | Performed by: PSYCHIATRY & NEUROLOGY

## 2019-01-23 PROCEDURE — 5130000000 HC BRIDGE APPOINTMENT

## 2019-01-23 RX ORDER — OLANZAPINE 5 MG/1
5 TABLET ORAL NIGHTLY
Qty: 30 TABLET | Refills: 0 | Status: SHIPPED | OUTPATIENT
Start: 2019-01-23 | End: 2019-08-16 | Stop reason: SDUPTHER

## 2019-01-23 RX ORDER — FOLIC ACID 1 MG/1
1 TABLET ORAL DAILY
Qty: 30 TABLET | Refills: 0 | Status: SHIPPED | OUTPATIENT
Start: 2019-01-24 | End: 2019-10-28

## 2019-01-23 RX ORDER — ATOMOXETINE 60 MG/1
60 CAPSULE ORAL DAILY
Qty: 30 CAPSULE | Refills: 0 | Status: SHIPPED | OUTPATIENT
Start: 2019-01-24 | End: 2019-08-16 | Stop reason: SDUPTHER

## 2019-01-23 RX ORDER — TRAZODONE HYDROCHLORIDE 50 MG/1
50 TABLET ORAL NIGHTLY PRN
Qty: 30 TABLET | Refills: 0 | Status: SHIPPED | OUTPATIENT
Start: 2019-01-23 | End: 2019-10-28

## 2019-01-23 RX ORDER — HYDROXYZINE 50 MG/1
50 TABLET, FILM COATED ORAL 3 TIMES DAILY PRN
Qty: 30 TABLET | Refills: 0 | Status: SHIPPED | OUTPATIENT
Start: 2019-01-23 | End: 2019-02-02

## 2019-01-23 RX ORDER — BUSPIRONE HYDROCHLORIDE 15 MG/1
15 TABLET ORAL 3 TIMES DAILY
Qty: 90 TABLET | Refills: 0 | Status: SHIPPED | OUTPATIENT
Start: 2019-01-23 | End: 2019-08-16 | Stop reason: SDUPTHER

## 2019-01-23 RX ADMIN — FOLIC ACID 1 MG: 1 TABLET ORAL at 08:31

## 2019-01-23 RX ADMIN — BUSPIRONE HYDROCHLORIDE 15 MG: 15 TABLET ORAL at 10:07

## 2019-01-23 RX ADMIN — ATOMOXETINE 60 MG: 60 CAPSULE ORAL at 08:31

## 2019-01-23 RX ADMIN — HYDROXYZINE HYDROCHLORIDE 50 MG: 50 TABLET, FILM COATED ORAL at 08:31

## 2019-01-23 RX ADMIN — NICOTINE POLACRILEX 2 MG: 2 GUM, CHEWING BUCCAL at 08:31

## 2019-01-23 RX ADMIN — Medication 100 MG: at 08:31

## 2019-02-27 ENCOUNTER — TELEPHONE (OUTPATIENT)
Dept: UROLOGY | Age: 39
End: 2019-02-27

## 2019-03-23 ENCOUNTER — HOSPITAL ENCOUNTER (OUTPATIENT)
Age: 39
Setting detail: SPECIMEN
Discharge: HOME | End: 2019-03-23
Payer: MEDICAID

## 2019-03-23 LAB
ABSOLUTE EOS #: 0.18 K/UL (ref 0–0.44)
ABSOLUTE IMMATURE GRANULOCYTE: 0.03 K/UL (ref 0–0.3)
ABSOLUTE LYMPH #: 2.92 K/UL (ref 1.1–3.7)
ABSOLUTE MONO #: 1.01 K/UL (ref 0.1–1.2)
ALBUMIN SERPL-MCNC: 4.2 G/DL (ref 3.5–5.2)
ALBUMIN/GLOBULIN RATIO: 1.4 (ref 1–2.5)
ALP BLD-CCNC: 107 U/L (ref 40–129)
ALT SERPL-CCNC: 34 U/L (ref 5–41)
ANION GAP SERPL CALCULATED.3IONS-SCNC: 12 MMOL/L (ref 9–17)
AST SERPL-CCNC: 18 U/L
BASOPHILS # BLD: 0 % (ref 0–2)
BASOPHILS ABSOLUTE: 0.03 K/UL (ref 0–0.2)
BILIRUB SERPL-MCNC: 1.7 MG/DL (ref 0.3–1.2)
BILIRUBIN DIRECT: 0.29 MG/DL
BILIRUBIN, INDIRECT: 1.41 MG/DL (ref 0–1)
BUN BLDV-MCNC: 13 MG/DL (ref 6–20)
CALCIUM SERPL-MCNC: 9 MG/DL (ref 8.6–10.4)
CHLORIDE BLD-SCNC: 103 MMOL/L (ref 98–107)
CO2: 25 MMOL/L (ref 20–31)
CREAT SERPL-MCNC: 0.76 MG/DL (ref 0.7–1.2)
DIFFERENTIAL TYPE: NORMAL
EOSINOPHILS RELATIVE PERCENT: 2 % (ref 1–4)
GFR AFRICAN AMERICAN: >60 ML/MIN
GFR NON-AFRICAN AMERICAN: >60 ML/MIN
GFR SERPL CREATININE-BSD FRML MDRD: ABNORMAL ML/MIN/{1.73_M2}
GFR SERPL CREATININE-BSD FRML MDRD: ABNORMAL ML/MIN/{1.73_M2}
GLUCOSE BLD-MCNC: 115 MG/DL (ref 70–99)
HAV IGM SER IA-ACNC: NONREACTIVE
HCT VFR BLD CALC: 47.6 % (ref 40.7–50.3)
HEMOGLOBIN: 16.1 G/DL (ref 13–17)
HEPATITIS B CORE IGM ANTIBODY: NONREACTIVE
HEPATITIS B SURFACE ANTIGEN: NONREACTIVE
HEPATITIS C ANTIBODY: REACTIVE
HIV AG/AB: NONREACTIVE
IMMATURE GRANULOCYTES: 0 %
LYMPHOCYTES # BLD: 32 % (ref 24–43)
MCH RBC QN AUTO: 30 PG (ref 25.2–33.5)
MCHC RBC AUTO-ENTMCNC: 33.8 G/DL (ref 28.4–34.8)
MCV RBC AUTO: 88.8 FL (ref 82.6–102.9)
MONOCYTES # BLD: 11 % (ref 3–12)
NRBC AUTOMATED: 0 PER 100 WBC
PDW BLD-RTO: 12.8 % (ref 11.8–14.4)
PLATELET # BLD: 238 K/UL (ref 138–453)
PLATELET ESTIMATE: NORMAL
PMV BLD AUTO: 11 FL (ref 8.1–13.5)
POTASSIUM SERPL-SCNC: 4.2 MMOL/L (ref 3.7–5.3)
RBC # BLD: 5.36 M/UL (ref 4.21–5.77)
RBC # BLD: NORMAL 10*6/UL
SEG NEUTROPHILS: 55 % (ref 36–65)
SEGMENTED NEUTROPHILS ABSOLUTE COUNT: 5.11 K/UL (ref 1.5–8.1)
SODIUM BLD-SCNC: 140 MMOL/L (ref 135–144)
TOTAL PROTEIN: 7.1 G/DL (ref 6.4–8.3)
WBC # BLD: 9.3 K/UL (ref 3.5–11.3)
WBC # BLD: NORMAL 10*3/UL

## 2019-03-25 LAB
QUANTI TB GOLD PLUS: NEGATIVE
QUANTI TB1 MINUS NIL: 0.11 IU/ML (ref 0–0.34)
QUANTI TB2 MINUS NIL: 0.06 IU/ML (ref 0–0.34)
QUANTIFERON MITOGEN: >10 IU/ML
QUANTIFERON NIL: 0.01 IU/ML

## 2019-08-16 ENCOUNTER — OFFICE VISIT (OUTPATIENT)
Dept: PRIMARY CARE CLINIC | Age: 39
End: 2019-08-16
Payer: MEDICAID

## 2019-08-16 VITALS
OXYGEN SATURATION: 100 % | DIASTOLIC BLOOD PRESSURE: 99 MMHG | BODY MASS INDEX: 31.01 KG/M2 | HEART RATE: 92 BPM | SYSTOLIC BLOOD PRESSURE: 142 MMHG | WEIGHT: 210 LBS

## 2019-08-16 DIAGNOSIS — Z87.898 HISTORY OF INSOMNIA: ICD-10-CM

## 2019-08-16 DIAGNOSIS — F33.2 SEVERE RECURRENT MAJOR DEPRESSION WITHOUT PSYCHOTIC FEATURES (HCC): ICD-10-CM

## 2019-08-16 DIAGNOSIS — Z86.59 HISTORY OF ADHD: ICD-10-CM

## 2019-08-16 DIAGNOSIS — Z86.59 HISTORY OF ANXIETY: ICD-10-CM

## 2019-08-16 DIAGNOSIS — Z76.0 ENCOUNTER FOR MEDICATION REFILL: Primary | ICD-10-CM

## 2019-08-16 DIAGNOSIS — Z87.19 HISTORY OF HIATAL HERNIA: ICD-10-CM

## 2019-08-16 PROCEDURE — G8417 CALC BMI ABV UP PARAM F/U: HCPCS | Performed by: NURSE PRACTITIONER

## 2019-08-16 PROCEDURE — G8427 DOCREV CUR MEDS BY ELIG CLIN: HCPCS | Performed by: NURSE PRACTITIONER

## 2019-08-16 PROCEDURE — 4004F PT TOBACCO SCREEN RCVD TLK: CPT | Performed by: NURSE PRACTITIONER

## 2019-08-16 PROCEDURE — 99212 OFFICE O/P EST SF 10 MIN: CPT | Performed by: NURSE PRACTITIONER

## 2019-08-16 RX ORDER — LANOLIN ALCOHOL/MO/W.PET/CERES
CREAM (GRAM) TOPICAL
Qty: 30 TABLET | Refills: 0 | Status: SHIPPED | OUTPATIENT
Start: 2019-08-16 | End: 2019-09-16 | Stop reason: SDUPTHER

## 2019-08-16 RX ORDER — LANOLIN ALCOHOL/MO/W.PET/CERES
1000 CREAM (GRAM) TOPICAL DAILY
Status: CANCELLED | OUTPATIENT
Start: 2019-08-16

## 2019-08-16 RX ORDER — HYDROXYZINE PAMOATE 50 MG/1
50 CAPSULE ORAL 3 TIMES DAILY PRN
Qty: 90 CAPSULE | Refills: 0 | Status: SHIPPED | OUTPATIENT
Start: 2019-08-16 | End: 2019-09-16 | Stop reason: SDUPTHER

## 2019-08-16 RX ORDER — FOLIC ACID 1 MG/1
1 TABLET ORAL DAILY
Qty: 30 TABLET | Refills: 0 | Status: CANCELLED | OUTPATIENT
Start: 2019-08-16

## 2019-08-16 RX ORDER — OLANZAPINE 5 MG/1
10 TABLET ORAL NIGHTLY
Qty: 30 TABLET | Refills: 0 | Status: SHIPPED | OUTPATIENT
Start: 2019-08-16 | End: 2019-09-16 | Stop reason: SDUPTHER

## 2019-08-16 RX ORDER — TRAZODONE HYDROCHLORIDE 50 MG/1
50 TABLET ORAL NIGHTLY PRN
Qty: 30 TABLET | Refills: 0 | Status: CANCELLED | OUTPATIENT
Start: 2019-08-16

## 2019-08-16 RX ORDER — PROPRANOLOL HYDROCHLORIDE 10 MG/1
TABLET ORAL
Refills: 0 | COMMUNITY
Start: 2019-08-06 | End: 2019-09-16 | Stop reason: SDUPTHER

## 2019-08-16 RX ORDER — LANOLIN ALCOHOL/MO/W.PET/CERES
CREAM (GRAM) TOPICAL
Refills: 0 | COMMUNITY
Start: 2019-08-06 | End: 2019-08-16 | Stop reason: SDUPTHER

## 2019-08-16 RX ORDER — PROPRANOLOL HYDROCHLORIDE 10 MG/1
TABLET ORAL
Qty: 90 TABLET | Refills: 0 | Status: CANCELLED | OUTPATIENT
Start: 2019-08-16

## 2019-08-16 RX ORDER — HYDROXYZINE PAMOATE 50 MG/1
CAPSULE ORAL
Refills: 0 | COMMUNITY
Start: 2019-08-06 | End: 2019-08-16 | Stop reason: SDUPTHER

## 2019-08-16 RX ORDER — GABAPENTIN 300 MG/1
CAPSULE ORAL
Qty: 90 CAPSULE | Refills: 0 | Status: SHIPPED | OUTPATIENT
Start: 2019-08-16 | End: 2019-09-16 | Stop reason: SDUPTHER

## 2019-08-16 RX ORDER — PANTOPRAZOLE SODIUM 40 MG/1
TABLET, DELAYED RELEASE ORAL
Refills: 0 | COMMUNITY
Start: 2019-08-06 | End: 2019-08-16 | Stop reason: SDUPTHER

## 2019-08-16 RX ORDER — BUSPIRONE HYDROCHLORIDE 15 MG/1
15 TABLET ORAL 3 TIMES DAILY
Qty: 90 TABLET | Refills: 0 | Status: SHIPPED | OUTPATIENT
Start: 2019-08-16 | End: 2019-09-16 | Stop reason: SDUPTHER

## 2019-08-16 RX ORDER — PANTOPRAZOLE SODIUM 40 MG/1
TABLET, DELAYED RELEASE ORAL
Qty: 30 TABLET | Refills: 0 | Status: SHIPPED | OUTPATIENT
Start: 2019-08-16 | End: 2019-09-16 | Stop reason: SDUPTHER

## 2019-08-16 RX ORDER — ATOMOXETINE 60 MG/1
60 CAPSULE ORAL DAILY
Qty: 30 CAPSULE | Refills: 0 | Status: SHIPPED | OUTPATIENT
Start: 2019-08-16 | End: 2019-09-16 | Stop reason: SDUPTHER

## 2019-08-16 RX ORDER — GABAPENTIN 300 MG/1
CAPSULE ORAL
Refills: 0 | COMMUNITY
Start: 2019-08-06 | End: 2019-08-16 | Stop reason: SDUPTHER

## 2019-08-16 ASSESSMENT — ENCOUNTER SYMPTOMS
COUGH: 0
ABDOMINAL PAIN: 0
BACK PAIN: 0
DIARRHEA: 0
SORE THROAT: 0
VOMITING: 0
EYE PAIN: 0
SHORTNESS OF BREATH: 0
SINUS PAIN: 0
NAUSEA: 0

## 2019-08-16 NOTE — PROGRESS NOTES
13+ 2-dose series) 08/09/1993    DTaP/Tdap/Td vaccine (1 - Tdap) 08/09/1999    Flu vaccine (1) 09/01/2019    HIV screen  Completed

## 2019-09-16 ENCOUNTER — OFFICE VISIT (OUTPATIENT)
Dept: PRIMARY CARE CLINIC | Age: 39
End: 2019-09-16
Payer: MEDICAID

## 2019-09-16 VITALS
DIASTOLIC BLOOD PRESSURE: 103 MMHG | TEMPERATURE: 97.9 F | OXYGEN SATURATION: 95 % | BODY MASS INDEX: 31.01 KG/M2 | SYSTOLIC BLOOD PRESSURE: 155 MMHG | WEIGHT: 210 LBS | HEART RATE: 104 BPM

## 2019-09-16 DIAGNOSIS — F19.20 POLYSUBSTANCE DEPENDENCE (HCC): Chronic | ICD-10-CM

## 2019-09-16 DIAGNOSIS — Z86.59 HISTORY OF ANXIETY: ICD-10-CM

## 2019-09-16 DIAGNOSIS — Z13.220 LIPID SCREENING: ICD-10-CM

## 2019-09-16 DIAGNOSIS — Z76.0 ENCOUNTER FOR MEDICATION REFILL: ICD-10-CM

## 2019-09-16 DIAGNOSIS — F33.2 SEVERE RECURRENT MAJOR DEPRESSION WITHOUT PSYCHOTIC FEATURES (HCC): Primary | ICD-10-CM

## 2019-09-16 DIAGNOSIS — Z87.898 HISTORY OF INSOMNIA: ICD-10-CM

## 2019-09-16 DIAGNOSIS — B18.2 HEP C W/O COMA, CHRONIC (HCC): ICD-10-CM

## 2019-09-16 DIAGNOSIS — F10.20 ALCOHOLISM (HCC): ICD-10-CM

## 2019-09-16 DIAGNOSIS — Z86.59 HISTORY OF ADHD: ICD-10-CM

## 2019-09-16 PROCEDURE — 4004F PT TOBACCO SCREEN RCVD TLK: CPT | Performed by: NURSE PRACTITIONER

## 2019-09-16 PROCEDURE — G8417 CALC BMI ABV UP PARAM F/U: HCPCS | Performed by: NURSE PRACTITIONER

## 2019-09-16 PROCEDURE — 99214 OFFICE O/P EST MOD 30 MIN: CPT | Performed by: NURSE PRACTITIONER

## 2019-09-16 PROCEDURE — G8427 DOCREV CUR MEDS BY ELIG CLIN: HCPCS | Performed by: NURSE PRACTITIONER

## 2019-09-16 RX ORDER — PANTOPRAZOLE SODIUM 40 MG/1
TABLET, DELAYED RELEASE ORAL
Qty: 30 TABLET | Refills: 0 | Status: SHIPPED | OUTPATIENT
Start: 2019-09-16 | End: 2019-11-05 | Stop reason: SDUPTHER

## 2019-09-16 RX ORDER — HYDROXYZINE PAMOATE 50 MG/1
50 CAPSULE ORAL 3 TIMES DAILY PRN
Qty: 90 CAPSULE | Refills: 0 | Status: SHIPPED | OUTPATIENT
Start: 2019-09-16 | End: 2020-05-27

## 2019-09-16 RX ORDER — GABAPENTIN 300 MG/1
CAPSULE ORAL
Qty: 90 CAPSULE | Refills: 0 | Status: SHIPPED | OUTPATIENT
Start: 2019-09-16 | End: 2019-10-28

## 2019-09-16 RX ORDER — ATOMOXETINE 60 MG/1
60 CAPSULE ORAL DAILY
Qty: 14 CAPSULE | Refills: 0 | Status: ON HOLD | OUTPATIENT
Start: 2019-09-16 | End: 2020-09-25 | Stop reason: HOSPADM

## 2019-09-16 RX ORDER — PROPRANOLOL HYDROCHLORIDE 10 MG/1
TABLET ORAL
Qty: 90 TABLET | Refills: 0 | Status: SHIPPED | OUTPATIENT
Start: 2019-09-16 | End: 2019-09-19 | Stop reason: SDUPTHER

## 2019-09-16 RX ORDER — OLANZAPINE 5 MG/1
10 TABLET ORAL NIGHTLY
Qty: 30 TABLET | Refills: 0 | Status: SHIPPED | OUTPATIENT
Start: 2019-09-16 | End: 2019-10-28

## 2019-09-16 RX ORDER — LANOLIN ALCOHOL/MO/W.PET/CERES
CREAM (GRAM) TOPICAL
Qty: 30 TABLET | Refills: 0 | Status: SHIPPED | OUTPATIENT
Start: 2019-09-16 | End: 2019-09-19

## 2019-09-16 RX ORDER — BUSPIRONE HYDROCHLORIDE 15 MG/1
15 TABLET ORAL 3 TIMES DAILY
Qty: 90 TABLET | Refills: 0 | Status: SHIPPED | OUTPATIENT
Start: 2019-09-16 | End: 2019-10-28 | Stop reason: DRUGHIGH

## 2019-09-16 ASSESSMENT — ENCOUNTER SYMPTOMS
TROUBLE SWALLOWING: 0
CHEST TIGHTNESS: 0
DIARRHEA: 0
VOMITING: 0
ABDOMINAL PAIN: 0
WHEEZING: 0
BLOOD IN STOOL: 0
SHORTNESS OF BREATH: 0

## 2019-09-17 ENCOUNTER — TELEPHONE (OUTPATIENT)
Dept: PRIMARY CARE CLINIC | Age: 39
End: 2019-09-17

## 2019-09-17 DIAGNOSIS — Z87.898 HISTORY OF INSOMNIA: ICD-10-CM

## 2019-09-17 DIAGNOSIS — Z76.0 ENCOUNTER FOR MEDICATION REFILL: ICD-10-CM

## 2019-09-19 ENCOUNTER — TELEPHONE (OUTPATIENT)
Dept: PRIMARY CARE CLINIC | Age: 39
End: 2019-09-19

## 2019-09-19 RX ORDER — LANOLIN ALCOHOL/MO/W.PET/CERES
CREAM (GRAM) TOPICAL
Qty: 60 TABLET | Refills: 3 | Status: SHIPPED | OUTPATIENT
Start: 2019-09-19 | End: 2020-09-24

## 2019-09-19 RX ORDER — PROPRANOLOL HYDROCHLORIDE 10 MG/1
TABLET ORAL
Qty: 30 TABLET | Refills: 0 | Status: SHIPPED | OUTPATIENT
Start: 2019-09-19 | End: 2019-11-05 | Stop reason: SDUPTHER

## 2019-09-20 ENCOUNTER — HOSPITAL ENCOUNTER (OUTPATIENT)
Age: 39
Discharge: HOME | End: 2019-09-20
Payer: MEDICAID

## 2019-09-20 DIAGNOSIS — F10.20 ALCOHOLISM (HCC): ICD-10-CM

## 2019-09-20 DIAGNOSIS — F19.20 POLYSUBSTANCE DEPENDENCE (HCC): Chronic | ICD-10-CM

## 2019-09-20 DIAGNOSIS — B18.2 HEP C W/O COMA, CHRONIC (HCC): ICD-10-CM

## 2019-09-20 LAB
ALBUMIN SERPL-MCNC: 4.3 G/DL (ref 3.5–5.2)
ALBUMIN/GLOBULIN RATIO: 1.4 (ref 1–2.5)
ALP BLD-CCNC: 62 U/L (ref 40–129)
ALT SERPL-CCNC: 18 U/L (ref 5–41)
ANION GAP SERPL CALCULATED.3IONS-SCNC: 11 MMOL/L (ref 9–17)
AST SERPL-CCNC: 13 U/L
BILIRUB SERPL-MCNC: 0.64 MG/DL (ref 0.3–1.2)
BUN BLDV-MCNC: 14 MG/DL (ref 6–20)
BUN/CREAT BLD: ABNORMAL (ref 9–20)
CALCIUM SERPL-MCNC: 8.7 MG/DL (ref 8.6–10.4)
CHLORIDE BLD-SCNC: 105 MMOL/L (ref 98–107)
CO2: 24 MMOL/L (ref 20–31)
CREAT SERPL-MCNC: 0.79 MG/DL (ref 0.7–1.2)
GFR AFRICAN AMERICAN: >60 ML/MIN
GFR NON-AFRICAN AMERICAN: >60 ML/MIN
GFR SERPL CREATININE-BSD FRML MDRD: ABNORMAL ML/MIN/{1.73_M2}
GFR SERPL CREATININE-BSD FRML MDRD: ABNORMAL ML/MIN/{1.73_M2}
GLUCOSE FASTING: 104 MG/DL (ref 70–99)
POTASSIUM SERPL-SCNC: 4.4 MMOL/L (ref 3.7–5.3)
SODIUM BLD-SCNC: 140 MMOL/L (ref 135–144)
TOTAL PROTEIN: 7.4 G/DL (ref 6.4–8.3)

## 2019-09-20 PROCEDURE — 87522 HEPATITIS C REVRS TRNSCRPJ: CPT

## 2019-09-20 PROCEDURE — 36415 COLL VENOUS BLD VENIPUNCTURE: CPT

## 2019-09-20 PROCEDURE — 80053 COMPREHEN METABOLIC PANEL: CPT

## 2019-09-20 PROCEDURE — 87902 NFCT AGT GNTYP ALYS HEP C: CPT

## 2019-09-23 LAB
HCV QUANTITATIVE: NORMAL
HEPATITIS C GENOTYPE: NORMAL

## 2019-09-24 LAB
DIRECT EXAM: NORMAL
Lab: NORMAL
SPECIMEN DESCRIPTION: NORMAL

## 2019-10-04 ENCOUNTER — TELEPHONE (OUTPATIENT)
Dept: PRIMARY CARE CLINIC | Age: 39
End: 2019-10-04

## 2019-10-28 ENCOUNTER — OFFICE VISIT (OUTPATIENT)
Dept: PRIMARY CARE CLINIC | Age: 39
End: 2019-10-28
Payer: MEDICAID

## 2019-10-28 VITALS
DIASTOLIC BLOOD PRESSURE: 98 MMHG | BODY MASS INDEX: 30.39 KG/M2 | HEART RATE: 86 BPM | WEIGHT: 205.8 LBS | SYSTOLIC BLOOD PRESSURE: 147 MMHG | TEMPERATURE: 97.9 F

## 2019-10-28 DIAGNOSIS — I10 ESSENTIAL HYPERTENSION: Primary | ICD-10-CM

## 2019-10-28 DIAGNOSIS — Z86.59 HISTORY OF ANXIETY: ICD-10-CM

## 2019-10-28 DIAGNOSIS — Z76.0 ENCOUNTER FOR MEDICATION REFILL: ICD-10-CM

## 2019-10-28 DIAGNOSIS — F33.2 SEVERE RECURRENT MAJOR DEPRESSION WITHOUT PSYCHOTIC FEATURES (HCC): ICD-10-CM

## 2019-10-28 DIAGNOSIS — B18.2 HEP C W/O COMA, CHRONIC (HCC): ICD-10-CM

## 2019-10-28 PROCEDURE — 99213 OFFICE O/P EST LOW 20 MIN: CPT | Performed by: NURSE PRACTITIONER

## 2019-10-28 PROCEDURE — G8417 CALC BMI ABV UP PARAM F/U: HCPCS | Performed by: NURSE PRACTITIONER

## 2019-10-28 PROCEDURE — G8427 DOCREV CUR MEDS BY ELIG CLIN: HCPCS | Performed by: NURSE PRACTITIONER

## 2019-10-28 PROCEDURE — 4004F PT TOBACCO SCREEN RCVD TLK: CPT | Performed by: NURSE PRACTITIONER

## 2019-10-28 PROCEDURE — G8482 FLU IMMUNIZE ORDER/ADMIN: HCPCS | Performed by: NURSE PRACTITIONER

## 2019-10-28 RX ORDER — LOSARTAN POTASSIUM 25 MG/1
25 TABLET ORAL DAILY
Qty: 30 TABLET | Refills: 2 | Status: SHIPPED | OUTPATIENT
Start: 2019-10-28 | End: 2019-12-06 | Stop reason: SDUPTHER

## 2019-10-28 RX ORDER — DULOXETIN HYDROCHLORIDE 30 MG/1
1 CAPSULE, DELAYED RELEASE ORAL DAILY
COMMUNITY
Start: 2019-10-15 | End: 2020-01-28 | Stop reason: ALTCHOICE

## 2019-10-28 RX ORDER — LOSARTAN POTASSIUM 25 MG/1
25 TABLET ORAL DAILY
Qty: 30 TABLET | Refills: 0 | Status: SHIPPED | OUTPATIENT
Start: 2019-10-28 | End: 2019-10-28

## 2019-10-28 RX ORDER — BUSPIRONE HYDROCHLORIDE 30 MG/1
30 TABLET ORAL 2 TIMES DAILY
Qty: 60 TABLET | Refills: 0 | Status: SHIPPED
Start: 2019-10-28 | End: 2020-05-27 | Stop reason: DRUGHIGH

## 2019-10-28 ASSESSMENT — ENCOUNTER SYMPTOMS
ABDOMINAL PAIN: 0
DIARRHEA: 0
TROUBLE SWALLOWING: 0
SHORTNESS OF BREATH: 0
WHEEZING: 0
VOMITING: 0
BLOOD IN STOOL: 0
CHEST TIGHTNESS: 0

## 2019-11-01 ENCOUNTER — TELEPHONE (OUTPATIENT)
Dept: PRIMARY CARE CLINIC | Age: 39
End: 2019-11-01

## 2019-11-01 DIAGNOSIS — Z76.0 ENCOUNTER FOR MEDICATION REFILL: ICD-10-CM

## 2019-11-05 RX ORDER — PANTOPRAZOLE SODIUM 40 MG/1
TABLET, DELAYED RELEASE ORAL
Qty: 30 TABLET | Refills: 2 | Status: SHIPPED | OUTPATIENT
Start: 2019-11-05 | End: 2019-12-06 | Stop reason: SDUPTHER

## 2019-11-05 RX ORDER — PROPRANOLOL HYDROCHLORIDE 10 MG/1
TABLET ORAL
Qty: 30 TABLET | Refills: 2 | Status: SHIPPED | OUTPATIENT
Start: 2019-11-05 | End: 2019-12-06 | Stop reason: SDUPTHER

## 2019-11-27 DIAGNOSIS — Z76.0 ENCOUNTER FOR MEDICATION REFILL: ICD-10-CM

## 2019-11-27 RX ORDER — DULOXETINE 40 MG/1
40 CAPSULE, DELAYED RELEASE ORAL DAILY
Status: ON HOLD | COMMUNITY
Start: 2019-11-07 | End: 2021-03-23

## 2019-11-27 RX ORDER — BUSPIRONE HYDROCHLORIDE 15 MG/1
TABLET ORAL
Qty: 57 TABLET | Refills: 0 | OUTPATIENT
Start: 2019-11-27

## 2019-11-27 RX ORDER — PANTOPRAZOLE SODIUM 40 MG/1
TABLET, DELAYED RELEASE ORAL
Qty: 19 TABLET | Refills: 0 | OUTPATIENT
Start: 2019-11-27

## 2019-11-27 RX ORDER — OLANZAPINE 5 MG/1
TABLET ORAL
Qty: 38 TABLET | Refills: 0 | OUTPATIENT
Start: 2019-11-27

## 2019-12-06 DIAGNOSIS — I10 ESSENTIAL HYPERTENSION: ICD-10-CM

## 2019-12-06 DIAGNOSIS — Z76.0 ENCOUNTER FOR MEDICATION REFILL: ICD-10-CM

## 2019-12-06 RX ORDER — PROPRANOLOL HYDROCHLORIDE 10 MG/1
TABLET ORAL
Qty: 30 TABLET | Refills: 2 | Status: SHIPPED | OUTPATIENT
Start: 2019-12-06 | End: 2019-12-16 | Stop reason: SDUPTHER

## 2019-12-06 RX ORDER — PANTOPRAZOLE SODIUM 40 MG/1
TABLET, DELAYED RELEASE ORAL
Qty: 30 TABLET | Refills: 2 | Status: SHIPPED | OUTPATIENT
Start: 2019-12-06 | End: 2019-12-16 | Stop reason: SDUPTHER

## 2019-12-06 RX ORDER — LOSARTAN POTASSIUM 25 MG/1
25 TABLET ORAL DAILY
Qty: 30 TABLET | Refills: 2 | Status: SHIPPED | OUTPATIENT
Start: 2019-12-06 | End: 2019-12-16 | Stop reason: SDUPTHER

## 2019-12-16 DIAGNOSIS — I10 ESSENTIAL HYPERTENSION: ICD-10-CM

## 2019-12-16 DIAGNOSIS — Z76.0 ENCOUNTER FOR MEDICATION REFILL: ICD-10-CM

## 2019-12-16 RX ORDER — LOSARTAN POTASSIUM 25 MG/1
25 TABLET ORAL DAILY
Qty: 30 TABLET | Refills: 2 | Status: SHIPPED | OUTPATIENT
Start: 2019-12-16 | End: 2020-01-28 | Stop reason: SDUPTHER

## 2019-12-16 RX ORDER — PROPRANOLOL HYDROCHLORIDE 10 MG/1
TABLET ORAL
Qty: 30 TABLET | Refills: 2 | Status: SHIPPED | OUTPATIENT
Start: 2019-12-16 | End: 2020-01-28 | Stop reason: SDUPTHER

## 2019-12-16 RX ORDER — LOSARTAN POTASSIUM 25 MG/1
25 TABLET ORAL DAILY
Qty: 30 TABLET | Refills: 2 | Status: CANCELLED | OUTPATIENT
Start: 2019-12-16

## 2019-12-16 RX ORDER — PANTOPRAZOLE SODIUM 40 MG/1
TABLET, DELAYED RELEASE ORAL
Qty: 30 TABLET | Refills: 2 | Status: SHIPPED | OUTPATIENT
Start: 2019-12-16 | End: 2020-04-24

## 2019-12-16 RX ORDER — PROPRANOLOL HYDROCHLORIDE 10 MG/1
TABLET ORAL
Qty: 30 TABLET | Refills: 2 | Status: CANCELLED | OUTPATIENT
Start: 2019-12-16

## 2019-12-16 RX ORDER — PANTOPRAZOLE SODIUM 40 MG/1
TABLET, DELAYED RELEASE ORAL
Qty: 30 TABLET | Refills: 2 | Status: CANCELLED | OUTPATIENT
Start: 2019-12-16

## 2020-01-28 ENCOUNTER — OFFICE VISIT (OUTPATIENT)
Dept: PRIMARY CARE CLINIC | Age: 40
End: 2020-01-28
Payer: MEDICAID

## 2020-01-28 VITALS
OXYGEN SATURATION: 97 % | WEIGHT: 209 LBS | DIASTOLIC BLOOD PRESSURE: 103 MMHG | BODY MASS INDEX: 30.86 KG/M2 | TEMPERATURE: 97.7 F | HEART RATE: 96 BPM | SYSTOLIC BLOOD PRESSURE: 143 MMHG

## 2020-01-28 PROCEDURE — G8482 FLU IMMUNIZE ORDER/ADMIN: HCPCS | Performed by: NURSE PRACTITIONER

## 2020-01-28 PROCEDURE — 4004F PT TOBACCO SCREEN RCVD TLK: CPT | Performed by: NURSE PRACTITIONER

## 2020-01-28 PROCEDURE — G8417 CALC BMI ABV UP PARAM F/U: HCPCS | Performed by: NURSE PRACTITIONER

## 2020-01-28 PROCEDURE — G8427 DOCREV CUR MEDS BY ELIG CLIN: HCPCS | Performed by: NURSE PRACTITIONER

## 2020-01-28 PROCEDURE — 99213 OFFICE O/P EST LOW 20 MIN: CPT | Performed by: NURSE PRACTITIONER

## 2020-01-28 RX ORDER — NICOTINE 21 MG/24HR
1 PATCH, TRANSDERMAL 24 HOURS TRANSDERMAL DAILY
Qty: 45 PATCH | Refills: 0 | Status: SHIPPED | OUTPATIENT
Start: 2020-01-28 | End: 2020-05-27

## 2020-01-28 RX ORDER — SUMATRIPTAN 50 MG/1
50 TABLET, FILM COATED ORAL
Qty: 9 TABLET | Refills: 5 | Status: SHIPPED | OUTPATIENT
Start: 2020-01-28 | End: 2020-04-01 | Stop reason: SDUPTHER

## 2020-01-28 RX ORDER — PROPRANOLOL HYDROCHLORIDE 10 MG/1
TABLET ORAL
Qty: 30 TABLET | Refills: 2 | Status: SHIPPED | OUTPATIENT
Start: 2020-01-28 | End: 2020-07-09 | Stop reason: SDUPTHER

## 2020-01-28 RX ORDER — LOSARTAN POTASSIUM 25 MG/1
25 TABLET ORAL DAILY
Qty: 30 TABLET | Refills: 2 | Status: SHIPPED | OUTPATIENT
Start: 2020-01-28 | End: 2020-05-27 | Stop reason: SDUPTHER

## 2020-01-28 ASSESSMENT — ENCOUNTER SYMPTOMS
TROUBLE SWALLOWING: 0
RHINORRHEA: 0
SCALP TENDERNESS: 0
VOMITING: 0
PHOTOPHOBIA: 1
NAUSEA: 0
VISUAL CHANGE: 1
BLURRED VISION: 1
EYE PAIN: 0
ABDOMINAL PAIN: 0

## 2020-01-28 NOTE — PROGRESS NOTES
[address] [facility]  [facility] [address]  Dept: [phone]  Dept Fax: [phone]    Patient Care Team:  KILLIAN Boykin CNP as PCP - General (Family Medicine)  KILLIAN Boykin CNP as PCP - [facility] Empaneled Provider    2020     Lizet Cat (:  [birth year])LW a 44 y.o. male, here for evaluation of the following medical concerns:   Chief Complaint   Patient presents with    Other     pt would like to discuss about quit smoking     Headache     pt states that he has been having HA's pt states that he has been geting them but now gets them atleast 2 days out of the week now. Headache    This is a chronic problem. The current episode started more than 1 month ago. The problem occurs intermittently (1-2 a week). The problem has been gradually worsening. The pain is located in the left unilateral region. The pain quality is similar to prior headaches. The quality of the pain is described as pulsating and throbbing. The pain is moderate. Associated symptoms include blurred vision, photophobia and a visual change. Pertinent negatives include no abdominal pain, dizziness, eye pain, fever, nausea, neck pain, numbness, rhinorrhea, scalp tenderness, tingling or vomiting. Nothing aggravates the symptoms. He has tried darkened room and Excedrin (watches his eating, getting enough sleep) for the symptoms. The treatment provided mild (caffeine does not agree with him) relief. His past medical history is significant for hypertension and migraines in the family. There is no history of cluster headaches, migraine headaches, obesity or recent head traumas. .    Review of Systems   Constitutional: Negative for chills and fever. HENT: Negative for rhinorrhea and trouble swallowing. Eyes: Positive for blurred vision and photophobia. Negative for pain. Gastrointestinal: Negative for abdominal pain, nausea and vomiting. 136/97 (!) 143/103   Site: Right Upper Arm Right Upper Arm   Position: Sitting Sitting   Cuff Size: Large Adult Large Adult   Pulse: 99 96   Temp: 97.7 °F (36.5 °C)    TempSrc: Oral    SpO2: 97%    Weight: 209 lb (94.8 kg)      Estimated body mass index is 30.86 kg/m² as calculated from the following:    Height as of 1/19/19: 5' 9\" (1.753 m). Weight as of this encounter: 209 lb (94.8 kg). DIAGNOSTIC FINDINGS:  CBC:  Lab Results   Component Value Date    WBC 9.3 03/23/2019    HGB 16.1 03/23/2019     03/23/2019       BMP:    Lab Results   Component Value Date     09/20/2019    K 4.4 09/20/2019     09/20/2019    CO2 24 09/20/2019    BUN 14 09/20/2019    CREATININE 0.79 09/20/2019    GLUCOSE 115 03/23/2019       HEMOGLOBIN A1C:   Lab Results   Component Value Date    LABA1C 5.4 01/20/2019       FASTING LIPID PANEL:  Lab Results   Component Value Date    CHOL 123 01/20/2019    HDL 53 01/20/2019    TRIG 73 01/20/2019       Physical Exam  Vitals signs and nursing note reviewed. Constitutional:       General: He is not in acute distress. Appearance: He is well-developed. HENT:      Head: Normocephalic. Eyes:      General: No scleral icterus. Pupils: Pupils are equal, round, and reactive to light. Neck:      Musculoskeletal: Normal range of motion and neck supple. Cardiovascular:      Rate and Rhythm: Normal rate and regular rhythm. Pulmonary:      Effort: Pulmonary effort is normal.      Breath sounds: Normal breath sounds. Abdominal:      Palpations: Abdomen is soft. Skin:     General: Skin is warm and dry. Neurological:      Mental Status: He is alert and oriented to person, place, and time. Coordination: Coordination normal.   Psychiatric:         Behavior: Behavior normal. Behavior is cooperative. Thought Content: Thought content normal.         Judgment: Judgment normal.         ASSESSMENT     Diagnosis Orders   1.  Chronic tension-type headache, not side effects of prescribed medications. Barriers to  medication compliance addressed. All patient questions answered. Pt  verbalized understanding of all instructions given. · Patient given educational materials - see patient instructions      · Patient advised to contact scheduling offices for any referrals or imaging orders  placed today if they have not been contacted in 48 hours. Return in about 4 weeks (around 2/25/2020) for HTN, headaches. An electronic signature was used to authenticate this note. --KILLIAN Osuna CNP on 1/28/2020 at 8:24 PM  Visit Information    Have you changed or started any medications since your last visit including any over-the-counter medicines, vitamins, or herbal medicines? no   Are you having any side effects from any of your medications? -  no  Have you stopped taking any of your medications? Is so, why? -  no    Have you seen any other physician or provider since your last visit? Yes - Records Requested  Have you had any other diagnostic tests since your last visit? No  Have you been seen in the emergency room and/or had an admission to a hospital since we last saw you? No  Have you had your routine dental cleaning in the past 6 months? no    Have you activated your [employer] account? If not, what are your barriers?  No     Patient Care Team:  KILLIAN Osuna CNP as PCP - General (Family Medicine)  KILLIAN Osuna CNP as PCP - [facility]    Medical History Review  Past Medical, Family, and Social History reviewed and does not contribute to the patient presenting condition    Health Maintenance   Topic Date Due    Hepatitis A vaccine (1 of 2 - Risk 2-dose series) 08/09/1981    Varicella Vaccine (1 of 2 - 2-dose childhood series) 08/09/1981    Pneumococcal 0-64 years Vaccine (1 of 1 - PPSV23) 08/09/1986    DTaP/Tdap/Td vaccine (1 - Tdap) 08/09/1991    Hepatitis B vaccine (1 of 3 - Risk 3-dose series) 08/09/1999   

## 2020-03-24 ENCOUNTER — HOSPITAL ENCOUNTER (OUTPATIENT)
Age: 40
Discharge: HOME | End: 2020-03-24
Payer: MEDICAID

## 2020-03-24 LAB
ABSOLUTE EOS #: 0.35 K/UL (ref 0–0.44)
ABSOLUTE IMMATURE GRANULOCYTE: 0.05 K/UL (ref 0–0.3)
ABSOLUTE LYMPH #: 3.01 K/UL (ref 1.1–3.7)
ABSOLUTE MONO #: 0.99 K/UL (ref 0.1–1.2)
ALBUMIN SERPL-MCNC: 4.3 G/DL (ref 3.5–5.2)
ALBUMIN/GLOBULIN RATIO: 1.3 (ref 1–2.5)
ALP BLD-CCNC: 61 U/L (ref 40–129)
ALT SERPL-CCNC: 19 U/L (ref 5–41)
ANION GAP SERPL CALCULATED.3IONS-SCNC: 11 MMOL/L (ref 9–17)
AST SERPL-CCNC: 12 U/L
BASOPHILS # BLD: 1 % (ref 0–2)
BASOPHILS ABSOLUTE: 0.05 K/UL (ref 0–0.2)
BILIRUB SERPL-MCNC: 0.43 MG/DL (ref 0.3–1.2)
BUN BLDV-MCNC: 11 MG/DL (ref 6–20)
BUN/CREAT BLD: NORMAL (ref 9–20)
CALCIUM SERPL-MCNC: 9.3 MG/DL (ref 8.6–10.4)
CHLORIDE BLD-SCNC: 102 MMOL/L (ref 98–107)
CHOLESTEROL, FASTING: 149 MG/DL
CHOLESTEROL/HDL RATIO: 4.3
CO2: 25 MMOL/L (ref 20–31)
CREAT SERPL-MCNC: 0.83 MG/DL (ref 0.7–1.2)
DIFFERENTIAL TYPE: ABNORMAL
EOSINOPHILS RELATIVE PERCENT: 3 % (ref 1–4)
GFR AFRICAN AMERICAN: >60 ML/MIN
GFR NON-AFRICAN AMERICAN: >60 ML/MIN
GFR SERPL CREATININE-BSD FRML MDRD: NORMAL ML/MIN/{1.73_M2}
GFR SERPL CREATININE-BSD FRML MDRD: NORMAL ML/MIN/{1.73_M2}
GLUCOSE BLD-MCNC: 94 MG/DL (ref 70–99)
HAV IGM SER IA-ACNC: NONREACTIVE
HCT VFR BLD CALC: 46.4 % (ref 40.7–50.3)
HDLC SERPL-MCNC: 35 MG/DL
HEMOGLOBIN: 15.6 G/DL (ref 13–17)
HEPATITIS B CORE IGM ANTIBODY: NONREACTIVE
HEPATITIS B SURFACE ANTIGEN: NONREACTIVE
HEPATITIS C ANTIBODY: REACTIVE
HIV AG/AB: NONREACTIVE
IMMATURE GRANULOCYTES: 1 %
LDL CHOLESTEROL: 86 MG/DL (ref 0–130)
LYMPHOCYTES # BLD: 29 % (ref 24–43)
MCH RBC QN AUTO: 30.5 PG (ref 25.2–33.5)
MCHC RBC AUTO-ENTMCNC: 33.6 G/DL (ref 28.4–34.8)
MCV RBC AUTO: 90.6 FL (ref 82.6–102.9)
MONOCYTES # BLD: 10 % (ref 3–12)
NRBC AUTOMATED: 0 PER 100 WBC
PDW BLD-RTO: 14.5 % (ref 11.8–14.4)
PLATELET # BLD: 280 K/UL (ref 138–453)
PLATELET ESTIMATE: ABNORMAL
PMV BLD AUTO: 9.9 FL (ref 8.1–13.5)
POTASSIUM SERPL-SCNC: 4.8 MMOL/L (ref 3.7–5.3)
RBC # BLD: 5.12 M/UL (ref 4.21–5.77)
RBC # BLD: ABNORMAL 10*6/UL
SEG NEUTROPHILS: 56 % (ref 36–65)
SEGMENTED NEUTROPHILS ABSOLUTE COUNT: 5.93 K/UL (ref 1.5–8.1)
SODIUM BLD-SCNC: 138 MMOL/L (ref 135–144)
T3 FREE: 3.19 PG/ML (ref 2.02–4.43)
THYROXINE, FREE: 1.34 NG/DL (ref 0.93–1.7)
TOTAL PROTEIN: 7.6 G/DL (ref 6.4–8.3)
TRIGLYCERIDE, FASTING: 139 MG/DL
TSH SERPL DL<=0.05 MIU/L-ACNC: 1.22 MIU/L (ref 0.3–5)
VLDLC SERPL CALC-MCNC: ABNORMAL MG/DL (ref 1–30)
WBC # BLD: 10.4 K/UL (ref 3.5–11.3)
WBC # BLD: ABNORMAL 10*3/UL

## 2020-03-24 PROCEDURE — 87389 HIV-1 AG W/HIV-1&-2 AB AG IA: CPT

## 2020-03-24 PROCEDURE — 80061 LIPID PANEL: CPT

## 2020-03-24 PROCEDURE — 84439 ASSAY OF FREE THYROXINE: CPT

## 2020-03-24 PROCEDURE — 80074 ACUTE HEPATITIS PANEL: CPT

## 2020-03-24 PROCEDURE — 84481 FREE ASSAY (FT-3): CPT

## 2020-03-24 PROCEDURE — 84443 ASSAY THYROID STIM HORMONE: CPT

## 2020-03-24 PROCEDURE — 93005 ELECTROCARDIOGRAM TRACING: CPT | Performed by: PSYCHIATRY & NEUROLOGY

## 2020-03-24 PROCEDURE — 85025 COMPLETE CBC W/AUTO DIFF WBC: CPT

## 2020-03-24 PROCEDURE — 36415 COLL VENOUS BLD VENIPUNCTURE: CPT

## 2020-03-24 PROCEDURE — 80053 COMPREHEN METABOLIC PANEL: CPT

## 2020-03-25 LAB
EKG ATRIAL RATE: 77 BPM
EKG P AXIS: 56 DEGREES
EKG P-R INTERVAL: 152 MS
EKG Q-T INTERVAL: 360 MS
EKG QRS DURATION: 82 MS
EKG QTC CALCULATION (BAZETT): 407 MS
EKG R AXIS: -8 DEGREES
EKG T AXIS: 24 DEGREES
EKG VENTRICULAR RATE: 77 BPM

## 2020-03-25 PROCEDURE — 93010 ELECTROCARDIOGRAM REPORT: CPT | Performed by: INTERNAL MEDICINE

## 2020-04-01 ENCOUNTER — TELEPHONE (OUTPATIENT)
Dept: PRIMARY CARE CLINIC | Age: 40
End: 2020-04-01

## 2020-04-01 DIAGNOSIS — G44.229 CHRONIC TENSION-TYPE HEADACHE, NOT INTRACTABLE: ICD-10-CM

## 2020-04-01 RX ORDER — SUMATRIPTAN 100 MG/1
100 TABLET, FILM COATED ORAL
Qty: 9 TABLET | Refills: 5 | Status: SHIPPED | OUTPATIENT
Start: 2020-04-01 | End: 2020-04-30

## 2020-04-24 RX ORDER — PANTOPRAZOLE SODIUM 40 MG/1
TABLET, DELAYED RELEASE ORAL
Qty: 30 TABLET | Refills: 2 | Status: SHIPPED | OUTPATIENT
Start: 2020-04-24 | End: 2020-08-21

## 2020-05-20 DIAGNOSIS — Z76.0 ENCOUNTER FOR MEDICATION REFILL: ICD-10-CM

## 2020-05-20 RX ORDER — PANTOPRAZOLE SODIUM 40 MG/1
TABLET, DELAYED RELEASE ORAL
Qty: 30 TABLET | Refills: 2 | OUTPATIENT
Start: 2020-05-20

## 2020-05-20 NOTE — TELEPHONE ENCOUNTER
Last visit:1/28/20   Next visit: 5/27/2020  Does patient have enough medication for 72 hours: Yes    Controlled Substance Only:     Last Refill: 4/24/20  Last medication contract documentation:(this must be documented using Rx Monitoring process to be updated)  No data recorded    Last urine drug screen: n/a  Consistent with medication(s):   Yes  @[employer]([id number])@     [place]ARRS Review for Controlled medication update  RX Monitoring 5/18/2015   Attestation The Prescription Monitoring Report for this patient was reviewed today.          All Future Testing planned in [facility]      All future appointments  Future Appointments   Date Time Provider Ramya Ritter   5/27/2020  1:00 PM Collin Piedra, APRN - CNP [address] [facility]   Topic Date Due    Hepatitis A vaccine (1 of 2 - Risk 2-dose series) 08/09/1981    Varicella vaccine (1 of 2 - 2-dose childhood series) 08/09/1981    Pneumococcal 0-64 years Vaccine (1 of 1 - PPSV23) 08/09/1986    Hepatitis B vaccine (1 of 3 - Risk 3-dose series) 08/09/1999    Potassium monitoring  03/24/2021    Creatinine monitoring  03/24/2021    DTaP/Tdap/Td vaccine (2 - Td) 03/09/2030    Flu vaccine  Completed    HIV screen  Completed    Hib vaccine  Aged Out    Meningococcal (ACWY) vaccine  Aged Out       Hemoglobin A1C (%)   Date Value   01/20/2019 5.4   11/06/2018 5.1   02/02/2017 5.2                                                                     ( goal A1C is < 7)   No results found for: LABMICR  LDL Cholesterol (mg/dL)   Date Value   03/24/2020 86                                                  (goal LDL is <100)   AST (U/L)   Date Value   03/24/2020 12     ALT (U/L)   Date Value   03/24/2020 19     BUN (mg/dL)   Date Value   03/24/2020 11     BP Readings from Last 3 Encounters:   01/28/20 (!) 143/103   10/28/19 (!) 147/98   09/16/19 (!) 155/103                                                                                     (goal 120/80)      Patient Active Problem List:     Mood disorder ([facility] [address]     Alcohol dependence ([facility] [address]     Seizures ([facility] [address]     Headache     Depression     Alcoholism ([facility] [address]     Bipolar affective disorder ([facility] [address]     Severe recurrent major depression without psychotic features ([facility] [address]     Polysubstance dependence ([facility] [address]     History of hiatal hernia     History of insomnia

## 2020-05-27 ENCOUNTER — VIRTUAL VISIT (OUTPATIENT)
Dept: INTERNAL MEDICINE | Age: 40
End: 2020-05-27
Payer: MEDICAID

## 2020-05-27 VITALS
BODY MASS INDEX: 31.01 KG/M2 | SYSTOLIC BLOOD PRESSURE: 107 MMHG | WEIGHT: 210 LBS | HEART RATE: 97 BPM | DIASTOLIC BLOOD PRESSURE: 87 MMHG

## 2020-05-27 PROCEDURE — 99213 OFFICE O/P EST LOW 20 MIN: CPT | Performed by: NURSE PRACTITIONER

## 2020-05-27 RX ORDER — BLOOD PRESSURE TEST KIT
KIT MISCELLANEOUS
Qty: 1 KIT | Refills: 0 | Status: ON HOLD | OUTPATIENT
Start: 2020-05-27 | End: 2021-03-23

## 2020-05-27 RX ORDER — LOSARTAN POTASSIUM 50 MG/1
50 TABLET ORAL DAILY
Qty: 90 TABLET | Refills: 0 | Status: SHIPPED | OUTPATIENT
Start: 2020-05-27 | End: 2020-08-25

## 2020-05-27 RX ORDER — BUSPIRONE HYDROCHLORIDE 15 MG/1
15 TABLET ORAL 3 TIMES DAILY
Qty: 90 TABLET | Refills: 0 | Status: SHIPPED
Start: 2020-05-27 | End: 2020-06-26

## 2020-05-27 ASSESSMENT — ENCOUNTER SYMPTOMS
EYE PAIN: 0
VOMITING: 0
PHOTOPHOBIA: 0
SHORTNESS OF BREATH: 0
RHINORRHEA: 0
NAUSEA: 0
ABDOMINAL PAIN: 0
CHEST TIGHTNESS: 0
TROUBLE SWALLOWING: 0

## 2020-05-27 NOTE — PROGRESS NOTES
[facility]  [facility] [address] [facility] [address] [address]  Dept: [phone]  Dept Fax: [phone]    Patient Care Team:  KILLIAN Mcmanus CNP as PCP - General (Family Medicine)  KILLIAN Mcmanus CNP as PCP - [facility]    Dorie Serra is a 44 y.o. male evaluated via telephone on 2020. Consent:  He and/or health care decision maker is aware that that he may receive a bill for this telephone service, depending on his insurance coverage, and has provided verbal consent to proceed: Yes      Documentation:  I communicated with the patient and/or health care decision maker about HA's. Details of this discussion including any medical advice provided: Dorie Reavesaugies        I affirm this is a Patient Initiated Episode with a Patient who has not had a related appointment within my department in the past 7 days or scheduled within the next 24 hours. Patient identification was verified at the start of the visit: Yes    Total Time: minutes: 11-20 minutes    Note: not billable if this call serves to triage the patient into an appointment for the relevant concern      Jazlyn Guidry       2020     Dorie Serra (:  1980)is a 44 y.o. male, here for evaluation of the following medical concerns:   Chief Complaint   Patient presents with    Headache     Patient feels blood pressure still not controlled, change in psych meds recently       Mitali Durbin is following up over the phone today for his BLANC's. Mitali Durbin states today he feels his daily headaches are related to blood pressure and are not his typical migraines. He did use a blood pressure cuff today at his counseling facility, but does not believe it is accurate. He has no cuff at home. He is taking Inderal daily and has seen a decrease in migraines, maybe 1-2 a month.   Migraines incapacitate him, cause nausea and light sensitivity    He still has frequent HA's, last one was for 10 days by mouth daily     Dispense:  90 tablet     Refill:  0         1. Nelson Domingo is following with psychiatry, just recently his anxiety medication dosing was reduced. He denies panic attacks or excess stress or worry. 2. Blood pressure readings in office remained mildly elevated, not at goal.  Based on symptoms and previous readings I will increase his losartan to 50 mg a day. Nelson Domingo was advised on signs and symptoms of low blood pressure and verbalized understanding  3. Blood pressure kit ordered for home use, we discussed checking his pressures a few times a week at different times of the day. FOLLOW UP AND INSTRUCTIONS:  Return in about 2 months (around 7/27/2020) for HTN, BLANC.    · Nelson Domingo received counseling on the following healthy behaviors:exercise and medication adherence    · Discussed use, benefit, and side effects of prescribed medications. Barriers to  medication compliance addressed. All patient questions answered. Pt  verbalized understanding of all instructions given. · Patient given educational materials - see patient instructions      · Patient advised to contact scheduling offices for any referrals or imaging orders  placed today if they have not been contacted in 48 hours. Return in about 2 months (around 7/27/2020) for HTN, HA. An electronic signature was used to authenticate this note.     --KILLIAN Atkins - CNP on 5/27/2020 at 1:17 PM

## 2020-05-27 NOTE — PROGRESS NOTES
Visit Information    Have you changed or started any medications since your last visit including any over-the-counter medicines, vitamins, or herbal medicines? yes -    Are you having any side effects from any of your medications? -  no  Have you stopped taking any of your medications? Is so, why? -  no    Have you seen any other physician or provider since your last visit? Yes - Records Obtained  Have you had any other diagnostic tests since your last visit? No  Have you been seen in the emergency room and/or had an admission to a hospital since we last saw you? No  Have you had your routine dental cleaning in the past 6 months? no    Have you activated your [employer] account? If not, what are your barriers?  No:      Patient Care Team:  KILLIAN Mcmanus CNP as PCP - General (Family Medicine)  KILLIAN Mcmanus CNP as PCP - [facility] Provider    Medical History Review  Past Medical, Family, and Social History reviewed and does contribute to the patient presenting condition    Health Maintenance   Topic Date Due    Hepatitis A vaccine (1 of 2 - Risk 2-dose series) 08/09/1981    Varicella vaccine (1 of 2 - 2-dose childhood series) 08/09/1981    Pneumococcal 0-64 years Vaccine (1 of 1 - PPSV23) 08/09/1986    Hepatitis B vaccine (1 of 3 - Risk 3-dose series) 08/09/1999    Potassium monitoring  03/24/2021    Creatinine monitoring  03/24/2021    DTaP/Tdap/Td vaccine (2 - Td) 03/09/2030    Flu vaccine  Completed    HIV screen  Completed    Hib vaccine  Aged Out    Meningococcal (ACWY) vaccine  Aged Out

## 2020-07-09 RX ORDER — PROPRANOLOL HYDROCHLORIDE 10 MG/1
TABLET ORAL
Qty: 30 TABLET | Refills: 2 | Status: ON HOLD | OUTPATIENT
Start: 2020-07-09 | End: 2021-03-23

## 2020-07-14 ENCOUNTER — HOSPITAL ENCOUNTER (OUTPATIENT)
Age: 40
Discharge: HOME | End: 2020-07-14
Payer: MEDICAID

## 2020-07-14 LAB
ABSOLUTE EOS #: 0.62 K/UL (ref 0–0.44)
ABSOLUTE IMMATURE GRANULOCYTE: 0.03 K/UL (ref 0–0.3)
ABSOLUTE LYMPH #: 2.96 K/UL (ref 1.1–3.7)
ABSOLUTE MONO #: 0.78 K/UL (ref 0.1–1.2)
ALBUMIN SERPL-MCNC: 4.4 G/DL (ref 3.5–5.2)
ALBUMIN/GLOBULIN RATIO: 1.7 (ref 1–2.5)
ALP BLD-CCNC: 64 U/L (ref 40–129)
ALT SERPL-CCNC: 16 U/L (ref 5–41)
ANION GAP SERPL CALCULATED.3IONS-SCNC: 12 MMOL/L (ref 9–17)
AST SERPL-CCNC: 11 U/L
BASOPHILS # BLD: 1 % (ref 0–2)
BASOPHILS ABSOLUTE: 0.04 K/UL (ref 0–0.2)
BILIRUB SERPL-MCNC: 0.64 MG/DL (ref 0.3–1.2)
BUN BLDV-MCNC: 14 MG/DL (ref 6–20)
BUN/CREAT BLD: ABNORMAL (ref 9–20)
CALCIUM SERPL-MCNC: 9.1 MG/DL (ref 8.6–10.4)
CHLORIDE BLD-SCNC: 102 MMOL/L (ref 98–107)
CHOLESTEROL/HDL RATIO: 4.2
CHOLESTEROL: 129 MG/DL
CO2: 26 MMOL/L (ref 20–31)
CREAT SERPL-MCNC: 0.84 MG/DL (ref 0.7–1.2)
DIFFERENTIAL TYPE: ABNORMAL
EOSINOPHILS RELATIVE PERCENT: 7 % (ref 1–4)
GFR AFRICAN AMERICAN: >60 ML/MIN
GFR NON-AFRICAN AMERICAN: >60 ML/MIN
GFR SERPL CREATININE-BSD FRML MDRD: ABNORMAL ML/MIN/{1.73_M2}
GFR SERPL CREATININE-BSD FRML MDRD: ABNORMAL ML/MIN/{1.73_M2}
GLUCOSE BLD-MCNC: 103 MG/DL (ref 70–99)
HAV IGM SER IA-ACNC: NONREACTIVE
HCT VFR BLD CALC: 45.3 % (ref 40.7–50.3)
HDLC SERPL-MCNC: 31 MG/DL
HEMOGLOBIN: 15.9 G/DL (ref 13–17)
HEPATITIS B CORE IGM ANTIBODY: NONREACTIVE
HEPATITIS B SURFACE ANTIGEN: NONREACTIVE
HEPATITIS C ANTIBODY: REACTIVE
HIV AG/AB: NONREACTIVE
IMMATURE GRANULOCYTES: 0 %
LDL CHOLESTEROL: 75 MG/DL (ref 0–130)
LYMPHOCYTES # BLD: 35 % (ref 24–43)
MCH RBC QN AUTO: 30.6 PG (ref 25.2–33.5)
MCHC RBC AUTO-ENTMCNC: 35.1 G/DL (ref 28.4–34.8)
MCV RBC AUTO: 87.1 FL (ref 82.6–102.9)
MONOCYTES # BLD: 9 % (ref 3–12)
NRBC AUTOMATED: 0 PER 100 WBC
PDW BLD-RTO: 12.7 % (ref 11.8–14.4)
PLATELET # BLD: 289 K/UL (ref 138–453)
PLATELET ESTIMATE: ABNORMAL
PMV BLD AUTO: 10.6 FL (ref 8.1–13.5)
POTASSIUM SERPL-SCNC: 4.2 MMOL/L (ref 3.7–5.3)
RBC # BLD: 5.2 M/UL (ref 4.21–5.77)
RBC # BLD: ABNORMAL 10*6/UL
SEG NEUTROPHILS: 48 % (ref 36–65)
SEGMENTED NEUTROPHILS ABSOLUTE COUNT: 4 K/UL (ref 1.5–8.1)
SODIUM BLD-SCNC: 140 MMOL/L (ref 135–144)
T3 FREE: 3.49 PG/ML (ref 2.02–4.43)
THYROXINE, FREE: 1.37 NG/DL (ref 0.93–1.7)
TOTAL PROTEIN: 7 G/DL (ref 6.4–8.3)
TRIGL SERPL-MCNC: 117 MG/DL
TSH SERPL DL<=0.05 MIU/L-ACNC: 1.27 MIU/L (ref 0.3–5)
VLDLC SERPL CALC-MCNC: ABNORMAL MG/DL (ref 1–30)
WBC # BLD: 8.4 K/UL (ref 3.5–11.3)
WBC # BLD: ABNORMAL 10*3/UL

## 2020-07-14 PROCEDURE — 80053 COMPREHEN METABOLIC PANEL: CPT

## 2020-07-14 PROCEDURE — 85025 COMPLETE CBC W/AUTO DIFF WBC: CPT

## 2020-07-14 PROCEDURE — 93005 ELECTROCARDIOGRAM TRACING: CPT | Performed by: PSYCHIATRY & NEUROLOGY

## 2020-07-14 PROCEDURE — 84481 FREE ASSAY (FT-3): CPT

## 2020-07-14 PROCEDURE — 80074 ACUTE HEPATITIS PANEL: CPT

## 2020-07-14 PROCEDURE — 87389 HIV-1 AG W/HIV-1&-2 AB AG IA: CPT

## 2020-07-14 PROCEDURE — 84443 ASSAY THYROID STIM HORMONE: CPT

## 2020-07-14 PROCEDURE — 80061 LIPID PANEL: CPT

## 2020-07-14 PROCEDURE — 36415 COLL VENOUS BLD VENIPUNCTURE: CPT

## 2020-07-14 PROCEDURE — 84439 ASSAY OF FREE THYROXINE: CPT

## 2020-07-15 LAB
EKG ATRIAL RATE: 86 BPM
EKG P AXIS: 55 DEGREES
EKG P-R INTERVAL: 146 MS
EKG Q-T INTERVAL: 348 MS
EKG QRS DURATION: 80 MS
EKG QTC CALCULATION (BAZETT): 416 MS
EKG R AXIS: -4 DEGREES
EKG T AXIS: 42 DEGREES
EKG VENTRICULAR RATE: 86 BPM

## 2020-07-15 PROCEDURE — 93010 ELECTROCARDIOGRAM REPORT: CPT | Performed by: INTERNAL MEDICINE

## 2020-07-20 ENCOUNTER — HOSPITAL ENCOUNTER (OUTPATIENT)
Age: 40
Discharge: HOME | End: 2020-07-20
Payer: MEDICAID

## 2020-07-20 ENCOUNTER — OFFICE VISIT (OUTPATIENT)
Dept: PRIMARY CARE CLINIC | Age: 40
End: 2020-07-20
Payer: MEDICAID

## 2020-07-20 VITALS
SYSTOLIC BLOOD PRESSURE: 147 MMHG | WEIGHT: 201 LBS | DIASTOLIC BLOOD PRESSURE: 103 MMHG | BODY MASS INDEX: 28.77 KG/M2 | TEMPERATURE: 95.9 F | OXYGEN SATURATION: 97 % | HEIGHT: 70 IN | HEART RATE: 95 BPM

## 2020-07-20 LAB
ALBUMIN SERPL-MCNC: 4.3 G/DL (ref 3.5–5.2)
ALBUMIN/GLOBULIN RATIO: 1.7 (ref 1–2.5)
ALP BLD-CCNC: 60 U/L (ref 40–129)
ALT SERPL-CCNC: 15 U/L (ref 5–41)
ANION GAP SERPL CALCULATED.3IONS-SCNC: 10 MMOL/L (ref 9–17)
AST SERPL-CCNC: 11 U/L
BILIRUB SERPL-MCNC: 0.76 MG/DL (ref 0.3–1.2)
BILIRUBIN, POC: ABNORMAL
BLOOD URINE, POC: ABNORMAL
BUN BLDV-MCNC: 14 MG/DL (ref 6–20)
BUN/CREAT BLD: NORMAL (ref 9–20)
CALCIUM SERPL-MCNC: 9.1 MG/DL (ref 8.6–10.4)
CHLORIDE BLD-SCNC: 102 MMOL/L (ref 98–107)
CLARITY, POC: ABNORMAL
CO2: 24 MMOL/L (ref 20–31)
COLOR, POC: YELLOW
CREAT SERPL-MCNC: 0.81 MG/DL (ref 0.7–1.2)
GFR AFRICAN AMERICAN: >60 ML/MIN
GFR NON-AFRICAN AMERICAN: >60 ML/MIN
GFR SERPL CREATININE-BSD FRML MDRD: NORMAL ML/MIN/{1.73_M2}
GFR SERPL CREATININE-BSD FRML MDRD: NORMAL ML/MIN/{1.73_M2}
GLUCOSE BLD-MCNC: 97 MG/DL (ref 70–99)
GLUCOSE URINE, POC: NEGATIVE
KETONES, POC: ABNORMAL
LEUKOCYTE EST, POC: ABNORMAL
NITRITE, POC: NEGATIVE
PH, POC: 6
POTASSIUM SERPL-SCNC: 4.2 MMOL/L (ref 3.7–5.3)
PROTEIN, POC: ABNORMAL
SODIUM BLD-SCNC: 136 MMOL/L (ref 135–144)
SPECIFIC GRAVITY, POC: 1.01
TOTAL PROTEIN: 6.8 G/DL (ref 6.4–8.3)
UROBILINOGEN, POC: NEGATIVE

## 2020-07-20 PROCEDURE — 87591 N.GONORRHOEAE DNA AMP PROB: CPT

## 2020-07-20 PROCEDURE — 99213 OFFICE O/P EST LOW 20 MIN: CPT | Performed by: NURSE PRACTITIONER

## 2020-07-20 PROCEDURE — 80053 COMPREHEN METABOLIC PANEL: CPT

## 2020-07-20 PROCEDURE — 1036F TOBACCO NON-USER: CPT | Performed by: NURSE PRACTITIONER

## 2020-07-20 PROCEDURE — 87491 CHLMYD TRACH DNA AMP PROBE: CPT

## 2020-07-20 PROCEDURE — 81001 URINALYSIS AUTO W/SCOPE: CPT

## 2020-07-20 PROCEDURE — G8427 DOCREV CUR MEDS BY ELIG CLIN: HCPCS | Performed by: NURSE PRACTITIONER

## 2020-07-20 PROCEDURE — G8417 CALC BMI ABV UP PARAM F/U: HCPCS | Performed by: NURSE PRACTITIONER

## 2020-07-20 PROCEDURE — 87522 HEPATITIS C REVRS TRNSCRPJ: CPT

## 2020-07-20 PROCEDURE — 81003 URINALYSIS AUTO W/O SCOPE: CPT | Performed by: NURSE PRACTITIONER

## 2020-07-20 PROCEDURE — 87902 NFCT AGT GNTYP ALYS HEP C: CPT

## 2020-07-20 PROCEDURE — 36415 COLL VENOUS BLD VENIPUNCTURE: CPT

## 2020-07-20 ASSESSMENT — ENCOUNTER SYMPTOMS
BLOOD IN STOOL: 0
CONSTIPATION: 0
NAUSEA: 0
DIARRHEA: 0
VOMITING: 0

## 2020-07-20 NOTE — PROGRESS NOTES
[facility] [address] PR[facility]  [facility] [address]  Dept: [phone]  Dept Fax: [phone]    Patient Care Team:  KILLIAN Chapman CNP as PCP - General (Family Medicine)  KILLIAN Chapman CNP as PCP - [facility] Empaneled Provider    2020     Gerardo Vogt (:  [id number])LW a 44 y.o. male, here for evaluation of the following medical concerns:   Chief Complaint   Patient presents with    Other     pt has concerns about his prostate and having dark urine       Worried about changes in urination  Having very dark urine about once a day, almost iced tea colored. Dark urine not always in the AM    Occurred about 2 years ago, but now more persistent. Urine in the past had proteins, they thought maybe kidney stones    Denies dehydration  Not working out, running at all, no weight lifting  Denies SOB or fatigue  Denies body aches, some occasional low back pain    He also admits to seeing semen after bearing down to have BM's    No change in urinary stream, no hesitancy, no nocturia or hematuira    Drinks about 1 L of water a day, also 16 ox decaf tea. .    Review of Systems   Constitutional: Negative for diaphoresis, fever and unexpected weight change. Cardiovascular: Negative for leg swelling. Gastrointestinal: Negative for blood in stool, constipation, diarrhea, nausea and vomiting. Genitourinary: Negative for decreased urine volume, difficulty urinating, discharge, dysuria, frequency, genital sores, hematuria, penile pain, testicular pain and urgency. Musculoskeletal: Negative for gait problem, myalgias and neck pain. Skin: Negative for rash. Allergic/Immunologic: Negative for immunocompromised state. Neurological: Negative for dizziness and headaches. Hematological: Does not bruise/bleed easily. Prior to Visit Medications    Medication Sig Taking?  Authorizing Provider   propranolol (INDERAL) 10 MG tablet TAKE ONE TABLET BY MOUTH EVERY DAY Yes KILLIAN Kapoor CNP   Blood Pressure KIT Daily as needed for blood pressure Yes KILLIAN Kapoor CNP   losartan (COZAAR) 50 MG tablet Take 1 tablet by mouth daily Yes KILLIAN Kapoor CNP   SUMAtriptan (IMITREX) 100 MG tablet TAKE 1 TAB BY MOUTH AS DIRECTED ONCE EVERY DAY FOR MIGRAINE. MAY REPEAT IN 2 HOURS IF UNRESOLVED. MAX 2 TABS/24HRS Yes KILLIAN Kapoor CNP   pantoprazole (PROTONIX) 40 MG tablet TAKE 1 TABLET BY MOUTH EVERY DAY Yes KILLIAN Kapoor CNP   DULoxetine HCl 40 MG CPEP Take 40 mg by mouth daily Yes Historical Provider, MD   atomoxetine (STRATTERA) 60 MG capsule Take 1 capsule by mouth daily for 14 days Yes KILLIAN Kapoor CNP   melatonin 3 MG TABS tablet TAKE TWO TABLETS BY MOUTH AT BEDTIME  Patient not taking: Reported on 5/27/2020  Trisha Martínez MD        Social History     Tobacco Use    Smoking status: Former Smoker     Packs/day: 1.00     Years: 20.00     Pack years: 20.00     Types: Cigarettes    Smokeless tobacco: Former User     Types: Snuff, Chew   Substance Use Topics    Alcohol use: Not Currently        Vitals:    07/20/20 1130 07/20/20 1205   BP: (!) 141/97 (!) 147/103   Site: Left Upper Arm    Position: Sitting    Cuff Size: Large Adult    Pulse: 95    Temp: 95.9 °F (35.5 °C)    SpO2: 97%    Weight: 201 lb (91.2 kg)    Height: 5' 10\" (1.778 m)      Estimated body mass index is 28.84 kg/m² as calculated from the following:    Height as of this encounter: 5' 10\" (1.778 m). Weight as of this encounter: 201 lb (91.2 kg).     DIAGNOSTIC FINDINGS:  CBC:  Lab Results   Component Value Date    WBC 8.4 07/14/2020    HGB 15.9 07/14/2020     07/14/2020       BMP:    Lab Results   Component Value Date     07/14/2020    K 4.2 07/14/2020     07/14/2020    CO2 26 07/14/2020    BUN 14 07/14/2020    CREATININE 0.84 07/14/2020    GLUCOSE 103 07/14/2020       HEMOGLOBIN A1C:   Lab Results Component Value Date    LABA1C 5.4 01/20/2019       FASTING LIPID PANEL:  Lab Results   Component Value Date    CHOL 129 07/14/2020    HDL 31 (L) 07/14/2020    TRIG 117 07/14/2020       Physical Exam  Vitals signs and nursing note reviewed. Constitutional:       General: He is not in acute distress. Appearance: He is well-developed. He is not ill-appearing or diaphoretic. HENT:      Head: Normocephalic. Right Ear: External ear normal.      Left Ear: External ear normal.   Eyes:      General: No scleral icterus. Conjunctiva/sclera: Conjunctivae normal.      Pupils: Pupils are equal, round, and reactive to light. Neck:      Musculoskeletal: Normal range of motion and neck supple. Cardiovascular:      Rate and Rhythm: Normal rate and regular rhythm. Pulmonary:      Effort: Pulmonary effort is normal.      Breath sounds: Normal breath sounds. Abdominal:      General: There is no distension. Palpations: Abdomen is soft. There is no mass. Tenderness: There is no right CVA tenderness or left CVA tenderness. Skin:     General: Skin is warm and dry. Neurological:      Mental Status: He is alert and oriented to person, place, and time. Mental status is at baseline. Coordination: Coordination normal.   Psychiatric:         Behavior: Behavior normal. Behavior is cooperative. Thought Content: Thought content normal.         Judgment: Judgment normal.         ASSESSMENT     Diagnosis Orders   1. Dark brown urine  Chlamydia/GC DNA, Urine    POCT Urinalysis No Micro (Auto)    Urinalysis Reflex to Culture   2. Screen for STD (sexually transmitted disease)  Chlamydia/GC DNA, Urine   3. Chronic hepatitis C without hepatic coma (HCC)  Comprehensive Metabolic Panel    Hepatitis C RNA, Quantitative, PCR    Hepatitis C Genotyping          PLAN:  No orders of the defined types were placed in this encounter. 1. Urine sample provided in office today, dark in color.   He 3+ red blood cells on urine dip. We will send further for urinalysis and culture. We will also test for STDs at this time. Differential diagnosis kidney stone, however patient is otherwise asymptomatic without CVA tenderness, dysuria, nausea, frequency or urinary retention. Consider Urology referral for painless hematuria if workup remains negative  2. Psychiatrist would like a recheck of Hepatitis C RNA, previously came back undetectable. FOLLOW UP AND INSTRUCTIONS:  No follow-ups on file. · Sherry Abbott received counseling on the following healthy behaviors:nutrition    · Discussed use, benefit, and side effects of prescribed medications. Barriers to  medication compliance addressed. All patient questions answered. Pt  verbalized understanding of all instructions given. · Patient given educational materials - see patient instructions      · Patient advised to contact scheduling offices for any referrals or imaging orders  placed today if they have not been contacted in 48 hours. No follow-ups on file. An electronic signature was used to authenticate this note. --KILLIAN Nicole CNP on 7/20/2020 at 12:16 PM  Visit Information    Have you changed or started any medications since your last visit including any over-the-counter medicines, vitamins, or herbal medicines? no   Are you having any side effects from any of your medications? -  no  Have you stopped taking any of your medications? Is so, why? -  no    Have you seen any other physician or provider since your last visit? No  Have you had any other diagnostic tests since your last visit? No  Have you been seen in the emergency room and/or had an admission to a hospital since we last saw you? No  Have you had your routine dental cleaning in the past 6 months? no    Have you activated your [employer] account? If not, what are your barriers?  No     Patient Care Team:  KILLIAN Nicole CNP as PCP - General (Family Medicine)  Alexei Kennedy APRN - CNP as PCP - [facility] Empaneled Provider    Medical History Review  Past Medical, Family, and Social History reviewed and does not contribute to the patient presenting condition    Health Maintenance   Topic Date Due    Hepatitis A vaccine (1 of 2 - Risk 2-dose series) 08/09/1981    Varicella vaccine (1 of 2 - 2-dose childhood series) 08/09/1981    Pneumococcal 0-64 years Vaccine (1 of 1 - PPSV23) 08/09/1986    Hepatitis B vaccine (1 of 3 - Risk 3-dose series) 08/09/1999    Flu vaccine (1) 09/01/2020    Potassium monitoring  07/14/2021    Creatinine monitoring  07/14/2021    DTaP/Tdap/Td vaccine (2 - Td) 03/09/2030    HIV screen  Completed    Hib vaccine  Aged Out    Meningococcal (ACWY) vaccine  Aged Out

## 2020-07-21 LAB
-: NORMAL
AMORPHOUS: NORMAL
BACTERIA: NORMAL
BILIRUBIN URINE: ABNORMAL
CASTS UA: NORMAL /LPF (ref 0–8)
COLOR: ABNORMAL
COMMENT UA: ABNORMAL
CRYSTALS, UA: NORMAL /HPF
EPITHELIAL CELLS UA: NORMAL /HPF (ref 0–5)
GLUCOSE URINE: NEGATIVE
KETONES, URINE: NEGATIVE
LEUKOCYTE ESTERASE, URINE: ABNORMAL
MUCUS: NORMAL
NITRITE, URINE: NEGATIVE
OTHER OBSERVATIONS UA: NORMAL
PH UA: 6.5 (ref 5–8)
PROTEIN UA: ABNORMAL
RBC UA: NORMAL /HPF (ref 0–4)
RENAL EPITHELIAL, UA: NORMAL /HPF
SPECIFIC GRAVITY UA: 1.03 (ref 1–1.03)
TRICHOMONAS: NORMAL
TURBIDITY: ABNORMAL
URINE HGB: ABNORMAL
UROBILINOGEN, URINE: NORMAL
WBC UA: NORMAL /HPF (ref 0–5)
YEAST: NORMAL

## 2020-07-22 LAB
C. TRACHOMATIS DNA ,URINE: NEGATIVE
N. GONORRHOEAE DNA, URINE: NEGATIVE
SPECIMEN DESCRIPTION: NORMAL

## 2020-07-23 LAB
HCV QUANTITATIVE: NORMAL
HEPATITIS C GENOTYPE: NORMAL

## 2020-07-24 LAB
DIRECT EXAM: NORMAL
Lab: NORMAL
SPECIMEN DESCRIPTION: NORMAL

## 2020-07-27 ENCOUNTER — HOSPITAL ENCOUNTER (EMERGENCY)
Age: 40
Discharge: HOME | End: 2020-07-27
Attending: EMERGENCY MEDICINE
Payer: MEDICAID

## 2020-07-27 ENCOUNTER — APPOINTMENT (OUTPATIENT)
Dept: CT IMAGING | Age: 40
End: 2020-07-27
Payer: MEDICAID

## 2020-07-27 VITALS
DIASTOLIC BLOOD PRESSURE: 117 MMHG | WEIGHT: 200 LBS | SYSTOLIC BLOOD PRESSURE: 168 MMHG | TEMPERATURE: 98.9 F | BODY MASS INDEX: 28.63 KG/M2 | RESPIRATION RATE: 16 BRPM | HEIGHT: 70 IN | OXYGEN SATURATION: 97 % | HEART RATE: 94 BPM

## 2020-07-27 LAB
-: NORMAL
AMORPHOUS: NORMAL
ANION GAP SERPL CALCULATED.3IONS-SCNC: 11 MMOL/L (ref 9–17)
BACTERIA: NORMAL
BILIRUBIN URINE: ABNORMAL
BUN BLDV-MCNC: 13 MG/DL (ref 6–20)
BUN/CREAT BLD: ABNORMAL (ref 9–20)
CALCIUM SERPL-MCNC: 9.1 MG/DL (ref 8.6–10.4)
CASTS UA: NORMAL /LPF (ref 0–8)
CHLORIDE BLD-SCNC: 108 MMOL/L (ref 98–107)
CO2: 23 MMOL/L (ref 20–31)
COLOR: ABNORMAL
COMMENT UA: ABNORMAL
CREAT SERPL-MCNC: 0.86 MG/DL (ref 0.7–1.2)
CRYSTALS, UA: NORMAL /HPF
EPITHELIAL CELLS UA: NORMAL /HPF (ref 0–5)
GFR AFRICAN AMERICAN: >60 ML/MIN
GFR NON-AFRICAN AMERICAN: >60 ML/MIN
GFR SERPL CREATININE-BSD FRML MDRD: ABNORMAL ML/MIN/{1.73_M2}
GFR SERPL CREATININE-BSD FRML MDRD: ABNORMAL ML/MIN/{1.73_M2}
GLUCOSE BLD-MCNC: 112 MG/DL (ref 70–99)
GLUCOSE URINE: NEGATIVE
KETONES, URINE: NEGATIVE
LEUKOCYTE ESTERASE, URINE: ABNORMAL
MUCUS: NORMAL
NITRITE, URINE: NEGATIVE
OTHER OBSERVATIONS UA: NORMAL
PH UA: 5.5 (ref 5–8)
POTASSIUM SERPL-SCNC: 4.1 MMOL/L (ref 3.7–5.3)
PROTEIN UA: ABNORMAL
RBC UA: NORMAL /HPF (ref 0–4)
RENAL EPITHELIAL, UA: NORMAL /HPF
SODIUM BLD-SCNC: 142 MMOL/L (ref 135–144)
SPECIFIC GRAVITY UA: 1.03 (ref 1–1.03)
TRICHOMONAS: NORMAL
TURBIDITY: ABNORMAL
URINE HGB: ABNORMAL
UROBILINOGEN, URINE: NORMAL
WBC UA: NORMAL /HPF (ref 0–5)
YEAST: NORMAL

## 2020-07-27 PROCEDURE — 80048 BASIC METABOLIC PNL TOTAL CA: CPT

## 2020-07-27 PROCEDURE — 81001 URINALYSIS AUTO W/SCOPE: CPT

## 2020-07-27 PROCEDURE — 96374 THER/PROPH/DIAG INJ IV PUSH: CPT

## 2020-07-27 PROCEDURE — 2580000003 HC RX 258: Performed by: STUDENT IN AN ORGANIZED HEALTH CARE EDUCATION/TRAINING PROGRAM

## 2020-07-27 PROCEDURE — 87086 URINE CULTURE/COLONY COUNT: CPT

## 2020-07-27 PROCEDURE — 6370000000 HC RX 637 (ALT 250 FOR IP): Performed by: STUDENT IN AN ORGANIZED HEALTH CARE EDUCATION/TRAINING PROGRAM

## 2020-07-27 PROCEDURE — 99284 EMERGENCY DEPT VISIT MOD MDM: CPT

## 2020-07-27 PROCEDURE — 74176 CT ABD & PELVIS W/O CONTRAST: CPT

## 2020-07-27 PROCEDURE — 6360000002 HC RX W HCPCS: Performed by: STUDENT IN AN ORGANIZED HEALTH CARE EDUCATION/TRAINING PROGRAM

## 2020-07-27 RX ORDER — KETOROLAC TROMETHAMINE 30 MG/ML
30 INJECTION, SOLUTION INTRAMUSCULAR; INTRAVENOUS ONCE
Status: COMPLETED | OUTPATIENT
Start: 2020-07-27 | End: 2020-07-27

## 2020-07-27 RX ORDER — IBUPROFEN 400 MG/1
400 TABLET ORAL EVERY 6 HOURS PRN
Qty: 20 TABLET | Refills: 0 | Status: ON HOLD | OUTPATIENT
Start: 2020-07-27 | End: 2021-03-23

## 2020-07-27 RX ORDER — 0.9 % SODIUM CHLORIDE 0.9 %
1000 INTRAVENOUS SOLUTION INTRAVENOUS ONCE
Status: COMPLETED | OUTPATIENT
Start: 2020-07-27 | End: 2020-07-27

## 2020-07-27 RX ORDER — ONDANSETRON 4 MG/1
4 TABLET, FILM COATED ORAL ONCE
Status: COMPLETED | OUTPATIENT
Start: 2020-07-27 | End: 2020-07-27

## 2020-07-27 RX ADMIN — ONDANSETRON HYDROCHLORIDE 4 MG: 4 TABLET, FILM COATED ORAL at 09:23

## 2020-07-27 RX ADMIN — KETOROLAC TROMETHAMINE 30 MG: 30 INJECTION, SOLUTION INTRAMUSCULAR at 09:23

## 2020-07-27 RX ADMIN — SODIUM CHLORIDE 1000 ML: 9 INJECTION, SOLUTION INTRAVENOUS at 09:23

## 2020-07-27 ASSESSMENT — PAIN SCALES - GENERAL
PAINLEVEL_OUTOF10: 8
PAINLEVEL_OUTOF10: 8

## 2020-07-27 ASSESSMENT — ENCOUNTER SYMPTOMS
ABDOMINAL PAIN: 0
VOMITING: 0
BACK PAIN: 1
SHORTNESS OF BREATH: 0
COUGH: 0
NAUSEA: 1

## 2020-07-27 ASSESSMENT — PAIN DESCRIPTION - ORIENTATION: ORIENTATION: LEFT;RIGHT

## 2020-07-27 ASSESSMENT — PAIN DESCRIPTION - DESCRIPTORS: DESCRIPTORS: DULL;THROBBING;SHARP

## 2020-07-27 ASSESSMENT — PAIN DESCRIPTION - LOCATION: LOCATION: FLANK

## 2020-07-27 NOTE — FLOWSHEET NOTE
was rounding in ED. Pt was open to spiritual care.  was bedside support and will follow up during hospital stay.

## 2020-07-27 NOTE — ED PROVIDER NOTES
[address] [facility]     Emergency Department     Faculty Attestation    I performed a history and physical examination of the patient and discussed management with the resident. I have reviewed and agree with the residents findings including all diagnostic interpretations, and treatment plans as written at the time of my review. Any areas of disagreement are noted on the chart. I was personally present for the key portions of any procedures. I have documented in the chart those procedures where I was not present during the key portions. For Physician Assistant/ Nurse Practitioner cases/documentation I have personally evaluated this patient and have completed at least one if not all key elements of the E/M (history, physical exam, and MDM). Additional findings are as noted. This patient was evaluated in the Emergency Department for symptoms described in the history of present illness. The patient was evaluated in the context of the global COVID-19 pandemic, which necessitated consideration that the patient might be at risk for infection with the SARS-CoV-2 virus that causes COVID-19. Institutional protocols and algorithms that pertain to the evaluation of patients at risk for COVID-19 are in a state of rapid change based on information released by regulatory bodies including the CDC and federal and state organizations. These policies and algorithms were followed during the patient's care in the ED. Primary Care Physician: Sheron Guallpa, APRN - CNP    History: This is a 44 y.o. male who presents to the Emergency Department with complaint of hematuria and flank pain. This began yesterday. There is been some associated nausea but the nausea denies vomiting. Patient states that he may have had kidney stones couple years ago but has not seen urologist or followed up with one since then. He denies any fever.     Physical:   height is 5' 10\" (1.778 m) and weight is 200 lb (90.7 kg). His oral temperature is 98.9 °F (37.2 °C). His blood pressure is 168/117 (abnormal) and his pulse is 94. His respiration is 16 and oxygen saturation is 97%. Tenderness to palpation in the left flank. There is some mild tenderness in the left lower quadrant abdomen is no guarding no rebound,    Impression: Left flank pain, groin pain, history of hematuria    Plan: CT scan, IV fluid, antiemetic, analgesia, CBC, BMP, urinalysis      (Please note that portions of this note were completed with a voice recognition program.  Efforts were made to edit the dictations but occasionally words are mis-transcribed.)    [facility].  Haritha Maldonado MD, [address] [facility]  Attending Emergency Medicine Physician        Rufino Villatoro MD  07/27/20 8309

## 2020-07-27 NOTE — ED PROVIDER NOTES
[address]  ED  Emergency Department Encounter  EmergencyMedicine Resident     Pt Honey Queen  MRN: [id number]  [place] 1980  Date of evaluation: 7/27/20  PCP:  KILLIAN Donaldson [id number]       Chief Complaint   Patient presents with    Hematuria    Flank Pain     bilateral flank pain       HISTORY OF PRESENT ILLNESS  (Location/Symptom, Timing/Onset, Context/Setting, Quality, Duration, Modifying Factors, Severity.)      Mayra Sierra is a 44 y.o. male who presents with hematuria, bilateral flank pain and urgency. States hematuria began a few weeks ago, but the pain started this morning. Patient's PCP did a UA which showed positive for Hgb and trace leukocytes. Patient describes the pain as dull, constant, and 9/10 in severity. Pain shoots down towards pelvis. Patient has not taken anything for the pain. Pain is more severe on the left side compared to the right. 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 has a past surgical history that includes Tonsillectomy (1983); Finger surgery (Left, 2004); Cystocopy (Bilateral, 12/19/2018); and Cystoscopy (Bilateral, 12/19/2018).       Social History     Socioeconomic History    Marital status:      Spouse name: Not on file    Number of children: 2    Years of education: Not on file    Highest education level: Not on file   Occupational History    Not on file   Social Needs    Financial resource strain: Not on file    Food insecurity     Worry: Not on file     Inability: Not on file   [employer] needs     Medical: Not on file     Non-medical: Not on file   Tobacco Use    Smoking status: Former Smoker     Packs/day: 1.00     Years: 20.00     Pack years: 20. 00     Types: Cigarettes    Smokeless tobacco: Former User     Types: Snuff, Chew   Substance and Sexual Activity    Alcohol use: Not Currently    Drug use: Not Currently     Types: Cocaine     Comment: reports using on 1/17/19    Sexual activity: Not Currently   Lifestyle    Physical activity     Days per week: Not on file     Minutes per session: Not on file    Stress: Not on file   Relationships    Social connections     Talks on phone: Not on file     Gets together: Not on file     Attends Sabianist service: Not on file     Active member of club or organization: Not on file     Attends meetings of clubs or organizations: Not on file     Relationship status: Not on file    Intimate partner violence     Fear of current or ex partner: Not on file     Emotionally abused: Not on file     Physically abused: Not on file     Forced sexual activity: Not on file   Other Topics Concern    Not on file   Social History Narrative    Not on file       Family History   Problem Relation Age of Onset    Mental Illness Mother     Depression Mother     High Blood Pressure Father     High Cholesterol Father     Substance Abuse Brother     Breast Cancer Maternal Grandmother        Allergies:  Dicloxacillin and Pcn [penicillins]    Home Medications:  Prior to Admission medications    Medication Sig Start Date End Date Taking? Authorizing Provider   ibuprofen (IBU) 400 MG tablet Take 1 tablet by mouth every 6 hours as needed for Pain 7/27/20  Yes Vi Tipton MD   propranolol (INDERAL) 10 MG tablet TAKE ONE TABLET BY MOUTH EVERY DAY 7/9/20   KILLIAN Suarez CNP   Blood Pressure KIT Daily as needed for blood pressure 5/27/20   KILLIAN Suarez CNP   losartan (COZAAR) 50 MG tablet Take 1 tablet by mouth daily 5/27/20 8/25/20  Juwan Snyder APRN - CNP   SUMAtriptan (IMITREX) 100 MG tablet TAKE 1 TAB BY MOUTH AS DIRECTED ONCE EVERY DAY FOR MIGRAINE. MAY REPEAT IN 2 HOURS IF UNRESOLVED.  MAX 2 TABS/24HRS 4/30/20   KILLIAN Chapman CNP   pantoprazole (PROTONIX) 40 MG tablet TAKE 1 TABLET BY MOUTH EVERY DAY 4/24/20   KILLIAN Chapman CNP   DULoxetine HCl 40 MG CPEP Take 40 mg by mouth daily 11/7/19   Historical Provider, MD   melatonin 3 MG TABS tablet TAKE TWO TABLETS BY MOUTH AT BEDTIME  Patient not taking: Reported on 5/27/2020 9/19/19   Magali Faria MD   atomoxetine (STRATTERA) 60 MG capsule Take 1 capsule by mouth daily for 14 days 9/16/19 10/28/20  KILLIAN Chapman CNP       REVIEW OF SYSTEMS    (2-9 systems for level 4, 10 or more for level 5)      Review of Systems   Constitutional: Positive for chills. Negative for fatigue and fever. Respiratory: Negative for cough and shortness of breath. Gastrointestinal: Positive for nausea. Negative for abdominal pain and vomiting. Genitourinary: Positive for dysuria, frequency, hematuria and urgency. Musculoskeletal: Positive for back pain (Left flank). PHYSICAL EXAM   (up to 7 for level 4, 8 or more for level 5)      INITIAL VITALS:   BP (!) 168/117   Pulse 94   Temp 98.9 °F (37.2 °C) (Oral)   Resp 16   Ht 5' 10\" (1.778 m)   Wt 200 lb (90.7 kg)   SpO2 97%   BMI 28.70 kg/m²     Physical Exam  Vitals signs reviewed. Constitutional:       General: He is awake. Appearance: Normal appearance. He is not ill-appearing or toxic-appearing. Cardiovascular:      Rate and Rhythm: Normal rate and regular rhythm. Heart sounds: Normal heart sounds. Pulmonary:      Effort: Pulmonary effort is normal.      Breath sounds: Normal breath sounds and air entry. Abdominal:      General: Bowel sounds are normal.      Tenderness: There is abdominal tenderness in the suprapubic area and left lower quadrant. There is left CVA tenderness. There is no right CVA tenderness, guarding or rebound. Neurological:      Mental Status: He is alert. Psychiatric:         Behavior: Behavior is cooperative.          DIFFERENTIAL DIAGNOSIS     PLAN (LABS / IMAGING / EKG):  Orders Placed This Encounter   Procedures    Culture, Urine    CT ABDOMEN PELVIS WO CONTRAST Additional Contrast? None    Urinalysis Reflex to Culture    Basic Metabolic Panel w/ Reflex to MG    CBC Auto Differential    Microscopic Urinalysis       MEDICATIONS ORDERED:  Orders Placed This Encounter   Medications    ketorolac (TORADOL) injection 30 mg    ondansetron (ZOFRAN) tablet 4 mg    0.9 % sodium chloride bolus    ibuprofen (IBU) 400 MG tablet     Sig: Take 1 tablet by mouth every 6 hours as needed for Pain     Dispense:  20 tablet     Refill:  0       DDX: Urolithiasis, Nephrolithiasis, Infected Stones    DIAGNOSTIC RESULTS / EMERGENCY DEPARTMENT COURSE / MDM   LAB RESULTS:  Results for orders placed or performed during the hospital encounter of 07/27/20   Urinalysis Reflex to Culture    Specimen: Urine, clean catch   Result Value Ref Range    Color, UA ORANGE (A) YELLOW    Turbidity UA CLOUDY (A) CLEAR    Glucose, Ur NEGATIVE NEGATIVE    Bilirubin Urine NEGATIVE  Verified by ictotest. (A) NEGATIVE    Ketones, Urine NEGATIVE NEGATIVE    Specific Gravity, UA 1.033 (H) 1.005 - 1.030    Urine Hgb LARGE (A) NEGATIVE    pH, UA 5.5 5.0 - 8.0    Protein, UA 2+ (A) NEGATIVE    Urobilinogen, Urine Normal Normal    Nitrite, Urine NEGATIVE NEGATIVE    Leukocyte Esterase, Urine SMALL (A) NEGATIVE    Urinalysis Comments NOT REPORTED    Basic Metabolic Panel w/ Reflex to MG   Result Value Ref Range    Glucose 112 (H) 70 - 99 mg/dL    BUN 13 6 - 20 mg/dL    CREATININE 0.86 0.70 - 1.20 mg/dL    Bun/Cre Ratio NOT REPORTED 9 - 20    Calcium 9.1 8.6 - 10.4 mg/dL    Sodium 142 135 - 144 mmol/L    Potassium 4.1 3.7 - 5.3 mmol/L    Chloride 108 (H) 98 - 107 mmol/L    CO2 23 20 - 31 mmol/L    Anion Gap 11 9 - 17 mmol/L    GFR Non-African American >60 >60 mL/min    GFR African American >60 >60 mL/min    GFR Comment          GFR Staging NOT REPORTED    Microscopic Urinalysis Result Value Ref Range    -          WBC, UA 10 TO 20 0 - 5 /HPF    RBC, UA TOO NUMEROUS TO COUNT 0 - 4 /HPF    Casts UA  0 - 8 /LPF     2 TO 5 HYALINE Reference range defined for non-centrifuged specimen. Crystals, UA NOT REPORTED None /HPF    Epithelial Cells UA 0 TO 2 0 - 5 /HPF    Renal Epithelial, UA NOT REPORTED 0 /HPF    Bacteria, UA NOT REPORTED None    Mucus, UA NOT REPORTED None    Trichomonas, UA NOT REPORTED None    Amorphous, UA NOT REPORTED None    Other Observations UA NOT REPORTED NOT REQ. Yeast, UA NOT REPORTED None       IMPRESSION: UA shows no sign of infection. RADIOLOGY:  CT Scan positive for a 4mm stone in the left distal ureter, an small stone in the left kidney and a 8mm non obstructing stone in the right renal pelvis. EKG      All EKG's are interpreted by the Emergency Department Physician who either signs or Co-signs this chart in the absence of a cardiologist.    EMERGENCY DEPARTMENT COURSE:  Patient was given Toradol for pain and started on IV Fluids. CT scan was done on patient which showed kidney stones and a mildly obstructing stone in the left ureter. Patient was informedabout the diagnosis and understood. Advised patient to drink more fluids and to take Motrin as needed for pain, and to follow up with primary care doctor. Patient understood and agreed. PROCEDURES:      CONSULTS:  None    CRITICAL CARE:  Please see attending note    FINAL IMPRESSION      1. Kidney stone    2.  Ureterolithiasis          DISPOSITION / PLAN     DISPOSITION Decision To Discharge 07/27/2020 10:19:21 AM      PATIENT REFERRED TO:  [facility]  [address] [facility] [address]  Schedule an appointment as soon as possible for a visit in 3 days  to follow up      DISCHARGE MEDICATIONS:  Discharge Medication List as of 7/27/2020 10:25 AM      START taking these medications    Details   ibuprofen (IBU) 400 MG tablet Take 1 tablet by mouth every 6 hours as needed for Pain,

## 2020-07-28 LAB
CULTURE: NO GROWTH
Lab: NORMAL
SPECIMEN DESCRIPTION: NORMAL

## 2020-08-21 RX ORDER — PANTOPRAZOLE SODIUM 40 MG/1
TABLET, DELAYED RELEASE ORAL
Qty: 30 TABLET | Refills: 2 | Status: ON HOLD | OUTPATIENT
Start: 2020-08-21 | End: 2021-03-23

## 2020-09-24 ENCOUNTER — HOSPITAL ENCOUNTER (OUTPATIENT)
Age: 40
Setting detail: OBSERVATION
Discharge: HOME | End: 2020-09-26
Attending: EMERGENCY MEDICINE | Admitting: INTERNAL MEDICINE
Payer: MEDICAID

## 2020-09-24 PROBLEM — F31.9 BIPOLAR AFFECTIVE DISORDER (HCC): Chronic | Status: ACTIVE | Noted: 2017-12-26

## 2020-09-24 PROBLEM — T51.2X1A ISOPROPYL ALCOHOL POISONING: Status: ACTIVE | Noted: 2020-09-24

## 2020-09-24 LAB
-: NORMAL
ABSOLUTE EOS #: 0.1 K/UL (ref 0–0.4)
ABSOLUTE IMMATURE GRANULOCYTE: ABNORMAL K/UL (ref 0–0.3)
ABSOLUTE LYMPH #: 1.5 K/UL (ref 1–4.8)
ABSOLUTE MONO #: 0.5 K/UL (ref 0.1–1.2)
ACETAMINOPHEN LEVEL: <5 UG/ML (ref 10–30)
ALBUMIN SERPL-MCNC: 4.6 G/DL (ref 3.5–5.2)
ALBUMIN/GLOBULIN RATIO: 1.6 (ref 1–2.5)
ALP BLD-CCNC: 83 U/L (ref 40–129)
ALT SERPL-CCNC: 24 U/L (ref 5–41)
ANION GAP SERPL CALCULATED.3IONS-SCNC: 15 MMOL/L (ref 9–17)
ANION GAP SERPL CALCULATED.3IONS-SCNC: 15 MMOL/L (ref 9–17)
AST SERPL-CCNC: 25 U/L
BASOPHILS # BLD: 1 % (ref 0–2)
BASOPHILS ABSOLUTE: 0 K/UL (ref 0–0.2)
BETA-HYDROXYBUTYRATE: 0.35 MMOL/L (ref 0.02–0.27)
BILIRUB SERPL-MCNC: 0.49 MG/DL (ref 0.3–1.2)
BILIRUBIN DIRECT: 0.14 MG/DL
BILIRUBIN URINE: NEGATIVE
BILIRUBIN, INDIRECT: 0.35 MG/DL (ref 0–1)
BUN BLDV-MCNC: 6 MG/DL (ref 6–20)
BUN BLDV-MCNC: 8 MG/DL (ref 6–20)
BUN/CREAT BLD: ABNORMAL (ref 9–20)
BUN/CREAT BLD: ABNORMAL (ref 9–20)
CALCIUM SERPL-MCNC: 9 MG/DL (ref 8.6–10.4)
CALCIUM SERPL-MCNC: 9.2 MG/DL (ref 8.6–10.4)
CHLORIDE BLD-SCNC: 103 MMOL/L (ref 98–107)
CHLORIDE BLD-SCNC: 103 MMOL/L (ref 98–107)
CO2: 23 MMOL/L (ref 20–31)
CO2: 24 MMOL/L (ref 20–31)
COLOR: YELLOW
COMMENT UA: NORMAL
CREAT SERPL-MCNC: 1.04 MG/DL (ref 0.7–1.2)
CREAT SERPL-MCNC: 1.19 MG/DL (ref 0.7–1.2)
DIFFERENTIAL TYPE: ABNORMAL
EOSINOPHILS RELATIVE PERCENT: 1 % (ref 1–4)
ETHANOL PERCENT: <0.01 %
ETHANOL: <10 MG/DL
GFR AFRICAN AMERICAN: >60 ML/MIN
GFR AFRICAN AMERICAN: >60 ML/MIN
GFR NON-AFRICAN AMERICAN: >60 ML/MIN
GFR NON-AFRICAN AMERICAN: >60 ML/MIN
GFR SERPL CREATININE-BSD FRML MDRD: ABNORMAL ML/MIN/{1.73_M2}
GLOBULIN: NORMAL G/DL (ref 1.5–3.8)
GLUCOSE BLD-MCNC: 136 MG/DL (ref 75–110)
GLUCOSE BLD-MCNC: 143 MG/DL (ref 70–99)
GLUCOSE BLD-MCNC: 161 MG/DL (ref 70–99)
GLUCOSE URINE: NEGATIVE
HCT VFR BLD CALC: 43.3 % (ref 41–53)
HEMOGLOBIN: 14.7 G/DL (ref 13.5–17.5)
IMMATURE GRANULOCYTES: ABNORMAL %
KETONES, URINE: NEGATIVE
LEUKOCYTE ESTERASE, URINE: NEGATIVE
LYMPHOCYTES # BLD: 16 % (ref 24–44)
MAGNESIUM: 1.7 MG/DL (ref 1.6–2.6)
MCH RBC QN AUTO: 30.3 PG (ref 26–34)
MCHC RBC AUTO-ENTMCNC: 34 G/DL (ref 31–37)
MCV RBC AUTO: 89.1 FL (ref 80–100)
MONOCYTES # BLD: 5 % (ref 2–11)
NITRITE, URINE: NEGATIVE
NRBC AUTOMATED: ABNORMAL PER 100 WBC
PDW BLD-RTO: 13.2 % (ref 12.5–15.4)
PH UA: 5.5 (ref 5–8)
PLATELET # BLD: 333 K/UL (ref 140–450)
PLATELET ESTIMATE: ABNORMAL
PMV BLD AUTO: 8.2 FL (ref 6–12)
POTASSIUM SERPL-SCNC: 3.5 MMOL/L (ref 3.7–5.3)
POTASSIUM SERPL-SCNC: 3.5 MMOL/L (ref 3.7–5.3)
PROTEIN UA: NEGATIVE
RBC # BLD: 4.86 M/UL (ref 4.5–5.9)
RBC # BLD: ABNORMAL 10*6/UL
REASON FOR REJECTION: NORMAL
SALICYLATE LEVEL: <1 MG/DL (ref 3–10)
SARS-COV-2, RAPID: NOT DETECTED
SARS-COV-2: NORMAL
SARS-COV-2: NORMAL
SEG NEUTROPHILS: 77 % (ref 36–66)
SEGMENTED NEUTROPHILS ABSOLUTE COUNT: 7.6 K/UL (ref 1.8–7.7)
SODIUM BLD-SCNC: 141 MMOL/L (ref 135–144)
SODIUM BLD-SCNC: 142 MMOL/L (ref 135–144)
SOURCE: NORMAL
SPECIFIC GRAVITY UA: 1 (ref 1–1.03)
TOTAL PROTEIN: 7.4 G/DL (ref 6.4–8.3)
TURBIDITY: CLEAR
URINE HGB: NEGATIVE
UROBILINOGEN, URINE: NORMAL
WBC # BLD: 9.7 K/UL (ref 3.5–11)
WBC # BLD: ABNORMAL 10*3/UL
ZZ NTE CLEAN UP: ORDERED TEST: NORMAL
ZZ NTE WITH NAME CLEAN UP: SPECIMEN SOURCE: NORMAL

## 2020-09-24 PROCEDURE — 6360000002 HC RX W HCPCS: Performed by: NURSE PRACTITIONER

## 2020-09-24 PROCEDURE — U0002 COVID-19 LAB TEST NON-CDC: HCPCS

## 2020-09-24 PROCEDURE — 6370000000 HC RX 637 (ALT 250 FOR IP): Performed by: NURSE PRACTITIONER

## 2020-09-24 PROCEDURE — 6370000000 HC RX 637 (ALT 250 FOR IP): Performed by: PHYSICIAN ASSISTANT

## 2020-09-24 PROCEDURE — 94761 N-INVAS EAR/PLS OXIMETRY MLT: CPT

## 2020-09-24 PROCEDURE — 36415 COLL VENOUS BLD VENIPUNCTURE: CPT

## 2020-09-24 PROCEDURE — 80307 DRUG TEST PRSMV CHEM ANLYZR: CPT

## 2020-09-24 PROCEDURE — 96360 HYDRATION IV INFUSION INIT: CPT

## 2020-09-24 PROCEDURE — G0378 HOSPITAL OBSERVATION PER HR: HCPCS

## 2020-09-24 PROCEDURE — 80048 BASIC METABOLIC PNL TOTAL CA: CPT

## 2020-09-24 PROCEDURE — 82010 KETONE BODYS QUAN: CPT

## 2020-09-24 PROCEDURE — 82947 ASSAY GLUCOSE BLOOD QUANT: CPT

## 2020-09-24 PROCEDURE — G0480 DRUG TEST DEF 1-7 CLASSES: HCPCS

## 2020-09-24 PROCEDURE — 84600 ASSAY OF VOLATILES: CPT

## 2020-09-24 PROCEDURE — 99219 PR INITIAL OBSERVATION CARE/DAY 50 MINUTES: CPT | Performed by: INTERNAL MEDICINE

## 2020-09-24 PROCEDURE — 99285 EMERGENCY DEPT VISIT HI MDM: CPT

## 2020-09-24 PROCEDURE — 85025 COMPLETE CBC W/AUTO DIFF WBC: CPT

## 2020-09-24 PROCEDURE — 83735 ASSAY OF MAGNESIUM: CPT

## 2020-09-24 PROCEDURE — 96361 HYDRATE IV INFUSION ADD-ON: CPT

## 2020-09-24 PROCEDURE — 96372 THER/PROPH/DIAG INJ SC/IM: CPT

## 2020-09-24 PROCEDURE — 2580000003 HC RX 258: Performed by: PHYSICIAN ASSISTANT

## 2020-09-24 PROCEDURE — 81003 URINALYSIS AUTO W/O SCOPE: CPT

## 2020-09-24 PROCEDURE — 2580000003 HC RX 258: Performed by: NURSE PRACTITIONER

## 2020-09-24 PROCEDURE — 93005 ELECTROCARDIOGRAM TRACING: CPT | Performed by: PHYSICIAN ASSISTANT

## 2020-09-24 PROCEDURE — 80076 HEPATIC FUNCTION PANEL: CPT

## 2020-09-24 RX ORDER — MULTIVITAMIN WITH IRON
1 TABLET ORAL DAILY
Status: DISCONTINUED | OUTPATIENT
Start: 2020-09-24 | End: 2020-09-26 | Stop reason: HOSPADM

## 2020-09-24 RX ORDER — FOLIC ACID 1 MG/1
1 TABLET ORAL DAILY
Status: DISCONTINUED | OUTPATIENT
Start: 2020-09-24 | End: 2020-09-26 | Stop reason: HOSPADM

## 2020-09-24 RX ORDER — PROMETHAZINE HYDROCHLORIDE 25 MG/1
12.5 TABLET ORAL EVERY 6 HOURS PRN
Status: DISCONTINUED | OUTPATIENT
Start: 2020-09-24 | End: 2020-09-25

## 2020-09-24 RX ORDER — POTASSIUM CHLORIDE 7.45 MG/ML
10 INJECTION INTRAVENOUS PRN
Status: DISCONTINUED | OUTPATIENT
Start: 2020-09-24 | End: 2020-09-26 | Stop reason: HOSPADM

## 2020-09-24 RX ORDER — SODIUM CHLORIDE 0.9 % (FLUSH) 0.9 %
10 SYRINGE (ML) INJECTION EVERY 12 HOURS SCHEDULED
Status: DISCONTINUED | OUTPATIENT
Start: 2020-09-24 | End: 2020-09-26 | Stop reason: HOSPADM

## 2020-09-24 RX ORDER — LORAZEPAM 0.5 MG/1
0.5 TABLET ORAL ONCE
Status: COMPLETED | OUTPATIENT
Start: 2020-09-24 | End: 2020-09-24

## 2020-09-24 RX ORDER — DEXTROSE MONOHYDRATE 50 MG/ML
100 INJECTION, SOLUTION INTRAVENOUS PRN
Status: DISCONTINUED | OUTPATIENT
Start: 2020-09-24 | End: 2020-09-26 | Stop reason: HOSPADM

## 2020-09-24 RX ORDER — 0.9 % SODIUM CHLORIDE 0.9 %
1000 INTRAVENOUS SOLUTION INTRAVENOUS ONCE
Status: COMPLETED | OUTPATIENT
Start: 2020-09-24 | End: 2020-09-24

## 2020-09-24 RX ORDER — ACETAMINOPHEN 650 MG/1
650 SUPPOSITORY RECTAL EVERY 6 HOURS PRN
Status: DISCONTINUED | OUTPATIENT
Start: 2020-09-24 | End: 2020-09-26 | Stop reason: HOSPADM

## 2020-09-24 RX ORDER — LORAZEPAM 2 MG/ML
3 INJECTION INTRAMUSCULAR
Status: DISCONTINUED | OUTPATIENT
Start: 2020-09-24 | End: 2020-09-25

## 2020-09-24 RX ORDER — LORAZEPAM 2 MG/ML
2 INJECTION INTRAMUSCULAR
Status: DISCONTINUED | OUTPATIENT
Start: 2020-09-24 | End: 2020-09-25

## 2020-09-24 RX ORDER — SODIUM CHLORIDE 0.9 % (FLUSH) 0.9 %
10 SYRINGE (ML) INJECTION PRN
Status: DISCONTINUED | OUTPATIENT
Start: 2020-09-24 | End: 2020-09-26 | Stop reason: HOSPADM

## 2020-09-24 RX ORDER — ONDANSETRON 2 MG/ML
4 INJECTION INTRAMUSCULAR; INTRAVENOUS EVERY 6 HOURS PRN
Status: DISCONTINUED | OUTPATIENT
Start: 2020-09-24 | End: 2020-09-26 | Stop reason: HOSPADM

## 2020-09-24 RX ORDER — NICOTINE 21 MG/24HR
1 PATCH, TRANSDERMAL 24 HOURS TRANSDERMAL DAILY
Status: DISCONTINUED | OUTPATIENT
Start: 2020-09-24 | End: 2020-09-26 | Stop reason: HOSPADM

## 2020-09-24 RX ORDER — MAGNESIUM SULFATE 1 G/100ML
1 INJECTION INTRAVENOUS PRN
Status: DISCONTINUED | OUTPATIENT
Start: 2020-09-24 | End: 2020-09-26 | Stop reason: HOSPADM

## 2020-09-24 RX ORDER — LORAZEPAM 1 MG/1
4 TABLET ORAL
Status: DISCONTINUED | OUTPATIENT
Start: 2020-09-24 | End: 2020-09-25

## 2020-09-24 RX ORDER — DEXTROSE MONOHYDRATE 25 G/50ML
12.5 INJECTION, SOLUTION INTRAVENOUS PRN
Status: DISCONTINUED | OUTPATIENT
Start: 2020-09-24 | End: 2020-09-26 | Stop reason: HOSPADM

## 2020-09-24 RX ORDER — LORAZEPAM 1 MG/1
2 TABLET ORAL
Status: DISCONTINUED | OUTPATIENT
Start: 2020-09-24 | End: 2020-09-25

## 2020-09-24 RX ORDER — ACETAMINOPHEN 325 MG/1
650 TABLET ORAL EVERY 6 HOURS PRN
Status: DISCONTINUED | OUTPATIENT
Start: 2020-09-24 | End: 2020-09-26 | Stop reason: HOSPADM

## 2020-09-24 RX ORDER — SODIUM CHLORIDE 9 MG/ML
INJECTION, SOLUTION INTRAVENOUS CONTINUOUS
Status: DISCONTINUED | OUTPATIENT
Start: 2020-09-24 | End: 2020-09-25

## 2020-09-24 RX ORDER — LORAZEPAM 2 MG/ML
4 INJECTION INTRAMUSCULAR
Status: DISCONTINUED | OUTPATIENT
Start: 2020-09-24 | End: 2020-09-25

## 2020-09-24 RX ORDER — POLYETHYLENE GLYCOL 3350 17 G/17G
17 POWDER, FOR SOLUTION ORAL DAILY PRN
Status: DISCONTINUED | OUTPATIENT
Start: 2020-09-24 | End: 2020-09-26 | Stop reason: HOSPADM

## 2020-09-24 RX ORDER — LORAZEPAM 1 MG/1
1 TABLET ORAL
Status: DISCONTINUED | OUTPATIENT
Start: 2020-09-24 | End: 2020-09-25

## 2020-09-24 RX ORDER — THIAMINE MONONITRATE (VIT B1) 100 MG
100 TABLET ORAL DAILY
Status: DISCONTINUED | OUTPATIENT
Start: 2020-09-24 | End: 2020-09-26 | Stop reason: HOSPADM

## 2020-09-24 RX ORDER — NICOTINE POLACRILEX 4 MG
15 LOZENGE BUCCAL PRN
Status: DISCONTINUED | OUTPATIENT
Start: 2020-09-24 | End: 2020-09-26 | Stop reason: HOSPADM

## 2020-09-24 RX ORDER — LORAZEPAM 1 MG/1
3 TABLET ORAL
Status: DISCONTINUED | OUTPATIENT
Start: 2020-09-24 | End: 2020-09-25

## 2020-09-24 RX ORDER — POTASSIUM CHLORIDE 20 MEQ/1
40 TABLET, EXTENDED RELEASE ORAL PRN
Status: DISCONTINUED | OUTPATIENT
Start: 2020-09-24 | End: 2020-09-26 | Stop reason: HOSPADM

## 2020-09-24 RX ORDER — LORAZEPAM 2 MG/ML
1 INJECTION INTRAMUSCULAR
Status: DISCONTINUED | OUTPATIENT
Start: 2020-09-24 | End: 2020-09-25

## 2020-09-24 RX ADMIN — POTASSIUM CHLORIDE 40 MEQ: 1500 TABLET, EXTENDED RELEASE ORAL at 20:21

## 2020-09-24 RX ADMIN — Medication 100 MG: at 20:04

## 2020-09-24 RX ADMIN — THERA TABS 1 TABLET: TAB at 20:04

## 2020-09-24 RX ADMIN — LORAZEPAM 2 MG: 1 TABLET ORAL at 18:58

## 2020-09-24 RX ADMIN — LORAZEPAM 0.5 MG: 0.5 TABLET ORAL at 16:30

## 2020-09-24 RX ADMIN — Medication 10 ML: at 20:22

## 2020-09-24 RX ADMIN — SODIUM CHLORIDE 1000 ML: 9 INJECTION, SOLUTION INTRAVENOUS at 14:45

## 2020-09-24 RX ADMIN — LORAZEPAM 4 MG: 1 TABLET ORAL at 20:04

## 2020-09-24 RX ADMIN — SODIUM CHLORIDE: 9 INJECTION, SOLUTION INTRAVENOUS at 20:22

## 2020-09-24 RX ADMIN — ENOXAPARIN SODIUM 40 MG: 40 INJECTION SUBCUTANEOUS at 20:04

## 2020-09-24 RX ADMIN — FOLIC ACID 1 MG: 1 TABLET ORAL at 20:04

## 2020-09-24 ASSESSMENT — PAIN SCALES - GENERAL: PAINLEVEL_OUTOF10: 0

## 2020-09-24 NOTE — ED NOTES
Perry MOMIN from poison control states to treat like ETOH. He states he will give a f/u call in a while.  Dr Andriy Izquierdo and [place], [place] both notified     Janneth Escamilla RN  09/24/20 [id number]

## 2020-09-24 NOTE — ED TRIAGE NOTES
pt states that he usually drinks 1/5 per day and yesterday he drank 1/5 of hand  and appox 6-8 shots of rubbing alcohol today

## 2020-09-24 NOTE — ED PROVIDER NOTES
[address] [facility] Surg ICU  [address] [facility] [address]  Phone: [phone]        Pt Name: Babak Alcaraz  MRN: [id number]  [place] 1980  Date of evaluation: 9/24/20    [address] [place] [address]       Chief Complaint   Patient presents with    Alcohol Problem     pt states that he drank rubbing alcohol today to alleviate detox s/s pt states that he usually drinks 1/5 per day and yesterday he drank 1/5 of hand  and appox 6-8 shots of rubbing alcohol today       HISTORY OF PRESENT ILLNESS (Location/Symptom, Timing/Onset, Context/Setting, Quality, Duration, Modifying Factors, Severity)      Babak Alcaraz is a 36 y.o. male with pertinent PMH of anxiety, hep C, alcohol abuse, who presents to the ED via private auto with ingestion of foreign substance. Patient reports that since morning he wanted alcohol, but could not pay for it so he drank approximately 6 shots of isopropyl alcohol. Patient reports that he has not done this before and was not trying to harm himself. He denied drinking any hands and has a yesterday, though told the nurse that he did drink some. Patient notes that he usually drinks half of the fifth or 1/5 of alcohol per day. He reports that he was sexually approximately 6 months clean and started drinking last Friday. He notes that he was trying to get to [place] for their detox program and that is why he called EMS to take him there, but EMS reported that they would take him to 2-hour ED because it was closer. At this time patient is complaining of some mild nausea, anxiety, and sleepiness. Denies fever, chills, tremors, hallucinations, vomiting, abdominal pain, chest pain, shortness of breath, headache, vision changes, or any other complaints at this time.     PAST MEDICAL / SURGICAL / SOCIAL / FAMILY HISTORY     PMH:  has a past medical history of Alcoholism ([facility] [address] Anxiety, Hepatitis C, History of atrial fibrillation, Mental and behavioral disorders d/t use of every 6 hours as needed for Pain 7/27/20   Temitope Armijo MD   Blood Pressure KIT Daily as needed for blood pressure 5/27/20   KILLIAN Grewal - CNP   SUMAtriptan (IMITREX) 100 MG tablet TAKE 1 TAB BY MOUTH AS DIRECTED ONCE EVERY DAY FOR MIGRAINE. MAY REPEAT IN 2 HOURS IF UNRESOLVED. MAX 2 TABS/24HRS 4/30/20   Nadeem Patton, KILLIAN - CNP       REVIEW OF SYSTEMS  (2-9 systems for level 4, 10 ormore for level 5)      Review of Systems    Constitutional: See HPI. Denies fever or chills. Eyes: Denies vision changes. HENT: Denies sore throat or neck pain. Respiratory: Denies cough or shortness of breath. Cardiovascular: Denies chest pain. GI: Denies vomiting or diarrhea. : Denies painful urination. Musculoskeletal: Denies recent trauma. Skin: Denies new rashes or wounds. Neurologic:  Denies new numbness or weakness. Psychiatric: Denies sleep disturbances. Endocrine:  Denies unexpected weight loss  Heme: Denies bleeding disorders. All other systems negative except as marked. PHYSICAL EXAM  (up to 7 for level 4, 8 or more for level 5)      INITIAL VITALS:  height is 5' 9\" (1.753 m) and weight is 90.7 kg (200 lb). His oral temperature is 98.3 °F (36.8 °C). His blood pressure is 157/86 (abnormal) and his pulse is 107. His respiration is 20 and oxygen saturation is 95%. Vital signs reviewed. Physical Exam    General:  Somnolent, responds to name, calm but not particularly cooperative, well-groomed, well-nourished, appears stated age, and is in no acute distress. Head:  Normocephalic, atraumatic, and without obvious abnormality. Eyes:  Sclerae/conjunctivae clear without injection, pallor, or icterus. Corneas clear without opacities. EOM's intact. ENT: Ears and nose are all without obvious masses lesion or deformity. No oropharynx examination performed due to aerosolization risk during COVID-19 pandemic. Neck: Supple and symmetrical. Trachea midline. No adenopathy.  No jugular venous distention. Lungs:   No respiratory distress. Clear to auscultation bilaterally. No wheezes, rhonchi, or rales. Heart:  Regular rate. Regular rhythm. No murmurs, rubs, or gallops. Abdomen:   Normoactive bowel sounds. Soft, nontender, nondistended without guarding or rebound. Extremities: Warm and dry without erythema or edema. Skin: Soft, good turgor, and well-hydrated. No obvious rashes or lesions. Neurologic: No focal deficits are appreciated. Normal gait. Grossly normal motor and sensation. Speech clear. Psychiatric:  Somnolent. Coherent thought process. DIFFERENTIAL DIAGNOSIS / MDM     Patient presents the ED with possible ingestion of rubbing alcohol and concern for alcohol withdrawal after 48 hours of not drinking. Vital signs are unremarkable other than tachycardia at 107. Patient is intermittently sleeping throughout the entire exam and was difficult to have a conversation with. He responds to his name as well as physical stimuli. Will plan to do EKG, labs, UA, IV fluids, and call poison control. Initially wanted to get a methanol level, but lab informed us that this is a test so we canceled it.     PLAN (LABS / IMAGING / EKG):  Orders Placed This Encounter   Procedures    CBC Auto Differential    Basic Metabolic Panel w/ Reflex to MG    Hepatic Function Panel    Urinalysis Reflex to Culture    Ethanol    SPECIMEN REJECTION    Magnesium    Comprehensive Metabolic Panel w/ Reflex to MG    Magnesium    Calcium, Ionized    CBC    Protime-INR    Phosphorus    TSH without Reflex    Hemoglobin A1C    Drug screen multi urine    Drug screen barbiturate urine    BASIC METABOLIC PANEL    [id number]    VOLATILE COMPOUNDS    Acetaminophen Level    Beta-Hydroxybutyrate    Salicylate    Diet NPO Effective Now    Vital signs per unit routine    Notify physician    Notify physician    Daily weights    Intake and output    Elevate Head of Bed     Place intermittent pneumatic compression device    Up with assistance    HYPOGLYCEMIA TREATMENT: blood glucose less than 50 mg/dL and patient  ALERT and TOLERATING PO    HYPOGLYCEMIA TREATMENT: blood glucose less than 70 mg/dL and patient ALERT and TOLERATING PO    HYPOGLYCEMIA TREATMENT: blood glucose less than 70 mg/dL and patient NOT ALERT or NPO    Telemetry monitoring - continuous duration    Full Code    Inpatient consult to Social Work    Initiate Oxygen Therapy Protocol    Pulse oximetry, continuous    Pulse oximetry, continuous    POCT Glucose    POCT Glucose    EKG 12 Lead    PATIENT STATUS (FROM ED OR OR/PROCEDURAL) Observation    Alcohol and or drug assessment    Seizure precautions    Fall precautions       MEDICATIONS ORDERED:  Orders Placed This Encounter   Medications    0.9 % sodium chloride bolus    0.9 % sodium chloride infusion    sodium chloride flush 0.9 % injection 10 mL    sodium chloride flush 0.9 % injection 10 mL    OR Linked Order Group     potassium chloride (KLOR-CON M) extended release tablet 40 mEq     potassium bicarb-citric acid (EFFER-K) effervescent tablet 40 mEq     potassium chloride 10 mEq/100 mL IVPB (Peripheral Line)    OR Linked Order Group     acetaminophen (TYLENOL) tablet 650 mg     acetaminophen (TYLENOL) suppository 650 mg    polyethylene glycol (GLYCOLAX) packet 17 g    OR Linked Order Group     promethazine (PHENERGAN) tablet 12.5 mg     ondansetron (ZOFRAN) injection 4 mg    enoxaparin (LOVENOX) injection 40 mg    vitamin B-1 (THIAMINE) tablet 100 mg    multivitamin 1 tablet    folic acid (FOLVITE) tablet 1 mg    nicotine (NICODERM CQ) 21 MG/24HR 1 patch    magnesium sulfate 1 g in dextrose 5% 100 mL IVPB    glucose (GLUTOSE) 40 % oral gel 15 g    dextrose 50 % IV solution    glucagon (rDNA) injection 1 mg    dextrose 5 % solution    OR Linked Order Group     LORazepam (ATIVAN) tablet 1 mg     LORazepam (ATIVAN) injection 1 mg  LORazepam (ATIVAN) tablet 2 mg     LORazepam (ATIVAN) injection 2 mg     LORazepam (ATIVAN) tablet 3 mg     LORazepam (ATIVAN) injection 3 mg     LORazepam (ATIVAN) tablet 4 mg     LORazepam (ATIVAN) injection 4 mg    LORazepam (ATIVAN) tablet 0.5 mg       Controlled Substances Monitoring:     DIAGNOSTIC RESULTS     EKG: All EKG's are interpreted by the Emergency Department Physician who either signs or Co-signs this chart in the [address] a cardiologist.    EKG Interpretation    Interpreted by emergency department physician    Rhythm: sinus tachycardia  Rate: 100-110  Axis: normal  Ectopy: none  Conduction: normal  ST Segments: no acute change  T Waves: no acute change  Q Waves: none    Clinical Impression: Sinus tachycardia    RADIOLOGY:  Non-plain film images such as CT, Ultrasound and MRI are read by the radiologist. Plain radiographic images are visualized by me and the following radiologist interpretations are reviewed. No results found.     LABS:  Results for orders placed or performed during the hospital encounter of 09/24/20   CBC Auto Differential   Result Value Ref Range    WBC 9.7 3.5 - 11.0 k/uL    RBC 4.86 4.5 - 5.9 m/uL    Hemoglobin 14.7 13.5 - 17.5 g/dL    Hematocrit 43.3 41 - 53 %    MCV 89.1 80 - 100 fL    MCH 30.3 26 - 34 pg    MCHC 34.0 31 - 37 g/dL    RDW 13.2 12.5 - 15.4 %    Platelets 221 703 - 811 k/uL    MPV 8.2 6.0 - 12.0 fL    NRBC Automated NOT REPORTED per 100 WBC    Differential Type NOT REPORTED     Seg Neutrophils 77 (H) 36 - 66 %    Lymphocytes 16 (L) 24 - 44 %    Monocytes 5 2 - 11 %    Eosinophils % 1 1 - 4 %    Basophils 1 0 - 2 %    Immature Granulocytes NOT REPORTED 0 %    Segs Absolute 7.60 1.8 - 7.7 k/uL    Absolute Lymph # 1.50 1.0 - 4.8 k/uL    Absolute Mono # 0.50 0.1 - 1.2 k/uL    Absolute Eos # 0.10 0.0 - 0.4 k/uL    Basophils Absolute 0.00 0.0 - 0.2 k/uL    Absolute Immature Granulocyte NOT REPORTED 0.00 - 0.30 k/uL    WBC Morphology NOT REPORTED     RBC Result Value Ref Range    Ethanol <10 <10 mg/dL    Ethanol percent <0.010 <0.010 %   SPECIMEN REJECTION   Result Value Ref Range    Specimen Source . BLOOD     Ordered Test VO     Reason for Rejection Unable to perform testing: No specimen received. - NOT REPORTED    Magnesium   Result Value Ref Range    Magnesium 1.7 1.6 - 2.6 mg/dL   COVID-19    Specimen: Other   Result Value Ref Range    SARS-CoV-2          SARS-CoV-2, Rapid Not Detected Not Detected    Source . NASOPHARYNGEAL SWAB     SARS-CoV-2         Acetaminophen Level   Result Value Ref Range    Acetaminophen Level <5 (L) 10 - 30 ug/mL   Beta-Hydroxybutyrate   Result Value Ref Range    Beta-Hydroxybutyrate 0.35 (H) 0.02 - 6.19 mmol/L   Salicylate   Result Value Ref Range    Salicylate Lvl <1 (L) 3 - 10 mg/dL   EKG 12 Lead   Result Value Ref Range    Ventricular Rate 113 BPM    Atrial Rate 113 BPM    P-R Interval 142 ms    QRS Duration 88 ms    Q-T Interval 352 ms    QTc Calculation (Bazett) 482 ms    P Axis 58 degrees    R Axis -11 degrees    T Axis 40 degrees       EMERGENCY DEPARTMENT COURSE       Patient's care was managed by Dr. [place] and all consults were done through him. Vitals:    Vitals:    09/24/20 1700 09/24/20 1715 09/24/20 1730 09/24/20 1745   BP: (!) 138/93 (!) 152/94 (!) 145/86 (!) 157/86   Pulse: 108 107 102 107   Resp: 24 20 22 20   Temp:       TempSrc:       SpO2:       Weight:       Height:         -------------------------  BP: (!) 157/86, Temp: 98.3 °F (36.8 °C), Pulse: 107, Resp: 20      RE-EVALUATION:  See ED Course notes above. This patient was seen by the attending physician and they agreed with the assessment and plan. CONSULTS:  Internal medicine    PROCEDURES:  None    FINAL IMPRESSION      1. Isopropyl alcohol poisoning          DISPOSITION / PLAN     CONDITION ON DISPOSITION:   Good / Stable for admission    PATIENT REFERRED TO:  No follow-up provider specified.     DISCHARGE MEDICATIONS:  Current Discharge Medication List          Flo Lala, [place]   Emergency Medicine Physician Assistant    (Please note that portions of this note were completed with a voice recognition program.  Efforts were made to edit the dictations but occasionally words aremis-transcribed.)       Danny Marin PA-C  09/24/20 1288

## 2020-09-24 NOTE — H&P
[facility]  Office: [address], DO, Isma Paredes, DO, Génesis Anaya, DO, Alisson Min, DO, Claudell Poplar, MD, Maximiliano Hernandez MD, Mynor Valera MD, Sandy Guzman MD, Brenda Alfaro MD, Ken Castanon MD, Evelyn Perez MD, Mitul Sifuentes MD, Mita Gudino MD, Benita Caputo DO, Santy Dominguez MD, Alisa Holt MD, Henrry Colbert DO, Shanika Dave MD,  Claudio Pennington DO, Devonte Trivedi MD, Silvia Don MD, Karin Dias, [facility], [facility], CNP, Danish Smyth, CNP, Scott Faria, CNS, Minda Collazo, CNP, Tania Corado, CNP, Megan Reis, CNP, Pia Jin, CNP, Bibiana Mcfarlane, CNP, Surinder Long PA-C, Ashvin Haynes, [facility], Valders Plate, CNP, Anika Mendez, CNP, Kremary Pencil, CNP, Lewdonavon Ramirez, CNP, Sinda Arch, [facility]   [address] [facility]    HISTORY AND PHYSICAL EXAMINATION            Date:   9/24/2020  Patient name:  Colette Cain  Date of admission:  9/24/2020  1:57 PM  MRN:   [id number]  Account:  [de-identified]  YOB: 1980  PCP:    KILLIAN Snyder CNP  Room:   [facility]/321-  Code Status:    Full Code    Chief Complaint:     Chief Complaint   Patient presents with    Alcohol Problem     pt states that he drank rubbing alcohol today to alleviate detox s/s pt states that he usually drinks 1/5 per day and yesterday he drank 1/5 of hand  and appox 6-8 shots of rubbing alcohol today       History Obtained From:     patient, electronic medical record    History of Present Illness:     Colette Cain is a 36 y.o. M with hx of alcohol abuse who presented with concern for alcohol withdrawal. States he has a history of heavy alcohol use, drinks 1/5 vodka per day. Last drink 2 days ago, states he drank rubbing alcohol today. Came to ER due to concern for withdrawal.     In ER ethanol level negative. Labs unremarkable.  Poison control contacted, recommending further monitoring due to rubbing alcohol ingestion. Admitted for further evaluation. States he has history of prior DTs in the past. No other complaints, denies fever/chills, nausea/vomiting, abdominal pain, dyspnea. Past Medical History:     Past Medical History:   Diagnosis Date    Alcoholism ([facility] [address] 01/31/2018    niw clean an sober   [address] Hepatitis C 2010    pt states he tested + for antibodies. no live virus detected -no treatment needed    History of atrial fibrillation 2001    pt states after a suicide attempt, overdose oxycontin pt developed atrial fib x 5-6 months. NO RECENT ISSUES    Mental and behavioral disorders d/t use of alcohol, acute intoxication ([address]     NO LONGER PERTINENT    Palpitations     DENIES    Seizures ([facility] [address] 2006    during alcohol detox     Wears glasses         Past Surgical History:     Past Surgical History:   Procedure Laterality Date    CYSTOSCOPY Bilateral 12/19/2018    Retrograde pyelogram    CYSTOSCOPY Bilateral 12/19/2018    CYSTOSCOPY, RETROGRADE PYELOGRAM performed by Candelaria Cordova MD at [address] 2004    second finger REATTACH [address]        Medications Prior to Admission:     Prior to Admission medications    Medication Sig Start Date End Date Taking?  Authorizing Provider   losartan (COZAAR) 50 MG tablet take 1 tablet by mouth once daily 8/25/20   Verlan Nine, APRN - CNP   pantoprazole (PROTONIX) 40 MG tablet TAKE 1 TABLET BY MOUTH EVERY DAY 8/21/20   Verlan Nine, APRN - CNP   ibuprofen (IBU) 400 MG tablet Take 1 tablet by mouth every 6 hours as needed for Pain 7/27/20   Michael Huynh MD   propranolol (INDERAL) 10 MG tablet TAKE ONE TABLET BY MOUTH EVERY DAY 7/9/20   Verlan Nine, APRN - CNP   Blood Pressure KIT Daily as needed for blood pressure 5/27/20   Verlan Nine, APRN - CNP   SUMAtriptan (IMITREX) 100 MG tablet TAKE 1 TAB BY MOUTH AS DIRECTED ONCE EVERY DAY FOR MIGRAINE. MAY extremities  BEHAVIOR/PSYCH:  negative for depression, anxiety    Physical Exam:   BP (!) 157/86   Pulse 107   Temp 98.3 °F (36.8 °C) (Oral)   Resp 20   Ht 5' 9\" (1.753 m)   Wt 200 lb (90.7 kg)   SpO2 95%   BMI 29.53 kg/m²   Temp (24hrs), Av.3 °F (36.8 °C), Min:98.3 °F (36.8 °C), Max:98.3 °F (36.8 °C)    No results for input(s): POCGLU in the last 72 hours.   No intake or output data in the 24 hours ending 20 1826    General Appearance: alert, well appearing, and in no acute distress, no tremors noted   Mental status: oriented to person, place, and time  Head: normocephalic, atraumatic  Eye: no icterus, redness, pupils equal and reactive, extraocular eye movements intact, conjunctiva clear  Ear: normal external ear, no discharge, hearing intact  Nose: no drainage noted  Mouth: mucous membranes moist  Lungs: Bilateral equal air entry, clear to ausculation, no wheezing  Cardiovascular: normal rate, regular rhythm, no murmur, gallop, rub  Abdomen: Soft, nontender, nondistended, normal bowel sounds, no hepatomegaly or splenomegaly  Neurologic: There are no new focal motor or sensory deficits, normal muscle tone and bulk, no abnormal sensation, normal speech, cranial nerves II through XII grossly intact  Skin: No gross lesions, rashes, bruising or bleeding on exposed skin area  Extremities: peripheral pulses palpable, no edema or calf pain with palpation  Psych: normal affect    Investigations:      Laboratory Testing:  Recent Results (from the past 24 hour(s))   CBC Auto Differential    Collection Time: 20  2:13 PM   Result Value Ref Range    WBC 9.7 3.5 - 11.0 k/uL    RBC 4.86 4.5 - 5.9 m/uL    Hemoglobin 14.7 13.5 - 17.5 g/dL    Hematocrit 43.3 41 - 53 %    MCV 89.1 80 - 100 fL    MCH 30.3 26 - 34 pg    MCHC 34.0 31 - 37 g/dL    RDW 13.2 12.5 - 15.4 %    Platelets 309 292 - 444 k/uL    MPV 8.2 6.0 - 12.0 fL    NRBC Automated NOT REPORTED per 100 WBC    Differential Type NOT REPORTED     Seg Neutrophils 77 (H) 36 - 66 %    Lymphocytes 16 (L) 24 - 44 %    Monocytes 5 2 - 11 %    Eosinophils % 1 1 - 4 %    Basophils 1 0 - 2 %    Immature Granulocytes NOT REPORTED 0 %    Segs Absolute 7.60 1.8 - 7.7 k/uL    Absolute Lymph # 1.50 1.0 - 4.8 k/uL    Absolute Mono # 0.50 0.1 - 1.2 k/uL    Absolute Eos # 0.10 0.0 - 0.4 k/uL    Basophils Absolute 0.00 0.0 - 0.2 k/uL    Absolute Immature Granulocyte NOT REPORTED 0.00 - 0.30 k/uL    WBC Morphology NOT REPORTED     RBC Morphology NOT REPORTED     Platelet Estimate NOT REPORTED    Basic Metabolic Panel w/ Reflex to MG    Collection Time: 09/24/20  2:13 PM   Result Value Ref Range    Glucose 161 (H) 70 - 99 mg/dL    BUN 8 6 - 20 mg/dL    CREATININE 1.04 0.70 - 1.20 mg/dL    Bun/Cre Ratio NOT REPORTED 9 - 20    Calcium 9.2 8.6 - 10.4 mg/dL    Sodium 141 135 - 144 mmol/L    Potassium 3.5 (L) 3.7 - 5.3 mmol/L    Chloride 103 98 - 107 mmol/L    CO2 23 20 - 31 mmol/L    Anion Gap 15 9 - 17 mmol/L    GFR Non-African American >60 >60 mL/min    GFR African American >60 >60 mL/min    GFR Comment          GFR Staging NOT REPORTED    Hepatic Function Panel    Collection Time: 09/24/20  2:13 PM   Result Value Ref Range    Alb 4.6 3.5 - 5.2 g/dL    Alkaline Phosphatase 83 40 - 129 U/L    ALT 24 5 - 41 U/L    AST 25 <40 U/L    Total Bilirubin 0.49 0.3 - 1.2 mg/dL    Bilirubin, Direct 0.14 <0.31 mg/dL    Bilirubin, Indirect 0.35 0.00 - 1.00 mg/dL    Total Protein 7.4 6.4 - 8.3 g/dL    Globulin NOT REPORTED 1.5 - 3.8 g/dL    Albumin/Globulin Ratio 1.6 1.0 - 2.5   Ethanol    Collection Time: 09/24/20  2:13 PM   Result Value Ref Range    Ethanol <10 <10 mg/dL    Ethanol percent <0.010 <0.010 %   SPECIMEN REJECTION    Collection Time: 09/24/20  2:13 PM   Result Value Ref Range    Specimen Source . BLOOD     Ordered Test VO     Reason for Rejection Unable to perform testing: No specimen received.      - NOT REPORTED    Magnesium    Collection Time: 09/24/20  2:13 PM   Result Value Ref Range    Magnesium 1.7 1.6 - 2.6 mg/dL   Acetaminophen Level    Collection Time: 09/24/20  2:13 PM   Result Value Ref Range    Acetaminophen Level <5 (L) 10 - 30 ug/mL   Beta-Hydroxybutyrate    Collection Time: 09/24/20  2:13 PM   Result Value Ref Range    Beta-Hydroxybutyrate 0.35 (H) 0.02 - 0.13 mmol/L   Salicylate    Collection Time: 09/24/20  2:13 PM   Result Value Ref Range    Salicylate Lvl <1 (L) 3 - 10 mg/dL   EKG 12 Lead    Collection Time: 09/24/20  2:13 PM   Result Value Ref Range    Ventricular Rate 113 BPM    Atrial Rate 113 BPM    P-R Interval 142 ms    QRS Duration 88 ms    Q-T Interval 352 ms    QTc Calculation (Bazett) 482 ms    P Axis 58 degrees    R Axis -11 degrees    T Axis 40 degrees   COVID-19    Collection Time: 09/24/20  4:20 PM    Specimen: Other   Result Value Ref Range    SARS-CoV-2          SARS-CoV-2, Rapid Not Detected Not Detected    Source . NASOPHARYNGEAL SWAB     SARS-CoV-2         Urinalysis Reflex to Culture    Collection Time: 09/24/20  4:45 PM    Specimen: Urine, clean catch   Result Value Ref Range    Color, UA YELLOW YELLOW    Turbidity UA CLEAR CLEAR    Glucose, Ur NEGATIVE NEGATIVE    Bilirubin Urine NEGATIVE NEGATIVE    Ketones, Urine NEGATIVE NEGATIVE    Specific [place], UA 1.005 1.005 - 1.030    Urine Hgb NEGATIVE NEGATIVE    pH, UA 5.5 5.0 - 8.0    Protein, UA NEGATIVE NEGATIVE    Urobilinogen, Urine Normal Normal    Nitrite, Urine NEGATIVE NEGATIVE    Leukocyte Esterase, Urine NEGATIVE NEGATIVE    Urinalysis Comments       Microscopic exam not performed based on chemical results unless requested in original order. Urinalysis Comments          Urinalysis Comments       Utilizing a urinalysis as the only screening method to exclude a potential uropathogen can be unreliable in many patient populations. Rapid screening tests are less sensitive than culture and if UTI is a clinical possibility, culture should be considered despite a negative urinalysis. Imaging/Diagnostics:  No results found.     Assessment :      Hospital Problems           Last Modified POA    * (Principal) Isopropyl alcohol poisoning 9/24/2020 Yes    Mood disorder ([address] (Chronic) 9/24/2020 Yes    Alcohol dependence ([address] 9/24/2020 Yes    Seizures ([address] (Chronic) 9/24/2020 Yes    Overview Signed 2/2/2017  2:13 AM by Catherine Phelps, APRN - CNP     during alcohol detox          Depression (Chronic) 9/24/2020 Yes    Bipolar affective disorder ([address] (Chronic) 9/24/2020 Yes    Polysubstance dependence ([facility] [address] (Chronic) 9/24/2020 Yes    Overview Signed 1/19/2019  4:12 PM by Jeison Casillas MD     Cocaine etoh benzodiazepines               Plan:     Patient status observation in the Med/Surge    - Vitals, labs, medications reviewed  - Poison control contacted, recommending monitoring of electrolytes  - Start CIWA  - Monitor and replace electrolytes  - Continue selected home medications  - Monitor for worsening withdrawals  - DC planning to alcohol detox center in AM if minimal ativan requirement and electrolytes stable     Consultations:   Karley Garcia MD  9/24/2020  6:26 PM    Copy sent to Dr. Stephani Borrero, APRN - CNP

## 2020-09-24 NOTE — ED PROVIDER NOTES
[address] [facility]  [address] [address] [facility] [address] [facility] [address]  Phone: [phone]  Fax: [phone]      Attending Physician Attestation    I performed a history and physical examination of the patient and discussed management with the mid level provider. I reviewed the mid level provider's note and agree with the documented findings and plan of care. Any areas of disagreement are noted on the chart. I was personally present for the key portions of any procedures. I have documented in the chart those procedures where I was not present during the key portions. I have reviewed the emergency nurses triage note. I agree with the chief complaint, past medical history, past surgical history, allergies, medications, social and family history as documented unless otherwise noted below. Documentation of the HPI, Physical Exam and Medical Decision Making performed by mid level providers is based on my personal performance of the HPI, PE and MDM. For Physician Assistant/ Nurse Practitioner cases/documentation I have personally evaluated this patient and have completed at least one if not all key elements of the E/M (history, physical exam, and MDM). Additional findings are as noted. CHIEF COMPLAINT       Chief Complaint   Patient presents with    Alcohol Problem     pt states that he drank rubbing alcohol today to alleviate detox s/s pt states that he usually drinks 1/5 per day and yesterday he drank 1/5 of hand  and appox 6-8 shots of rubbing alcohol today         HISTORY OF PRESENT ILLNESS    Keira Boyer is a 36 y.o. male who presents after he apparently drank isopropyl alcohol as well as hand . He admits that he is an alcoholic and would like to go through detox. [facility] Patient does not give me any history here as he is passed out on the bed. He does not appear to be in pain or distress. He is managing airway and secretions without difficulty.       PAST MEDICAL HISTORY    has a past medical history of Alcoholism ([facility] [address] Anxiety, Hepatitis C, History of atrial fibrillation, Mental and behavioral disorders d/t use of alcohol, acute intoxication ([facility] [address] Palpitations, Seizures ([facility] [address] and Wears glasses. SURGICAL HISTORY      has a past surgical history that includes Tonsillectomy (1983); Finger surgery (Left, 2004); Cystocopy (Bilateral, 12/19/2018); and Cystoscopy (Bilateral, 12/19/2018). CURRENT MEDICATIONS       Previous Medications    ATOMOXETINE (STRATTERA) 60 MG CAPSULE    Take 1 capsule by mouth daily for 14 days    BLOOD PRESSURE KIT    Daily as needed for blood pressure    DULOXETINE HCL 40 MG CPEP    Take 40 mg by mouth daily    IBUPROFEN (IBU) 400 MG TABLET    Take 1 tablet by mouth every 6 hours as needed for Pain    LOSARTAN (COZAAR) 50 MG TABLET    take 1 tablet by mouth once daily    PANTOPRAZOLE (PROTONIX) 40 MG TABLET    TAKE 1 TABLET BY MOUTH EVERY DAY    PROPRANOLOL (INDERAL) 10 MG TABLET    TAKE ONE TABLET BY MOUTH EVERY DAY    SUMATRIPTAN (IMITREX) 100 MG TABLET    TAKE 1 TAB BY MOUTH AS DIRECTED ONCE EVERY DAY FOR MIGRAINE. MAY REPEAT IN 2 HOURS IF UNRESOLVED. MAX 2 TABS/24HRS       ALLERGIES     is allergic to dicloxacillin and pcn [penicillins]. FAMILY HISTORY     He indicated that his mother is alive. He indicated that his father is alive. He indicated that his brother is alive. He indicated that his maternal grandmother is alive. He indicated that his maternal grandfather is alive. He indicated that his paternal grandmother is alive. He indicated that his paternal grandfather is alive. family history includes Breast Cancer in his maternal grandmother; Depression in his mother; High Blood Pressure in his father; High Cholesterol in his father; Mental Illness in his mother; Substance Abuse in his brother. SOCIAL HISTORY      reports that he has quit smoking. His smoking use included cigarettes.  He has a 20.00 pack-year smoking history. He has quit using smokeless tobacco.  His smokeless tobacco use included snuff and chew. He reports previous alcohol use. He reports previous drug use. Drug: Cocaine. PHYSICAL EXAM     INITIAL VITALS:  height is 5' 9\" (1.753 m) and weight is 90.7 kg (200 lb). His oral temperature is 98.3 °F (36.8 °C). His blood pressure is 135/96 (abnormal) and his pulse is 122. His respiration is 18 and oxygen saturation is 96%. Heart is tachycardic. Lungs are clear. Patient is managing airway and secretions without difficulty. DIAGNOSTIC RESULTS     EKG: All EKG's are interpreted by the Emergency Department Physician who either signs or Co-signs this chart in the absence of a cardiologist.    EKG is interpreted by myself showing a sinus tachycardia with abnormal R wave progression to the anterior leads but no acute ST segment changes. Axis and intervals are otherwise normal.    RADIOLOGY:   Non-plain film images such as CT, Ultrasound and MRI are read by the radiologist. Plain radiographic images are visualized and the radiologist interpretations are reviewed as follows:         LABS:  Results for orders placed or performed during the hospital encounter of 09/24/20   SPECIMEN REJECTION   Result Value Ref Range    Specimen Source . BLOOD     Ordered Test VO     Reason for Rejection Unable to perform testing: No specimen received. - NOT REPORTED            EMERGENCY DEPARTMENT COURSE:   Vitals:    Vitals:    09/24/20 1359   BP: (!) 135/96   Pulse: 122   Resp: 18   Temp: 98.3 °F (36.8 °C)   TempSrc: Oral   SpO2: 96%   Weight: 90.7 kg (200 lb)   Height: 5' 9\" (1.753 m)     -------------------------  BP: (!) 135/96, Temp: 98.3 °F (36.8 °C), Pulse: 122, Resp: 18      PERTINENT ATTENDING PHYSICIAN COMMENTS:    Poison control has been contacted. They state that methanol level is not required. They recommend managing electrolyte abnormalities. Patient will require admission.     Currently there is only minimal electrolyte abnormalities. He is in agreement with being admitted to this facility. I currently have a page out to the hospitalist for admission. 3:35 PM EDT  I have discussed this patient with the hospitalist who was kind enough to admit the patient. 5:24 PM EDT  I have discussed this patient with poison control who has called back for an update. They do recommend a volatile panel and have added this on. With this patient not urinating, they recommend a beta hydroxybutyrate. (Please note that portions of this note were completed with a voice recognition program.  Efforts were made to edit the dictations but occasionally words are mis-transcribed.)    Stewart MD, F.A.C.E.P.   Attending Emergency Medicine Physician        Essence Lara MD  09/24/20 802 [facility] Elena Nick MD  09/24/20 0483

## 2020-09-24 NOTE — ED NOTES
Pt transported upstairs to floor per Jessica RT. Belongings in bag on patient's lap.       Rajwinder Gupta, LILIANE  09/24/20 0841

## 2020-09-25 VITALS
SYSTOLIC BLOOD PRESSURE: 128 MMHG | BODY MASS INDEX: 29.59 KG/M2 | HEART RATE: 95 BPM | DIASTOLIC BLOOD PRESSURE: 88 MMHG | WEIGHT: 199.8 LBS | RESPIRATION RATE: 16 BRPM | TEMPERATURE: 98.4 F | OXYGEN SATURATION: 95 % | HEIGHT: 69 IN

## 2020-09-25 PROBLEM — T51.2X1A ISOPROPYL ALCOHOL POISONING: Status: RESOLVED | Noted: 2020-09-24 | Resolved: 2020-09-25

## 2020-09-25 LAB
ACETONE BLOOD: NORMAL
ALBUMIN SERPL-MCNC: 4 G/DL (ref 3.5–5.2)
ALBUMIN/GLOBULIN RATIO: 1.5 (ref 1–2.5)
ALP BLD-CCNC: 74 U/L (ref 40–129)
ALT SERPL-CCNC: 18 U/L (ref 5–41)
AMPHETAMINE SCREEN URINE: NEGATIVE
ANION GAP SERPL CALCULATED.3IONS-SCNC: 10 MMOL/L (ref 9–17)
ANION GAP SERPL CALCULATED.3IONS-SCNC: 11 MMOL/L (ref 9–17)
ANION GAP SERPL CALCULATED.3IONS-SCNC: 12 MMOL/L (ref 9–17)
ANION GAP SERPL CALCULATED.3IONS-SCNC: 12 MMOL/L (ref 9–17)
AST SERPL-CCNC: 16 U/L
BARBITURATE SCREEN URINE: NEGATIVE
BENZODIAZEPINE SCREEN, URINE: NEGATIVE
BILIRUB SERPL-MCNC: 0.41 MG/DL (ref 0.3–1.2)
BUN BLDV-MCNC: 3 MG/DL (ref 6–20)
BUN BLDV-MCNC: 4 MG/DL (ref 6–20)
BUN BLDV-MCNC: 5 MG/DL (ref 6–20)
BUN BLDV-MCNC: 5 MG/DL (ref 6–20)
BUN/CREAT BLD: ABNORMAL (ref 9–20)
BUPRENORPHINE URINE: NORMAL
CALCIUM IONIZED: 1.19 MMOL/L (ref 1.13–1.33)
CALCIUM SERPL-MCNC: 8.6 MG/DL (ref 8.6–10.4)
CALCIUM SERPL-MCNC: 8.7 MG/DL (ref 8.6–10.4)
CALCIUM SERPL-MCNC: 8.8 MG/DL (ref 8.6–10.4)
CALCIUM SERPL-MCNC: 8.9 MG/DL (ref 8.6–10.4)
CANNABINOID SCREEN URINE: NEGATIVE
CHLORIDE BLD-SCNC: 106 MMOL/L (ref 98–107)
CHLORIDE BLD-SCNC: 106 MMOL/L (ref 98–107)
CHLORIDE BLD-SCNC: 107 MMOL/L (ref 98–107)
CHLORIDE BLD-SCNC: 109 MMOL/L (ref 98–107)
CO2: 24 MMOL/L (ref 20–31)
CO2: 25 MMOL/L (ref 20–31)
COCAINE METABOLITE, URINE: NEGATIVE
CREAT SERPL-MCNC: 1.07 MG/DL (ref 0.7–1.2)
CREAT SERPL-MCNC: 1.17 MG/DL (ref 0.7–1.2)
CREAT SERPL-MCNC: 1.17 MG/DL (ref 0.7–1.2)
CREAT SERPL-MCNC: 1.2 MG/DL (ref 0.7–1.2)
EKG ATRIAL RATE: 113 BPM
EKG P AXIS: 58 DEGREES
EKG P-R INTERVAL: 142 MS
EKG Q-T INTERVAL: 352 MS
EKG QRS DURATION: 88 MS
EKG QTC CALCULATION (BAZETT): 482 MS
EKG R AXIS: -11 DEGREES
EKG T AXIS: 40 DEGREES
EKG VENTRICULAR RATE: 113 BPM
ETHANOL: NORMAL (ref 0–0.08)
ETHYLENE GLYCOL: NORMAL
GFR AFRICAN AMERICAN: >60 ML/MIN
GFR NON-AFRICAN AMERICAN: >60 ML/MIN
GFR SERPL CREATININE-BSD FRML MDRD: ABNORMAL ML/MIN/{1.73_M2}
GLUCOSE BLD-MCNC: 125 MG/DL (ref 70–99)
GLUCOSE BLD-MCNC: 128 MG/DL (ref 70–99)
GLUCOSE BLD-MCNC: 131 MG/DL (ref 70–99)
GLUCOSE BLD-MCNC: 133 MG/DL (ref 70–99)
HCT VFR BLD CALC: 41.4 % (ref 41–53)
HEMOGLOBIN: 13.8 G/DL (ref 13.5–17.5)
INR BLD: 1.1
ISOPROPANOL BLOOD: NORMAL
MAGNESIUM: 1.8 MG/DL (ref 1.6–2.6)
MCH RBC QN AUTO: 30.3 PG (ref 26–34)
MCHC RBC AUTO-ENTMCNC: 33.2 G/DL (ref 31–37)
MCV RBC AUTO: 91.2 FL (ref 80–100)
MDMA URINE: NORMAL
METHADONE SCREEN, URINE: NEGATIVE
METHAMPHETAMINE, URINE: NORMAL
METHANOL BLOOD: NORMAL
NRBC AUTOMATED: ABNORMAL PER 100 WBC
OPIATES, URINE: NEGATIVE
OXYCODONE SCREEN URINE: NEGATIVE
PDW BLD-RTO: 13.6 % (ref 12.5–15.4)
PHENCYCLIDINE, URINE: NEGATIVE
PHOSPHORUS: 3.2 MG/DL (ref 2.5–4.5)
PLATELET # BLD: 287 K/UL (ref 140–450)
PMV BLD AUTO: 7.3 FL (ref 6–12)
POTASSIUM SERPL-SCNC: 3.5 MMOL/L (ref 3.7–5.3)
POTASSIUM SERPL-SCNC: 3.8 MMOL/L (ref 3.7–5.3)
POTASSIUM SERPL-SCNC: 3.9 MMOL/L (ref 3.7–5.3)
POTASSIUM SERPL-SCNC: 4.2 MMOL/L (ref 3.7–5.3)
PROPOXYPHENE, URINE: NORMAL
PROTHROMBIN TIME: 11.1 SEC (ref 9.4–12.6)
RBC # BLD: 4.55 M/UL (ref 4.5–5.9)
SODIUM BLD-SCNC: 142 MMOL/L (ref 135–144)
SODIUM BLD-SCNC: 143 MMOL/L (ref 135–144)
SODIUM BLD-SCNC: 143 MMOL/L (ref 135–144)
SODIUM BLD-SCNC: 144 MMOL/L (ref 135–144)
TEST INFORMATION: NORMAL
TOTAL PROTEIN: 6.7 G/DL (ref 6.4–8.3)
TRICYCLIC ANTIDEPRESSANTS, UR: NORMAL
TSH SERPL DL<=0.05 MIU/L-ACNC: 0.46 MIU/L (ref 0.3–5)
WBC # BLD: 11.1 K/UL (ref 3.5–11)

## 2020-09-25 PROCEDURE — 99225 PR SBSQ OBSERVATION CARE/DAY 25 MINUTES: CPT | Performed by: INTERNAL MEDICINE

## 2020-09-25 PROCEDURE — 83735 ASSAY OF MAGNESIUM: CPT

## 2020-09-25 PROCEDURE — 80048 BASIC METABOLIC PNL TOTAL CA: CPT

## 2020-09-25 PROCEDURE — APPSS45 APP SPLIT SHARED TIME 31-45 MINUTES: Performed by: NURSE PRACTITIONER

## 2020-09-25 PROCEDURE — 85610 PROTHROMBIN TIME: CPT

## 2020-09-25 PROCEDURE — 36415 COLL VENOUS BLD VENIPUNCTURE: CPT

## 2020-09-25 PROCEDURE — 82330 ASSAY OF CALCIUM: CPT

## 2020-09-25 PROCEDURE — 85027 COMPLETE CBC AUTOMATED: CPT

## 2020-09-25 PROCEDURE — 6370000000 HC RX 637 (ALT 250 FOR IP): Performed by: NURSE PRACTITIONER

## 2020-09-25 PROCEDURE — 80053 COMPREHEN METABOLIC PANEL: CPT

## 2020-09-25 PROCEDURE — 84100 ASSAY OF PHOSPHORUS: CPT

## 2020-09-25 PROCEDURE — 96372 THER/PROPH/DIAG INJ SC/IM: CPT

## 2020-09-25 PROCEDURE — 6360000002 HC RX W HCPCS: Performed by: NURSE PRACTITIONER

## 2020-09-25 PROCEDURE — 83036 HEMOGLOBIN GLYCOSYLATED A1C: CPT

## 2020-09-25 PROCEDURE — G0378 HOSPITAL OBSERVATION PER HR: HCPCS

## 2020-09-25 PROCEDURE — 84443 ASSAY THYROID STIM HORMONE: CPT

## 2020-09-25 PROCEDURE — 2580000003 HC RX 258: Performed by: NURSE PRACTITIONER

## 2020-09-25 RX ORDER — MULTIVITAMIN WITH IRON
1 TABLET ORAL DAILY
Refills: 0 | Status: ON HOLD | COMMUNITY
Start: 2020-09-26 | End: 2021-03-23

## 2020-09-25 RX ORDER — LANOLIN ALCOHOL/MO/W.PET/CERES
100 CREAM (GRAM) TOPICAL DAILY
Qty: 30 TABLET | Refills: 3 | Status: ON HOLD | OUTPATIENT
Start: 2020-09-26 | End: 2021-03-23

## 2020-09-25 RX ORDER — FOLIC ACID 1 MG/1
1 TABLET ORAL DAILY
Qty: 30 TABLET | Refills: 3 | Status: ON HOLD | OUTPATIENT
Start: 2020-09-26 | End: 2021-03-23

## 2020-09-25 RX ADMIN — ENOXAPARIN SODIUM 40 MG: 40 INJECTION SUBCUTANEOUS at 11:38

## 2020-09-25 RX ADMIN — THERA TABS 1 TABLET: TAB at 11:41

## 2020-09-25 RX ADMIN — POTASSIUM CHLORIDE 40 MEQ: 1500 TABLET, EXTENDED RELEASE ORAL at 05:41

## 2020-09-25 RX ADMIN — SODIUM CHLORIDE: 9 INJECTION, SOLUTION INTRAVENOUS at 03:20

## 2020-09-25 RX ADMIN — Medication 100 MG: at 11:41

## 2020-09-25 RX ADMIN — FOLIC ACID 1 MG: 1 TABLET ORAL at 11:38

## 2020-09-25 RX ADMIN — LORAZEPAM 3 MG: 1 TABLET ORAL at 03:13

## 2020-09-25 ASSESSMENT — ENCOUNTER SYMPTOMS
WHEEZING: 0
STRIDOR: 0
COUGH: 0
NAUSEA: 0
ABDOMINAL PAIN: 0
VOMITING: 0
SHORTNESS OF BREATH: 0
BLOOD IN STOOL: 0
CONSTIPATION: 0
DIARRHEA: 0

## 2020-09-25 ASSESSMENT — PAIN SCALES - GENERAL
PAINLEVEL_OUTOF10: 0

## 2020-09-25 NOTE — PROGRESS NOTES
[facility]  Office: [address], DO, Brittany Fontanez, DO, Oanh Coombs, DO, Christian Nguyen Blood, DO, Julita Curtis MD, Nehemiah Meckel, MD, Gaurang Black MD, Hayden Sanon MD, Sharona Piper MD, Renee Alvarado MD, Jamee Henderson MD, Denisse Núñez MD, Mita Espinoza MD, Dian Sheikh DO, Anne-Marie Linares MD, Florencia Ness MD, Emigdio Mclaughlin, DO, Kalin Woodson MD,  Ray Pro DO, Shazia Currie MD, Matthew Holloway MD, Lizette Stanton [facility], [facility], CNP, Harpreet Davison, CNP, Jay Mason, CNS, Rory Denson, [facility], Rashad De Jesus, CNP, Damaris Carvajal, CNP, Lyn Iverson, CNP, Víctor Bose CNP, Anjelica Fletcher PA-C, Wild Romero DNP, Filiberto Mares, CNP, Echo Velazquez CNP, Alisa Spain, CNP, Laquita Cadena, CNP, Gerald Delgadillo [address]2    Progress Note    9/25/2020    7:19 AM    Name:   Francisco Javier Malhotra  MRN:     [id number]     Acct:      [de-identified]   Room:   [address] [place] [address] Day:  0  Admit Date:  9/24/2020  1:57 PM    PCP:   KILLIAN James CNP  Code Status:  Full Code    Subjective:     C/C:   Chief Complaint   Patient presents with    Alcohol Problem     pt states that he drank rubbing alcohol today to alleviate detox s/s pt states that he usually drinks 1/5 per day and yesterday he drank 1/5 of hand  and appox 6-8 shots of rubbing alcohol today     Interval History Status: improved. Overnight he received approximately 12 mg of Ativan. Per nursing flowsheet the patient scored a 15 on his CIWA scale at approximately 314 in the morning she notes nausea and vomiting, tactile disturbance, tremor, anxiety, agitation, and cloudy sensorium. This morning he was incontinent of urine in bed that woke him up. He was assisted out of bed to the chair while a.m. care was completed. During this time he was awake and appropriate.   States he would like some breakfast.  He denies headache, chest pain, Daily    thiamine  100 mg Oral Daily    multivitamin  1 tablet Oral Daily    folic acid  1 mg Oral Daily    nicotine  1 patch Transdermal Daily     Continuous Infusions:    sodium chloride 125 mL/hr at 20 0320    dextrose       PRN Meds: sodium chloride flush, potassium chloride **OR** potassium alternative oral replacement **OR** potassium chloride, acetaminophen **OR** acetaminophen, polyethylene glycol, promethazine **OR** ondansetron, magnesium sulfate, glucose, dextrose, glucagon (rDNA), dextrose, LORazepam **OR** LORazepam **OR** LORazepam **OR** LORazepam **OR** LORazepam **OR** LORazepam **OR** LORazepam **OR** LORazepam    Data:     Past Medical History:   has a past medical history of Alcoholism ([facility] [address] Anxiety, Hepatitis C, History of atrial fibrillation, Mental and behavioral disorders d/t use of alcohol, acute intoxication ([facility] [address] Palpitations, Seizures ([facility] [address] and Wears glasses. Social History:   reports that he has quit smoking. His smoking use included cigarettes. He has a 20.00 pack-year smoking history. He has quit using smokeless tobacco.  His smokeless tobacco use included snuff and chew. He reports previous alcohol use. He reports previous drug use. Drug: Cocaine. Family History:   Family History   Problem Relation Age of Onset    Mental Illness Mother     Depression Mother     High Blood Pressure Father     High Cholesterol Father     Substance Abuse Brother     Breast Cancer Maternal Grandmother        Vitals:  BP (!) 151/100   Pulse 120   Temp 97.6 °F (36.4 °C) (Oral)   Resp 20   Ht 5' 9\" (1.753 m)   Wt 199 lb 12.8 oz (90.6 kg)   SpO2 99%   BMI 29.51 kg/m²   Temp (24hrs), Av.9 °F (36.6 °C), Min:97.6 °F (36.4 °C), Max:98.3 °F (36.8 °C)    Recent Labs     20  2151   POCGLU 136*       I/O (24Hr):     Intake/Output Summary (Last 24 hours) at 2020 0719  Last data filed at 2020 0524  Gross per 24 hour   Intake 1279.17 ml   Output 1500 ml   Net -220.83 ml       Labs:  Hematology:  Recent Labs     09/24/20  1413 09/25/20  0359   WBC 9.7 11.1*   RBC 4.86 4.55   HGB 14.7 13.8   HCT 43.3 41.4   MCV 89.1 91.2   MCH 30.3 30.3   MCHC 34.0 33.2   RDW 13.2 13.6    287   MPV 8.2 7.3   INR  --  1.1     Chemistry:  Recent Labs     09/24/20  1413 09/24/20  1955 09/24/20  2344 09/25/20  0359    142 143 143   K 3.5* 3.5* 3.8 3.5*    103 106 107   CO2 23 24 25 24   GLUCOSE 161* 143* 133* 131*   BUN 8 6 5* 5*   CREATININE 1.04 1.19 1.17 1.20   MG 1.7  --   --  1.8   ANIONGAP 15 15 12 12   LABGLOM >60 >60 >60 >60   GFRAA >60 >60 >60 >60   CALCIUM 9.2 9.0 8.7 8.6   PHOS  --   --   --  3.2     Recent Labs     09/24/20  1413 09/24/20  2151 09/25/20  0359   PROT 7.4  --  6.7   LABALBU 4.6  --  4.0   AST 25  --  16   ALT 24  --  18   ALKPHOS 83  --  74   BILITOT 0.49  --  0.41   BILIDIR 0.14  --   --    POCGLU  --  136*  --      ABG:No results found for: POCPH, PHART, PH, POCPCO2, XCN5ZHQ, PCO2, POCPO2, PO2ART, PO2, POCHCO3, CJO8KVY, HCO3, NBEA, PBEA, BEART, BE, THGBART, THB, TSN1AGP, RAEV8NCM, Z0MKUUOD, O2SAT, FIO2  Lab Results   Component Value Date/Time    SPECIAL NOT REPORTED 07/27/2020 10:06 AM     Lab Results   Component Value Date/Time    CULTURE NO GROWTH 07/27/2020 10:06 AM       Radiology:  No results found. Physical Examination:     Physical Exam  Vitals signs and nursing note reviewed. Constitutional:       General: He is awake. HENT:      Mouth/Throat:      Mouth: Mucous membranes are moist.   Eyes:      Conjunctiva/sclera: Conjunctivae normal.   Cardiovascular:      Rate and Rhythm: Normal rate and regular rhythm. Pulses: Normal pulses. Heart sounds: Normal heart sounds. Pulmonary:      Effort: Pulmonary effort is normal.      Breath sounds: Examination of the right-middle field reveals decreased breath sounds. Examination of the left-middle field reveals decreased breath sounds.  Examination of the right-lower field reveals decreased and drug assessment. Fall and seizure precautions. Urine drug screen negative    Discharge planning to alcohol detox center today if electrolytes are stable and DTs seem to be managed    Lovenox for DVT prophylaxis    Folic acid thiamine and multivitamin daily for history of EtOH    Start diet this morning.   Decrease IV fluids    Consult to  related to history of EtOH to provide treatment center information        KILLIAN Chang - CNP  9/25/2020  7:19 AM

## 2020-09-25 NOTE — PLAN OF CARE
Problem: Falls - Risk of:  Goal: Will remain free from falls  Description: Will remain free from falls  9/25/2020 0349 by Timoteo Young RN  Outcome: Ongoing  9/25/2020 0306 by Timoteo Young RN  Outcome: Ongoing     Problem: Falls - Risk of:  Goal: Absence of physical injury  Description: Absence of physical injury  9/25/2020 0349 by Timoteo Young RN  Outcome: Ongoing  9/25/2020 0306 by Timoteo Young RN  Outcome: Ongoing      Pt remains free from falls, bed low, call light in reach, bed alarm on, side rails up x2. Problem: Infection:  Goal: Will remain free from infection  Description: Will remain free from infection  9/25/2020 0349 by Timoteo Young RN  Outcome: Ongoing  9/25/2020 0306 by Timoteo Young RN  Outcome: Ongoing     Monitor patient for s/s of infection, encourage nutritional supplements to promote healing. Assist patient w/transfers, reposition q2 hrs while in bed & q1hr while in chair. Heels elevated when in bed & pressure redistribution device in use when appropriate. Clean wound & perform drsg change as ordered.        Problem: Safety:  Goal: Free from accidental physical injury  Description: Free from accidental physical injury  9/25/2020 0349 by Timoteo Young RN  Outcome: Ongoing  9/25/2020 0306 by Timoteo Young RN  Outcome: Ongoing     Problem: Safety:  Goal: Free from accidental physical injury  Description: Free from accidental physical injury  9/25/2020 0349 by Timoteo Young RN  Outcome: Ongoing     Problem: Safety:  Goal: Free from intentional harm  Description: Free from intentional harm  9/25/2020 0349 by Timoteo Young RN  Outcome: Ongoing  9/25/2020 0306 by Timoteo Young RN  Outcome: Ongoing     Problem: Violence - Risk of, Self/Other-Directed:  Goal: Knowledge of developmental care interventions  Description: Absence of violence  9/25/2020 0349 by Timoteo Young RN  Outcome: Ongoing  9/25/2020 0306 by Timoteo Young RN  Outcome: Ongoing Pt remains free from falls, bed low, call light in reach, bed alarm on, side rails up x2.

## 2020-09-25 NOTE — PROGRESS NOTES
Patient is discharged. Patient states that he will \"try to find a ride. \" patient stated to make calls on his cell phone then fell back asleep. Serena Carias updated. Will allow patient to rest and revisit.

## 2020-09-25 NOTE — DISCHARGE SUMMARY
[facility]  Office: [address], DO, Bertha [facility], DO, Perlita Milford, DO, Chaparrita Monroy Blood, DO, Renetta Ascencio MD, Brianna Leon MD, Hans Thompson MD, Raj Jackson MD, Maddi Kwok MD, Sary Ribeiro MD, Krystle Cyr MD, Felipe Amador MD, Mita Drake MD, Sweta Estrella, DO, Farhat Kaufman MD, Rome Perez MD, Fredy Montano, DO, Daljit Smith MD,  Hellen Mcarthur, DO, Claude Cannon MD, Tamara Byrne MD, Scott Ordoñez, [facility], [facility], CNP, Pablito Rutledge, CNP, Suzette Betancur, CNS, Janet Woodard, CNP, Tamra Kumar, CNP, Williemae Lundborg, CNP, Kain Maynard, CNP, Shaun Evans, CNP, Ashwini Mejia PA-C, Candice Rivera, [facility], Shiva Room, CNP, Kelechi Jarquin, CNP, Melvin Frank, CNP, Anali Armstrong, CNP, Rei Aparicioarmond [address]    Discharge Summary     Patient ID: Johanne Andre  :  1980   MRN: [id number]     ACCOUNT:  [de-identified]   Patient's PCP: KILLIAN Goins CNP  Admit Date: 2020   Discharge Date: 2020     Length of Stay: 0  Code Status:  Full Code  Admitting Physician: Krystle Cyr MD  Discharge Physician: KILLIAN Leal CNP     Active Discharge Diagnoses:     Hospital Problem Lists:  Principal Problem (Resolved): Isopropyl alcohol poisoning  Active Problems:    Mood disorder ([facility] [address]    Alcohol dependence ([facility] [address]    Seizures ([facility] [address]    Depression    Bipolar affective disorder ([facility] [address]    [facility]      Admission Condition:  fair     Discharged Condition: fair    Hospital Stay:     Hospital Course:  Sandie Overton a 36 y.o. M with hx of alcohol abuse who presented with concern for alcohol withdrawal. States he has a history of heavy alcohol use, drinks 1/5 vodka per day. Last drink 2 days ago, states he drank rubbing alcohol today. Came to ER due to concern for withdrawal.      In ER ethanol level negative. Labs unremarkable.  Poison control contacted, recommending further monitoring due to rubbing alcohol ingestion. Admitted for further evaluation. States he has history of prior DTs in the past. No other complaints, denies fever/chills, nausea/vomiting, abdominal pain, dyspnea.      No signs of withdrawal. He has been sleepy a lot of the day but arouses easily. Ambulates in room without difficulty. Labs have been stable. SW and CM both discussed rehab issues and shelter options with the patient. He declined rehab or assist with getting to a shelter. He said he was working on a place to go. During his discharge conversation he states he still hasn't found anywhere to go. I reached out to the SW again. Waiting on response.        Significant therapeutic interventions: Ativan CIWA scale    Significant Diagnostic Studies:   Labs / Micro:  CBC:   Lab Results   Component Value Date    WBC 11.1 09/25/2020    RBC 4.55 09/25/2020    HGB 13.8 09/25/2020    HCT 41.4 09/25/2020    MCV 91.2 09/25/2020    MCH 30.3 09/25/2020    MCHC 33.2 09/25/2020    RDW 13.6 09/25/2020     09/25/2020     BMP:    Lab Results   Component Value Date    GLUCOSE 128 09/25/2020     09/25/2020    K 3.9 09/25/2020     09/25/2020    CO2 25 09/25/2020    ANIONGAP 10 09/25/2020    BUN 3 09/25/2020    CREATININE 1.07 09/25/2020    BUNCRER NOT REPORTED 09/25/2020    CALCIUM 8.9 09/25/2020    LABGLOM >60 09/25/2020    GFRAA >60 09/25/2020    GFR      09/25/2020    GFR NOT REPORTED 09/25/2020     HFP:    Lab Results   Component Value Date    PROT 6.7 09/25/2020     CMP:    Lab Results   Component Value Date    GLUCOSE 128 09/25/2020     09/25/2020    K 3.9 09/25/2020     09/25/2020    CO2 25 09/25/2020    BUN 3 09/25/2020    CREATININE 1.07 09/25/2020    ANIONGAP 10 09/25/2020    ALKPHOS 74 09/25/2020    ALT 18 09/25/2020    AST 16 09/25/2020    BILITOT 0.41 09/25/2020    LABALBU 4.0 09/25/2020    ALBUMIN 1.5 09/25/2020    LABGLOM >60 09/25/2020    GFRAA >60 09/25/2020    GFR      09/25/2020    GFR NOT REPORTED 09/25/2020    PROT 6.7 09/25/2020    CALCIUM 8.9 09/25/2020     FLP:    Lab Results   Component Value Date    CHOL 129 07/14/2020    TRIG 117 07/14/2020    HDL 31 07/14/2020     U/A:    Lab Results   Component Value Date    COLORU YELLOW 09/24/2020    TURBIDITY CLEAR 09/24/2020    SPECGRAV 1.005 09/24/2020    HGBUR NEGATIVE 09/24/2020    PHUR 5.5 09/24/2020    PROTEINU NEGATIVE 09/24/2020    GLUCOSEU NEGATIVE 09/24/2020    KETUA NEGATIVE 09/24/2020    BILIRUBINUR NEGATIVE 09/24/2020    BILIRUBINUR 1+ 07/20/2020    UROBILINOGEN Normal 09/24/2020    NITRU NEGATIVE 09/24/2020    LEUKOCYTESUR NEGATIVE 09/24/2020     TSH:    Lab Results   Component Value Date    TSH 0.46 09/25/2020        Radiology:  No results found. Consultations:    Consults:     Final Specialist Recommendations/Findings:   IP CONSULT TO SOCIAL WORK      The patient was seen and examined on day of discharge and this discharge summary is in conjunction with any daily progress note from day of discharge. Discharge plan:     Disposition: Home    Physician Follow Up:     Jorge A Ibarra, APRN - CNP  [address]  [phone]      [facility] [place] follow up in 7-10 days       Diet: cardiac diet and low fat, low cholesterol diet    Activity: As tolerated    Instructions to Patient: patient encouraged to sustain from alcohol use. He states he is very aware of his options around [place] for alcohol rehabs. I again encouraged to reach out for help. He knows to Call 911 or return to the ER if you experience a recurrence of his symptoms or starts having fever, chills, chest pain or shortness of breath.       Discharge Medications:      Medication List      START taking these medications    folic acid 1 MG tablet  Commonly known as:  FOLVITE  Take 1 tablet by mouth daily  Start taking on:  September 26, 2020     multivitamin Tabs tablet  Take 1 tablet by mouth daily  Start taking on: September 26, 2020     thiamine 100 MG tablet  Take 1 tablet by mouth daily  Start taking on:  September 26, 2020        Jessica Damian taking these medications    Blood Pressure Kit  Daily as needed for blood pressure     DULoxetine HCl 40 MG Cpep     ibuprofen 400 MG tablet  Commonly known as:  IBU  Take 1 tablet by mouth every 6 hours as needed for Pain     losartan 50 MG tablet  Commonly known as:  COZAAR  take 1 tablet by mouth once daily     pantoprazole 40 MG tablet  Commonly known as:  PROTONIX  TAKE 1 TABLET BY MOUTH EVERY DAY     propranolol 10 MG tablet  Commonly known as:  INDERAL  TAKE ONE TABLET BY MOUTH EVERY DAY     SUMAtriptan 100 MG tablet  Commonly known as:  IMITREX  TAKE 1 TAB BY MOUTH AS DIRECTED ONCE EVERY DAY FOR MIGRAINE. MAY REPEAT IN 2 HOURS IF UNRESOLVED. MAX 2 TABS/24HRS        STOP taking these medications    atomoxetine 60 MG capsule  Commonly known as:  STRATTERA           Where to Get Your Medications      [facility] [address], [address] [place], [place] [address]    Phone:  [phone]   · folic acid 1 MG tablet  · thiamine 100 MG tablet     You can get these medications from any pharmacy    You don't need a prescription for these medications  · multivitamin Tabs tablet         No discharge procedures on file. Time Spent on discharge is  31 mins in patient examination, evaluation, counseling as well as medication reconciliation, prescriptions for required medications, discharge plan and follow up. Electronically signed by   KILLIAN Sparks CNP  9/25/2020  7:04 PM      Thank you KILLIAN Lang CNP for the opportunity to be involved in this patient's care.

## 2020-09-25 NOTE — PLAN OF CARE
[facility]  Office: [address], DO, Norman Bowles, DO, Yara Berrios, DO, Brianna Godwin Blood, DO, Yuni Monsivais MD, Yobany Duggan MD, Mariela Carpenter MD, Yazan Goldman MD, Merline Mckeon MD, Cher Rome MD, Bhanu Delacruz MD, Teri Price MD, Mita Escobar MD, Jenna Bedoya DO, Vaishnavi Arellano MD, El Fitch MD, Courtney Bagley, DO, Alexsandra Ward MD,  Alessandro Collins, DO, Helga Hoff MD, Manuel Moreira MD, Mariam Alejandre, [facility], [facility], CNP, Jes Roa, CNP, Sadia Rivas, CNS, Stanton Alert, CNP, Shauna Branham, CNP, Pamella Epley, CNP, Rose Barraza, CNP, Yasmin Razo, CNP, Elza Oliveira PA-C, Frazier Schlatter, [facility], Lara Alexander, CNP, Sanju Ely, CNP, Rodrick Hernadez, CNP, Sirena Dumont, CNP, Tatiana Aldana, CNP         [facility]   [address]    Second Visit Note  For more detailed information please refer to the progress note of the day      9/25/2020    1:01 PM    Name:   Jose Magallanes  MRN:     [id number]     Acct:      [de-identified]   Room:   [address] [place] [address] Day:  0  Admit Date:  9/24/2020  1:57 PM    PCP:   KILLIAN Riggs CNP  Code Status:  Full Code        Pt vitals were reviewed   New labs were reviewed   Patient was seen    Updated plan :     1. Ativan from CIWA stopped. 2. He has slept most of the day. He woke up to toilet and take his meds but otherwise he has been sleeping  3.  No outward signs of withdrawl        KILLIAN Schreiber CNP  9/25/2020  1:01 PM

## 2020-09-25 NOTE — CARE COORDINATION
SW consulted for pt. Attempted to meet with pt in room. Pt was snoring and asleep. Pt did not respond to light touch and SW calling his name several times. SW will follow back up this afternoon.

## 2020-09-25 NOTE — CARE COORDINATION
Unable to rouse patient to participate in interview, will return as time allows. Pt independent in ADLs PTA per chart - anticipate no skilled needs. SW services consulted for ETOH abuse.

## 2020-09-26 PROCEDURE — G0378 HOSPITAL OBSERVATION PER HR: HCPCS

## 2020-09-26 NOTE — PROGRESS NOTES
Patient awake; alert/oriented; updated on discharge; hot breakfast given to patient. Pt asked if allowed to leave facility if they are walking to nearby home; NP Jess Mejia updated about pt's question/request - NP confirmed yes, that would be OK. Patient updated, and said they would let writer know what they planned on doing after making a few phone calls; continue to monitor.

## 2020-09-26 NOTE — PROGRESS NOTES
Writer received shift change report from [employer]; KITTY Alvarado also updated writer on patient's discharge status, and confirmed that writer was not to administer medications or inpatient care d/t discharge status. Informed to take to ED if medical care needed, and to confirm pt has a place to go before calling a cab for transport.  Pt currently sleeping; continue to monitor and assess situation

## 2020-09-26 NOTE — PROGRESS NOTES
Doctor from [facility] called for update on patient; answered questions to satisfaction

## 2020-09-26 NOTE — PROGRESS NOTES
Writer was providing care in another patient's room when informed by NP Yanna Alvarado that pt came out of room, and stated they were ready to leave now. Pt declined offer for cab/transportation service, told NP that he was going home to  some items, stated home was nearby/walking distance, and then informed NP that he was going to [facility] afterwards. RT Smita Gudino escorted pt to entrance/exit.

## 2020-09-26 NOTE — PROGRESS NOTES
Individual requested \"something for anxiety\". Writer explained that due to the individual's discharge status, no medications can be given. Individual understood.

## 2020-09-28 LAB
ESTIMATED AVERAGE GLUCOSE: 108 MG/DL
HBA1C MFR BLD: 5.4 % (ref 4–6)

## 2021-03-22 ENCOUNTER — HOSPITAL ENCOUNTER (INPATIENT)
Age: 41
LOS: 3 days | Discharge: HOME | End: 2021-03-26
Attending: EMERGENCY MEDICINE | Admitting: PSYCHIATRY & NEUROLOGY
Payer: MEDICAID

## 2021-03-22 DIAGNOSIS — R45.851 SUICIDAL IDEATION: Primary | ICD-10-CM

## 2021-03-22 DIAGNOSIS — F10.920 ACUTE ALCOHOLIC INTOXICATION WITHOUT COMPLICATION (HCC): ICD-10-CM

## 2021-03-22 LAB
AMPHETAMINE SCREEN URINE: NEGATIVE
BARBITURATE SCREEN URINE: NEGATIVE
BENZODIAZEPINE SCREEN, URINE: NEGATIVE
BUPRENORPHINE URINE: NORMAL
CANNABINOID SCREEN URINE: NEGATIVE
COCAINE METABOLITE, URINE: NEGATIVE
ETHANOL PERCENT: 0.28 %
ETHANOL: 285 MG/DL
MDMA URINE: NORMAL
METHADONE SCREEN, URINE: NEGATIVE
METHAMPHETAMINE, URINE: NORMAL
OPIATES, URINE: NEGATIVE
OXYCODONE SCREEN URINE: NEGATIVE
PHENCYCLIDINE, URINE: NEGATIVE
PROPOXYPHENE, URINE: NORMAL
TEST INFORMATION: NORMAL
TRICYCLIC ANTIDEPRESSANTS, UR: NORMAL

## 2021-03-22 PROCEDURE — G0480 DRUG TEST DEF 1-7 CLASSES: HCPCS

## 2021-03-22 PROCEDURE — 80307 DRUG TEST PRSMV CHEM ANLYZR: CPT

## 2021-03-22 PROCEDURE — 36415 COLL VENOUS BLD VENIPUNCTURE: CPT

## 2021-03-22 PROCEDURE — 99284 EMERGENCY DEPT VISIT MOD MDM: CPT

## 2021-03-22 PROCEDURE — 6370000000 HC RX 637 (ALT 250 FOR IP): Performed by: EMERGENCY MEDICINE

## 2021-03-22 PROCEDURE — 80053 COMPREHEN METABOLIC PANEL: CPT

## 2021-03-22 PROCEDURE — 85025 COMPLETE CBC W/AUTO DIFF WBC: CPT

## 2021-03-22 RX ORDER — LORAZEPAM 1 MG/1
1 TABLET ORAL ONCE
Status: COMPLETED | OUTPATIENT
Start: 2021-03-22 | End: 2021-03-22

## 2021-03-22 RX ADMIN — LORAZEPAM 1 MG: 1 TABLET ORAL at 22:37

## 2021-03-22 ASSESSMENT — ENCOUNTER SYMPTOMS
DIARRHEA: 0
SHORTNESS OF BREATH: 0
CHEST TIGHTNESS: 0
COUGH: 0
NAUSEA: 0
VOMITING: 0
SORE THROAT: 0
EYE PAIN: 0
ABDOMINAL PAIN: 0
CONSTIPATION: 0
BACK PAIN: 0
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
## 2021-03-23 PROBLEM — F32.A DEPRESSION WITH SUICIDAL IDEATION: Status: ACTIVE | Noted: 2021-03-23

## 2021-03-23 PROBLEM — R45.851 DEPRESSION WITH SUICIDAL IDEATION: Status: ACTIVE | Noted: 2021-03-23

## 2021-03-23 LAB
ABSOLUTE EOS #: 0.1 K/UL (ref 0–0.4)
ABSOLUTE IMMATURE GRANULOCYTE: NORMAL K/UL (ref 0–0.3)
ABSOLUTE LYMPH #: 2.9 K/UL (ref 1–4.8)
ABSOLUTE MONO #: 0.4 K/UL (ref 0.1–1.3)
ALBUMIN SERPL-MCNC: 4.7 G/DL (ref 3.5–5.2)
ALBUMIN/GLOBULIN RATIO: ABNORMAL (ref 1–2.5)
ALP BLD-CCNC: 77 U/L (ref 40–129)
ALT SERPL-CCNC: 23 U/L (ref 5–41)
ANION GAP SERPL CALCULATED.3IONS-SCNC: 12 MMOL/L (ref 9–17)
AST SERPL-CCNC: 24 U/L
BASOPHILS # BLD: 1 % (ref 0–2)
BASOPHILS ABSOLUTE: 0 K/UL (ref 0–0.2)
BILIRUB SERPL-MCNC: 0.39 MG/DL (ref 0.3–1.2)
BUN BLDV-MCNC: 6 MG/DL (ref 6–20)
BUN/CREAT BLD: ABNORMAL (ref 9–20)
CALCIUM SERPL-MCNC: 9.8 MG/DL (ref 8.6–10.4)
CHLORIDE BLD-SCNC: 104 MMOL/L (ref 98–107)
CO2: 26 MMOL/L (ref 20–31)
CREAT SERPL-MCNC: 0.89 MG/DL (ref 0.7–1.2)
DIFFERENTIAL TYPE: NORMAL
EOSINOPHILS RELATIVE PERCENT: 2 % (ref 0–4)
GFR AFRICAN AMERICAN: >60 ML/MIN
GFR NON-AFRICAN AMERICAN: >60 ML/MIN
GFR SERPL CREATININE-BSD FRML MDRD: ABNORMAL ML/MIN/{1.73_M2}
GFR SERPL CREATININE-BSD FRML MDRD: ABNORMAL ML/MIN/{1.73_M2}
GLUCOSE BLD-MCNC: 111 MG/DL (ref 70–99)
HCT VFR BLD CALC: 47.2 % (ref 41–53)
HEMOGLOBIN: 15.4 G/DL (ref 13.5–17.5)
IMMATURE GRANULOCYTES: NORMAL %
LYMPHOCYTES # BLD: 42 % (ref 24–44)
MCH RBC QN AUTO: 30.2 PG (ref 26–34)
MCHC RBC AUTO-ENTMCNC: 32.7 G/DL (ref 31–37)
MCV RBC AUTO: 92.5 FL (ref 80–100)
MONOCYTES # BLD: 7 % (ref 1–7)
NRBC AUTOMATED: NORMAL PER 100 WBC
PDW BLD-RTO: 14.5 % (ref 11.5–14.9)
PLATELET # BLD: 311 K/UL (ref 150–450)
PLATELET ESTIMATE: NORMAL
PMV BLD AUTO: 10.5 FL (ref 6–12)
POTASSIUM SERPL-SCNC: 4.7 MMOL/L (ref 3.7–5.3)
RBC # BLD: 5.1 M/UL (ref 4.5–5.9)
RBC # BLD: NORMAL 10*6/UL
SARS-COV-2, RAPID: NOT DETECTED
SEG NEUTROPHILS: 48 % (ref 36–66)
SEGMENTED NEUTROPHILS ABSOLUTE COUNT: 3.3 K/UL (ref 1.3–9.1)
SODIUM BLD-SCNC: 142 MMOL/L (ref 135–144)
SPECIMEN DESCRIPTION: NORMAL
TOTAL PROTEIN: 7.9 G/DL (ref 6.4–8.3)
WBC # BLD: 6.7 K/UL (ref 3.5–11)
WBC # BLD: NORMAL 10*3/UL

## 2021-03-23 PROCEDURE — 87635 SARS-COV-2 COVID-19 AMP PRB: CPT

## 2021-03-23 PROCEDURE — 99223 1ST HOSP IP/OBS HIGH 75: CPT | Performed by: PSYCHIATRY & NEUROLOGY

## 2021-03-23 PROCEDURE — 6370000000 HC RX 637 (ALT 250 FOR IP): Performed by: PSYCHIATRY & NEUROLOGY

## 2021-03-23 PROCEDURE — 6370000000 HC RX 637 (ALT 250 FOR IP): Performed by: NURSE PRACTITIONER

## 2021-03-23 PROCEDURE — 6370000000 HC RX 637 (ALT 250 FOR IP): Performed by: EMERGENCY MEDICINE

## 2021-03-23 PROCEDURE — 1240000000 HC EMOTIONAL WELLNESS R&B

## 2021-03-23 RX ORDER — ATOMOXETINE 60 MG/1
60 CAPSULE ORAL DAILY
Status: DISCONTINUED | OUTPATIENT
Start: 2021-03-23 | End: 2021-03-26 | Stop reason: HOSPADM

## 2021-03-23 RX ORDER — LORAZEPAM 2 MG/ML
1 INJECTION INTRAMUSCULAR
Status: DISCONTINUED | OUTPATIENT
Start: 2021-03-23 | End: 2021-03-26 | Stop reason: HOSPADM

## 2021-03-23 RX ORDER — LORAZEPAM 2 MG/ML
2 INJECTION INTRAMUSCULAR
Status: DISCONTINUED | OUTPATIENT
Start: 2021-03-23 | End: 2021-03-26 | Stop reason: HOSPADM

## 2021-03-23 RX ORDER — MAGNESIUM HYDROXIDE/ALUMINUM HYDROXICE/SIMETHICONE 120; 1200; 1200 MG/30ML; MG/30ML; MG/30ML
30 SUSPENSION ORAL EVERY 6 HOURS PRN
Status: DISCONTINUED | OUTPATIENT
Start: 2021-03-23 | End: 2021-03-26 | Stop reason: HOSPADM

## 2021-03-23 RX ORDER — LORAZEPAM 1 MG/1
2 TABLET ORAL
Status: DISCONTINUED | OUTPATIENT
Start: 2021-03-23 | End: 2021-03-26 | Stop reason: HOSPADM

## 2021-03-23 RX ORDER — M-VIT,TX,IRON,MINS/CALC/FOLIC 27MG-0.4MG
1 TABLET ORAL DAILY
Status: DISCONTINUED | OUTPATIENT
Start: 2021-03-23 | End: 2021-03-26 | Stop reason: HOSPADM

## 2021-03-23 RX ORDER — LORAZEPAM 1 MG/1
4 TABLET ORAL
Status: DISCONTINUED | OUTPATIENT
Start: 2021-03-23 | End: 2021-03-26 | Stop reason: HOSPADM

## 2021-03-23 RX ORDER — NICOTINE 21 MG/24HR
1 PATCH, TRANSDERMAL 24 HOURS TRANSDERMAL DAILY
Status: DISCONTINUED | OUTPATIENT
Start: 2021-03-23 | End: 2021-03-26 | Stop reason: HOSPADM

## 2021-03-23 RX ORDER — DULOXETIN HYDROCHLORIDE 20 MG/1
20 CAPSULE, DELAYED RELEASE ORAL DAILY
Status: DISCONTINUED | OUTPATIENT
Start: 2021-03-23 | End: 2021-03-23

## 2021-03-23 RX ORDER — ACETAMINOPHEN 325 MG/1
650 TABLET ORAL EVERY 4 HOURS PRN
Status: DISCONTINUED | OUTPATIENT
Start: 2021-03-23 | End: 2021-03-26 | Stop reason: HOSPADM

## 2021-03-23 RX ORDER — LORAZEPAM 1 MG/1
3 TABLET ORAL
Status: DISCONTINUED | OUTPATIENT
Start: 2021-03-23 | End: 2021-03-26 | Stop reason: HOSPADM

## 2021-03-23 RX ORDER — LORAZEPAM 1 MG/1
1 TABLET ORAL
Status: DISCONTINUED | OUTPATIENT
Start: 2021-03-23 | End: 2021-03-26 | Stop reason: HOSPADM

## 2021-03-23 RX ORDER — GAUZE BANDAGE 2" X 2"
100 BANDAGE TOPICAL DAILY
Status: DISCONTINUED | OUTPATIENT
Start: 2021-03-23 | End: 2021-03-26 | Stop reason: HOSPADM

## 2021-03-23 RX ORDER — LORAZEPAM 1 MG/1
1 TABLET ORAL ONCE
Status: COMPLETED | OUTPATIENT
Start: 2021-03-23 | End: 2021-03-23

## 2021-03-23 RX ORDER — TRAZODONE HYDROCHLORIDE 50 MG/1
50 TABLET ORAL NIGHTLY PRN
Status: DISCONTINUED | OUTPATIENT
Start: 2021-03-23 | End: 2021-03-26 | Stop reason: HOSPADM

## 2021-03-23 RX ORDER — IBUPROFEN 200 MG
400 TABLET ORAL EVERY 6 HOURS PRN
Status: DISCONTINUED | OUTPATIENT
Start: 2021-03-23 | End: 2021-03-26 | Stop reason: HOSPADM

## 2021-03-23 RX ORDER — BUSPIRONE HYDROCHLORIDE 10 MG/1
20 TABLET ORAL 2 TIMES DAILY
Status: ON HOLD | COMMUNITY
End: 2021-03-26 | Stop reason: HOSPADM

## 2021-03-23 RX ORDER — LORAZEPAM 2 MG/ML
3 INJECTION INTRAMUSCULAR
Status: DISCONTINUED | OUTPATIENT
Start: 2021-03-23 | End: 2021-03-26 | Stop reason: HOSPADM

## 2021-03-23 RX ORDER — BUSPIRONE HYDROCHLORIDE 10 MG/1
20 TABLET ORAL ONCE
Status: COMPLETED | OUTPATIENT
Start: 2021-03-23 | End: 2021-03-23

## 2021-03-23 RX ORDER — POLYETHYLENE GLYCOL 3350 17 G/17G
17 POWDER, FOR SOLUTION ORAL DAILY PRN
Status: DISCONTINUED | OUTPATIENT
Start: 2021-03-23 | End: 2021-03-26 | Stop reason: HOSPADM

## 2021-03-23 RX ORDER — ATOMOXETINE 60 MG/1
60 CAPSULE ORAL DAILY
Status: ON HOLD | COMMUNITY
End: 2021-03-26 | Stop reason: HOSPADM

## 2021-03-23 RX ORDER — MIRTAZAPINE 15 MG/1
7.5 TABLET, FILM COATED ORAL NIGHTLY
Status: DISCONTINUED | OUTPATIENT
Start: 2021-03-23 | End: 2021-03-24

## 2021-03-23 RX ORDER — BUSPIRONE HYDROCHLORIDE 10 MG/1
20 TABLET ORAL 2 TIMES DAILY
Status: DISCONTINUED | OUTPATIENT
Start: 2021-03-23 | End: 2021-03-25

## 2021-03-23 RX ORDER — HYDROXYZINE 50 MG/1
50 TABLET, FILM COATED ORAL 3 TIMES DAILY PRN
Status: DISCONTINUED | OUTPATIENT
Start: 2021-03-23 | End: 2021-03-26 | Stop reason: HOSPADM

## 2021-03-23 RX ORDER — LORAZEPAM 2 MG/ML
4 INJECTION INTRAMUSCULAR
Status: DISCONTINUED | OUTPATIENT
Start: 2021-03-23 | End: 2021-03-26 | Stop reason: HOSPADM

## 2021-03-23 RX ADMIN — NICOTINE POLACRILEX 2 MG: 2 GUM, CHEWING BUCCAL at 17:04

## 2021-03-23 RX ADMIN — Medication 1 TABLET: at 09:59

## 2021-03-23 RX ADMIN — BUSPIRONE HYDROCHLORIDE 20 MG: 10 TABLET ORAL at 01:00

## 2021-03-23 RX ADMIN — LORAZEPAM 1 MG: 1 TABLET ORAL at 22:58

## 2021-03-23 RX ADMIN — DULOXETINE 20 MG: 20 CAPSULE, DELAYED RELEASE ORAL at 15:42

## 2021-03-23 RX ADMIN — LORAZEPAM 2 MG: 1 TABLET ORAL at 06:01

## 2021-03-23 RX ADMIN — HYDROXYZINE HYDROCHLORIDE 50 MG: 50 TABLET, FILM COATED ORAL at 22:58

## 2021-03-23 RX ADMIN — MIRTAZAPINE 7.5 MG: 15 TABLET, FILM COATED ORAL at 22:58

## 2021-03-23 RX ADMIN — LORAZEPAM 3 MG: 1 TABLET ORAL at 10:09

## 2021-03-23 RX ADMIN — ATOMOXETINE 60 MG: 60 CAPSULE ORAL at 15:41

## 2021-03-23 RX ADMIN — LORAZEPAM 3 MG: 1 TABLET ORAL at 16:32

## 2021-03-23 RX ADMIN — HYDROXYZINE HYDROCHLORIDE 50 MG: 50 TABLET, FILM COATED ORAL at 10:00

## 2021-03-23 RX ADMIN — THIAMINE HCL TAB 100 MG 100 MG: 100 TAB at 10:00

## 2021-03-23 RX ADMIN — BUSPIRONE HYDROCHLORIDE 20 MG: 10 TABLET ORAL at 22:58

## 2021-03-23 RX ADMIN — LORAZEPAM 1 MG: 1 TABLET ORAL at 04:54

## 2021-03-23 ASSESSMENT — SLEEP AND FATIGUE QUESTIONNAIRES
DIFFICULTY STAYING ASLEEP: NO
DIFFICULTY FALLING ASLEEP: YES
DO YOU USE A SLEEP AID: YES
AVERAGE NUMBER OF SLEEP HOURS: 8
SLEEP PATTERN: DIFFICULTY FALLING ASLEEP

## 2021-03-23 ASSESSMENT — PATIENT HEALTH QUESTIONNAIRE - PHQ9: SUM OF ALL RESPONSES TO PHQ QUESTIONS 1-9: 3

## 2021-03-23 ASSESSMENT — PAIN SCALES - GENERAL: PAINLEVEL_OUTOF10: 0

## 2021-03-23 ASSESSMENT — LIFESTYLE VARIABLES: HISTORY_ALCOHOL_USE: YES

## 2021-03-23 NOTE — GROUP NOTE
Group Therapy Note    Date: 3/23/2021    Group Start Time: 1430  Group End Time: 1520  Group Topic: Recreational    EDWIN Mccoy, CTRS        Group Therapy Note    Attendees: 5/11         Pt did not participate in Recreation Therapy Group at 1430 when encouraged by RT due to resting in room. Pt was offered talk time as an alternative to group but declined.        Discipline Responsible: Psychoeducational Specialist      Signature:  Izzy Forrest

## 2021-03-23 NOTE — GROUP NOTE
Group Therapy Note    Date: 3/23/2021    Group Start Time: 0900  Group End Time: 0930  Group Topic: Community Meeting    EDWIN Mario, [place]        Group Therapy Note    Attendees: 8/19         Pt did not participate in Community Meeting/Goals Group at 900am when encouraged by RT due to resting in room. Pt was offered talk time as an alternative to group but declined.       Discipline Responsible: Psychoeducational Specialist      Signature:  Natanael Arceo

## 2021-03-23 NOTE — GROUP NOTE
Group Therapy Note    Date: 3/23/2021    Group Start Time: 1100  Group End Time: [phone]  Group Topic: Psychotherapy    [facility] D Murriel Merlin        Group Therapy Note           Patient refused to attend psychotherapy group after encouragement from staff. 1:1 talk time offered but refused. Signature:   Murriel Merlin

## 2021-03-23 NOTE — CARE COORDINATION
BHI Biopsychosocial Assessment    Current Level of Psychosocial Functioning     Independent   Dependent    Minimal Assist X      Psychosocial High Risk Factors (check all that apply)    Unable to obtain meds   Chronic illness/pain    Substance abuse X Alcohol   Lack of Family Support  X  Financial stress   Isolation X  Inadequate Community Resources  Suicide attempt(s)   Not taking medications X  Victim of crime   Developmental Delay  Unable to manage personal needs  X  Age 72 or older   Homeless  No transportation   Readmission within 30 days  Unemployment  Traumatic Event    Psychiatric Advanced Directives: none reported     Family to Involve in Treatment: lack of family support     Sexual Orientation:  Heterosexual     Patient Strengths: adequate housing, insurance, linked with outpatient Treatment at [facility] for [employer], staying at [facility] in [facility]. Patient Barriers: Pt relapsed on Alcohol, Pt presents on admission with suicicdal ideation, increase in symptoms of Depression, recent change in Psychiatric medications. Opiate Education Provided: N/A PT denies Heroin or Opiate use/abuse   Pt drug screen on admission was negative for all substances, Pt relapsed on Alcohol, Pt Ethanol level on admission to ED was 285. CMHC/mental health history:  Pt reports that he is currently staying at [facility] in [facility] and reports that he is able to return back there at discharge. PT reports that he has been following up for Outpatient treatment at [facility] with Dr. Cory Serna and would like to continue to follow up with her at discharge. Patient states he has been in ongoing therapy and states he has been receiving  EMDR therapy with Mis Solorzano at [place] [address].     Plan of Care   medication management, group/individual therapies, family meetings, psycho -education, treatment team meetings to assist with stabilization, referral to community

## 2021-03-23 NOTE — GROUP NOTE
Group Therapy Note    Date: 3/23/2021    Group Start Time: 1330  Group End Time: 7023  Group Topic: Cognitive Skills    [facility] KELTONI JOEL    Oumar Apo, CTRS    Pt did not attend 1330 cognitive skills group d/t resting in room despite staff invitation to attend. 1:1 talk time offered as alternative to group session, pt declined.             Signature:  Mary Grace Brice

## 2021-03-23 NOTE — PROGRESS NOTES
Behavioral Services  Medicare Certification Upon Admission    I certify that this patient's inpatient psychiatric hospital admission is medically necessary for:    [x] (1) Treatment which could reasonably be expected to improve this patient's condition,       [x] (2) Or for diagnostic study;     AND     [x](2) The inpatient psychiatric services are provided while the individual is under the care of a physician and are included in the individualized plan of care.     Estimated length of stay/service 3-5 days    Plan for post-hospital care -outpatient care      Electronically signed by Shawn Ruffin MD on 3/23/2021 at 4:45 PM
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
## 2021-03-23 NOTE — GROUP NOTE
Group Therapy Note    Date: 3/23/2021    Group Start Time: 1000  Group End Time: 2146  Group Topic: Cognitive Skills    EDWIN Cotton, CTRS        Group Therapy Note    Attendees: 5/9         Pt did not participate in Cognitive Skills Group at 1000am when encouraged by RT due to resting in room. Pt was offered talk time as an alternative to group but declined.        Discipline Responsible: Psychoeducational Specialist      Signature:  Mani Klein

## 2021-03-23 NOTE — GROUP NOTE
Group Therapy Note    Date: 3/23/2021    Group Start Time: 1600  Group End Time: 2379  Group Topic: Healthy Living/Wellness    EDWIN UMANA    Nguyễn Adame LPN        Group Therapy Note    Attendees: 6         Patient's Goal:  Healthy Living    Notes:      Status After Intervention:  Improved    Participation Level:  Active Listener    Participation Quality: Appropriate      Speech:  normal      Thought Process/Content: Logical      Affective Functioning: Congruent      Mood: stable      Level of consciousness:  Alert and Oriented x4      Response to Learning: Able to verbalize current knowledge/experience      Endings: None Reported    Modes of Intervention: Education and Activity      Discipline Responsible: Licensed Practical Nurse      Signature:  Nguyễn Adame LPN

## 2021-03-23 NOTE — BH NOTE
Patient given tobacco quitline number 67868891737 at this time, refusing to call at this time, states \" I just dont want to quit now\"- patient given information as to the dangers of long term tobacco use. Continue to reinforce the importance of tobacco cessation.

## 2021-03-23 NOTE — PLAN OF CARE
Problem: Depressive Behavior With or Without Suicide Precautions:  Goal: Ability to disclose and discuss suicidal ideas will improve  Description: Ability to disclose and discuss suicidal ideas will improve  3/23/2021 1146 by Shahrzad Carias LPN  Outcome: Ongoing  3/23/2021 0728 by Seb Angelo, CTRS  Outcome: Ongoing  3/23/2021 0724 by Seb Angelo CTRS  Outcome: Ongoing   Patient admits to depression and anxiety. Patient denies Suicidal Ideations and agrees to approach staff if having thoughts to harm self. Q 15 min safety checks continue at this time. Patient is medication compliant and in behavior control. Patient continues to have symptoms of alcohol withdrawals.   Problem: Tobacco Use:  Goal: Inpatient tobacco use cessation counseling participation  Description: Inpatient tobacco use cessation counseling participation  3/23/2021 1146 by Shahrzad Carias LPN  Outcome: Ongoing  3/23/2021 0728 by Seb Angelo CTRS  Outcome: Ongoing  3/23/2021 0724 by KIYA RoachS  Outcome: Ongoing

## 2021-03-23 NOTE — PLAN OF CARE
[facility]  Initial Interdisciplinary Treatment Plan NO      Original treatment plan Date & Time: 3/23/2021  0729    Admission Type:  Admission Type: Voluntary    Reason for admission:   Reason for Admission: alcohol intoxication; increase depression/anxiety/suicidal thoughts due to multiple medication changes    Estimated Length of Stay:  5-7days  Estimated Discharge Date: to be determined by physician    PATIENT STRENGTHS:  Patient Strengths:Strengths: Communication, Positive Support, Social Skills, Medication Compliance  Patient Strengths and Limitations:Limitations: Inappropriate/potentially harmful leisure interests  Addictive Behavior: Addictive Behavior  In the past 3 months, have you felt or has someone told you that you have a problem with:  : None  Do you have a history of Chemical Use?: No  Do you have a history of Alcohol Use?: Yes  Do you have a history of Street Drug Abuse?: No  Histroy of Prescripton Drug Abuse?: No  Medical Problems:  Past Medical History:   Diagnosis Date    Alcoholism ([facility] [address] 01/31/2018    niw clean an sober    [facility] [address]    Hepatitis C 2010    pt states he tested + for antibodies. no live virus detected -no treatment needed    History of atrial fibrillation 2001    pt states after a suicide attempt, overdose oxycontin pt developed atrial fib x 5-6 months.  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, acute intoxication ([address]     NO LONGER PERTINENT    Palpitations     DENIES    Seizures ([facility] [address] 2006    during alcohol detox     Wears glasses      Status EXAM:Status and Exam  Normal: No  Facial Expression: Flat  Affect: Appropriate  Level of Consciousness: Alert  Mood:Normal: No  Mood: Depressed, Anxious, Helpless  Motor Activity:Normal: Yes  Interview Behavior: Cooperative  Preception: [place] to Person, [place] to Time, [place] to Place, [place] to Situation  Attention:Normal: No  Attention: Distractible  Thought Processes: Circumstantial  Thought Content:Normal: No  Thought Content: Preoccupations  Hallucinations: None  Delusions: No  Memory:Normal: Yes  Insight and Judgment: No  Insight and Judgment: Poor Judgment, Poor Insight  Present Suicidal Ideation: No  Present Homicidal Ideation: No    EDUCATION:   Learner Progress Toward Treatment Goals: reviewed group plans and strategies for care    Method:group therapy, medication compliance, individualized assessments and care planning    Outcome: needs reinforcement    PATIENT GOALS: to be discussed with patient within 72 hours    PLAN/TREATMENT RECOMMENDATIONS:     continue group therapy , medications compliance, goal setting, individualized assessments and care, continue to monitor pt on unit      SHORT-TERM GOALS:   Time frame for Short-Term Goals: 5-7 days    LONG-TERM GOALS:  Time frame for Long-Term Goals: 6 months  Members Present in Team Meeting: See Signature Sheet    ALBINO Hernandez

## 2021-03-23 NOTE — ED PROVIDER NOTES
3:28 AM EDT  I received signout on this patient this is a 51-year-old male with a history of bipolar affective disorder who comes in today. The patient states that recently he had a decrease in his medication and he has been increasing his alcohol intake he states that he has had increase in depressive sort of symptoms and he has been having ideations that he wants to walk out in front of traffic to kill himself. Patient initially presented intoxicated he is now sober continues to have suicidal ideation he has no medical complaints at this time he is medically cleared he will be admitted for further management of his mental health disorder.     Diagnosis: Suicidal ideation, alcohol intoxication      Fabien Guiod MD  03/23/21 6324

## 2021-03-23 NOTE — PROGRESS NOTES
Pharmacy Medication History Note      List of current medications patient is taking is complete. Source of information: [facility], [facility], [facility], [place], [address] [place] [address] Rd    Changes made to medication list:  Medications removed (include reason, ex. therapy complete or physician discontinued, noncompliance):  Duloxetine  Folic acid  Ibuprofen  Losartan  Mulitvitamin  Pantoprazole  Propranolol  Sumatriptan  Vitamin B-1    Medications added/doses adjusted: Added buspirone 10mg 2 tabs BID  Added Strattera 60mg QD    Other notes (ex. Recent course of antibiotics, Coumadin dosing):  Per [facility], the only medications the patient has filled so far this year are the buspirone and Strattera  Per [facility] and [address], the patient has not filled any medications at either location since September 2020      Please let me know if you have any questions about this encounter. Thank you!     Electronically signed by Terri Espinosa on 3/23/2021 at 9:10 AM

## 2021-03-23 NOTE — ED NOTES
Patient reevaluated upon sobriety. Patient endorses suicidal thoughts with a plan to walk into traffic. Patient reports previous attempts by overdose in the past.  Patient reports feeling like he is \"not good enough\". Patient stated there was a \"period of time where I felt happy and normal and I want to get back there\". Patient reports several medications changes and the d/c of several meds as well which he feels is the cause for his increase desire to drink and decrease in mood. Patient denies HI or hallucinations. Patient reports feeling suicidal for \"a while\". This writer consulted with Dr Tesha Brower who recommended inpatient hospitalization for safety and stabilization. Patient signed application for voluntary admission to [facility].

## 2021-03-23 NOTE — ED PROVIDER NOTES
[address] ED  eMERGENCY dEPARTMENT eNCOUnter    Pt Name: Porter Simmonds  MRN: [id number]  [place] 1980  Date of evaluation: 3/22/21  CHIEF COMPLAINT       Chief Complaint   Patient presents with    Mental Health Problem     HISTORY OF PRESENT ILLNESS   HPI   Patient presenting for mental health evaluation. He reports he has had multiple medication changes recently. About 3 weeks ago he relapsed on alcohol, he repots he was drinking to self medicate for his worsening depression and anxiety. He reports he has a history of suicide attempts and states \"I'm beginning not to trust myself\". States he has had thoughts of walking into traffic. Denies intentional ingestion. REVIEW OF SYSTEMS     Review of Systems   Constitutional: Negative for chills and fever. HENT: Negative for congestion, ear pain and sore throat. Eyes: Negative for pain and visual disturbance. Respiratory: Negative for cough, chest tightness and shortness of breath. Cardiovascular: Negative for chest pain and palpitations. Gastrointestinal: Negative for abdominal pain, constipation, diarrhea, nausea and vomiting. Genitourinary: Negative for dysuria and testicular pain. Musculoskeletal: Negative for back pain. Skin: Negative for rash and wound. Neurological: Negative for seizures, syncope and headaches. Psychiatric/Behavioral: Positive for dysphoric mood. The patient is nervous/anxious. PASTMEDICAL HISTORY     Past Medical History:   Diagnosis Date    Alcoholism ([address] 01/31/2018    niw clean an sober   [address] Hepatitis C 2010    pt states he tested + for antibodies. no live virus detected -no treatment needed    History of atrial fibrillation 2001    pt states after a suicide attempt, overdose oxycontin pt developed atrial fib x 5-6 months.  NO RECENT ISSUES    Mental and behavioral disorders d/t use of alcohol, acute intoxication ([address]     NO LONGER PERTINENT    Palpitations     DENIES    Seizures ([address] 2006    during alcohol detox     Wears glasses      SURGICAL HISTORY       Past Surgical History:   Procedure Laterality Date    CYSTOSCOPY Bilateral 12/19/2018    Retrograde pyelogram    CYSTOSCOPY Bilateral 12/19/2018    CYSTOSCOPY, RETROGRADE PYELOGRAM performed by Tj Pickett MD at [address] Left 2004    second finger REATTACH LIGAMENT AND TENDONS UNDER LOCAL    TONSILLECTOMY  1983     CURRENT MEDICATIONS       Previous Medications    BLOOD PRESSURE KIT    Daily as needed for blood pressure    DULOXETINE HCL 40 MG CPEP    Take 40 mg by mouth daily    FOLIC ACID (FOLVITE) 1 MG TABLET    Take 1 tablet by mouth daily    IBUPROFEN (IBU) 400 MG TABLET    Take 1 tablet by mouth every 6 hours as needed for Pain    LOSARTAN (COZAAR) 50 MG TABLET    take 1 tablet by mouth once daily    MULTIPLE VITAMIN (MULTIVITAMIN) TABS TABLET    Take 1 tablet by mouth daily    PANTOPRAZOLE (PROTONIX) 40 MG TABLET    TAKE 1 TABLET BY MOUTH EVERY DAY    PROPRANOLOL (INDERAL) 10 MG TABLET    TAKE ONE TABLET BY MOUTH EVERY DAY    SUMATRIPTAN (IMITREX) 100 MG TABLET    TAKE 1 TAB BY MOUTH AS DIRECTED ONCE EVERY DAY FOR MIGRAINE. MAY REPEAT IN 2 HOURS IF UNRESOLVED. MAX 2 TABS/24HRS    VITAMIN B-1 100 MG TABLET    Take 1 tablet by mouth daily     ALLERGIES     is allergic to dicloxacillin and pcn [penicillins]. FAMILY HISTORY     He indicated that his mother is alive. He indicated that his father is alive. He indicated that his brother is alive. He indicated that his maternal grandmother is alive. He indicated that his maternal grandfather is alive. He indicated that his paternal grandmother is alive. He indicated that his paternal grandfather is alive. SOCIALHISTORY      reports that he has quit smoking. His smoking use included cigarettes. He has a 20.00 pack-year smoking history. He has quit using smokeless tobacco.  His smokeless tobacco use included snuff and chew.  He reports previous alcohol Procedures    DIAGNOSTIC RESULTS   EKG: All EKG's are interpreted by the Emergency Department Physician who either signs or Co-signs this chart inthe absence of a cardiologist.      RADIOLOGY:All plain film, CT, MRI, and formal ultrasound images (except ED bedside ultrasound) are read by the radiologist, see reports below, unless otherwise noted in MDM or here. No orders to display     LABS: All lab results were reviewed by myself, and all abnormals are listed below. Labs Reviewed   ETHANOL - Abnormal; Notable for the following components:       Result Value    Ethanol 285 (*)     All other components within normal limits   URINE DRUG SCREEN   CBC WITH AUTO DIFFERENTIAL   COMPREHENSIVE METABOLIC PANEL   ETHANOL     EMERGENCY DEPARTMENT COURSE:   Vitals:    Vitals:    03/22/21 1709   BP: (!) 147/94   Pulse: 126   Resp: 18   Temp: 98.2 °F (36.8 °C)   TempSrc: Oral   SpO2: 97%       The patient was given the following medications while in the emergency department:  Orders Placed This Encounter   Medications    LORazepam (ATIVAN) tablet 1 mg     CONSULTS:  None    FINAL IMPRESSION      1. Suicidal ideation    2. Acute alcoholic intoxication without complication (HCC)          DISPOSITION/PLAN   DISPOSITION        PATIENT REFERRED TO:  No follow-up provider specified.   DISCHARGE MEDICATIONS:  New Prescriptions    No medications on file     Fabiano Burgos MD  AttendingEmergency Physician                        Benedict Soria MD  03/22/21 3100

## 2021-03-23 NOTE — BH NOTE
`[facility]  Admission Note     Admission Type:   Admission Type: Voluntary    Reason for admission:  Reason for Admission: alcohol intoxication; increase depression/anxiety/suicidal thoughts due to multiple medication changes    PATIENT STRENGTHS:  Strengths: Communication, Positive Support, Social Skills, Medication Compliance    Patient Strengths and Limitations:  Limitations: Inappropriate/potentially harmful leisure interests    Addictive Behavior:   Addictive Behavior  In the past 3 months, have you felt or has someone told you that you have a problem with:  : None  Do you have a history of Chemical Use?: No  Do you have a history of Alcohol Use?: Yes  Do you have a history of Street Drug Abuse?: No  Histroy of Prescripton Drug Abuse?: No    Medical Problems:   Past Medical History:   Diagnosis Date    Alcoholism ([facility] [address] 01/31/2018    niw clean an sober   [address] Hepatitis C 2010    pt states he tested + for antibodies. no live virus detected -no treatment needed    History of atrial fibrillation 2001    pt states after a suicide attempt, overdose oxycontin pt developed atrial fib x 5-6 months.  NO RECENT ISSUES    Mental and behavioral disorders d/t use of alcohol, acute intoxication ([facility] [address]     NO LONGER PERTINENT    Palpitations     DENIES    Seizures ([facility] [address] 2006    during alcohol detox     Wears glasses        Status EXAM:  Status and Exam  Normal: No  Facial Expression: Flat  Affect: Appropriate  Level of Consciousness: Alert  Mood:Normal: No  Mood: Depressed, Anxious, Helpless  Motor Activity:Normal: Yes  Interview Behavior: Cooperative  Preception: [place] to Person, [place] to Time, [place] to Place, [place] to Situation  Attention:Normal: No  Attention: Distractible  Thought Processes: Circumstantial  Thought Content:Normal: No  Thought Content: Preoccupations  Hallucinations: None  Delusions: No  Memory:Normal: Yes  Insight and Judgment: No  Insight and Judgment: Poor Judgment, Poor Insight  Present Suicidal Ideation: No  Present Homicidal Ideation: No    Tobacco Screening:  Practical Counseling, on admission, esthela X, if applicable and completed (first 3 are required if patient doesn't refuse):            ( )  Recognizing danger situations (included triggers and roadblocks)                    ( )  Coping skills (new ways to manage stress, exercise, relaxation techniques, changing routine, distraction)                                                           ( )  Basic information about quitting (benefits of quitting, techniques in how to quit, available resources  ( ) Referral for counseling faxed to [facility]                                           (x ) Patient refused counseling  ( ) Patient has not smoked in the last 30 days    Metabolic Screening:    Lab Results   Component Value Date    LABA1C 5.4 09/25/2020       Lab Results   Component Value Date    CHOL 129 07/14/2020    CHOL 123 01/20/2019    CHOL 129 11/06/2018     Lab Results   Component Value Date    TRIG 117 07/14/2020    TRIG 73 01/20/2019    TRIG 89 11/06/2018     Lab Results   Component Value Date    HDL 31 (L) 07/14/2020    HDL 35 (L) 03/24/2020    HDL 53 01/20/2019    HDL 36 (L) 11/06/2018     No components found for: LDLCAL  No results found for: LABVLDL      Body mass index is 29.53 kg/m². BP Readings from Last 2 Encounters:   03/23/21 (!) 162/110   09/25/20 128/88           Pt admitted with followings belongings:  Dentures: None  Vision - Corrective Lenses: Glasses  Hearing Aid: None  Jewelry: None  Body Piercings Removed: N/A  Clothing: Footwear, Pants, Shirt, Socks, Undergarments (Comment)  Were All Patient Medications Collected?: Not Applicable  Other Valuables: Cell phone, Money (Comment), Wallet     Valuables sent home with N/A. Valuables placed in safe in security envelope, number:  \[phone]. Patient's home medications were Reviewed, need verified.   Patient oriented to surroundings and program expectations and copy of patient rights given. Received admission packet:  Yes. Consents reviewed, signed Yes. Patient verbalize understanding:  Yes. Patient education on precautions: Yes     [facility] with alcohol intoxication and suicidal ideations. The patient stated he has had multiple medication changes and recently relapsed on alcohol. The patient has been living in sober living for awhile. During admission the patient was having active withdrawals. The patient had tremors, increased anxiety, slight sweat perfusion, headache, and severe body aches. The patient was calm and cooperative during admission and signed all paperwork. Medications need to be verified.       Claudia Plaza RN

## 2021-03-24 PROCEDURE — 6370000000 HC RX 637 (ALT 250 FOR IP): Performed by: PSYCHIATRY & NEUROLOGY

## 2021-03-24 PROCEDURE — 99232 SBSQ HOSP IP/OBS MODERATE 35: CPT | Performed by: PSYCHIATRY & NEUROLOGY

## 2021-03-24 PROCEDURE — 6370000000 HC RX 637 (ALT 250 FOR IP): Performed by: NURSE PRACTITIONER

## 2021-03-24 PROCEDURE — 1240000000 HC EMOTIONAL WELLNESS R&B

## 2021-03-24 RX ORDER — MIRTAZAPINE 15 MG/1
15 TABLET, FILM COATED ORAL NIGHTLY
Status: DISCONTINUED | OUTPATIENT
Start: 2021-03-24 | End: 2021-03-26 | Stop reason: HOSPADM

## 2021-03-24 RX ADMIN — LORAZEPAM 1 MG: 1 TABLET ORAL at 09:14

## 2021-03-24 RX ADMIN — HYDROXYZINE HYDROCHLORIDE 50 MG: 50 TABLET, FILM COATED ORAL at 21:51

## 2021-03-24 RX ADMIN — ATOMOXETINE 60 MG: 60 CAPSULE ORAL at 09:03

## 2021-03-24 RX ADMIN — LORAZEPAM 1 MG: 1 TABLET ORAL at 11:33

## 2021-03-24 RX ADMIN — BUSPIRONE HYDROCHLORIDE 20 MG: 10 TABLET ORAL at 21:51

## 2021-03-24 RX ADMIN — MIRTAZAPINE 15 MG: 15 TABLET, FILM COATED ORAL at 21:51

## 2021-03-24 RX ADMIN — Medication 1 TABLET: at 09:03

## 2021-03-24 RX ADMIN — THIAMINE HCL TAB 100 MG 100 MG: 100 TAB at 09:03

## 2021-03-24 RX ADMIN — LORAZEPAM 1 MG: 1 TABLET ORAL at 16:22

## 2021-03-24 RX ADMIN — BUSPIRONE HYDROCHLORIDE 20 MG: 10 TABLET ORAL at 09:03

## 2021-03-24 RX ADMIN — LORAZEPAM 1 MG: 1 TABLET ORAL at 21:51

## 2021-03-24 RX ADMIN — HYDROXYZINE HYDROCHLORIDE 50 MG: 50 TABLET, FILM COATED ORAL at 15:26

## 2021-03-24 ASSESSMENT — LIFESTYLE VARIABLES: HISTORY_ALCOHOL_USE: YES

## 2021-03-24 NOTE — PLAN OF CARE
Problem: Depressive Behavior With or Without Suicide Precautions:  Goal: Ability to disclose and discuss suicidal ideas will improve  Description: Ability to disclose and discuss suicidal ideas will improve  3/24/2021 0952 by Cece Moody RN  Outcome: Ongoing  Note: Patient denies suicidal/homicidal/self harm ideations upon assessment this shift. Patient agrees to seek out staff if thoughts of self harm should arise. Will monitor. Problem: Tobacco Use:  Goal: Inpatient tobacco use cessation counseling participation  Description: Inpatient tobacco use cessation counseling participation  3/24/2021 0952 by Cece Moody RN  Outcome: Ongoing  Note: Patient given tobacco quitline number 19658269236 at this time, refusing to call at this time, states \" I just dont want to quit now\"- patient given information as to the dangers of long term tobacco use. Continue to reinforce the importance of tobacco cessation. Problem: Pain:  Goal: Pain level will decrease  Description: Pain level will decrease  3/24/2021 0952 by Cece Moody RN  Outcome: Ongoing  Note: Patient denies pain upon assessment this shift. Will monitor.

## 2021-03-24 NOTE — GROUP NOTE
Group Therapy Note    Date: 3/24/2021    Group Start Time: 1100  Group End Time: 36  Group Topic: Psychotherapy    CZ KELTONI D Murriel Merlin        Group Therapy Note           Patient refused to attend psychotherapy group after encouragement from staff. 1:1 talk time offered but refused. Signature:   Murriel Merlin

## 2021-03-24 NOTE — PLAN OF CARE
[facility]  Day 3 Interdisciplinary Treatment Plan NOTE    Review Date & Time: 3/24/2021 1312    Admission Type:   Admission Type: Voluntary    Reason for admission:  Reason for Admission: alcohol intoxication; increase depression/anxiety/suicidal thoughts due to multiple medication changes  Estimated Length of Stay: 5-7 days  Estimated Discharge Date Update: to be determined by physician    PATIENT STRENGTHS:  Patient Strengths Strengths: Communication, Connection to output provider  Patient Strengths and Limitations:Limitations: Tendency to isolate self, Inappropriate/potentially harmful leisure interests, Difficult relationships / poor social skills, Difficulty problem solving/relies on others to help solve problems  Addictive Behavior:Addictive Behavior  In the past 3 months, have you felt or has someone told you that you have a problem with:  : None  Do you have a history of Chemical Use?: No  Do you have a history of Alcohol Use?: Yes  Do you have a history of Street Drug Abuse?: No  Histroy of Prescripton Drug Abuse?: No  Medical Problems:  Past Medical History:   Diagnosis Date    Alcoholism ([facility] [address] 01/31/2018    niw clean an sober    [address] [place] [address]    Hepatitis C 2010    pt states he tested + for antibodies. no live virus detected -no treatment needed    History of atrial fibrillation 2001    pt states after a suicide attempt, overdose oxycontin pt developed atrial fib x 5-6 months.  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, acute intoxication ([facility] [address]     NO LONGER PERTINENT    Palpitations     DENIES    Seizures ([facility] [address] 2006    during alcohol detox     Wears glasses        Risk:  Fall RiskTotal: 73  Jason Scale Jason Scale Score: 22  BVC Total: 0  Change in scores no Changes to plan of Care no    Status EXAM:   Status and Exam  Normal: No  Facial Expression: Flat  Affect: Appropriate  Level of Consciousness: Alert  Mood:Normal: No  Mood: Depressed, Anxious  Motor Activity:Normal: No  Motor Activity: Decreased  Interview Behavior: Cooperative  Preception: [place] to Person, [place] to Time, [place] to Place, [place] to Situation  Attention:Normal: No  Attention: Distractible  Thought Processes: Circumstantial  Thought Content:Normal: Yes  Thought Content: Poverty of Content  Hallucinations: None  Delusions: No  Memory:Normal: Yes  Insight and Judgment: No  Insight and Judgment: Poor Judgment  Present Suicidal Ideation: No  Present Homicidal Ideation: No    Daily Assessment Last Entry:   Daily Sleep (WDL): Within Defined Limits         Patient Currently in Pain: Denies  Daily Nutrition (WDL): Within Defined Limits    Patient Monitoring:  Frequency of Checks: 4 times per hour, close    Psychiatric Symptoms:   Depression Symptoms  Depression Symptoms: Isolative, Loss of interest, Impaired concentration  Anxiety Symptoms  Anxiety Symptoms: Generalized  Gunjan Symptoms  [place] Symptoms: No problems reported or observed. Psychosis Symptoms  Delusion Type: No problems reported or observed. Suicide Risk CSSR-S:  1) Within the past month, have you wished you were dead or wished you could go to sleep and not wake up? : Yes  2) Have you actually had any thoughts of killing yourself? : Yes  3) Have you been thinking about how you might kill yourself? : Yes  5) Have you started to work out or worked out the details of how to kill yourself?  Do you intend to carry out this plan? : No  6) Have you ever done anything, started to do anything, or prepared to do anything to end your life?: No  Change in Result no Change in Plan of care no      EDUCATION:   EDUCATION:   Learner Progress Toward Treatment Goals: Reviewed results and recommendations of this team, Reviewed group plan and strategies, Reviewed signs, symptoms and risk of self harm and violent behavior, Reviewed goals and plan of care    Method:small group, individual verbal education    Outcome:verbalized by patient, but needs reinforcement to obtain goals    PATIENT GOALS:  Short term: medication stabilization   Long term: sobriety/ following a sober living plan    PLAN/TREATMENT RECOMMENDATIONS UPDATE: continue with group therapies, increased socialization, continue planning for after discharge goals, continue with medication compliance    SHORT-TERM GOALS UPDATE:   Time frame for Short-Term Goals: 5-7 days    LONG-TERM GOALS UPDATE:   Time frame for Long-Term Goals: 6 months  Members Present in Team Meeting: See Signature Sheet    ALBINO Miranda

## 2021-03-24 NOTE — GROUP NOTE
Group Therapy Note    Date: 3/24/2021    Group Start Time: 0900  Group End Time: 0920  Group Topic: Community Meeting    CINDY Savage    Pt did not attend 0900 goal setting group d/t resting in room despite staff invitation to attend. 1:1 talk time offered as alternative to group session, pt declined.             Signature:  Radha Cornelius

## 2021-03-24 NOTE — PROGRESS NOTES
Daily Progress Note  Joanne Fuentes CNP  3/24/2021      CHIEF COMPLAINT:  Depression with suicidal ideation     Reviewed patient's current plan of care and vital signs with nursing staff. Sleep:  several hours last night  Attending groups: No    SUBJECTIVE:    Mr. Maikol Malloy was seen for follow-up evaluation today. Unit staff report no overnight behavioral events. Patient has been medication compliant and behaviorally in control. He reports that his symptoms of withdrawal from alcohol are improving. He reports experiencing ongoing anxiety but denies any suicidal or homicidal ideation. He discussed all of his recent medication changes and how he feels that the abrupt discontinuation of his antidepressant led to his symptoms coming back. He is open to trying something new because the Cymbalta caused too many sexual side effects. He has not yet attended any groups and stated he just wants to rest in bed until he feels completely recovered. He stated he got up for breakfast today but has not yet showered. He made a goal to shower later in the day if he feels up for it. He is denying any current medication side effects. He stated the plan for him is to go back to [address] [place] [address] after discharge and was wondering when he would be able to leave. Patient agreed to contract for safety. He had no other concerns or questions.     Mental Status Exam  Level of consciousness:  Awake and alert  Appearance: Hospital attire, lying in bed, with fair grooming and hygiene   Behavior/Motor: No abnormalities noted  Attitude toward examiner:  Cooperative, attentive, good eye contact  Speech:  spontaneous, normal rate, normal volume and well articulated  Mood: Euthymic  Affect: Mood congruent  Thought processes:  linear, goal directed and coherent  Thought content:  denies homicidal ideation  Suicidal Ideation: Denies suicidal ideation  Delusions:  no evidence of delusions  Perceptual Disturbance:  denies any perceptual disturbance  Cognition:  Oriented to person, place, time/date, situation   Memory: age appropriate  Insight & Judgement: improving  Medication side effects: denies     Data   height is 5' 9\" (1.753 m) and weight is 200 lb (90.7 kg). His oral temperature is 97.6 °F (36.4 °C). His blood pressure is 148/90 (abnormal) and his pulse is 80. His respiration is 14 and oxygen saturation is 96%.    Labs:   Admission on 03/22/2021   Component Date Value Ref Range Status    Ethanol 03/22/2021 285* <10 mg/dL Final    Ethanol percent 03/22/2021 0.285  % Final    Amphetamine Screen, Ur 03/22/2021 NEGATIVE  NEGATIVE Final    Comment:       (Positive cutoff 1000 ng/mL)                  Barbiturate Screen, Ur 03/22/2021 NEGATIVE  NEGATIVE Final    Comment:       (Positive cutoff 200 ng/mL)                  Benzodiazepine Screen, Urine 03/22/2021 NEGATIVE  NEGATIVE Final    Comment:       (Positive cutoff 200 ng/mL)                  Cocaine Metabolite, Urine 03/22/2021 NEGATIVE  NEGATIVE Final    Comment:       (Positive cutoff 300 ng/mL)                  Methadone Screen, Urine 03/22/2021 NEGATIVE  NEGATIVE Final    Comment:       (Positive cutoff 300 ng/mL)                  Opiates, Urine 03/22/2021 NEGATIVE  NEGATIVE Final    Comment:       (Positive cutoff 300 ng/mL)                  Phencyclidine, Urine 03/22/2021 NEGATIVE  NEGATIVE Final    Comment:       (Positive cutoff 25 ng/mL)                  Propoxyphene, Urine 03/22/2021 NOT REPORTED  NEGATIVE Final    Cannabinoid Scrn, Ur 03/22/2021 NEGATIVE  NEGATIVE Final    Comment:       (Positive cutoff 50 ng/mL)                  Oxycodone Screen, Ur 03/22/2021 NEGATIVE  NEGATIVE Final    Comment:       (Positive cutoff 100 ng/mL)                  Methamphetamine, Urine 03/22/2021 NOT REPORTED  NEGATIVE Final    Tricyclic Antidepressants, Urine 03/22/2021 NOT REPORTED  NEGATIVE Final    MDMA, Urine 03/22/2021 NOT REPORTED  NEGATIVE Final    Buprenorphine Urine 03/22/2021 NOT REPORTED  NEGATIVE Final    Test Information 03/22/2021 Assay provides medical screening only. The absence of expected drug(s) and/or metabolite(s) may indicate diluted or adulterated urine, limitations of testing or timing of collection. Final    Comment: Testing for legal purposes should be confirmed by another method. To request confirmation   of test result, please call the lab within 7 days of sample submission.       WBC 03/22/2021 6.7  3.5 - 11.0 k/uL Final    RBC 03/22/2021 5.10  4.5 - 5.9 m/uL Final    Hemoglobin 03/22/2021 15.4  13.5 - 17.5 g/dL Final    Hematocrit 03/22/2021 47.2  41 - 53 % Final    MCV 03/22/2021 92.5  80 - 100 fL Final    MCH 03/22/2021 30.2  26 - 34 pg Final    MCHC 03/22/2021 32.7  31 - 37 g/dL Final    RDW 03/22/2021 14.5  11.5 - 14.9 % Final    Platelets 53/30/2189 311  150 - 450 k/uL Final    MPV 03/22/2021 10.5  6.0 - 12.0 fL Final    NRBC Automated 03/22/2021 NOT REPORTED  per 100 WBC Final    Differential Type 03/22/2021 NOT REPORTED   Final    Seg Neutrophils 03/22/2021 48  36 - 66 % Final    Lymphocytes 03/22/2021 42  24 - 44 % Final    Monocytes 03/22/2021 7  1 - 7 % Final    Eosinophils % 03/22/2021 2  0 - 4 % Final    Basophils 03/22/2021 1  0 - 2 % Final    Immature Granulocytes 03/22/2021 NOT REPORTED  0 % Final    Segs Absolute 03/22/2021 3.30  1.3 - 9.1 k/uL Final    Absolute Lymph # 03/22/2021 2.90  1.0 - 4.8 k/uL Final    Absolute Mono # 03/22/2021 0.40  0.1 - 1.3 k/uL Final    Absolute Eos # 03/22/2021 0.10  0.0 - 0.4 k/uL Final    Basophils Absolute 03/22/2021 0.00  0.0 - 0.2 k/uL Final    Absolute Immature Granulocyte 03/22/2021 NOT REPORTED  0.00 - 0.30 k/uL Final    WBC Morphology 03/22/2021 NOT REPORTED   Final    RBC Morphology 03/22/2021 NOT REPORTED   Final    Platelet Estimate 89/20/6091 NOT REPORTED   Final    Glucose 03/22/2021 111* 70 - 99 mg/dL Final    BUN 03/22/2021 6  6 - 20 mg/dL Final    CREATININE 03/22/2021 0.89  0.70 - 1.20 mg/dL Final    Bun/Cre Ratio 03/22/2021 NOT REPORTED  9 - 20 Final    Calcium 03/22/2021 9.8  8.6 - 10.4 mg/dL Final    Sodium 03/22/2021 142  135 - 144 mmol/L Final    Potassium 03/22/2021 4.7  3.7 - 5.3 mmol/L Final    Chloride 03/22/2021 104  98 - 107 mmol/L Final    CO2 03/22/2021 26  20 - 31 mmol/L Final    Anion Gap 03/22/2021 12  9 - 17 mmol/L Final    Alkaline Phosphatase 03/22/2021 77  40 - 129 U/L Final    ALT 03/22/2021 23  5 - 41 U/L Final    AST 03/22/2021 24  <40 U/L Final    Total Bilirubin 03/22/2021 0.39  0.3 - 1.2 mg/dL Final    Total Protein 03/22/2021 7.9  6.4 - 8.3 g/dL Final    Albumin 03/22/2021 4.7  3.5 - 5.2 g/dL Final    Albumin/Globulin Ratio 03/22/2021 NOT REPORTED  1.0 - 2.5 Final    GFR Non- 03/22/2021 >60  >60 mL/min Final    GFR  03/22/2021 >60  >60 mL/min Final    GFR Comment 03/22/2021        Final    Comment: Average GFR for 38-51 years old:   80 mL/min/1.73sq m  Chronic Kidney Disease:   <60 mL/min/1.73sq m  Kidney failure:   <15 mL/min/1.73sq m              eGFR calculated using average adult body mass. Additional eGFR calculator available at:        [employer]            GFR Staging 03/22/2021 NOT REPORTED   Final    Specimen Description 03/23/2021 . NASOPHARYNGEAL SWAB   Final    SARS-CoV-2, Rapid 03/23/2021 Not Detected  Not Detected Final    Comment:       Rapid NAAT:  The specimen is NEGATIVE for SARS-CoV-2, the novel coronavirus associated with   COVID-19. The ID NOW COVID-19 assay is designed to detect the virus that causes COVID-19 in patients   with signs and symptoms of infection who are suspected of COVID-19. An individual without symptoms of COVID-19 and who is not shedding SARS-CoV-2 virus would   expect to have a negative (not detected) result in this assay.   Negative results should be treated as presumptive and, if inconsistent with clinical signs   and symptoms or necessary for patient management,  should be tested with an alternative molecular assay. Negative results do not preclude   SARS-CoV-2 infection and   should not be used as the sole basis for patient management decisions.          Fact sheet for Healthcare Providers: [place]  Fact sheet for Patients: [place]          Methodology: Isothermal Nucleic Acid Amplification              Medications  Current Facility-Administered Medications: LORazepam (ATIVAN) tablet 1 mg, 1 mg, Oral, Q1H PRN **OR** LORazepam (ATIVAN) injection 1 mg, 1 mg, Intramuscular, Q1H PRN **OR** LORazepam (ATIVAN) tablet 2 mg, 2 mg, Oral, Q1H PRN **OR** LORazepam (ATIVAN) injection 2 mg, 2 mg, Intramuscular, Q1H PRN **OR** LORazepam (ATIVAN) tablet 3 mg, 3 mg, Oral, Q1H PRN **OR** LORazepam (ATIVAN) injection 3 mg, 3 mg, Intramuscular, Q1H PRN **OR** LORazepam (ATIVAN) tablet 4 mg, 4 mg, Oral, Q1H PRN **OR** LORazepam (ATIVAN) injection 4 mg, 4 mg, Intramuscular, Q1H PRN  thiamine mononitrate tablet 100 mg, 100 mg, Oral, Daily  therapeutic multivitamin-minerals 1 tablet, 1 tablet, Oral, Daily  acetaminophen (TYLENOL) tablet 650 mg, 650 mg, Oral, Q4H PRN  aluminum & magnesium hydroxide-simethicone (MAALOX) 200-200-20 MG/5ML suspension 30 mL, 30 mL, Oral, Q6H PRN  ibuprofen (ADVIL;MOTRIN) tablet 400 mg, 400 mg, Oral, Q6H PRN  nicotine (NICODERM CQ) 14 MG/24HR 1 patch, 1 patch, Transdermal, Daily  polyethylene glycol (GLYCOLAX) packet 17 g, 17 g, Oral, Daily PRN  traZODone (DESYREL) tablet 50 mg, 50 mg, Oral, Nightly PRN  hydrOXYzine (ATARAX) tablet 50 mg, 50 mg, Oral, TID PRN  busPIRone (BUSPAR) tablet 20 mg, 20 mg, Oral, BID  atomoxetine (STRATTERA) capsule 60 mg, 60 mg, Oral, Daily  nicotine polacrilex (NICORETTE) gum 2 mg, 2 mg, Oral, PRN  mirtazapine (REMERON) tablet 7.5 mg, 7.5 mg, Oral, Nightly    ASSESSMENT  Major depressive disorder, recurrent severe

## 2021-03-24 NOTE — GROUP NOTE
Group Therapy Note    Date: 3/24/2021    Group Start Time: 1000  Group End Time: 1193  Group Topic: Cognitive Skills    EDWIN Rothman, CTRS        Group Therapy Note    Attendees: 2/7       Pt did not participate in Cognitive Skills Group at 1000am when encouraged by RT due to resting in room. Pt was offered talk time as an alternative to group but declined.        Discipline Responsible: Psychoeducational Specialist      Signature:  Nydia Ely

## 2021-03-25 VITALS
RESPIRATION RATE: 14 BRPM | SYSTOLIC BLOOD PRESSURE: 118 MMHG | HEIGHT: 69 IN | HEART RATE: 102 BPM | WEIGHT: 200 LBS | OXYGEN SATURATION: 96 % | DIASTOLIC BLOOD PRESSURE: 70 MMHG | BODY MASS INDEX: 29.62 KG/M2 | TEMPERATURE: 97.2 F

## 2021-03-25 PROCEDURE — 99232 SBSQ HOSP IP/OBS MODERATE 35: CPT | Performed by: PSYCHIATRY & NEUROLOGY

## 2021-03-25 PROCEDURE — 6370000000 HC RX 637 (ALT 250 FOR IP): Performed by: NURSE PRACTITIONER

## 2021-03-25 PROCEDURE — 6370000000 HC RX 637 (ALT 250 FOR IP): Performed by: PSYCHIATRY & NEUROLOGY

## 2021-03-25 PROCEDURE — 1240000000 HC EMOTIONAL WELLNESS R&B

## 2021-03-25 RX ORDER — BUSPIRONE HYDROCHLORIDE 10 MG/1
20 TABLET ORAL 3 TIMES DAILY
Status: DISCONTINUED | OUTPATIENT
Start: 2021-03-25 | End: 2021-03-26 | Stop reason: HOSPADM

## 2021-03-25 RX ORDER — NALTREXONE HYDROCHLORIDE 50 MG/1
50 TABLET, FILM COATED ORAL ONCE
Status: DISCONTINUED | OUTPATIENT
Start: 2021-03-25 | End: 2021-03-25

## 2021-03-25 RX ORDER — NALTREXONE HYDROCHLORIDE 50 MG/1
50 TABLET, FILM COATED ORAL ONCE
Status: COMPLETED | OUTPATIENT
Start: 2021-03-26 | End: 2021-03-26

## 2021-03-25 RX ADMIN — BUSPIRONE HYDROCHLORIDE 20 MG: 10 TABLET ORAL at 20:28

## 2021-03-25 RX ADMIN — NICOTINE POLACRILEX 2 MG: 2 GUM, CHEWING BUCCAL at 11:48

## 2021-03-25 RX ADMIN — NICOTINE POLACRILEX 2 MG: 2 GUM, CHEWING BUCCAL at 13:35

## 2021-03-25 RX ADMIN — LORAZEPAM 1 MG: 1 TABLET ORAL at 14:46

## 2021-03-25 RX ADMIN — ATOMOXETINE 60 MG: 60 CAPSULE ORAL at 09:26

## 2021-03-25 RX ADMIN — LORAZEPAM 1 MG: 1 TABLET ORAL at 16:23

## 2021-03-25 RX ADMIN — HYDROXYZINE HYDROCHLORIDE 50 MG: 50 TABLET, FILM COATED ORAL at 13:35

## 2021-03-25 RX ADMIN — BUSPIRONE HYDROCHLORIDE 20 MG: 10 TABLET ORAL at 09:25

## 2021-03-25 RX ADMIN — NICOTINE POLACRILEX 2 MG: 2 GUM, CHEWING BUCCAL at 15:20

## 2021-03-25 RX ADMIN — HYDROXYZINE HYDROCHLORIDE 50 MG: 50 TABLET, FILM COATED ORAL at 21:20

## 2021-03-25 RX ADMIN — HYDROXYZINE HYDROCHLORIDE 50 MG: 50 TABLET, FILM COATED ORAL at 09:29

## 2021-03-25 RX ADMIN — NICOTINE POLACRILEX 2 MG: 2 GUM, CHEWING BUCCAL at 19:57

## 2021-03-25 RX ADMIN — MIRTAZAPINE 15 MG: 15 TABLET, FILM COATED ORAL at 21:20

## 2021-03-25 RX ADMIN — Medication 1 TABLET: at 09:26

## 2021-03-25 RX ADMIN — THIAMINE HCL TAB 100 MG 100 MG: 100 TAB at 09:26

## 2021-03-25 NOTE — GROUP NOTE
Group Therapy Note    Date: 3/25/2021    Group Start Time: 1330  Group End Time: 3465  Group Topic: Cognitive Skills    CZ BHSTACEY Reddy, CTRS      Pt did not attend 1330 cognitive skills group d/t resting in room despite staff invitation to attend. 1:1 talk time offered as alternative to group session, pt declined.               Signature:  Jada Mejia

## 2021-03-25 NOTE — PLAN OF CARE
Problem: Tobacco Use:  Goal: Inpatient tobacco use cessation counseling participation  Description: Inpatient tobacco use cessation counseling participation  Outcome: Ongoing   Patient denies any tobacco cravings at this time. Problem: Depressive Behavior With or Without Suicide Precautions:  Goal: Ability to disclose and discuss suicidal ideas will improve  Description: Ability to disclose and discuss suicidal ideas will improve  3/25/2021 1111 by Mere Ashley LPN  Outcome: Ongoing   Agustín Hsu is alert and oriented x 4 affect is flat, good eye contact. Speech is clear, patient denies any withdrawal symptoms besides some anxiety, which he feels at this time is due to anticipation of discharge. Feels hopeful about the future. Taking medications as ordered.  15 minutes safety checks continue

## 2021-03-25 NOTE — GROUP NOTE
Group Therapy Note    Date: 3/25/2021    Group Start Time: 0900  Group End Time: 0920  Group Topic: Community Meeting    [facility] D Glennie Boas, CTRS    Pt did not attend 0900 goal setting / community meeting  d/t resting in room despite staff invitation to attend. 1:1 talk time offered as alternative to group session, pt declined.           Signature:  Anita Maguire

## 2021-03-25 NOTE — PLAN OF CARE
Problem: Depressive Behavior With or Without Suicide Precautions:  Goal: Ability to disclose and discuss suicidal ideas will improve  Description: Ability to disclose and discuss suicidal ideas will improve  3/24/2021 2128 by Darcy Snow RN  Outcome: Ongoing  Note: Patient denies suicidal ideation and verbally contracts for safety. Patient remains safe during Q15 min and random safety checks. Patient remains in hospital appropriate clothing at all times and free from harmful objects. Patient is accepting of talk time with writer. Denies any thoughts to harm others, denies any hallucinations. States he is eating well and sleeping okay. Isolative to room today.

## 2021-03-25 NOTE — BH NOTE
Patient reevaluated for withdrawal symptoms, /100 P 115 anxious and restless, complains of headache, 1 mg Ativan PO given as ordered along with Gatorade.

## 2021-03-25 NOTE — GROUP NOTE
Group Therapy Note    Date: 3/25/2021    Group Start Time: 1000  Group End Time: 1030  Group Topic: Psychotherapy    EDWIN Castro        Group Therapy Note         Patient refused to attend psychotherapy group after encouragement from staff. 1:1 talk time offered but refused. Signature:   Ty Castro

## 2021-03-25 NOTE — GROUP NOTE
Group Therapy Note    Date: 3/25/2021    Group Start Time: 1100  Group End Time: 4133  Group Topic: Cognitive Skills    [facility]    Pt did not attend cognitive skills group d/t resting in room despite staff invitation to attend. 1:1 talk time offered as alternative to group session, pt declined.         Patient's Goal:  ***    Notes:  ***    Status After Intervention:  {Status After Intervention:492093587}    Participation Level: {Participation Level:041543557}    Participation Quality: {[facility] PARTICIPATION QUALITY:390982535}      Speech:  {ED  CD_SPEECH:78237}      Thought Process/Content: {Thought Process/Content:746217651}      Affective Functioning: {Affective Functionin}      Mood: {Mood:274544059}      Level of consciousness:  {Level of consciousness:058919644}      Response to Learning: {[facility] Responses to Learnin}      Endings: {[facility] Endings:25030}    Modes of Intervention: {[facility] Modes of Intervention:206083850}      Discipline Responsible: {[facility] Multidisciplinary:655122041}      Signature:  KIYA ESCAMILLAS

## 2021-03-25 NOTE — PROGRESS NOTES
Daily Progress Note  Nena Talley CNP  3/25/2021      CHIEF COMPLAINT:  Depression with suicidal ideation     Reviewed patient's current plan of care and vital signs with nursing staff. Sleep:  several hours last night  Attending groups: No    SUBJECTIVE:    Patient compliant with medications, observed socializing with select peers on the unit, he has not attended group today. Patient reports some anxiety as alcohol withdrawal but denies any other. Reviewed his labs, ALT/AST normal, urine drug screen negative. Patient reports his mood is anxious, denies suicidal ideation intent or plan, contracts for safety while in unit. Denies any auditory visual hallucinations. Reports his appetite is good, and they slept through the night. Discussed with patient Vivitrol to help with alcohol cravings. Reviewed risk benefit and alternative treatments. Patient is interested in this. We will order a one-time oral dose of naltrexone 50 mg if he tolerates well can give Vivitrol 380 mg x 1 dose every 28 days. Denies any medication side effects. Mental Status Exam  Level of consciousness:  Awake and alert  Appearance: Casually dressed, lying in bed, with fair grooming and hygiene   Behavior/Motor: No abnormalities noted  Attitude toward examiner:  Cooperative, attentive, good eye contact  Speech:  spontaneous, normal rate, normal volume and well articulated  Mood: Anxious  Affect: Mood congruent  Thought processes:  linear, goal directed and coherent  Thought content:  denies homicidal ideation  Suicidal Ideation: Denies suicidal ideation  Delusions:  no evidence of delusions  Perceptual Disturbance:  denies any perceptual disturbance  Cognition:  Oriented to person, place, time/date, situation   Memory: age appropriate  Insight & Judgement: improving  Medication side effects: denies     Data   height is 5' 9\" (1.753 m) and weight is 200 lb (90.7 kg). His oral temperature is 98.3 °F (36.8 °C).  His blood pressure is 125/100 (abnormal) and his pulse is 115. His respiration is 14 and oxygen saturation is 96%. Labs:   Admission on 03/22/2021   Component Date Value Ref Range Status    Ethanol 03/22/2021 285* <10 mg/dL Final    Ethanol percent 03/22/2021 0.285  % Final    Amphetamine Screen, Ur 03/22/2021 NEGATIVE  NEGATIVE Final    Comment:       (Positive cutoff 1000 ng/mL)                  Barbiturate Screen, Ur 03/22/2021 NEGATIVE  NEGATIVE Final    Comment:       (Positive cutoff 200 ng/mL)                  Benzodiazepine Screen, Urine 03/22/2021 NEGATIVE  NEGATIVE Final    Comment:       (Positive cutoff 200 ng/mL)                  Cocaine Metabolite, Urine 03/22/2021 NEGATIVE  NEGATIVE Final    Comment:       (Positive cutoff 300 ng/mL)                  Methadone Screen, Urine 03/22/2021 NEGATIVE  NEGATIVE Final    Comment:       (Positive cutoff 300 ng/mL)                  Opiates, Urine 03/22/2021 NEGATIVE  NEGATIVE Final    Comment:       (Positive cutoff 300 ng/mL)                  Phencyclidine, Urine 03/22/2021 NEGATIVE  NEGATIVE Final    Comment:       (Positive cutoff 25 ng/mL)                  Propoxyphene, Urine 03/22/2021 NOT REPORTED  NEGATIVE Final    Cannabinoid Scrn, Ur 03/22/2021 NEGATIVE  NEGATIVE Final    Comment:       (Positive cutoff 50 ng/mL)                  Oxycodone Screen, Ur 03/22/2021 NEGATIVE  NEGATIVE Final    Comment:       (Positive cutoff 100 ng/mL)                  Methamphetamine, Urine 03/22/2021 NOT REPORTED  NEGATIVE Final    Tricyclic Antidepressants, Urine 03/22/2021 NOT REPORTED  NEGATIVE Final    MDMA, Urine 03/22/2021 NOT REPORTED  NEGATIVE Final    Buprenorphine Urine 03/22/2021 NOT REPORTED  NEGATIVE Final    Test Information 03/22/2021 Assay provides medical screening only. The absence of expected drug(s) and/or metabolite(s) may indicate diluted or adulterated urine, limitations of testing or timing of collection.    Final    Comment: Testing for legal purposes should be confirmed by another method. To request confirmation   of test result, please call the lab within 7 days of sample submission.       WBC 03/22/2021 6.7  3.5 - 11.0 k/uL Final    RBC 03/22/2021 5.10  4.5 - 5.9 m/uL Final    Hemoglobin 03/22/2021 15.4  13.5 - 17.5 g/dL Final    Hematocrit 03/22/2021 47.2  41 - 53 % Final    MCV 03/22/2021 92.5  80 - 100 fL Final    MCH 03/22/2021 30.2  26 - 34 pg Final    MCHC 03/22/2021 32.7  31 - 37 g/dL Final    RDW 03/22/2021 14.5  11.5 - 14.9 % Final    Platelets 39/15/8845 311  150 - 450 k/uL Final    MPV 03/22/2021 10.5  6.0 - 12.0 fL Final    NRBC Automated 03/22/2021 NOT REPORTED  per 100 WBC Final    Differential Type 03/22/2021 NOT REPORTED   Final    Seg Neutrophils 03/22/2021 48  36 - 66 % Final    Lymphocytes 03/22/2021 42  24 - 44 % Final    Monocytes 03/22/2021 7  1 - 7 % Final    Eosinophils % 03/22/2021 2  0 - 4 % Final    Basophils 03/22/2021 1  0 - 2 % Final    Immature Granulocytes 03/22/2021 NOT REPORTED  0 % Final    Segs Absolute 03/22/2021 3.30  1.3 - 9.1 k/uL Final    Absolute Lymph # 03/22/2021 2.90  1.0 - 4.8 k/uL Final    Absolute Mono # 03/22/2021 0.40  0.1 - 1.3 k/uL Final    Absolute Eos # 03/22/2021 0.10  0.0 - 0.4 k/uL Final    Basophils Absolute 03/22/2021 0.00  0.0 - 0.2 k/uL Final    Absolute Immature Granulocyte 03/22/2021 NOT REPORTED  0.00 - 0.30 k/uL Final    WBC Morphology 03/22/2021 NOT REPORTED   Final    RBC Morphology 03/22/2021 NOT REPORTED   Final    Platelet Estimate 08/17/6854 NOT REPORTED   Final    Glucose 03/22/2021 111* 70 - 99 mg/dL Final    BUN 03/22/2021 6  6 - 20 mg/dL Final    CREATININE 03/22/2021 0.89  0.70 - 1.20 mg/dL Final    Bun/Cre Ratio 03/22/2021 NOT REPORTED  9 - 20 Final    Calcium 03/22/2021 9.8  8.6 - 10.4 mg/dL Final    Sodium 03/22/2021 142  135 - 144 mmol/L Final    Potassium 03/22/2021 4.7  3.7 - 5.3 mmol/L Final    Chloride 03/22/2021 104  98 - 107 mmol/L Final    CO2 03/22/2021 26  20 - 31 mmol/L Final    Anion Gap 03/22/2021 12  9 - 17 mmol/L Final    Alkaline Phosphatase 03/22/2021 77  40 - 129 U/L Final    ALT 03/22/2021 23  5 - 41 U/L Final    AST 03/22/2021 24  <40 U/L Final    Total Bilirubin 03/22/2021 0.39  0.3 - 1.2 mg/dL Final    Total Protein 03/22/2021 7.9  6.4 - 8.3 g/dL Final    Albumin 03/22/2021 4.7  3.5 - 5.2 g/dL Final    Albumin/Globulin Ratio 03/22/2021 NOT REPORTED  1.0 - 2.5 Final    GFR Non- 03/22/2021 >60  >60 mL/min Final    GFR  03/22/2021 >60  >60 mL/min Final    GFR Comment 03/22/2021        Final    Comment: Average GFR for 38-51 years old:   80 mL/min/1.73sq m  Chronic Kidney Disease:   <60 mL/min/1.73sq m  Kidney failure:   <15 mL/min/1.73sq m              eGFR calculated using average adult body mass. Additional eGFR calculator available at:        [employer]            GFR Staging 03/22/2021 NOT REPORTED   Final    Specimen Description 03/23/2021 . NASOPHARYNGEAL SWAB   Final    SARS-CoV-2, Rapid 03/23/2021 Not Detected  Not Detected Final    Comment:       Rapid NAAT:  The specimen is NEGATIVE for SARS-CoV-2, the novel coronavirus associated with   COVID-19. The ID NOW COVID-19 assay is designed to detect the virus that causes COVID-19 in patients   with signs and symptoms of infection who are suspected of COVID-19. An individual without symptoms of COVID-19 and who is not shedding SARS-CoV-2 virus would   expect to have a negative (not detected) result in this assay. Negative results should be treated as presumptive and, if inconsistent with clinical signs   and symptoms or necessary for patient management,  should be tested with an alternative molecular assay. Negative results do not preclude   SARS-CoV-2 infection and   should not be used as the sole basis for patient management decisions.          Fact sheet for Healthcare Providers: [place]aka  Fact sheet for Patients: Ayaka          Methodology: Isothermal Nucleic Acid Amplification              Medications  Current Facility-Administered Medications: naltrexone (VIVITROL) injection 380 mg, 380 mg, Intramuscular, Once  naltrexone (DEPADE) tablet 50 mg, 50 mg, Oral, Once  busPIRone (BUSPAR) tablet 20 mg, 20 mg, Oral, TID  mirtazapine (REMERON) tablet 15 mg, 15 mg, Oral, Nightly  LORazepam (ATIVAN) tablet 1 mg, 1 mg, Oral, Q1H PRN **OR** LORazepam (ATIVAN) injection 1 mg, 1 mg, Intramuscular, Q1H PRN **OR** LORazepam (ATIVAN) tablet 2 mg, 2 mg, Oral, Q1H PRN **OR** LORazepam (ATIVAN) injection 2 mg, 2 mg, Intramuscular, Q1H PRN **OR** LORazepam (ATIVAN) tablet 3 mg, 3 mg, Oral, Q1H PRN **OR** LORazepam (ATIVAN) injection 3 mg, 3 mg, Intramuscular, Q1H PRN **OR** LORazepam (ATIVAN) tablet 4 mg, 4 mg, Oral, Q1H PRN **OR** LORazepam (ATIVAN) injection 4 mg, 4 mg, Intramuscular, Q1H PRN  thiamine mononitrate tablet 100 mg, 100 mg, Oral, Daily  therapeutic multivitamin-minerals 1 tablet, 1 tablet, Oral, Daily  acetaminophen (TYLENOL) tablet 650 mg, 650 mg, Oral, Q4H PRN  aluminum & magnesium hydroxide-simethicone (MAALOX) 200-200-20 MG/5ML suspension 30 mL, 30 mL, Oral, Q6H PRN  ibuprofen (ADVIL;MOTRIN) tablet 400 mg, 400 mg, Oral, Q6H PRN  nicotine (NICODERM CQ) 14 MG/24HR 1 patch, 1 patch, Transdermal, Daily  polyethylene glycol (GLYCOLAX) packet 17 g, 17 g, Oral, Daily PRN  traZODone (DESYREL) tablet 50 mg, 50 mg, Oral, Nightly PRN  hydrOXYzine (ATARAX) tablet 50 mg, 50 mg, Oral, TID PRN  atomoxetine (STRATTERA) capsule 60 mg, 60 mg, Oral, Daily  nicotine polacrilex (NICORETTE) gum 2 mg, 2 mg, Oral, PRN    ASSESSMENT  Major depressive disorder, recurrent severe without psychotic features (HCC)  TERRIE  Alcohol dependence  Rule out borderline personality disorder    PLAN  Patients symptoms are improving  Continue current medication regimen  Increase BuSpar to 20 mg 3 times a day  Reviewed labs  One-time dose of naltrexone 50 mg orally, if tolerates give Vivitrol 380 mg IM  Attempt to develop insight  Psycho-education conducted. Supportive Therapy conducted. Probable discharge is TBD  Follow-up daily while inpatient        **This report has been created using voice recognition software. It may contain minor errors which are inherent in voice recognition technology. **      I independently saw and evaluated the patient. I reviewed the midlevel provider's documentation above. Any additional comments or changes to the midlevel provider's documentation are stated below otherwise agree with assessment. The patient's mood is improved. He continues to report anxiety symptoms. He states he has benefited from a higher dose of buspirone. This will be increased as noted above. He is tolerating the Remeron well and reports no side effects. PLAN  Medications as noted above  Attempt to develop insight  Psycho-education conducted. Supportive Therapy conducted.   Probable discharge is as noted above  Follow-up daily while on inpatient unit    Electronically signed by Becca Herbert MD on 3/25/21 at 5:20 PM EDT

## 2021-03-26 PROCEDURE — 99239 HOSP IP/OBS DSCHRG MGMT >30: CPT | Performed by: PSYCHIATRY & NEUROLOGY

## 2021-03-26 PROCEDURE — 6370000000 HC RX 637 (ALT 250 FOR IP): Performed by: NURSE PRACTITIONER

## 2021-03-26 PROCEDURE — 6370000000 HC RX 637 (ALT 250 FOR IP): Performed by: PSYCHIATRY & NEUROLOGY

## 2021-03-26 RX ORDER — MIRTAZAPINE 15 MG/1
15 TABLET, FILM COATED ORAL NIGHTLY
Qty: 30 TABLET | Refills: 3 | Status: ON HOLD | OUTPATIENT
Start: 2021-03-26 | End: 2021-09-03

## 2021-03-26 RX ORDER — M-VIT,TX,IRON,MINS/CALC/FOLIC 27MG-0.4MG
1 TABLET ORAL DAILY
Qty: 30 TABLET | Refills: 0 | Status: ON HOLD | OUTPATIENT
Start: 2021-03-27 | End: 2022-06-14 | Stop reason: SDUPTHER

## 2021-03-26 RX ORDER — GAUZE BANDAGE 2" X 2"
100 BANDAGE TOPICAL DAILY
Qty: 30 TABLET | Refills: 0 | Status: ON HOLD | OUTPATIENT
Start: 2021-03-27 | End: 2022-06-14 | Stop reason: SDUPTHER

## 2021-03-26 RX ORDER — BUSPIRONE HYDROCHLORIDE 10 MG/1
20 TABLET ORAL 3 TIMES DAILY
Qty: 180 TABLET | Refills: 0 | Status: SHIPPED | OUTPATIENT
Start: 2021-03-26 | End: 2021-09-02 | Stop reason: DRUGHIGH

## 2021-03-26 RX ORDER — ATOMOXETINE 60 MG/1
60 CAPSULE ORAL DAILY
Qty: 30 CAPSULE | Refills: 3 | Status: SHIPPED | OUTPATIENT
Start: 2021-03-27 | End: 2021-09-02 | Stop reason: DRUGHIGH

## 2021-03-26 RX ADMIN — NALTREXONE HYDROCHLORIDE 50 MG: 50 TABLET, FILM COATED ORAL at 08:50

## 2021-03-26 RX ADMIN — NICOTINE POLACRILEX 2 MG: 2 GUM, CHEWING BUCCAL at 10:22

## 2021-03-26 RX ADMIN — Medication 1 TABLET: at 08:50

## 2021-03-26 RX ADMIN — BUSPIRONE HYDROCHLORIDE 20 MG: 10 TABLET ORAL at 08:50

## 2021-03-26 RX ADMIN — HYDROXYZINE HYDROCHLORIDE 50 MG: 50 TABLET, FILM COATED ORAL at 10:50

## 2021-03-26 RX ADMIN — THIAMINE HCL TAB 100 MG 100 MG: 100 TAB at 08:50

## 2021-03-26 RX ADMIN — ATOMOXETINE 60 MG: 60 CAPSULE ORAL at 08:50

## 2021-03-26 NOTE — GROUP NOTE
Group Therapy Note    Date: 3/26/2021    Group Start Time: 0900  Group End Time: 0930  Group Topic: Community Meeting    EDWIN Abreuer, [address]         Group Therapy Note    Attendees: 5/16         Pt did not participate in Community Meeting/Goals Group at 900am when encouraged by RT due to resting in room. RT attempted to discuss goal 1:1 with pt in room but pt declined. Pt was offered talk time as an alternative to group but declined.       Discipline Responsible: Psychoeducational Specialist      Signature:  Isabelle Cox

## 2021-03-26 NOTE — PLAN OF CARE
Problem: Depressive Behavior With or Without Suicide Precautions:  Goal: Ability to disclose and discuss suicidal ideas will improve  Description: Ability to disclose and discuss suicidal ideas will improve  3/25/2021 2101 by Michael Fermin RN  Outcome: Ongoing  Note: Pt denies suicidal/homicidal ideation and visual/auditory hallucinations. Is  polite, cooperative, and medication compliant. Out in dayroom watching tv and reading. Aloof to peers     Problem: Tobacco Use:  Goal: Inpatient tobacco use cessation counseling participation  Description: Inpatient tobacco use cessation counseling participation  3/25/2021 2101 by Michael Fermin RN  Outcome: Ongoing  Note: Patient given tobacco quitline number 39782227575 at this time, refusing to call at this time, states \" I just dont want to quit now\"- patient given information as to the dangers of long term tobacco use. Continue to reinforce the importance of tobacco cessation.         Problem: Pain:  Goal: Pain level will decrease  Description: Pain level will decrease  Outcome: Ongoing  Note: Pt denies pain     Problem: Pain:  Goal: Control of acute pain  Description: Control of acute pain  Outcome: Ongoing  Note: Pt denies pain     Problem: Pain:  Goal: Control of chronic pain  Description: Control of chronic pain  Outcome: Ongoing  Note: Pt denies pain

## 2021-03-26 NOTE — CARE COORDINATION
Name: Pancho Summers    : 1980    Discharge Date: 3/26/2021    Primary Auth/Cert #: [id number]    Destination: [address] [place] [address]    Discharge Medications:      Medication List      START taking these medications    mirtazapine 15 MG tablet  Commonly known as: REMERON  Take 1 tablet by mouth nightly  Notes to patient: For sleeping/mood     naltrexone 380 MG injection  Commonly known as: VIVITROL  Inject 380 mg into the muscle every 30 days  Start taking on: 2021  Notes to patient: For alcohol addiction      therapeutic multivitamin-minerals tablet  Take 1 tablet by mouth daily  Start taking on: 2021  Notes to patient: Supplement      thiamine mononitrate 100 MG tablet  Take 1 tablet by mouth daily  Start taking on: 2021  Notes to patient: Supplement         CHANGE how you take these medications    busPIRone 10 MG tablet  Commonly known as: BUSPAR  Take 2 tablets by mouth 3 times daily  What changed: when to take this  Notes to patient: For anxiety         CONTINUE taking these medications    atomoxetine 60 MG capsule  Commonly known as: STRATTERA  Take 1 capsule by mouth daily  Start taking on: 2021  Notes to patient: To help with focus           Where to Get Your Medications      These medications were sent to [address] [place] [address] 18, [address] [facility] [address]    Hours: 24-hours Phone: [phone]   · atomoxetine 60 MG capsule  · busPIRone 10 MG tablet  · mirtazapine 15 MG tablet  · naltrexone 380 MG injection  · therapeutic multivitamin-minerals tablet  · thiamine mononitrate 100 MG tablet         Follow Up Appointment: Discharge to: [address] [place] [address]    [phone]    Go on 3/26/2021  If Johnathan Lubin doesn't have a ride, please call [facility] transportation    [employer]  for Sam Dowd.   [place], [place] [address]  Phone: [phone]  Fax: [phone] Please fax AVS    Go on 3/29/2021  You have an appointment with Dr. Chase Ocasio on Monday, March 29 at 1:00pm    [facility]  [address]   [facility], [address] [facility] [address]  [phone]  Fax: [phone]  Please fax AVS        Schedule an appointment as soon as possible for a visit  Your therapist, Harsh Garcia, will contact you directly with your next appointment

## 2021-03-26 NOTE — CARE COORDINATION
SOCIAL SERVICE NOTE: EVITA meets with PT to complete 1:1 to discuss discharge planning.  Pt is being discharged today and plans on returning to [place] [address] at [facility] in [facility] EVITA schedules PT follow up appointments with [facility] and also at [place] [address] with therapist Rocio Lamar per PT request.
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
## 2021-03-26 NOTE — BH NOTE
Patient given tobacco quitline number 76764650369 at this time, refusing to call at this time, states \" I just dont want to quit now\"- patient given information as to the dangers of long term tobacco use. Continue to reinforce the importance of tobacco cessation.

## 2021-03-26 NOTE — SUICIDE SAFETY PLAN
clergy, someone else? 1. MotherFabienne [phone]  2. Other friends, family members and social support systems    Professionals or [facility] agencies I can contact during a crisis: Who can I call for help - for example, my doctor, my psychiatrist, my psychologist, a mental health provider, a suicide hotline? 1. Colt Escudero at [facility] Therapy and Dr. Farida Noyola at [facility] outpatient  2. Suicide Prevention Lifeline: 7-510-548-TALK (6196)  3. [facility]: Emergency Services Address: [address] [facility], [facility],  [address], Phone: [phone]  4. [facility] [employer]: 2-1-1, [phone] or [address] [facility] [address] Emergency Services - for example, [address], [facility] suicide hotline,   1. [place] [address], 6-495.941.4048  2. Mental Health & Recovery Services Board [facility], Crisis line: [phone]  3. [employer] (Crisis Intervention Team - CIT), 899.580.9687 or 9-1-1  4. [facility], 0-524.834.7511  5. National Association of Mental Illness, 0-919.358.4920  6. [facility] Substance Abuse National Helpline, 3-378-076-HELP (5023)  7. Crisis Text Line, Text 4HOPE to 109092 to connect with a crisis counselor  8. [facility], 0-307.415.3214  9. [employer] ([place], [address]), 0-733.330.2018  10. [employer] (Alcohol / Drug help)  11. Call the Recovery Helpline at 07 613 505 (24 hours a day - 7 days a week)  12. COVID-19 Emotional Support Line: 101.372.7647    Making the environment safe: How can I make my environment (house/apartment/living space) safer? For example, can I remove guns, medications, and other items? 1. Remove unsafe objects  2. Keep Medications in safe and secure location  3.  Plan daily goals to help remember to stay on specific medications

## 2021-03-26 NOTE — GROUP NOTE
Group Therapy Note    Date: 3/26/2021    Group Start Time: 1100  Group End Time: 1150  Group Topic: Cognitive Skills    [facility] CINDY Barnett        Group Therapy Note    Attendees: 4/12         Patient's Goal:  To increase social interaction and to practice problem solving and communication skills. Notes: Pt participated fully in group task . Pt was able to practice problem solving and communication skills. Pt was pleasant and supportive of peers. Status After Intervention:  Improved     Participation Level: Attentive, Interactive, alert     Participation Quality:  Attentive, Sharing , Appropriate     Speech:  Normal     Thought Process/Content: Logical,linear.      Affective Functioning: congruent, brightened      Mood:  Euthymic , expressed enjoyment of task     Level of consciousness:  Alert, Oriented x4 and Attentive        Response to Learning: Able to verbalize current knowledge/experience, Able to acknowledge new learning ,and Progressing to goal        Endings: None Reported     Modes of Intervention: Education, Support, Socialization, Exploration, Clarifying and Problem-solving        Discipline Responsible: Psychoeducational Specialist        Signature:  CINDY Ward

## 2021-03-26 NOTE — BH NOTE
[facility]  Discharge Note    Pt discharged with followings belongings:   Dentures: None  Vision - Corrective Lenses: Glasses  Hearing Aid: None  Jewelry: None  Body Piercings Removed: N/A  Clothing: Footwear, Pants, Shirt, Socks, Undergarments (Comment)  Were All Patient Medications Collected?: Not Applicable  Other Valuables: Cell phone, Money (Comment), Wallet   Valuables sent home with patient  Valuables retrieved from safe, Security envelope number:  [id number] and returned to patient.  Patient education on aftercare instructions: yes  Information faxed to [facility] recovery by staff Patient verbalize understanding of AVS:  yes  Status EXAM upon discharge:  Status and Exam  Normal: No  Facial Expression: Flat  Affect: Appropriate  Level of Consciousness: Alert  Mood:Normal: No  Mood: Anxious  Motor Activity:Normal: No  Motor Activity: Decreased  Interview Behavior: Cooperative  Preception: [place] to Person, [place] to Time, [place] to Place, [place] to Situation  Attention:Normal: No  Attention: Distractible  Thought Processes: Circumstantial  Thought Content:Normal: No  Thought Content: Preoccupations  Hallucinations: None  Delusions: No  Memory:Normal: Yes  Insight and Judgment: No  Insight and Judgment: Poor Insight  Present Suicidal Ideation: No  Present Homicidal Ideation: No  Patient discharged to home address, transported by friend    Metabolic Screening:    Lab Results   Component Value Date    LABA1C 5.4 09/25/2020       Lab Results   Component Value Date    CHOL 129 07/14/2020    CHOL 123 01/20/2019    CHOL 129 11/06/2018     Lab Results   Component Value Date    TRIG 117 07/14/2020    TRIG 73 01/20/2019    TRIG 89 11/06/2018     Lab Results   Component Value Date    HDL 31 (L) 07/14/2020    HDL 35 (L) 03/24/2020    HDL 53 01/20/2019    HDL 36 (L) 11/06/2018     No components found for: LDLCAL  No results found for: LABVLDL    Mere Ashley LPN

## 2021-05-24 NOTE — PLAN OF CARE
[facility]  Initial Interdisciplinary Treatment Plan NO      Original treatment plan Date & Time: 3/23/2021  0724    Admission Type:  Admission Type: Voluntary    Reason for admission:   Reason for Admission: alcohol intoxication; increase depression/anxiety/suicidal thoughts due to multiple medication changes    Estimated Length of Stay:  5-7days  Estimated Discharge Date: to be determined by physician    PATIENT STRENGTHS:  Patient Strengths:Strengths: Communication, Positive Support, Social Skills, Medication Compliance  Patient Strengths and Limitations:Limitations: Inappropriate/potentially harmful leisure interests  Addictive Behavior: Addictive Behavior  In the past 3 months, have you felt or has someone told you that you have a problem with:  : None  Do you have a history of Chemical Use?: No  Do you have a history of Alcohol Use?: Yes  Do you have a history of Street Drug Abuse?: No  Histroy of Prescripton Drug Abuse?: No  Medical Problems:  Past Medical History:   Diagnosis Date    Alcoholism ([facility] 01/31/2018    niw clean an sober    [facility]    Hepatitis C 2010    pt states he tested + for antibodies. no live virus detected -no treatment needed    History of atrial fibrillation 2001    pt states after a suicide attempt, overdose oxycontin pt developed atrial fib x 5-6 months.  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, acute intoxication ([address]     NO LONGER PERTINENT    Palpitations     DENIES    Seizures ([facility] 2006    during alcohol detox     Wears glasses      Status EXAM:Status and Exam  Normal: No  Facial Expression: Flat  Affect: Appropriate  Level of Consciousness: Alert  Mood:Normal: No  Mood: Depressed, Anxious, Helpless  Motor Activity:Normal: Yes  Interview Behavior: Cooperative  Preception: [place] to Person, [place] to Time, [place] to Place, [place] to Situation  Attention:Normal: No  Attention: Distractible  Thought Processes: Circumstantial  Thought Content:Normal: No  Thought Content: Preoccupations  Hallucinations: None  Delusions: No  Memory:Normal: Yes  Insight and Judgment: No  Insight and Judgment: Poor Judgment, Poor Insight  Present Suicidal Ideation: No  Present Homicidal Ideation: No    EDUCATION:   Learner Progress Toward Treatment Goals: reviewed group plans and strategies for care    Method:group therapy, medication compliance, individualized assessments and care planning    Outcome: needs reinforcement    PATIENT GOALS: to be discussed with patient within 72 hours    PLAN/TREATMENT RECOMMENDATIONS:     continue group therapy , medications compliance, goal setting, individualized assessments and care, continue to monitor pt on unit      SHORT-TERM GOALS:   Time frame for Short-Term Goals: 5-7 days    LONG-TERM GOALS:  Time frame for Long-Term Goals: 6 months  Members Present in Team Meeting: See Signature Sheet    ALBINO Broussard Replaced

## 2021-07-24 ENCOUNTER — APPOINTMENT (OUTPATIENT)
Dept: GENERAL RADIOLOGY | Age: 41
End: 2021-07-24
Payer: MEDICAID

## 2021-07-24 ENCOUNTER — HOSPITAL ENCOUNTER (EMERGENCY)
Age: 41
Discharge: HOME | End: 2021-07-24
Attending: EMERGENCY MEDICINE
Payer: MEDICAID

## 2021-07-24 VITALS
BODY MASS INDEX: 28.63 KG/M2 | DIASTOLIC BLOOD PRESSURE: 81 MMHG | WEIGHT: 200 LBS | OXYGEN SATURATION: 98 % | RESPIRATION RATE: 19 BRPM | HEART RATE: 137 BPM | SYSTOLIC BLOOD PRESSURE: 126 MMHG | HEIGHT: 70 IN | TEMPERATURE: 98.2 F

## 2021-07-24 DIAGNOSIS — N17.9 AKI (ACUTE KIDNEY INJURY) (HCC): ICD-10-CM

## 2021-07-24 DIAGNOSIS — R00.0 TACHYCARDIA: ICD-10-CM

## 2021-07-24 DIAGNOSIS — S60.221A CONTUSION OF RIGHT HAND, INITIAL ENCOUNTER: Primary | ICD-10-CM

## 2021-07-24 LAB
ABSOLUTE EOS #: 0.09 K/UL (ref 0–0.44)
ABSOLUTE IMMATURE GRANULOCYTE: 0.04 K/UL (ref 0–0.3)
ABSOLUTE LYMPH #: 4.46 K/UL (ref 1.1–3.7)
ABSOLUTE MONO #: 1.06 K/UL (ref 0.1–1.2)
ANION GAP SERPL CALCULATED.3IONS-SCNC: 17 MMOL/L (ref 9–17)
BASOPHILS # BLD: 0 % (ref 0–2)
BASOPHILS ABSOLUTE: 0.03 K/UL (ref 0–0.2)
BNP INTERPRETATION: NORMAL
BUN BLDV-MCNC: 12 MG/DL (ref 6–20)
BUN/CREAT BLD: 9 (ref 9–20)
CALCIUM SERPL-MCNC: 8.4 MG/DL (ref 8.6–10.4)
CHLORIDE BLD-SCNC: 101 MMOL/L (ref 98–107)
CO2: 18 MMOL/L (ref 20–31)
CREAT SERPL-MCNC: 1.27 MG/DL (ref 0.7–1.2)
D-DIMER QUANTITATIVE: 0.42 MG/L FEU (ref 0–0.59)
DIFFERENTIAL TYPE: ABNORMAL
EOSINOPHILS RELATIVE PERCENT: 1 % (ref 1–4)
ETHANOL PERCENT: 0.29 %
ETHANOL: 287 MG/DL
GFR AFRICAN AMERICAN: >60 ML/MIN
GFR NON-AFRICAN AMERICAN: >60 ML/MIN
GFR SERPL CREATININE-BSD FRML MDRD: ABNORMAL ML/MIN/{1.73_M2}
GFR SERPL CREATININE-BSD FRML MDRD: ABNORMAL ML/MIN/{1.73_M2}
GLUCOSE BLD-MCNC: 133 MG/DL (ref 70–99)
HCT VFR BLD CALC: 40.1 % (ref 40.7–50.3)
HEMOGLOBIN: 14 G/DL (ref 13–17)
IMMATURE GRANULOCYTES: 0 %
LYMPHOCYTES # BLD: 44 % (ref 24–43)
MCH RBC QN AUTO: 30.6 PG (ref 25.2–33.5)
MCHC RBC AUTO-ENTMCNC: 34.9 G/DL (ref 28.4–34.8)
MCV RBC AUTO: 87.6 FL (ref 82.6–102.9)
MONOCYTES # BLD: 11 % (ref 3–12)
NRBC AUTOMATED: 0 PER 100 WBC
PDW BLD-RTO: 13.8 % (ref 11.8–14.4)
PLATELET # BLD: 295 K/UL (ref 138–453)
PLATELET ESTIMATE: ABNORMAL
PMV BLD AUTO: 8.8 FL (ref 8.1–13.5)
POTASSIUM SERPL-SCNC: 4.3 MMOL/L (ref 3.7–5.3)
PRO-BNP: <20 PG/ML
RBC # BLD: 4.58 M/UL (ref 4.21–5.77)
RBC # BLD: ABNORMAL 10*6/UL
SEG NEUTROPHILS: 44 % (ref 36–65)
SEGMENTED NEUTROPHILS ABSOLUTE COUNT: 4.41 K/UL (ref 1.5–8.1)
SODIUM BLD-SCNC: 136 MMOL/L (ref 135–144)
TROPONIN INTERP: NORMAL
TROPONIN T: NORMAL NG/ML
TROPONIN, HIGH SENSITIVITY: <6 NG/L (ref 0–22)
TSH SERPL DL<=0.05 MIU/L-ACNC: 2.32 MIU/L (ref 0.3–5)
WBC # BLD: 10.1 K/UL (ref 3.5–11.3)
WBC # BLD: ABNORMAL 10*3/UL

## 2021-07-24 PROCEDURE — 99284 EMERGENCY DEPT VISIT MOD MDM: CPT

## 2021-07-24 PROCEDURE — G0480 DRUG TEST DEF 1-7 CLASSES: HCPCS

## 2021-07-24 PROCEDURE — 80048 BASIC METABOLIC PNL TOTAL CA: CPT

## 2021-07-24 PROCEDURE — 83880 ASSAY OF NATRIURETIC PEPTIDE: CPT

## 2021-07-24 PROCEDURE — 84443 ASSAY THYROID STIM HORMONE: CPT

## 2021-07-24 PROCEDURE — 71046 X-RAY EXAM CHEST 2 VIEWS: CPT

## 2021-07-24 PROCEDURE — 93005 ELECTROCARDIOGRAM TRACING: CPT | Performed by: EMERGENCY MEDICINE

## 2021-07-24 PROCEDURE — 85379 FIBRIN DEGRADATION QUANT: CPT

## 2021-07-24 PROCEDURE — 73130 X-RAY EXAM OF HAND: CPT

## 2021-07-24 PROCEDURE — 85025 COMPLETE CBC W/AUTO DIFF WBC: CPT

## 2021-07-24 PROCEDURE — 73110 X-RAY EXAM OF WRIST: CPT

## 2021-07-24 PROCEDURE — 84484 ASSAY OF TROPONIN QUANT: CPT

## 2021-07-24 RX ORDER — 0.9 % SODIUM CHLORIDE 0.9 %
1000 INTRAVENOUS SOLUTION INTRAVENOUS ONCE
Status: DISCONTINUED | OUTPATIENT
Start: 2021-07-24 | End: 2021-07-25 | Stop reason: HOSPADM

## 2021-07-24 RX ORDER — ACETAMINOPHEN 500 MG
1000 TABLET ORAL EVERY 8 HOURS PRN
Qty: 20 TABLET | Refills: 0 | Status: SHIPPED | OUTPATIENT
Start: 2021-07-24 | End: 2021-09-02

## 2021-07-24 RX ORDER — KETOROLAC TROMETHAMINE 15 MG/ML
15 INJECTION, SOLUTION INTRAMUSCULAR; INTRAVENOUS ONCE
Status: DISCONTINUED | OUTPATIENT
Start: 2021-07-24 | End: 2021-07-25 | Stop reason: HOSPADM

## 2021-07-24 RX ORDER — NAPROXEN 500 MG/1
500 TABLET ORAL 2 TIMES DAILY WITH MEALS
Qty: 14 TABLET | Refills: 0 | Status: SHIPPED | OUTPATIENT
Start: 2021-07-24 | End: 2021-09-02

## 2021-07-24 ASSESSMENT — ENCOUNTER SYMPTOMS
SHORTNESS OF BREATH: 0
NAUSEA: 0
VOMITING: 0

## 2021-07-24 ASSESSMENT — PAIN SCALES - GENERAL
PAINLEVEL_OUTOF10: 2
PAINLEVEL_OUTOF10: 2

## 2021-07-24 ASSESSMENT — PAIN DESCRIPTION - PAIN TYPE: TYPE: ACUTE PAIN

## 2021-07-24 ASSESSMENT — PAIN DESCRIPTION - LOCATION: LOCATION: HAND

## 2021-07-24 ASSESSMENT — PAIN DESCRIPTION - ORIENTATION: ORIENTATION: RIGHT

## 2021-07-24 NOTE — ED PROVIDER NOTES
[facility]  Emergency Department Encounter     Pt Name: Manny Juarez  MRN: [id number]  [place] 1980  Date of evaluation: 7/24/21  PCP:  KILLIAN Jorgensen CNP    CHIEF COMPLAINT       Chief Complaint   Patient presents with    Hand Pain     Right hand pain. Pt reports that he was moving furniture a couple of days ago when furniture was falling down stairs and he stopped fall with right hand        HISTORY OF PRESENT ILLNESS  (Location/Symptom, Timing/Onset, Context/Setting, Quality, Duration, Modifying Factors, Severity.)    Manny Juarez is a 36 y.o. male who presents with right hand and wrist pain. Mostly with movement. States that today when he was trying to shift gears in his car this is what really started to bother him and he decided to come in the emergency department for evaluation. He states that a couple days ago he was helping lift a couch when they were on the stairs and the couch tipped and to prevent the catch from falling on top of them he caught the side of the couch with his hand and his wrist was bent backwards. Has tried to tough it out at home without much improvement. Has been taking Excedrin at home without much relief of the pain. Denies any numbness or tingling. I did note some abrasions, states that his tetanus is up-to-date within the last 10 years. In triage his heart rate was noted to be in the 140s. EKG obtained here which did show heart rate in the 130s. Patient admitted that he was a prior IV drug abuser, however states he is in recovery. However does state to drinking alcohol this morning after having a \"moving party \". Denies any chest pain, shortness of breath, palpitations, nausea or vomiting, lightheadedness or dizziness.     PAST MEDICAL / SURGICAL / SOCIAL / FAMILY HISTORY    has a past medical history of Alcoholism ([facility] [address] Anxiety, Hepatitis C, History of atrial fibrillation, Mental and behavioral disorders d/t use of alcohol, acute intoxication ([facility] [address] Palpitations, Seizures ([facility] [address] and Wears glasses. has a past surgical history that includes Tonsillectomy (1983); Finger surgery (Left, 2004); Cystocopy (Bilateral, 12/19/2018); and Cystoscopy (Bilateral, 12/19/2018). Social History     Socioeconomic History    Marital status:      Spouse name: Not on file    Number of children: 2    Years of education: Not on file    Highest education level: Not on file   Occupational History    Not on file   Tobacco Use    Smoking status: Former Smoker     Packs/day: 1.00     Years: 20.00     Pack years: 20.00     Types: Cigarettes    Smokeless tobacco: Former User     Types: Snuff, Chew   Vaping Use    Vaping Use: Every day   Substance and Sexual Activity    Alcohol use: Yes    Drug use: Not Currently     Types: Cocaine     Comment: reports using on 1/17/19    Sexual activity: Not Currently   Other Topics Concern    Not on file   Social History Narrative    Not on file     Social Determinants of Health     Financial Resource Strain:     Difficulty of Paying Living Expenses:    Food Insecurity:     Worried About Running Out of Food in the Last Year:     [address] in the Last Year:    Transportation Needs:     Lack of Transportation (Medical):      Lack of Transportation (Non-Medical):    Physical Activity:     Days of Exercise per Week:     Minutes of Exercise per Session:    Stress:     Feeling of Stress :    Social Connections:     Frequency of Communication with Friends and Family:     Frequency of Social Gatherings with Friends and Family:     Attends Sabianism Services:     Active Member of Clubs or Organizations:     Attends Club or Organization Meetings:     Marital Status:    Intimate Partner Violence:     Fear of Current or Ex-Partner:     Emotionally Abused:     Physically Abused:     Sexually Abused:        Family History   Problem Relation Age of Onset    Mental Illness Mother     Depression Mother     High Blood Pressure Father     High Cholesterol Father     Substance Abuse Brother     Breast Cancer Maternal Grandmother        Allergies:    Dicloxacillin and Pcn [penicillins]    Home Medications:  Prior to Admission medications    Medication Sig Start Date End Date Taking? Authorizing Provider   acetaminophen (TYLENOL) 500 MG tablet Take 2 tablets by mouth every 8 hours as needed for Pain 7/24/21  Yes Nisha Obed Coffee, DO   naproxen (NAPROSYN) 500 MG tablet Take 1 tablet by mouth 2 times daily (with meals) for 7 days 7/24/21 7/31/21 Yes Nisha Obed Coffee, DO   thiamine mononitrate 100 MG tablet Take 1 tablet by mouth daily 3/27/21   Rita Blankenship MD   atomoxetine (STRATTERA) 60 MG capsule Take 1 capsule by mouth daily 3/27/21   Rita Blankenship MD   Multiple Vitamins-Minerals (THERAPEUTIC MULTIVITAMIN-MINERALS) tablet Take 1 tablet by mouth daily 3/27/21   Rita Blankenship MD   naltrexone (VIVITROL) 380 MG injection Inject 380 mg into the muscle every 30 days 4/26/21   Rita Blankenship MD   mirtazapine (REMERON) 15 MG tablet Take 1 tablet by mouth nightly 3/26/21   Rita Blankenship MD   busPIRone (BUSPAR) 10 MG tablet Take 2 tablets by mouth 3 times daily 3/26/21   Rita Blankenship MD       REVIEW OF SYSTEMS    (2-9 systems for level 4, 10 or more for level 5)    Review of Systems   Constitutional: Negative for diaphoresis. Respiratory: Negative for shortness of breath. Cardiovascular: Negative for chest pain and palpitations. Gastrointestinal: Negative for nausea and vomiting. Musculoskeletal: Positive for arthralgias and myalgias. Skin: Positive for wound. Neurological: Negative for dizziness, weakness, light-headedness and numbness. Hematological: Does not bruise/bleed easily.        PHYSICAL EXAM   (up to 7 for level 4, 8 or more for level 5)    VITALS:   Vitals:    07/24/21 1704   BP: 126/81   Pulse: 137   Resp: 19   Temp: 98.2 °F (36.8 °C)   TempSrc: Oral   SpO2: 98%   Weight: 200 lb (90.7 kg)   Height: 5' 10\" (1.778 m)       Physical Exam  Vitals and nursing note reviewed. Constitutional:       General: He is not in acute distress. Appearance: He is well-developed. He is not diaphoretic. Comments: Smells of etoh   HENT:      Head: Normocephalic and atraumatic. Mouth/Throat:      Mouth: Mucous membranes are moist.   Eyes:      Extraocular Movements: Extraocular movements intact. Conjunctiva/sclera: Conjunctivae normal.      Pupils: Pupils are equal, round, and reactive to light. Comments: Pupils 4mm and reactive    Cardiovascular:      Rate and Rhythm: Regular rhythm. Tachycardia present. Heart sounds: Normal heart sounds. No murmur heard. Pulmonary:      Effort: Pulmonary effort is normal. No respiratory distress. Breath sounds: Normal breath sounds. No wheezing, rhonchi or rales. Abdominal:      General: There is no distension. Palpations: Abdomen is soft. Tenderness: There is no abdominal tenderness. There is no guarding or rebound. Musculoskeletal:      Right wrist: No swelling, deformity, effusion, tenderness, bony tenderness, snuff box tenderness or crepitus. Decreased range of motion. Normal pulse. Right hand: Swelling and tenderness present. No deformity, lacerations or bony tenderness. Normal range of motion. Normal strength. Normal sensation. Normal capillary refill. Normal pulse. Cervical back: Normal range of motion and neck supple. Right lower leg: No edema. Left lower leg: No edema. Skin:     General: Skin is warm and dry. Coloration: Skin is not pale. Findings: Abrasion, signs of injury and wound present. No bruising, ecchymosis or laceration. Neurological:      Mental Status: He is alert. Sensory: Sensation is intact. Motor: Motor function is intact. Psychiatric:         Speech: Speech is slurred.          Behavior: Behavior normal.         DIFFERENTIAL DIAGNOSIS   PLAN (LABS / IMAGING / EKG):  Orders Placed This Encounter   Procedures    XR CHEST (2 VW)    XR HAND RIGHT (MIN 3 VIEWS)    XR WRIST RIGHT (MIN 3 VIEWS)    CBC with DIFF    Basic Metabolic Panel    TSH w/reflex to FT4    Ethanol Alcohol    Troponin    D-Dimer Test    Brain Natriuretic Peptide    EKG 12 Lead    Insert peripheral IV       MEDICATIONS ORDERED:  Orders Placed This Encounter   Medications    0.9 % sodium chloride bolus    ketorolac (TORADOL) injection 15 mg    acetaminophen (TYLENOL) 500 MG tablet     Sig: Take 2 tablets by mouth every 8 hours as needed for Pain     Dispense:  20 tablet     Refill:  0    naproxen (NAPROSYN) 500 MG tablet     Sig: Take 1 tablet by mouth 2 times daily (with meals) for 7 days     Dispense:  14 tablet     Refill:  0     DIAGNOSTIC RESULTS / EMERGENCYDEPARTMENT COURSE / MDM   LABS:  Labs Reviewed   CBC WITH AUTO DIFFERENTIAL - Abnormal; Notable for the following components:       Result Value    Hematocrit 40.1 (*)     MCHC 34.9 (*)     Lymphocytes 44 (*)     Absolute Lymph # 4.46 (*)     All other components within normal limits   BASIC METABOLIC PANEL - Abnormal; Notable for the following components:    Glucose 133 (*)     CREATININE 1.27 (*)     Calcium 8.4 (*)     CO2 18 (*)     All other components within normal limits   ETHANOL - Abnormal; Notable for the following components:    Ethanol 287 (*)     Ethanol percent 0.287 (*)     All other components within normal limits   TSH WITH REFLEX   TROPONIN   D-DIMER, QUANTITATIVE   BRAIN NATRIURETIC PEPTIDE       RADIOLOGY:  XR CHEST (2 VW)    Result Date: 7/24/2021  EXAMINATION: TWO XRAY VIEWS OF THE CHEST 7/24/2021 5:46 pm COMPARISON: 01/21/2018 HISTORY: ORDERING SYSTEM PROVIDED HISTORY: tachycardia TECHNOLOGIST PROVIDED HISTORY: tachycardia Reason for Exam: sob Acuity: Unknown Type of Exam: Unknown Relevant Medical/Surgical History: sob x today FINDINGS: The cardiomediastinal silhouette is normal in size and contour. The lungs are clear. No pleural effusion or pneumothorax is present. No acute cardiopulmonary process     XR WRIST RIGHT (MIN 3 VIEWS)    Result Date: 7/24/2021  EXAMINATION: THREE XRAY VIEWS OF THE RIGHT HAND; 3 XRAY VIEWS OF THE RIGHT WRIST 7/24/2021 5:43 pm COMPARISON: None. HISTORY: ORDERING SYSTEM PROVIDED HISTORY: pain, swelling TECHNOLOGIST PROVIDED HISTORY: pain, swelling Reason for Exam: pain Acuity: Acute Type of Exam: Initial Relevant Medical/Surgical History: pain in rt wrist and hand, pt had and object fall on rt hand and wrist today FINDINGS: Cortical irregularity identified involving distal phalanx of the 1st digit, unclear if this represents a high a fracture or chronic finding please correlate point tenderness. . No fracture, dislocation, or focal osseous lesion is noted. No significant soft tissue abnormality seen. No fracture or dislocation of the wrist. Questionable irregularity involving the distal phalanx 1st digit. Questions could represent a nondisplaced fracture. .  Please correlate with point tenderness. XR HAND RIGHT (MIN 3 VIEWS)    Result Date: 7/24/2021  EXAMINATION: THREE XRAY VIEWS OF THE RIGHT HAND; 3 XRAY VIEWS OF THE RIGHT WRIST 7/24/2021 5:43 pm COMPARISON: None. HISTORY: ORDERING SYSTEM PROVIDED HISTORY: pain, swelling TECHNOLOGIST PROVIDED HISTORY: pain, swelling Reason for Exam: pain Acuity: Acute Type of Exam: Initial Relevant Medical/Surgical History: pain in rt wrist and hand, pt had and object fall on rt hand and wrist today FINDINGS: Cortical irregularity identified involving distal phalanx of the 1st digit, unclear if this represents a high a fracture or chronic finding please correlate point tenderness. . No fracture, dislocation, or focal osseous lesion is noted. No significant soft tissue abnormality seen. No fracture or dislocation of the wrist. Questionable irregularity involving the distal phalanx 1st digit.   Questions could represent a nondisplaced fracture. .  Please correlate with point tenderness. EKG    EKG Interpretation    Interpreted by emergency department physician    Rhythm: sinus tachycardia  Rate: normal  Axis: normal  Ectopy: none  Conduction: normal  ST Segments: no acute change  T Waves: no acute change  Q Waves: none    Clinical Impression: non-specific EKG. No change from 09/2020    All EKG's are interpreted by the Emergency Department Physician whoeither signs or Co-signs this chart in the absence of a cardiologist.    EMERGENCY DEPARTMENT COURSE:  ED Course as of Jul 24 2215   Sat Jul 24, 2021   1739 CBC with DIFF(!):    WBC 10.1   RBC 4.58   Hemoglobin Quant 14.0   Hematocrit 40. 1(!)   MCV 87.6   MCH 30.6   MCHC 34.9(!)   RDW 13.8   Platelet Count 943   MPV 8.8   NRBC Automated 0.0   Differential Type NOT REPORTED   Seg Neutrophils 44   Lymphocytes 44(!)   Monocytes 11   Eosinophils % 1   Basophils 0   Immature Granulocytes 0   Segs Absolute 4.41   Absolute Lymph # 4.46(!)   Absolute Mono # 1.06   Absolute Eos # 0.09   Basophils Absolute 0.03   Absolute Immature Granulocyte 0.04   WBC Morp. .. [AO]   1810 Troponin, High Sensitivity: <6 [AO]   1810 D-Dimer, Quant: 0.42 [AO]   1810 Pro-BNP: <20 [AO]   8[address] JUAN CARLOS   Basic Metabolic Panel(!):    Glucose 133(!)   BUN 12   Creatinine 1.27(!)   Bun/Cre Ratio 9   Calcium 8.4(!)   Sodium 136   Potassium 4.3   Chloride 101   CO2 18(!)   Anion Gap 17   GFR Non- >60   GFR  >60   GFR Comment        GFR Staging NOT REPORTED [AO]   1810 TSH: 2.32 [AO]   1810 Medical sober time 7/25 0030   Ethanol(!): 287 [AO]   1811 XR CHEST (2 VW) [AO]   1812 XR WRIST RIGHT (MIN 3 VIEWS) [AO]   1812 XR HAND RIGHT (MIN 3 VIEWS) [AO]   1839 Patient notified of negative x-rays. No tenderness over thumb where there were some abnormalities noted. Patient informed his cardiac work-up is normal.  Refuse to keep his IV and so cannot provide hydration for his JUAN CARLOS. Alcohol noted to almost be 300. Patient had admitted to drinking earlier today. Has no one to pick him up. Do not feel comfortable letting him walk home or using public transit due to the level of alcohol. Patient extremely agitated, verbally abusive. Security at bedside. I will feel more comfortable allowing patient to leave on his own accord when he is at the legal cut off of 0.08 which would be around 11:00. [AO]      ED Course User Index  [AO] Amy Amos 1721, DO       MDM  Number of Diagnoses or Management Options  JUAN CARLOS (acute kidney injury) ([facility] [address]  Contusion of right hand, initial encounter  Tachycardia  Diagnosis management comments: [address] yo M presents to the emergency department with right hand pain after trying to lift a couch and have the couch while on top of him. No head trauma. On evaluation was noted that he was tachycardic in the 140s in triage. Other vital signs stable. As patient is having a cardiac symptoms and he declined, however did admit to drinking alcohol earlier today due to having a party to celebrate moving. States he has not had anything to drink since earlier this morning, however on exam I could smell on his breath, his pupils were dilated, he was slurring his words. Plain films of his hand obtained. Cardiac work-up obtained. EKG shows sinus tachycardia but troponin, BMP, D-dimer, electrolytes and thyroid were all within normal limits. JUAN CARLOS noted. Wanted to give IVF but refused to keep IV in place. I did check an ethanol level as he states that his brother dropped him off to his house and he walked here. His alcohol level was almost 300. Medical sober time would be at [facility] on 7/25. Explained to patient for his safety that I could not him walk home or take a cab with his alcohol level being this high. Patient states that he is in recovery and was a prior chronic alcoholic and that this level was not high for him and that he was safe to go home.   Patient became extremely agitated, fighting with me and staff, raising his voice, pounding on the floor, pacing around the room. Security was called to bedside. Patient trying to reason with me. With his alcohol being 300 I do not feel comfortable letting him go home by walking himself or with a cab. We agreed on 11:00 PM he would be around legal limit, around 80, and there he could be cabbed home. I gave him the option to call if any of his family or friends to come pick him up, states that he did not want to be his main friend of that he was drinking and then they were disowned him because they think that he has been sober. Patient signed out to Dr. Carli Byrne. Amount and/or Complexity of Data Reviewed  Clinical lab tests: ordered and reviewed  Tests in the radiology section of CPT®: ordered and reviewed  Review and summarize past medical records: yes  Independent visualization of images, tracings, or specimens: yes    Patient Progress  Patient progress: stable      PROCEDURES:  Procedures     CONSULTS:  None    CRITICAL CARE:  TNONE  FINAL IMPRESSION     1. Contusion of right hand, initial encounter    2. Tachycardia    3. JUAN CARLOS (acute kidney injury) ([facility] [address]        DISPOSITION / PLAN   DISPOSITION      PENDING DISCHARGE AT 11PM    PATIENT REFERRED TO:  Ross Law, APRN - CNP  [address] ED  [address] [facility]  [phone]    As needed, If symptoms worsen      DISCHARGE MEDICATIONS:  New Prescriptions    ACETAMINOPHEN (TYLENOL) 500 MG TABLET    Take 2 tablets by mouth every 8 hours as needed for Pain    NAPROXEN (NAPROSYN) 500 MG TABLET    Take 1 tablet by mouth 2 times daily (with meals) for 7 days       Nisha Clark, [place]  Emergency Medicine Physician    (Please note that portions of this note were completed with a voice recognition program.  Efforts were made to edit the dictations but occasionally words are mis-transcribed.)

## 2021-07-26 LAB
EKG ATRIAL RATE: 129 BPM
EKG P AXIS: 60 DEGREES
EKG P-R INTERVAL: 136 MS
EKG Q-T INTERVAL: 302 MS
EKG QRS DURATION: 72 MS
EKG QTC CALCULATION (BAZETT): 442 MS
EKG R AXIS: -16 DEGREES
EKG T AXIS: 46 DEGREES
EKG VENTRICULAR RATE: 129 BPM

## 2021-07-26 PROCEDURE — 93010 ELECTROCARDIOGRAM REPORT: CPT | Performed by: INTERNAL MEDICINE

## 2021-09-02 ENCOUNTER — APPOINTMENT (OUTPATIENT)
Dept: CT IMAGING | Age: 41
End: 2021-09-02
Payer: MEDICAID

## 2021-09-02 ENCOUNTER — HOSPITAL ENCOUNTER (INPATIENT)
Age: 41
LOS: 3 days | Discharge: HOME | End: 2021-09-05
Attending: EMERGENCY MEDICINE | Admitting: INTERNAL MEDICINE
Payer: MEDICAID

## 2021-09-02 DIAGNOSIS — F10.10 ALCOHOL ABUSE: ICD-10-CM

## 2021-09-02 DIAGNOSIS — H57.02 ANISOCORIA: Primary | ICD-10-CM

## 2021-09-02 PROBLEM — I10 HYPERTENSION: Status: ACTIVE | Noted: 2021-09-02

## 2021-09-02 PROBLEM — F10.929 ALCOHOLIC INTOXICATION, WITH UNSPECIFIED COMPLICATION (HCC): Status: ACTIVE | Noted: 2021-09-02

## 2021-09-02 LAB
ABSOLUTE EOS #: 0.1 K/UL (ref 0–0.4)
ABSOLUTE IMMATURE GRANULOCYTE: ABNORMAL K/UL (ref 0–0.3)
ABSOLUTE LYMPH #: 2.6 K/UL (ref 1–4.8)
ABSOLUTE MONO #: 0.7 K/UL (ref 0.1–1.3)
ALBUMIN SERPL-MCNC: 4.5 G/DL (ref 3.5–5.2)
ALBUMIN/GLOBULIN RATIO: ABNORMAL (ref 1–2.5)
ALP BLD-CCNC: 69 U/L (ref 40–129)
ALT SERPL-CCNC: 30 U/L (ref 5–41)
AMPHETAMINE SCREEN URINE: NEGATIVE
ANION GAP SERPL CALCULATED.3IONS-SCNC: 12 MMOL/L (ref 9–17)
AST SERPL-CCNC: 39 U/L
BARBITURATE SCREEN URINE: NEGATIVE
BASOPHILS # BLD: 1 % (ref 0–2)
BASOPHILS ABSOLUTE: 0 K/UL (ref 0–0.2)
BENZODIAZEPINE SCREEN, URINE: NEGATIVE
BILIRUB SERPL-MCNC: 0.48 MG/DL (ref 0.3–1.2)
BUN BLDV-MCNC: 5 MG/DL (ref 6–20)
BUN/CREAT BLD: ABNORMAL (ref 9–20)
BUPRENORPHINE URINE: NORMAL
CALCIUM SERPL-MCNC: 8.9 MG/DL (ref 8.6–10.4)
CANNABINOID SCREEN URINE: NEGATIVE
CHLORIDE BLD-SCNC: 98 MMOL/L (ref 98–107)
CO2: 23 MMOL/L (ref 20–31)
COCAINE METABOLITE, URINE: NEGATIVE
CREAT SERPL-MCNC: 0.74 MG/DL (ref 0.7–1.2)
DIFFERENTIAL TYPE: ABNORMAL
EOSINOPHILS RELATIVE PERCENT: 1 % (ref 0–4)
ETHANOL PERCENT: 0.21 %
ETHANOL: 209 MG/DL
GFR AFRICAN AMERICAN: >60 ML/MIN
GFR NON-AFRICAN AMERICAN: >60 ML/MIN
GFR SERPL CREATININE-BSD FRML MDRD: ABNORMAL ML/MIN/{1.73_M2}
GFR SERPL CREATININE-BSD FRML MDRD: ABNORMAL ML/MIN/{1.73_M2}
GLUCOSE BLD-MCNC: 133 MG/DL (ref 70–99)
HCT VFR BLD CALC: 44.5 % (ref 41–53)
HEMOGLOBIN: 15.6 G/DL (ref 13.5–17.5)
IMMATURE GRANULOCYTES: ABNORMAL %
LYMPHOCYTES # BLD: 36 % (ref 24–44)
MCH RBC QN AUTO: 33.3 PG (ref 26–34)
MCHC RBC AUTO-ENTMCNC: 35.1 G/DL (ref 31–37)
MCV RBC AUTO: 94.9 FL (ref 80–100)
MDMA URINE: NORMAL
METHADONE SCREEN, URINE: NEGATIVE
METHAMPHETAMINE, URINE: NORMAL
MONOCYTES # BLD: 10 % (ref 1–7)
NRBC AUTOMATED: ABNORMAL PER 100 WBC
OPIATES, URINE: NEGATIVE
OXYCODONE SCREEN URINE: NEGATIVE
PDW BLD-RTO: 16.7 % (ref 11.5–14.9)
PHENCYCLIDINE, URINE: NEGATIVE
PLATELET # BLD: 234 K/UL (ref 150–450)
PLATELET ESTIMATE: ABNORMAL
PMV BLD AUTO: 6.7 FL (ref 6–12)
POTASSIUM SERPL-SCNC: 3.5 MMOL/L (ref 3.7–5.3)
PROPOXYPHENE, URINE: NORMAL
RBC # BLD: 4.69 M/UL (ref 4.5–5.9)
RBC # BLD: ABNORMAL 10*6/UL
SEG NEUTROPHILS: 52 % (ref 36–66)
SEGMENTED NEUTROPHILS ABSOLUTE COUNT: 3.8 K/UL (ref 1.3–9.1)
SODIUM BLD-SCNC: 133 MMOL/L (ref 135–144)
TEST INFORMATION: NORMAL
TOTAL PROTEIN: 8 G/DL (ref 6.4–8.3)
TRICYCLIC ANTIDEPRESSANTS, UR: NORMAL
WBC # BLD: 7.3 K/UL (ref 3.5–11)
WBC # BLD: ABNORMAL 10*3/UL

## 2021-09-02 PROCEDURE — 6360000002 HC RX W HCPCS: Performed by: EMERGENCY MEDICINE

## 2021-09-02 PROCEDURE — G0480 DRUG TEST DEF 1-7 CLASSES: HCPCS

## 2021-09-02 PROCEDURE — 2580000003 HC RX 258: Performed by: EMERGENCY MEDICINE

## 2021-09-02 PROCEDURE — 70450 CT HEAD/BRAIN W/O DYE: CPT

## 2021-09-02 PROCEDURE — 99285 EMERGENCY DEPT VISIT HI MDM: CPT

## 2021-09-02 PROCEDURE — 2500000003 HC RX 250 WO HCPCS: Performed by: EMERGENCY MEDICINE

## 2021-09-02 PROCEDURE — 85025 COMPLETE CBC W/AUTO DIFF WBC: CPT

## 2021-09-02 PROCEDURE — 2580000003 HC RX 258

## 2021-09-02 PROCEDURE — 96375 TX/PRO/DX INJ NEW DRUG ADDON: CPT

## 2021-09-02 PROCEDURE — 6370000000 HC RX 637 (ALT 250 FOR IP): Performed by: EMERGENCY MEDICINE

## 2021-09-02 PROCEDURE — 36415 COLL VENOUS BLD VENIPUNCTURE: CPT

## 2021-09-02 PROCEDURE — 6360000002 HC RX W HCPCS

## 2021-09-02 PROCEDURE — 96365 THER/PROPH/DIAG IV INF INIT: CPT

## 2021-09-02 PROCEDURE — 1200000000 HC SEMI PRIVATE

## 2021-09-02 PROCEDURE — 80307 DRUG TEST PRSMV CHEM ANLYZR: CPT

## 2021-09-02 PROCEDURE — 6370000000 HC RX 637 (ALT 250 FOR IP)

## 2021-09-02 PROCEDURE — 80053 COMPREHEN METABOLIC PANEL: CPT

## 2021-09-02 PROCEDURE — 6370000000 HC RX 637 (ALT 250 FOR IP): Performed by: NURSE PRACTITIONER

## 2021-09-02 RX ORDER — LOSARTAN POTASSIUM 100 MG/1
100 TABLET ORAL DAILY
Status: DISCONTINUED | OUTPATIENT
Start: 2021-09-02 | End: 2021-09-05 | Stop reason: HOSPADM

## 2021-09-02 RX ORDER — DULOXETIN HYDROCHLORIDE 20 MG/1
40 CAPSULE, DELAYED RELEASE ORAL DAILY
Status: ON HOLD | COMMUNITY
End: 2022-06-14 | Stop reason: HOSPADM

## 2021-09-02 RX ORDER — LORAZEPAM 1 MG/1
4 TABLET ORAL
Status: DISCONTINUED | OUTPATIENT
Start: 2021-09-02 | End: 2021-09-05 | Stop reason: HOSPADM

## 2021-09-02 RX ORDER — SODIUM CHLORIDE 9 MG/ML
25 INJECTION, SOLUTION INTRAVENOUS PRN
Status: DISCONTINUED | OUTPATIENT
Start: 2021-09-02 | End: 2021-09-05 | Stop reason: HOSPADM

## 2021-09-02 RX ORDER — ATOMOXETINE 25 MG/1
25 CAPSULE ORAL DAILY
Status: DISCONTINUED | OUTPATIENT
Start: 2021-09-02 | End: 2021-09-05 | Stop reason: HOSPADM

## 2021-09-02 RX ORDER — BUSPIRONE HYDROCHLORIDE 15 MG/1
15 TABLET ORAL 3 TIMES DAILY
Status: DISCONTINUED | OUTPATIENT
Start: 2021-09-02 | End: 2021-09-04

## 2021-09-02 RX ORDER — MAGNESIUM HYDROXIDE/ALUMINUM HYDROXICE/SIMETHICONE 120; 1200; 1200 MG/30ML; MG/30ML; MG/30ML
30 SUSPENSION ORAL ONCE
Status: COMPLETED | OUTPATIENT
Start: 2021-09-02 | End: 2021-09-02

## 2021-09-02 RX ORDER — MIRTAZAPINE 15 MG/1
15 TABLET, FILM COATED ORAL NIGHTLY
Status: DISCONTINUED | OUTPATIENT
Start: 2021-09-02 | End: 2021-09-04

## 2021-09-02 RX ORDER — CHLORTHALIDONE 25 MG/1
25 TABLET ORAL DAILY
Status: DISCONTINUED | OUTPATIENT
Start: 2021-09-02 | End: 2021-09-05 | Stop reason: HOSPADM

## 2021-09-02 RX ORDER — ONDANSETRON 4 MG/1
4 TABLET, ORALLY DISINTEGRATING ORAL EVERY 8 HOURS PRN
Status: DISCONTINUED | OUTPATIENT
Start: 2021-09-02 | End: 2021-09-05 | Stop reason: HOSPADM

## 2021-09-02 RX ORDER — LORAZEPAM 1 MG/1
2 TABLET ORAL ONCE
Status: COMPLETED | OUTPATIENT
Start: 2021-09-02 | End: 2021-09-02

## 2021-09-02 RX ORDER — ACETAMINOPHEN 650 MG/1
650 SUPPOSITORY RECTAL EVERY 6 HOURS PRN
Status: DISCONTINUED | OUTPATIENT
Start: 2021-09-02 | End: 2021-09-05 | Stop reason: HOSPADM

## 2021-09-02 RX ORDER — ONDANSETRON 2 MG/ML
4 INJECTION INTRAMUSCULAR; INTRAVENOUS EVERY 6 HOURS PRN
Status: DISCONTINUED | OUTPATIENT
Start: 2021-09-02 | End: 2021-09-05 | Stop reason: HOSPADM

## 2021-09-02 RX ORDER — LORAZEPAM 2 MG/ML
1 INJECTION INTRAMUSCULAR
Status: DISCONTINUED | OUTPATIENT
Start: 2021-09-02 | End: 2021-09-05 | Stop reason: HOSPADM

## 2021-09-02 RX ORDER — ACETAMINOPHEN 325 MG/1
650 TABLET ORAL EVERY 6 HOURS PRN
Status: DISCONTINUED | OUTPATIENT
Start: 2021-09-02 | End: 2021-09-05 | Stop reason: HOSPADM

## 2021-09-02 RX ORDER — ACETAMINOPHEN 325 MG/1
650 TABLET ORAL ONCE
Status: COMPLETED | OUTPATIENT
Start: 2021-09-02 | End: 2021-09-02

## 2021-09-02 RX ORDER — LOSARTAN POTASSIUM 100 MG/1
100 TABLET ORAL DAILY
Status: ON HOLD | COMMUNITY
End: 2022-06-14 | Stop reason: SDUPTHER

## 2021-09-02 RX ORDER — POTASSIUM CHLORIDE 20 MEQ/1
40 TABLET, EXTENDED RELEASE ORAL ONCE
Status: COMPLETED | OUTPATIENT
Start: 2021-09-02 | End: 2021-09-02

## 2021-09-02 RX ORDER — ATOMOXETINE 25 MG/1
25 CAPSULE ORAL DAILY
Status: ON HOLD | COMMUNITY
End: 2022-06-14 | Stop reason: HOSPADM

## 2021-09-02 RX ORDER — LORAZEPAM 1 MG/1
1 TABLET ORAL
Status: DISCONTINUED | OUTPATIENT
Start: 2021-09-02 | End: 2021-09-05 | Stop reason: HOSPADM

## 2021-09-02 RX ORDER — LORAZEPAM 1 MG/1
2 TABLET ORAL
Status: DISCONTINUED | OUTPATIENT
Start: 2021-09-02 | End: 2021-09-05 | Stop reason: HOSPADM

## 2021-09-02 RX ORDER — SODIUM CHLORIDE 9 MG/ML
INJECTION, SOLUTION INTRAVENOUS CONTINUOUS
Status: DISCONTINUED | OUTPATIENT
Start: 2021-09-02 | End: 2021-09-05 | Stop reason: HOSPADM

## 2021-09-02 RX ORDER — DULOXETIN HYDROCHLORIDE 20 MG/1
40 CAPSULE, DELAYED RELEASE ORAL DAILY
Status: DISCONTINUED | OUTPATIENT
Start: 2021-09-02 | End: 2021-09-04

## 2021-09-02 RX ORDER — CARVEDILOL 3.12 MG/1
3.12 TABLET ORAL 2 TIMES DAILY WITH MEALS
Status: ON HOLD | COMMUNITY
End: 2022-06-14 | Stop reason: SDUPTHER

## 2021-09-02 RX ORDER — FOLIC ACID 1 MG/1
1 TABLET ORAL DAILY
Status: DISCONTINUED | OUTPATIENT
Start: 2021-09-02 | End: 2021-09-02

## 2021-09-02 RX ORDER — LORAZEPAM 2 MG/ML
3 INJECTION INTRAMUSCULAR
Status: DISCONTINUED | OUTPATIENT
Start: 2021-09-02 | End: 2021-09-05 | Stop reason: HOSPADM

## 2021-09-02 RX ORDER — M-VIT,TX,IRON,MINS/CALC/FOLIC 27MG-0.4MG
1 TABLET ORAL DAILY
Status: DISCONTINUED | OUTPATIENT
Start: 2021-09-02 | End: 2021-09-05 | Stop reason: HOSPADM

## 2021-09-02 RX ORDER — LORAZEPAM 2 MG/ML
2 INJECTION INTRAMUSCULAR
Status: DISCONTINUED | OUTPATIENT
Start: 2021-09-02 | End: 2021-09-05 | Stop reason: HOSPADM

## 2021-09-02 RX ORDER — SODIUM CHLORIDE 0.9 % (FLUSH) 0.9 %
5-40 SYRINGE (ML) INJECTION PRN
Status: DISCONTINUED | OUTPATIENT
Start: 2021-09-02 | End: 2021-09-05 | Stop reason: HOSPADM

## 2021-09-02 RX ORDER — POTASSIUM CHLORIDE 7.45 MG/ML
10 INJECTION INTRAVENOUS PRN
Status: DISCONTINUED | OUTPATIENT
Start: 2021-09-02 | End: 2021-09-05 | Stop reason: HOSPADM

## 2021-09-02 RX ORDER — THIAMINE HYDROCHLORIDE 100 MG/ML
500 INJECTION, SOLUTION INTRAMUSCULAR; INTRAVENOUS DAILY
Status: DISCONTINUED | OUTPATIENT
Start: 2021-09-02 | End: 2021-09-02

## 2021-09-02 RX ORDER — 0.9 % SODIUM CHLORIDE 0.9 %
1000 INTRAVENOUS SOLUTION INTRAVENOUS ONCE
Status: COMPLETED | OUTPATIENT
Start: 2021-09-02 | End: 2021-09-02

## 2021-09-02 RX ORDER — POLYETHYLENE GLYCOL 3350 17 G/17G
17 POWDER, FOR SOLUTION ORAL DAILY PRN
Status: DISCONTINUED | OUTPATIENT
Start: 2021-09-02 | End: 2021-09-05 | Stop reason: HOSPADM

## 2021-09-02 RX ORDER — SODIUM CHLORIDE 0.9 % (FLUSH) 0.9 %
5-40 SYRINGE (ML) INJECTION EVERY 12 HOURS SCHEDULED
Status: DISCONTINUED | OUTPATIENT
Start: 2021-09-02 | End: 2021-09-05 | Stop reason: HOSPADM

## 2021-09-02 RX ORDER — LORAZEPAM 1 MG/1
3 TABLET ORAL
Status: DISCONTINUED | OUTPATIENT
Start: 2021-09-02 | End: 2021-09-05 | Stop reason: HOSPADM

## 2021-09-02 RX ORDER — LORAZEPAM 2 MG/ML
4 INJECTION INTRAMUSCULAR
Status: DISCONTINUED | OUTPATIENT
Start: 2021-09-02 | End: 2021-09-05 | Stop reason: HOSPADM

## 2021-09-02 RX ORDER — BUSPIRONE HYDROCHLORIDE 15 MG/1
15 TABLET ORAL 3 TIMES DAILY
Status: ON HOLD | COMMUNITY
End: 2022-06-14 | Stop reason: SDUPTHER

## 2021-09-02 RX ORDER — CHLORTHALIDONE 25 MG/1
25 TABLET ORAL DAILY
Status: ON HOLD | COMMUNITY
End: 2022-06-14 | Stop reason: HOSPADM

## 2021-09-02 RX ORDER — POTASSIUM CHLORIDE 20 MEQ/1
40 TABLET, EXTENDED RELEASE ORAL PRN
Status: DISCONTINUED | OUTPATIENT
Start: 2021-09-02 | End: 2021-09-05 | Stop reason: HOSPADM

## 2021-09-02 RX ORDER — M-VIT,TX,IRON,MINS/CALC/FOLIC 27MG-0.4MG
1 TABLET ORAL DAILY
Status: DISCONTINUED | OUTPATIENT
Start: 2021-09-02 | End: 2021-09-04

## 2021-09-02 RX ADMIN — ENOXAPARIN SODIUM 40 MG: 40 INJECTION SUBCUTANEOUS at 21:10

## 2021-09-02 RX ADMIN — LOSARTAN POTASSIUM 100 MG: 100 TABLET, FILM COATED ORAL at 21:10

## 2021-09-02 RX ADMIN — SODIUM CHLORIDE: 9 INJECTION, SOLUTION INTRAVENOUS at 18:21

## 2021-09-02 RX ADMIN — CHLORTHALIDONE 25 MG: 25 TABLET ORAL at 21:10

## 2021-09-02 RX ADMIN — LORAZEPAM 2 MG: 1 TABLET ORAL at 14:09

## 2021-09-02 RX ADMIN — LORAZEPAM 1 MG: 1 TABLET ORAL at 23:18

## 2021-09-02 RX ADMIN — THIAMINE HYDROCHLORIDE 500 MG: 100 INJECTION, SOLUTION INTRAMUSCULAR; INTRAVENOUS at 14:15

## 2021-09-02 RX ADMIN — ONDANSETRON 4 MG: 2 INJECTION INTRAMUSCULAR; INTRAVENOUS at 18:55

## 2021-09-02 RX ADMIN — ACETAMINOPHEN 650 MG: 325 TABLET, FILM COATED ORAL at 15:42

## 2021-09-02 RX ADMIN — BUSPIRONE HYDROCHLORIDE 15 MG: 15 TABLET ORAL at 21:11

## 2021-09-02 RX ADMIN — SODIUM CHLORIDE 1000 ML: 9 INJECTION, SOLUTION INTRAVENOUS at 14:11

## 2021-09-02 RX ADMIN — MULTIPLE VITAMINS W/ MINERALS TAB 1 TABLET: TAB at 21:11

## 2021-09-02 RX ADMIN — ALUMINUM HYDROXIDE, MAGNESIUM HYDROXIDE, AND SIMETHICONE 30 ML: 200; 200; 20 SUSPENSION ORAL at 15:43

## 2021-09-02 RX ADMIN — ALUMINUM HYDROXIDE, MAGNESIUM HYDROXIDE, AND SIMETHICONE 30 ML: 200; 200; 20 SUSPENSION ORAL at 23:13

## 2021-09-02 RX ADMIN — THIAMINE HYDROCHLORIDE 200 MG: 100 INJECTION, SOLUTION INTRAMUSCULAR; INTRAVENOUS at 21:08

## 2021-09-02 RX ADMIN — DULOXETINE 40 MG: 20 CAPSULE, DELAYED RELEASE ORAL at 21:11

## 2021-09-02 RX ADMIN — LORAZEPAM 2 MG: 2 INJECTION INTRAMUSCULAR; INTRAVENOUS at 18:55

## 2021-09-02 RX ADMIN — MULTIPLE VITAMINS W/ MINERALS TAB 1 TABLET: TAB at 21:16

## 2021-09-02 RX ADMIN — POTASSIUM CHLORIDE 40 MEQ: 1500 TABLET, EXTENDED RELEASE ORAL at 14:58

## 2021-09-02 RX ADMIN — FOLIC ACID 1 MG: 5 INJECTION, SOLUTION INTRAMUSCULAR; INTRAVENOUS; SUBCUTANEOUS at 14:09

## 2021-09-02 ASSESSMENT — PAIN DESCRIPTION - ORIENTATION
ORIENTATION: POSTERIOR;ANTERIOR;RIGHT
ORIENTATION: RIGHT;POSTERIOR

## 2021-09-02 ASSESSMENT — ENCOUNTER SYMPTOMS
WHEEZING: 0
COUGH: 0
TROUBLE SWALLOWING: 0
CONSTIPATION: 0
ABDOMINAL PAIN: 0
EYE PAIN: 0
DIARRHEA: 0
ABDOMINAL DISTENTION: 0
SHORTNESS OF BREATH: 0
NAUSEA: 0
NAUSEA: 1
SORE THROAT: 0
CHEST TIGHTNESS: 0
BACK PAIN: 0
CHOKING: 0
PHOTOPHOBIA: 0
APNEA: 0
VOMITING: 0

## 2021-09-02 ASSESSMENT — PAIN DESCRIPTION - DESCRIPTORS
DESCRIPTORS: THROBBING;SHARP;STABBING
DESCRIPTORS: THROBBING;STABBING

## 2021-09-02 ASSESSMENT — PAIN DESCRIPTION - PROGRESSION
CLINICAL_PROGRESSION: NOT CHANGED
CLINICAL_PROGRESSION: GRADUALLY IMPROVING

## 2021-09-02 ASSESSMENT — PAIN DESCRIPTION - LOCATION
LOCATION: HEAD
LOCATION: HEAD

## 2021-09-02 ASSESSMENT — PAIN DESCRIPTION - FREQUENCY
FREQUENCY: CONTINUOUS
FREQUENCY: INTERMITTENT

## 2021-09-02 ASSESSMENT — PAIN - FUNCTIONAL ASSESSMENT
PAIN_FUNCTIONAL_ASSESSMENT: ACTIVITIES ARE NOT PREVENTED
PAIN_FUNCTIONAL_ASSESSMENT: ACTIVITIES ARE NOT PREVENTED

## 2021-09-02 ASSESSMENT — PAIN DESCRIPTION - PAIN TYPE
TYPE: ACUTE PAIN
TYPE: ACUTE PAIN

## 2021-09-02 ASSESSMENT — PAIN SCALES - GENERAL
PAINLEVEL_OUTOF10: 6
PAINLEVEL_OUTOF10: 7

## 2021-09-02 ASSESSMENT — PAIN DESCRIPTION - ONSET
ONSET: ON-GOING
ONSET: ON-GOING

## 2021-09-02 NOTE — ED PROVIDER NOTES
[address]  eMERGENCY dEPARTMENT eNCOUnter    Pt Name: Saturnino Duron  MRN: [id number]  [place] 1980  Date of evaluation: 9/2/21  CHIEF COMPLAINT       Chief Complaint   Patient presents with    Mental Health Problem    Alcohol Problem     HISTORY OF PRESENT ILLNESS   HPI   Patient presenting with alcohol abuse and SI. States he recently relapsed using alcohol, for the last 3 weeks has been drinking vodka \"all day everyday. \" typically drinks at least a fifth of vodka daily, sometimes more. He has been having right sided headaches, nausea, anxiety. Also notes occasional black spots in the vision of his left eye, none currently. Yesterday night he began having thoughts of suicide, states he \"started feeling stabby. \" Last drink was today at 11:30 AM, he drank a fifth of vodka in an effort to forestall withdrawal symptoms on his way here. Currently he has a headache 5/10, feels anxious and nauseated. REVIEW OF SYSTEMS     Review of Systems   Constitutional: Negative for chills and fever. HENT: Negative for congestion, ear pain and sore throat. Eyes: Positive for visual disturbance. Negative for pain. Respiratory: Negative for cough, chest tightness and shortness of breath. Cardiovascular: Negative for chest pain and palpitations. Gastrointestinal: Positive for nausea. Negative for abdominal pain, constipation, diarrhea and vomiting. Genitourinary: Negative for dysuria and testicular pain. Musculoskeletal: Negative for back pain. Skin: Negative for rash and wound. Neurological: Positive for headaches. Negative for seizures and syncope. Psychiatric/Behavioral: Positive for suicidal ideas. The patient is nervous/anxious. PASTMEDICAL HISTORY     Past Medical History:   Diagnosis Date    Alcoholism ([facility] [address] 01/31/2018    niw clean an sober   [address] Hepatitis C 2010    pt states he tested + for antibodies.  no live virus detected -no treatment needed    History of atrial fibrillation 2001    pt states after a suicide attempt, overdose oxycontin pt developed atrial fib x 5-6 months. NO RECENT ISSUES    Mental and behavioral disorders d/t use of alcohol, acute intoxication ([address]     NO LONGER PERTINENT    Palpitations     DENIES    Seizures ([address] 2006    during alcohol detox     Wears glasses      SURGICAL HISTORY       Past Surgical History:   Procedure Laterality Date    CYSTOSCOPY Bilateral 12/19/2018    Retrograde pyelogram    CYSTOSCOPY Bilateral 12/19/2018    CYSTOSCOPY, RETROGRADE PYELOGRAM performed by Augie Hernandez MD at [address] Left 2004    second finger REATTACH LIGAMENT AND TENDONS UNDER LOCAL    TONSILLECTOMY  1983     CURRENT MEDICATIONS       Previous Medications    ACETAMINOPHEN (TYLENOL) 500 MG TABLET    Take 2 tablets by mouth every 8 hours as needed for Pain    ATOMOXETINE (STRATTERA) 60 MG CAPSULE    Take 1 capsule by mouth daily    BUSPIRONE (BUSPAR) 10 MG TABLET    Take 2 tablets by mouth 3 times daily    MIRTAZAPINE (REMERON) 15 MG TABLET    Take 1 tablet by mouth nightly    MULTIPLE VITAMINS-MINERALS (THERAPEUTIC MULTIVITAMIN-MINERALS) TABLET    Take 1 tablet by mouth daily    NALTREXONE (VIVITROL) 380 MG INJECTION    Inject 380 mg into the muscle every 30 days    NAPROXEN (NAPROSYN) 500 MG TABLET    Take 1 tablet by mouth 2 times daily (with meals) for 7 days    THIAMINE MONONITRATE 100 MG TABLET    Take 1 tablet by mouth daily     ALLERGIES     is allergic to dicloxacillin and pcn [penicillins]. FAMILY HISTORY     He indicated that his mother is alive. He indicated that his father is alive. He indicated that his brother is alive. He indicated that his maternal grandmother is alive. He indicated that his maternal grandfather is alive. He indicated that his paternal grandmother is alive. He indicated that his paternal grandfather is alive. SOCIALHISTORY      reports that he has quit smoking.  His smoking use included cigarettes. He has a 20.00 pack-year smoking history. He has quit using smokeless tobacco.  His smokeless tobacco use included snuff and chew. He reports current alcohol use. He reports previous drug use. Drug: Cocaine. PHYSICAL EXAM     INITIAL VITALS: BP (!) 134/100   Pulse 108   Temp 98.6 °F (37 °C) (Oral)   Resp 16   Ht 5' 10\" (1.778 m)   Wt 200 lb (90.7 kg)   SpO2 95%   BMI 28.70 kg/m²    Physical Exam  Vitals and nursing note reviewed. Constitutional:       Appearance: He is well-developed. Comments: Non-toxic appearing. HENT:      Head: Normocephalic and atraumatic. Right Ear: External ear normal.      Left Ear: External ear normal.   Eyes:      Comments: Both pupils are reactive to light, L>R. EOMI. Neck:      Vascular: No JVD. Trachea: No tracheal deviation. Cardiovascular:      Rate and Rhythm: Normal rate and regular rhythm. Heart sounds: Normal heart sounds. Pulmonary:      Effort: Pulmonary effort is normal. No respiratory distress. Breath sounds: Normal breath sounds. No stridor. Abdominal:      General: Bowel sounds are normal. There is no distension. Palpations: Abdomen is soft. Tenderness: There is no abdominal tenderness. Musculoskeletal:         General: No tenderness or deformity. Normal range of motion. Cervical back: Normal range of motion and neck supple. Skin:     Comments: Palms are sweaty. Neurological:      Mental Status: He is oriented to person, place, and time. Cranial Nerves: No cranial nerve deficit. Coordination: Coordination normal.         MEDICAL DECISION MAKING:   Assessment:  Stephanie Bonner is a 39 y.o. male who presents with suicidal ideation and alcohol withdrawal.       ED Course/MDM:   Patient arrived hemodynamically stable and in no acute distress. Patient's previous imaging and studies reviewed.     Anisocoria noted on exam, patient states he has not had this before; raises the possibility of wernicke encephalopathy given recent heavy alcohol use. CT head with no acute abnormalities. Labs pending. Plan to treat with IV thiamine, IV fluids, CIWA protocol. Admit to medicine with psychiatry consult when sober. Procedures    DIAGNOSTIC RESULTS   EKG: All EKG's are interpreted by the Emergency Department Physician who either signs or Co-signs this chart inthe absence of a cardiologist.      RADIOLOGY:All plain film, CT, MRI, and formal ultrasound images (except ED bedside ultrasound) are read by the radiologist, see reports below, unless otherwise noted in MDM or here. No orders to display     LABS: All lab results were reviewed by myself, and all abnormals are listed below. Labs Reviewed - No data to display  EMERGENCY DEPARTMENT COURSE:   Vitals:    Vitals:    09/02/21 1238   BP: (!) 134/100   Pulse: 108   Resp: 16   Temp: 98.6 °F (37 °C)   TempSrc: Oral   SpO2: 95%   Weight: 200 lb (90.7 kg)   Height: 5' 10\" (1.778 m)       The patient was given the following medications while in the emergency department:  No orders of the defined types were placed in this encounter. CONSULTS:  None    FINAL IMPRESSION    No diagnosis found. DISPOSITION/PLAN   DISPOSITION        PATIENT REFERRED TO:  No follow-up provider specified.   DISCHARGE MEDICATIONS:  New Prescriptions    No medications on file     Loras Nissen, MD  AttendingEmergency Physician                        Adrian Argueta MD  09/02/21 7458

## 2021-09-02 NOTE — Clinical Note
Patient Class: Inpatient [101]   REQUIRED: Diagnosis: Alcoholic intoxication, with unspecified complication [facility] [[id number]]   Estimated Length of Stay: Estimated stay of more than 2 midnights   Admitting Provider: Momo Aceves [[id number]]   Telemetry/Cardiac Monitoring Required?: Yes

## 2021-09-02 NOTE — ED NOTES
Mode of arrival (squad #, walk in, police, etc) : walk in         Chief complaint(s): [address] Problem/ Alcohol Detox        Arrival Note (brief scenario, treatment PTA, etc). : patient arrived to ED from home with C/O mental health problem, and a relapse using alcohol. Patient states he is a past alcoholic and has gone through periods of sobriety and relapsing in the past. Patient states that he drank a fifth of vodka PTA today and states he wanted to drink more before he came but did not. Patient states for the last three weeks he has been drinking everyday. Patient reports right posterior HA, radiating up behind his right eye. Posterior pain reported as constant throbbing and anterior HA pain reported as constant sharp stabbing pain. Patient reports once in a while he will see a black spot out of his left eye. Patient denies any CP, dizziness, blurred vision, double vision, neck pain, recent falls, other drug use or any SOB at this time. Patient is alert and oriented and able to answer questions appropriately and calmly. Patient is cooperative and is seeking help. No plan to hurt self other than drinking himself \"to death\". Patient states \" my mental health has been getting really bad the past three weeks, I am making decisions that I normally wouldn't, and I feel very anxious and paranoid more than usual\". Patient denies any auditory or visual hallucinations          C= \"Have you ever felt that you should Cut down on your drinking? \"  No  A= \"Have people Annoyed you by criticizing your drinking? \"  No  G= \"Have you ever felt bad or Guilty about your drinking? \"  No  E= \"Have you ever had a drink as an Eye-opener first thing in the morning to steady your nerves or to help a hangover? \"  No      Deferred []      Reason for deferring: N/A    *If yes to two or more: probable alcohol abuse. Lien Albert RN  09/02/21 7149

## 2021-09-02 NOTE — PROGRESS NOTES
Medication History completed:    New medications: losartan, duloxetine, chlorthalidone, carvedilol    Medications discontinued: naproxen, naltrexone, acetaminophen    Changes to dosing:   Buspirone changed to 15 mg three times daily  Atomoxetine changed to 25 mg daily    Stated allergies: As listed    Other pertinent information: Medications confirmed with [facility] and [facility].      Thank you,  Thomas Bass, PharmD, BCPS  [phone]

## 2021-09-02 NOTE — H&P
[address]      [address] [facility]     HISTORY AND PHYSICAL EXAMINATION            Date:   9/2/2021  Patient name:  Juliana Coyne  Date of admission:  9/2/2021 12:41 PM  MRN:   [id number]  Account:  [de-identified]  YOB: 1980  PCP:    No primary care provider on file. Room:   [facility]  Code Status:    Prior    Chief Complaint:     Chief Complaint   Patient presents with    Mental Health Problem    Alcohol Problem       History Obtained From:     patient    History of Present Illness: The patient is a 39 y.o. Non- / non  male who presents withMental Health Problem and Alcohol Problem   and he is admitted to the hospital for the management of suicidal ideation. Patient has a 25-year drinking history but was recently sober. For the past 3 weeks he relapsed and states that he was drinking approximately 1 L of vodka a day. During this time, he stopped taking his mental health medications. He describes feeling hopeless and suicidal. Patient described previous suicide attempt in which he tried cutting his wrists. The pervasive thoughts of suicide were disturbing and patient decided to seek help by coming to the hospital. Past medical history is significant for depression, anxiety and hypertension. He describes feeling fatigue, currently has a right-sided headache. CT head and UDS was negative in the ED. Ethanol level was 209. Patient's last drink was this morning. Past Medical History:     Past Medical History:   Diagnosis Date    Alcoholism ([address] 01/31/2018    hx of alcoholism with intermittent sobriety;    [address] [place] [address]    Hepatitis C 2010    pt states he tested + for antibodies.  no live virus detected -no treatment needed    History of atrial fibrillation 2001    pt states after a suicide attempt, overdose oxycontin pt developed atrial fib x 5-6 months. NO RECENT ISSUES    Mental and behavioral disorders d/t use of alcohol, acute intoxication ([address]     NO LONGER PERTINENT    Palpitations     DENIES    Seizures ([facility] [address] 2006    during alcohol detox     Wears glasses         Past SurgicalHistory:     Past Surgical History:   Procedure Laterality Date    CYSTOSCOPY Bilateral 12/19/2018    Retrograde pyelogram    CYSTOSCOPY Bilateral 12/19/2018    CYSTOSCOPY, RETROGRADE PYELOGRAM performed by Willa Masterson MD at [address] 2004    second finger REATTACH [address]        Medications Prior to Admission:        Prior to Admission medications    Medication Sig Start Date End Date Taking? Authorizing Provider   atomoxetine (STRATTERA) 25 MG capsule Take 25 mg by mouth daily   Yes Historical Provider, MD   carvedilol (COREG) 3.125 MG tablet Take 3.125 mg by mouth 2 times daily (with meals)   Yes Historical Provider, MD   chlorthalidone (HYGROTON) 25 MG tablet Take 25 mg by mouth daily   Yes Historical Provider, MD   busPIRone (BUSPAR) 15 MG tablet Take 15 mg by mouth 3 times daily   Yes Historical Provider, MD   DULoxetine (CYMBALTA) 20 MG extended release capsule Take 40 mg by mouth daily   Yes Historical Provider, MD   losartan (COZAAR) 100 MG tablet Take 100 mg by mouth daily   Yes Historical Provider, MD   thiamine mononitrate 100 MG tablet Take 1 tablet by mouth daily 3/27/21  Yes Brady Pro MD   Multiple Vitamins-Minerals (THERAPEUTIC MULTIVITAMIN-MINERALS) tablet Take 1 tablet by mouth daily 3/27/21  Yes Brady Pro MD   mirtazapine (REMERON) 15 MG tablet Take 1 tablet by mouth nightly 3/26/21  Yes Brady Pro MD        Allergies:     Dicloxacillin and Pcn [penicillins]    Social History:     Tobacco:    reports that he has quit smoking. His smoking use included cigarettes. He has a 20.00 pack-year smoking history.  He has quit using smokeless tobacco.  His smokeless tobacco use included snuff and chew. Alcohol:      reports current alcohol use. Drug Use:  reports previous drug use. Drug: Cocaine. Family History:     Family History   Problem Relation Age of Onset    Mental Illness Mother     Depression Mother     High Blood Pressure Father     High Cholesterol Father     Substance Abuse Brother     Breast Cancer Maternal Grandmother        Review of Systems:     Positive and Negative as described in HPI. Review of Systems   Constitutional: Positive for fatigue. Negative for activity change, appetite change, chills and fever. HENT: Negative for hearing loss, tinnitus and trouble swallowing. Eyes: Positive for visual disturbance. Negative for photophobia and pain. Respiratory: Negative for apnea, cough, choking, chest tightness, shortness of breath and wheezing. Cardiovascular: Negative for chest pain and palpitations. Gastrointestinal: Negative for abdominal distention, abdominal pain, constipation, diarrhea, nausea and vomiting. Genitourinary: Negative for dysuria, flank pain, frequency and urgency. Musculoskeletal: Negative for arthralgias, back pain, gait problem, myalgias, neck pain and neck stiffness. Neurological: Positive for headaches. Negative for dizziness, tremors, seizures, syncope, facial asymmetry, speech difficulty, weakness, light-headedness and numbness. Psychiatric/Behavioral: Positive for dysphoric mood and suicidal ideas. Negative for agitation, behavioral problems, confusion, decreased concentration, hallucinations and sleep disturbance. The patient is not nervous/anxious and is not hyperactive. Physical Exam:   BP (!) 133/106   Pulse 93   Temp 98.6 °F (37 °C) (Oral)   Resp 16   Ht 5' 10\" (1.778 m)   Wt 200 lb (90.7 kg)   SpO2 94%   BMI 28.70 kg/m²   Temp (24hrs), Av.6 °F (37 °C), Min:98.6 °F (37 °C), Max:98.6 °F (37 °C)    No results for input(s): POCGLU in the last 72 hours.   No intake or output data in the 24 hours ending 09/02/21 1721    Physical Exam  Constitutional:       General: He is not in acute distress. Appearance: Normal appearance. HENT:      Head: Normocephalic and atraumatic. Mouth/Throat:      Mouth: Mucous membranes are moist.      Pharynx: Oropharynx is clear. Eyes:      Extraocular Movements: Extraocular movements intact. Conjunctiva/sclera: Conjunctivae normal.      Pupils: Pupils are unequal.      Comments: Left eye 3 mm  Right eye 2 mm  Post pupils reactive to light  Visual acuity decreased in right eye when compared to left  Decreased visual acuity and extremes of gaze  Bilateral nystagmus on lateral gaze   Cardiovascular:      Rate and Rhythm: Normal rate and regular rhythm. Pulses: Normal pulses. Heart sounds: Normal heart sounds. Pulmonary:      Effort: Pulmonary effort is normal.      Breath sounds: Normal breath sounds. Abdominal:      General: Abdomen is flat. Palpations: Abdomen is soft. Musculoskeletal:         General: Normal range of motion. Cervical back: Normal range of motion and neck supple. Skin:     General: Skin is warm and dry. Neurological:      Mental Status: He is alert and oriented to person, place, and time. Cranial Nerves: Cranial nerves are intact. Sensory: Sensation is intact. Motor: Motor function is intact. Coordination: Finger-Nose-Finger Test abnormal. Heel to More Test normal.      Comments: Mild bilateral dysmetria on finger-to-nose test  Bilateral action tremor   Psychiatric:         Mood and Affect: Mood normal.         Behavior: Behavior normal.         Thought Content:  Thought content normal.         Judgment: Judgment normal.         Investigations:     Laboratory Testing:  Recent Results (from the past 24 hour(s))   CBC Auto Differential    Collection Time: 09/02/21  2:02 PM   Result Value Ref Range    WBC 7.3 3.5 - 11.0 k/uL    RBC 4.69 4.5 - 5.9 m/uL    Hemoglobin 15.6 13.5 - 17.5 g/dL    Hematocrit 44.5 41 - 53 %    MCV 94.9 80 - 100 fL    MCH 33.3 26 - 34 pg    MCHC 35.1 31 - 37 g/dL    RDW 16.7 (H) 11.5 - 14.9 %    Platelets 679 015 - 509 k/uL    MPV 6.7 6.0 - 12.0 fL    NRBC Automated NOT REPORTED per 100 WBC    Differential Type NOT REPORTED     Seg Neutrophils 52 36 - 66 %    Lymphocytes 36 24 - 44 %    Monocytes 10 (H) 1 - 7 %    Eosinophils % 1 0 - 4 %    Basophils 1 0 - 2 %    Immature Granulocytes NOT REPORTED 0 %    Segs Absolute 3.80 1.3 - 9.1 k/uL    Absolute Lymph # 2.60 1.0 - 4.8 k/uL    Absolute Mono # 0.70 0.1 - 1.3 k/uL    Absolute Eos # 0.10 0.0 - 0.4 k/uL    Basophils Absolute 0.00 0.0 - 0.2 k/uL    Absolute Immature Granulocyte NOT REPORTED 0.00 - 0.30 k/uL    WBC Morphology NOT REPORTED     RBC Morphology NOT REPORTED     Platelet Estimate NOT REPORTED    Comprehensive Metabolic Panel    Collection Time: 09/02/21  2:02 PM   Result Value Ref Range    Glucose 133 (H) 70 - 99 mg/dL    BUN 5 (L) 6 - 20 mg/dL    CREATININE 0.74 0.70 - 1.20 mg/dL    Bun/Cre Ratio NOT REPORTED 9 - 20    Calcium 8.9 8.6 - 10.4 mg/dL    Sodium 133 (L) 135 - 144 mmol/L    Potassium 3.5 (L) 3.7 - 5.3 mmol/L    Chloride 98 98 - 107 mmol/L    CO2 23 20 - 31 mmol/L    Anion Gap 12 9 - 17 mmol/L    Alkaline Phosphatase 69 40 - 129 U/L    ALT 30 5 - 41 U/L    AST 39 <40 U/L    Total Bilirubin 0.48 0.3 - 1.2 mg/dL    Total Protein 8.0 6.4 - 8.3 g/dL    Albumin 4.5 3.5 - 5.2 g/dL    Albumin/Globulin Ratio NOT REPORTED 1.0 - 2.5    GFR Non-African American >60 >60 mL/min    GFR African American >60 >60 mL/min    GFR Comment          GFR Staging NOT REPORTED    Ethanol    Collection Time: 09/02/21  2:02 PM   Result Value Ref Range    Ethanol 209 (H) <10 mg/dL    Ethanol percent 0.209 %   Urine Drug Screen    Collection Time: 09/02/21  2:09 PM   Result Value Ref Range    Amphetamine Screen, Ur NEGATIVE NEGATIVE    Barbiturate Screen, Ur NEGATIVE NEGATIVE    Benzodiazepine Screen, Urine NEGATIVE NEGATIVE Cocaine Metabolite, Urine NEGATIVE NEGATIVE    Methadone Screen, Urine NEGATIVE NEGATIVE    Opiates, Urine NEGATIVE NEGATIVE    Phencyclidine, Urine NEGATIVE NEGATIVE    Propoxyphene, Urine NOT REPORTED NEGATIVE    Cannabinoid Scrn, Ur NEGATIVE NEGATIVE    Oxycodone Screen, Ur NEGATIVE NEGATIVE    Methamphetamine, Urine NOT REPORTED NEGATIVE    Tricyclic Antidepressants, Urine NOT REPORTED NEGATIVE    MDMA, Urine NOT REPORTED NEGATIVE    Buprenorphine Urine NOT REPORTED NEGATIVE    Test Information       Assay provides medical screening only. The absence of expected drug(s) and/or metabolite(s) may indicate diluted or adulterated urine, limitations of testing or timing of collection. Imaging/Diagnostics:  CT HEAD WO CONTRAST    Result Date: 9/2/2021  EXAMINATION: CT OF THE HEAD WITHOUT CONTRAST  9/2/2021 1:29 pm TECHNIQUE: CT of the head was performed without the administration of intravenous contrast. Dose modulation, iterative reconstruction, and/or weight based adjustment of the mA/kV was utilized to reduce the radiation dose to as low as reasonably achievable. COMPARISON: None. HISTORY: ORDERING SYSTEM PROVIDED HISTORY: Headache, anisocoria Reason for Exam: ha, dialated pupils FINDINGS: BRAIN/VENTRICLES: No acute intracranial hemorrhage, mass effect or midline shift. No abnormal extra-axial fluid collection. The gray-white differentiation is maintained without evidence of an acute infarct. No evidence of hydrocephalus. ORBITS: The visualized portion of the orbits demonstrate no acute abnormality. SINUSES: The visualized paranasal sinuses and mastoid air cells appear clear. SOFT TISSUES/SKULL:  No acute abnormality of the visualized skull or soft tissues. Negative noncontrast CT examination of the brain.        Assessment :      Primary Problem  Suicidal ideation    Active Hospital Problems    Diagnosis Date Noted    Mood disorder [facility]) [F39] 08/08/2016     Priority: High    Alcoholic intoxication, with unspecified complication [facility]) [F90.354] 09/02/2021    Hypertension [I10] 09/02/2021    Suicidal ideation [R45.851]     Depression [F32.9]     Alcoholism ([facility] [address] [F10.20]     Headache [R51.9]        Plan:     Patient status Admit as inpatient in the  Med/[facility]    Suicidal ideation  Suicide precaution including one-to-one sitter  Consult to psychiatry for possible transfer to [facility]    Alcohol abuse  Thiamine 200 3 times daily for 2 to 7 days  Folic acid and multivitamin  IV hydration with normal saline at 100 mL/h    Anisocoria and nystagmus  Inpatient consult to neurology  CT head negative for acute findings  Physiologic anisocoria versus focal neurological deficit  Possible Warnicke's encephalopathy    Depression/anxiety  Restart patient's home antidepressants  Cymbalta 40 mg daily  Mirtazapine 15 mg nightly  Buspirone 15 mg 3 times daily    Essential hypertension  Restarted home medications  Carvedilol held  Chlorthalidone 25 mg daily  Cozaar 100 mg daily    Diet: Regular diet  Lovenox for DVT prophylaxis  Dispo: Possible transfer to [facility] based on psychiatry recommendations. Consultations:   IP CONSULT TO INTERNAL MEDICINE  IP CONSULT TO SOCIAL WORK  IP CONSULT TO PSYCHIATRY  IP CONSULT TO NEUROLOGY    Patient is admitted as inpatient status because of co-morbiditieslisted above, severity of signs and symptoms as outlined, requirement for current medical therapies and most importantly because of direct risk to patient if care not provided in a hospital setting. Enzo Guajardo MD  9/2/2021  5:21 PM    Copy sent to Dr. Grayson primary care provider on file.

## 2021-09-02 NOTE — LETTER
[address] [facility] [address]  Phone: [phone]             September 5, 2021    Patient: Gabrielle Hutchison   YOB: 1980   Date of Visit: 9/2/2021       To Whom It May Concern:    Taryn Davis was seen and treated in our facility beginning 9/2/2021 until 09/05/2021.        Sincerely,       Jah Cheung RN        Signature:__________________________________

## 2021-09-03 ENCOUNTER — APPOINTMENT (OUTPATIENT)
Dept: MRI IMAGING | Age: 41
End: 2021-09-03
Payer: MEDICAID

## 2021-09-03 PROCEDURE — 2580000003 HC RX 258: Performed by: EMERGENCY MEDICINE

## 2021-09-03 PROCEDURE — 6360000002 HC RX W HCPCS: Performed by: STUDENT IN AN ORGANIZED HEALTH CARE EDUCATION/TRAINING PROGRAM

## 2021-09-03 PROCEDURE — 2580000003 HC RX 258: Performed by: PSYCHIATRY & NEUROLOGY

## 2021-09-03 PROCEDURE — APPSS180 APP SPLIT SHARED TIME > 60 MINUTES: Performed by: NURSE PRACTITIONER

## 2021-09-03 PROCEDURE — 99223 1ST HOSP IP/OBS HIGH 75: CPT | Performed by: INTERNAL MEDICINE

## 2021-09-03 PROCEDURE — 70553 MRI BRAIN STEM W/O & W/DYE: CPT

## 2021-09-03 PROCEDURE — 6360000002 HC RX W HCPCS

## 2021-09-03 PROCEDURE — 6370000000 HC RX 637 (ALT 250 FOR IP): Performed by: STUDENT IN AN ORGANIZED HEALTH CARE EDUCATION/TRAINING PROGRAM

## 2021-09-03 PROCEDURE — A9579 GAD-BASE MR CONTRAST NOS,1ML: HCPCS | Performed by: PSYCHIATRY & NEUROLOGY

## 2021-09-03 PROCEDURE — 90792 PSYCH DIAG EVAL W/MED SRVCS: CPT | Performed by: PSYCHIATRY & NEUROLOGY

## 2021-09-03 PROCEDURE — 6370000000 HC RX 637 (ALT 250 FOR IP)

## 2021-09-03 PROCEDURE — 1200000000 HC SEMI PRIVATE

## 2021-09-03 PROCEDURE — 2580000003 HC RX 258

## 2021-09-03 PROCEDURE — 99254 IP/OBS CNSLTJ NEW/EST MOD 60: CPT | Performed by: PSYCHIATRY & NEUROLOGY

## 2021-09-03 PROCEDURE — 6360000004 HC RX CONTRAST MEDICATION: Performed by: PSYCHIATRY & NEUROLOGY

## 2021-09-03 PROCEDURE — 2580000003 HC RX 258: Performed by: STUDENT IN AN ORGANIZED HEALTH CARE EDUCATION/TRAINING PROGRAM

## 2021-09-03 PROCEDURE — 2500000003 HC RX 250 WO HCPCS: Performed by: EMERGENCY MEDICINE

## 2021-09-03 RX ORDER — SODIUM CHLORIDE 0.9 % (FLUSH) 0.9 %
10 SYRINGE (ML) INJECTION ONCE
Status: COMPLETED | OUTPATIENT
Start: 2021-09-03 | End: 2021-09-03

## 2021-09-03 RX ORDER — HYDRALAZINE HYDROCHLORIDE 20 MG/ML
5 INJECTION INTRAMUSCULAR; INTRAVENOUS EVERY 6 HOURS PRN
Status: DISCONTINUED | OUTPATIENT
Start: 2021-09-03 | End: 2021-09-04

## 2021-09-03 RX ORDER — NICOTINE 21 MG/24HR
1 PATCH, TRANSDERMAL 24 HOURS TRANSDERMAL DAILY
Status: DISCONTINUED | OUTPATIENT
Start: 2021-09-03 | End: 2021-09-05 | Stop reason: HOSPADM

## 2021-09-03 RX ADMIN — CHLORTHALIDONE 25 MG: 25 TABLET ORAL at 08:40

## 2021-09-03 RX ADMIN — BUSPIRONE HYDROCHLORIDE 15 MG: 15 TABLET ORAL at 21:45

## 2021-09-03 RX ADMIN — LORAZEPAM 2 MG: 1 TABLET ORAL at 09:32

## 2021-09-03 RX ADMIN — ENOXAPARIN SODIUM 40 MG: 40 INJECTION SUBCUTANEOUS at 08:33

## 2021-09-03 RX ADMIN — THIAMINE HYDROCHLORIDE 500 MG: 100 INJECTION, SOLUTION INTRAMUSCULAR; INTRAVENOUS at 22:32

## 2021-09-03 RX ADMIN — LORAZEPAM 2 MG: 2 INJECTION INTRAMUSCULAR; INTRAVENOUS at 05:09

## 2021-09-03 RX ADMIN — LOSARTAN POTASSIUM 100 MG: 100 TABLET, FILM COATED ORAL at 08:40

## 2021-09-03 RX ADMIN — ACETAMINOPHEN 650 MG: 325 TABLET, FILM COATED ORAL at 05:09

## 2021-09-03 RX ADMIN — THIAMINE HYDROCHLORIDE 200 MG: 100 INJECTION, SOLUTION INTRAMUSCULAR; INTRAVENOUS at 05:09

## 2021-09-03 RX ADMIN — HYDRALAZINE HYDROCHLORIDE 5 MG: 20 INJECTION INTRAMUSCULAR; INTRAVENOUS at 13:42

## 2021-09-03 RX ADMIN — LORAZEPAM 3 MG: 1 TABLET ORAL at 18:10

## 2021-09-03 RX ADMIN — BUSPIRONE HYDROCHLORIDE 15 MG: 15 TABLET ORAL at 13:41

## 2021-09-03 RX ADMIN — LORAZEPAM 2 MG: 1 TABLET ORAL at 13:42

## 2021-09-03 RX ADMIN — BUSPIRONE HYDROCHLORIDE 15 MG: 15 TABLET ORAL at 08:33

## 2021-09-03 RX ADMIN — THIAMINE HYDROCHLORIDE 500 MG: 100 INJECTION, SOLUTION INTRAMUSCULAR; INTRAVENOUS at 13:49

## 2021-09-03 RX ADMIN — GADOTERIDOL 20 ML: 279.3 INJECTION, SOLUTION INTRAVENOUS at 21:28

## 2021-09-03 RX ADMIN — SODIUM CHLORIDE, PRESERVATIVE FREE 10 ML: 5 INJECTION INTRAVENOUS at 21:28

## 2021-09-03 RX ADMIN — LORAZEPAM 3 MG: 2 INJECTION INTRAMUSCULAR; INTRAVENOUS at 01:36

## 2021-09-03 RX ADMIN — SODIUM CHLORIDE: 9 INJECTION, SOLUTION INTRAVENOUS at 01:30

## 2021-09-03 RX ADMIN — DULOXETINE 40 MG: 20 CAPSULE, DELAYED RELEASE ORAL at 08:40

## 2021-09-03 RX ADMIN — FOLIC ACID 1 MG: 5 INJECTION, SOLUTION INTRAMUSCULAR; INTRAVENOUS; SUBCUTANEOUS at 09:21

## 2021-09-03 RX ADMIN — SODIUM CHLORIDE: 9 INJECTION, SOLUTION INTRAVENOUS at 13:51

## 2021-09-03 RX ADMIN — MULTIPLE VITAMINS W/ MINERALS TAB 1 TABLET: TAB at 08:33

## 2021-09-03 RX ADMIN — LORAZEPAM 3 MG: 2 INJECTION INTRAMUSCULAR; INTRAVENOUS at 20:33

## 2021-09-03 ASSESSMENT — ENCOUNTER SYMPTOMS
WHEEZING: 0
COUGH: 0
APNEA: 0
ABDOMINAL PAIN: 0
BACK PAIN: 0
EYE PAIN: 0
CONSTIPATION: 0
CHOKING: 0
DIARRHEA: 0
CHEST TIGHTNESS: 0
NAUSEA: 0
SHORTNESS OF BREATH: 0
ABDOMINAL DISTENTION: 0
VOMITING: 0
PHOTOPHOBIA: 0
TROUBLE SWALLOWING: 0

## 2021-09-03 ASSESSMENT — PAIN SCALES - GENERAL
PAINLEVEL_OUTOF10: 6
PAINLEVEL_OUTOF10: 3
PAINLEVEL_OUTOF10: 0
PAINLEVEL_OUTOF10: 1

## 2021-09-03 ASSESSMENT — PAIN DESCRIPTION - LOCATION: LOCATION: HEAD

## 2021-09-03 ASSESSMENT — PAIN DESCRIPTION - PAIN TYPE: TYPE: ACUTE PAIN

## 2021-09-03 NOTE — CONSULTS
NEUROLOGY INPATIENT CONSULT NOTE    9/3/2021         Katelyn Matamoros is a  39 y.o. male admitted on 9/2/2021 with  Anisocoria [H57.02]  Suicidal ideation [R45.851]  Alcohol abuse [S31.32]  Alcoholic intoxication, with unspecified complication ([facility] [E76.767]      History is obtained mostly from the patient and the medical record and from the caregivers. Chart is reviewed and patient is examined. Briefly, this is a  39 y.o. male with hx of alcoholism, anxiety, hep c, a fib, alcohol withdrawal sz  was admitted on 9/2/2021 with suicidal ideations. Patient had 25-year history of alcoholism and recently was sober. For the past 3 weeks, he relapsed and he has been drinking 1Lit of vodka daily. He has not been taking his meds, BuSpar, duloxetine, mirtazapine. Patient has been having suicidal ideations. CT head on admit -ve. UDS -ve. Patient stated that he has been having gait difficulties with ongoing alcoholism. He denies blurred vision and double vision. Denied speech and swallowing difficulties. Admits to having memory problems with depression. No current facility-administered medications on file prior to encounter.      Current Outpatient Medications on File Prior to Encounter   Medication Sig Dispense Refill    atomoxetine (STRATTERA) 25 MG capsule Take 25 mg by mouth daily      busPIRone (BUSPAR) 15 MG tablet Take 15 mg by mouth 3 times daily      DULoxetine (CYMBALTA) 20 MG extended release capsule Take 40 mg by mouth daily      losartan (COZAAR) 100 MG tablet Take 100 mg by mouth daily      Multiple Vitamins-Minerals (THERAPEUTIC MULTIVITAMIN-MINERALS) tablet Take 1 tablet by mouth daily 30 tablet 0    carvedilol (COREG) 3.125 MG tablet Take 3.125 mg by mouth 2 times daily (with meals)      chlorthalidone (HYGROTON) 25 MG tablet Take 25 mg by mouth daily      thiamine mononitrate 100 MG tablet Take 1 tablet by mouth daily 30 tablet 0     Allergies: Katelyn Matamoros is allergic to dicloxacillin and pcn [penicillins]. Past Medical History:   Diagnosis Date    Alcoholism ([facility] 01/31/2018    hx of alcoholism with intermittent sobriety;    [facility]    Hepatitis C 2010    pt states he tested + for antibodies. no live virus detected -no treatment needed    History of atrial fibrillation 2001    pt states after a suicide attempt, overdose oxycontin pt developed atrial fib x 5-6 months. NO RECENT ISSUES    Mental and behavioral disorders d/t use of alcohol, acute intoxication ([facility]     NO LONGER PERTINENT    Palpitations     DENIES    Seizures ([facility] 2006    during alcohol detox     Wears glasses        Past Surgical History:   Procedure Laterality Date    CYSTOSCOPY Bilateral 12/19/2018    Retrograde pyelogram    CYSTOSCOPY Bilateral 12/19/2018    CYSTOSCOPY, RETROGRADE PYELOGRAM performed by Jessica Huerta MD at [address] Left 2004    second finger REATTACH R [address] TONSILLECTOMY  1983     Social History: Juliana Coyne  reports that he has quit smoking. His smoking use included cigarettes. He has a 20.00 pack-year smoking history. He has quit using smokeless tobacco.  His smokeless tobacco use included snuff and chew. He reports current alcohol use. He reports previous drug use. Drug: Cocaine.     Family History   Problem Relation Age of Onset    Mental Illness Mother     Depression Mother     High Blood Pressure Father     High Cholesterol Father     Substance Abuse Brother     Breast Cancer Maternal Grandmother        Current Medications:     thiamine (VITAMIN B1) IVPB  500 mg IntraVENous Q8H    nicotine  1 patch TransDERmal Daily    folic acid IVPB  1 mg IntraVENous Daily    [Held by provider] atomoxetine  25 mg Oral Daily    busPIRone  15 mg Oral TID    chlorthalidone  25 mg Oral Daily    DULoxetine  40 mg Oral Daily    losartan  100 mg Oral Daily    mirtazapine  15 mg Oral Nightly    therapeutic sentences well; serial subtractions well; no dysarthria noted; intact language. CRANIAL NERVES: II     -       Pupils reactive b/l; left 4 mm; right 3 mm and are reactive and with no change in dark and light suggesting physiologic anisocoria;., Fundus exam: intact venous pulsations; Visual fields intact to confrontation  III,IV,VI -horizontal nystagmus noted bilaterally; no afferent defect, no ptosis  V     -     Normal facial sensation  VII    -     Normal facial symmetry  VIII   -     Intact hearing  IX,X -     Symmetrical palate  XI    -     Symmetrical shoulder shrug  XII   -     Midline tongue, no atrophy    MOTOR FUNCTION:  Normal bulk, normal tone, normal power;  no involuntary movements, no tremor   SENSORY FUNCTION:  Normal touch, normal pin, normal vibration, normal proprioception   CEREBELLAR FUNCTION:  Intact fine motor control over upper limbs   REFLEX FUNCTION:  Symmetric, no perverted reflex, no Babinski sign   STATION and GAIT  normal station; wide-based gait and could not do tandem gait         Data:    Lab Results:     Lab Results   Component Value Date    CHOL 129 07/14/2020    LDLCHOLESTEROL 75 07/14/2020    HDL 31 (L) 07/14/2020    TRIG 117 07/14/2020    ALT 30 09/02/2021    AST 39 09/02/2021    TSH 2.32 07/24/2021    INR 1.08 07/09/2021    GLUF 104 (H) 09/20/2019    LABA1C 5.4 09/25/2020     Hematology:  Recent Labs     09/02/21  1402   WBC 7.3   HGB 15.6   HCT 44.5        Chemistry:  Recent Labs     09/02/21  1402   *   K 3.5*   CL 98   CO2 23   GLUCOSE 133*   BUN 5*   CREATININE 0.74   CALCIUM 8.9     Recent Labs     09/02/21  1402   PROT 8.0   LABALBU 4.5   AST 39   ALT 30       No results found for: PHENYTOIN, PHENYTOIN, VALPROATE, CBMZ        Diagnostic data reviewed:  CT Head 9/2/21: -ve. Urine toxicology 9/2/21: -ve. Impression and Plan: Mr. Belen Graham is a 39 y.o. male with   Horizontal nystagmus with anisocoria; gait ataxia with chronic alcoholism. ,

## 2021-09-03 NOTE — PROGRESS NOTES
Physical Therapy        Physical Therapy Cancel Note      DATE: 9/3/2021    NAME: Leighann Quiñonez  MRN: [id number]   : 1980      Patient not seen this date for Physical Therapy due to:    Pt observed ambulating in room with supervision- all other mobility independent- no need for PT at this time      Electronically signed by Carmita Polanco PT on 9/3/2021 at 9:07 AM

## 2021-09-03 NOTE — CONSULTS
Department of Psychiatry   Psychiatric Assessment        Thank you very much for allowing us to participate in the care of this patient.       Reason for Consult:  evaluation for suicidal ideation     HISTORY OF PRESENT ILLNESS:           The patient is a 39 y.o. male with significant history of alcohol abuse, polysubstance use disorder, anxiety and major depression. He is  admitted medically for alcohol withdrawal.  Patient is currently on CIWA protocol and alcohol withdrawal is being managed with lorazepam.  CIWA scores continue to be elevated. Patient is hypertensive received hydralazine IV for elevated blood pressure.     Patient reports that he has been having active suicidal thoughts with a plan to kill self. He is able to contract to safety here on the unit and is agreeable to come to the psychiatric unit after he is medically stable. Per care coordination note from Pricilla Jones: While in the ED, patient had voiced suicidal ideation with a plan to overdose. Psychiatry consulted to assess for SI and patient safety. Patient endorsing continued feelings of hopelessness with thoughts that life is not worth living. He denies specific plan at this time, but states that he is aware of multiple methods. He does confirm prior suicide attempts in the past.  Patient believes his depression is affected by situational stressors. He states that he has been participating in empowered for [facility] program.  His anxiety was not well controlled and he began drinking alcohol approximately 1 month ago in an attempt to self medicate. He endorses consuming 1/5 of vodka on a daily basis for the past month. Per review of documentation, patient has now been discharged from the [facility] program and is reporting homelessness.     Patient states his most recent medications include Cymbalta 40 mg, BuSpar 15 mg 3 times daily and Strattera 25 mg daily.   He states that he has not been compliant with his medications for over a week as he did not find them beneficial.  Prior trials of medications will be listed below. Reviewed multiple medications and he denies prior  trials of Lexapro, risperidone, or olanzapine. Would recommend patient     Recommend psychotropic medications to be discontinued at this time. withdrawal.  Will revisit psychotropic medications with improvement in withdrawal.     Reviewed labs, vitals, and imaging. All abnormal or significant results below:     Urine toxicology: Negative  EtOH: 0.209  Hyponatremia: 133  Hypokalemia: 3.5  BUN: 5  Liver profile: Within appropriate range     PSYCHIATRIC HISTORY:      · Outpatient psychiatric provider: Bryant Erwin at [facility]  · Suicide attempts: Endorses multiple  · Inpatient psychiatric admissions: Endorses multiple, most recent admission to [facility] on 3/22/2021     Past psychiatric medications includes:   Cymbalta  BuSpar  Strattera  Celexa  Effexor  Wellbutrin  Latuda  Pristiq  Mirtazapine  Seroquel: Reports tiring and sedating        Adverse reactions from psychotropic medications:   yes -Effexor, Celexa and Wellbutrin: Panic and sexual side effects        Lifetime Psychiatric Review of Systems          Gunjan or Hypomania: denies      Panic Attacks: Reports     Phobias: denies     Obsessions and Compulsions:denies     Body or Vocal Tics:  denies     Hallucinations:denies     Delusions: Denies      Home Medications           Prior to Admission medications    Medication Sig Start Date End Date Taking?  Authorizing Provider   atomoxetine (STRATTERA) 25 MG capsule Take 25 mg by mouth daily     Yes Historical Provider, MD   busPIRone (BUSPAR) 15 MG tablet Take 15 mg by mouth 3 times daily     Yes Historical Provider, MD   DULoxetine (CYMBALTA) 20 MG extended release capsule Take 40 mg by mouth daily     Yes Historical Provider, MD   losartan (COZAAR) 100 MG tablet Take 100 mg by mouth daily     Yes Historical Provider, MD   Multiple Vitamins-Minerals (THERAPEUTIC MULTIVITAMIN-MINERALS) tablet Take 1 tablet by mouth daily 3/27/21   Yes Essence Howard MD   carvedilol (COREG) 3.125 MG tablet Take 3.125 mg by mouth 2 times daily (with meals)       Historical Provider, MD   chlorthalidone (HYGROTON) 25 MG tablet Take 25 mg by mouth daily       Historical Provider, MD   thiamine mononitrate 100 MG tablet Take 1 tablet by mouth daily 3/27/21     Essence Howard MD            Medications:      Current Hospital Medications   Current Facility-Administered Medications: thiamine (B-1) 500 mg in sodium chloride 0.9 % 100 mL IVPB, 500 mg, IntraVENous, Q8H  hydrALAZINE (APRESOLINE) injection 5 mg, 5 mg, IntraVENous, Q6H PRN  nicotine (NICODERM CQ) 21 MG/24HR 1 patch, 1 patch, TransDERmal, Daily  folic acid 1 mg in dextrose 5 % 50 mL IVPB, 1 mg, IntraVENous, Daily  [Held by provider] atomoxetine (STRATTERA) capsule 25 mg, 25 mg, Oral, Daily  busPIRone (BUSPAR) tablet 15 mg, 15 mg, Oral, TID  chlorthalidone (HYGROTON) tablet 25 mg, 25 mg, Oral, Daily  DULoxetine (CYMBALTA) extended release capsule 40 mg, 40 mg, Oral, Daily  losartan (COZAAR) tablet 100 mg, 100 mg, Oral, Daily  mirtazapine (REMERON) tablet 15 mg, 15 mg, Oral, Nightly  therapeutic multivitamin-minerals 1 tablet, 1 tablet, Oral, Daily  sodium chloride flush 0.9 % injection 5-40 mL, 5-40 mL, IntraVENous, 2 times per day  sodium chloride flush 0.9 % injection 5-40 mL, 5-40 mL, IntraVENous, PRN  0.9 % sodium chloride infusion, 25 mL, IntraVENous, PRN  enoxaparin (LOVENOX) injection 40 mg, 40 mg, SubCUTAneous, Daily  ondansetron (ZOFRAN-ODT) disintegrating tablet 4 mg, 4 mg, Oral, Q8H PRN **OR** ondansetron (ZOFRAN) injection 4 mg, 4 mg, IntraVENous, Q6H PRN  polyethylene glycol (GLYCOLAX) packet 17 g, 17 g, Oral, Daily PRN  acetaminophen (TYLENOL) tablet 650 mg, 650 mg, Oral, Q6H PRN **OR** acetaminophen (TYLENOL) suppository 650 mg, 650 mg, Rectal, Q6H PRN  0.9 % sodium chloride infusion, , IntraVENous, Continuous  therapeutic  Oriented to self, location, time, situation  Concentration clinically adequate  Memory: intact  Insight &Judgment: poor     DSM-5 DIAGNOSIS:       Major depressive disorder, recurrent, severe without psychotic features  Alcohol use disorder with withdrawal        Stressors     Severity of stressors is severe  Source of stressors include:   Other psychosocial and environmental stressors     PLAN:       Continue CIWA protocol  Continue one-to-one sitter secondary to continued suicidal ideation  Discontinue mirtazapine as this is not an active medication  Recommend discontinuing atomoxetine, mirtazapine, buspirone, and duloxetine  Will revisit initiation of psychotropic medications once through withdrawal        Additional recommendations will follow the clinical course.         Thank you very much for allowing us to participate in the care of this patient    Electronically signed by Miesha Montoya MD on 9/3/21 at 5:27 PM EDT

## 2021-09-03 NOTE — FLOWSHEET NOTE
09/03/21 1806   Encounter Summary   Services provided to: Patient   Referral/Consult From: Rounding   Complexity of Encounter Low   Length of Encounter 15 minutes   Spiritual/Rastafari   Type Spiritual support   Assessment Sleeping   Intervention Prayer   Outcome Did not respond

## 2021-09-03 NOTE — CARE COORDINATION
CASE MANAGEMENT NOTE:    Admission Date:  9/2/2021 Jose Luis Calabrese is a 39 y.o.  male    Admitted for : Anisocoria [H57.02]  Suicidal ideation [R45.851]  Alcohol abuse [N78.59]  Alcoholic intoxication, with unspecified complication ([facility] [B00.897]    Met with:  Patient    PCP:  No pcp                                Insurance:  Joslyn Campbell       Is patient alert and oriented at time of discussion:  Yes    Current Residence/ Living Arrangements:  independently at home             Current Services PTA:  No    Does patient go to outpatient dialysis: No  If yes, location and chair time:     Is patient agreeable to VNS: No    Freedom of choice provided:  No    List of [address] provided: No    VNS chosen:  No    DME:  none    Home Oxygen: No    Nebulizer: No    CPAP/BIPAP: No    Supplier: N/A    Potential Assistance Needed: No    SNF needed: No    Freedom of choice and list provided: No    [facility] [place] [facility]       Does Patient want to use MEDS to BEDS? No    Is patient currently receiving oral anticoagulation therapy? No    Is the Patient an [facility] with Readmission Risk Score greater than 14%? No  If yes, pt needs a follow up appointment made within 7 days. Family Members/Caregivers that pt would like involved in their care:    Yes    If yes, list name here:  Mere    Transportation Provider:  Family             Discharge Plan:  9/3/21 Joslyn Campbell Pt is from home with his roommate he states that he is not allowed to go back there pt uses no dme and denies need for vns will follow for needs . //tv                 Electronically signed by:  Rigoberto Betancourt RN on 9/3/2021 at 9:27 AM

## 2021-09-03 NOTE — PLAN OF CARE
Problem: Pain:  Goal: Pain level will decrease  Description: Pain level will decrease  Outcome: Ongoing  Goal: Control of acute pain  Description: Control of acute pain  Outcome: Ongoing  Goal: Control of chronic pain  Description: Control of chronic pain  Outcome: Ongoing     Problem: Falls - Risk of:  Goal: Will remain free from falls  Description: Will remain free from falls  Outcome: Ongoing  Goal: Absence of physical injury  Description: Absence of physical injury  Outcome: Ongoing     Problem: Suicide risk  Goal: Provide patient with safe environment  Description: Provide patient with safe environment  Outcome: Ongoing

## 2021-09-03 NOTE — PROGRESS NOTES
[facility]   OCCUPATIONAL THERAPY MISSED TREATMENT NOTE   INPATIENT   Date: 9/3/21  Patient Name: Rene Akhtar       Room: 8955/4530-24  MRN: [id number]   Account #: [de-identified]    : 1980  (39 y.o.)  Gender: male                 REASON FOR MISSED TREATMENT:  Patient reports and demonstrates independence with all self-care and functional mobility in room. Patient denies any concerns regarding self-care for discharge. No need for skilled OT services at this time. Discharge OT.   -   OT being discontinued at this time.  Patient functioning at Premorbid Level  No further needs       Trey Duron, OT

## 2021-09-03 NOTE — PROGRESS NOTES
[facility]    PROGRESS NOTE             9/3/2021    8:34 AM    Name:   Janina Stiles  MRN:     [id number]     [place]:      [de-identified]   Room:   2072/2072-01  IP Day:  1  Admit Date:  9/2/2021 12:41 PM    PCP:  No primary care provider on file. Code Status:  Full Code    Subjective:     C/C:   Chief Complaint   Patient presents with    Mental Health Problem    Alcohol Problem     Interval History Status: not changed. Patient feels about the same as he did yesterday. Continues to feel anxious and slightly lightheaded. He is more tremulous today. Nystagmus much more pronounced. CIWA protocol in place. Patient to see psychiatry and neurology today. Patient does concede that he had a previous seizure on alcohol withdrawal.  Seizure precautions in place. No acute events overnight    Brief History:     Briefly this is a 22-year-old male who presented to the ED for suicidal ideation. He was admitted. Patient has a 25-year drinking history but was recently sober. For past 3 weeks he relapsed and started drinking approximately 1 L of vodka a day. During this time he ceased taking his antidepressant medication. He described feeling hopeless and suicidal.  Anxiety became acutely worse and he became suicidal.  This prompted him to come to the hospital.  Patient has a previous suicide attempt in which he tried to cut his wrist.  Past medical history significant for depression, anxiety, and essential hypertension. CT head and UDS the ED were negative. Ethanol at the time was 209. Patient started on thiamine folate multivitamins. IV fluid was begun. Patient [facility] to [facility]    Review of Systems:     Review of Systems   Constitutional: Positive for fatigue. Negative for activity change, appetite change, chills and fever. HENT: Negative for hearing loss, tinnitus and trouble swallowing. Eyes: Positive for visual disturbance. LORazepam **OR** LORazepam **OR** LORazepam **OR** LORazepam **OR** LORazepam **OR** LORazepam **OR** LORazepam **OR** LORazepam    Data:     Past Medical History:   has a past medical history of Alcoholism ([facility] [address] Anxiety, Hepatitis C, History of atrial fibrillation, Mental and behavioral disorders d/t use of alcohol, acute intoxication ([facility] [address] [facility] Palpitations, [facility] and Wears glasses. Social History:   reports that he has quit smoking. His smoking use included cigarettes. He has a 20.00 pack-year smoking history. He has quit using smokeless tobacco.  His smokeless tobacco use included snuff and chew. He reports current alcohol use. He reports previous drug use. Drug: Cocaine. Family History:   Family History   Problem Relation Age of Onset    Mental Illness Mother     Depression Mother     High Blood Pressure Father     High Cholesterol Father     Substance Abuse Brother     Breast Cancer Maternal Grandmother        Vitals:  BP (!) 148/103   Pulse 110   Temp 97.8 °F (36.6 °C) (Oral)   Resp 16   Ht 5' 10\" (1.778 m)   Wt 200 lb (90.7 kg)   SpO2 96%   BMI 28.70 kg/m²   Temp (24hrs), Av.2 °F (36.8 °C), Min:97.8 °F (36.6 °C), Max:98.6 °F (37 °C)    No results for input(s): POCGLU in the last 72 hours. I/O(24Hr): No intake or output data in the 24 hours ending 21 0834    Labs:    [unfilled]    Lab Results   Component Value Date/Time    SPECIAL NOT REPORTED 2020 10:06 AM     Lab Results   Component Value Date/Time    CULTURE NO GROWTH 2020 10:06 AM       [unfilled]    Radiology:    CT HEAD WO CONTRAST    Result Date: 2021  EXAMINATION: CT OF THE HEAD WITHOUT CONTRAST  2021 1:29 pm TECHNIQUE: CT of the head was performed without the administration of intravenous contrast. Dose modulation, iterative reconstruction, and/or weight based adjustment of the mA/kV was utilized to reduce the radiation dose to as low as reasonably achievable. COMPARISON: None.  HISTORY: ORDERING SYSTEM PROVIDED HISTORY: Headache, anisocoria Reason for Exam: ha, dialated pupils FINDINGS: BRAIN/VENTRICLES: No acute intracranial hemorrhage, mass effect or midline shift. No abnormal extra-axial fluid collection. The gray-white differentiation is maintained without evidence of an acute infarct. No evidence of hydrocephalus. ORBITS: The visualized portion of the orbits demonstrate no acute abnormality. SINUSES: The visualized paranasal sinuses and mastoid air cells appear clear. SOFT TISSUES/SKULL:  No acute abnormality of the visualized skull or soft tissues. Negative noncontrast CT examination of the brain. Physical Examination:        Physical Exam  Constitutional:       General: He is not in acute distress. Appearance: Normal appearance. HENT:      Head: Normocephalic and atraumatic. Mouth/Throat:      Mouth: Mucous membranes are moist.      Pharynx: Oropharynx is clear. Eyes:      Extraocular Movements: Extraocular movements intact. Conjunctiva/sclera: Conjunctivae normal.      Pupils: Pupils are unequal.      Comments: Left eye 3 mm  Right eye 2 mm  Post pupils reactive to light  Visual acuity decreased in right eye when compared to left  Decreased visual acuity and extremes of gaze  Bilateral nystagmus on lateral gaze  Bilateral nystagmus at rest more pronounced today   Cardiovascular:      Rate and Rhythm: Normal rate and regular rhythm. Pulses: Normal pulses. Heart sounds: Normal heart sounds. Pulmonary:      Effort: Pulmonary effort is normal.      Breath sounds: Normal breath sounds. Abdominal:      General: Abdomen is flat. Palpations: Abdomen is soft. Musculoskeletal:         General: Normal range of motion. Cervical back: Normal range of motion and neck supple. Skin:     General: Skin is warm and dry. Neurological:      Mental Status: He is alert and oriented to person, place, and time.       Cranial Nerves: Cranial nerves are intact. Sensory: Sensation is intact. Motor: Motor function is intact. Coordination: Finger-Nose-Finger Test abnormal. Heel to More Test normal.      Comments: Mild bilateral dysmetria on finger-to-nose test worse on the left  Bilateral action tremor  Resting tremor present   Psychiatric:         Mood and Affect: Mood normal.         Behavior: Behavior normal.         Thought Content: Thought content normal.         Judgment: Judgment normal.           Assessment:        Primary Problem  Suicidal ideation    Active Hospital Problems    Diagnosis Date Noted    Mood disorder ([facility] [F39] 08/08/2016     Priority: High    Alcoholic intoxication, with unspecified complication ([facility] [M25.504] 09/02/2021    Hypertension [I10] 09/02/2021    Suicidal ideation [R45.851]     Depression [F32.9]     Alcoholism ([facility] [F10.20]     Headache [R51.9]        Plan:        Suicidal ideation  Suicide precaution including one-to-one sitter  Consult to psychiatry for possible transfer to [facility]     Alcohol abuse  Thiamine 200 3 times daily for 2 to 7 days  Folic acid and multivitamin  IV hydration with normal saline at 100 mL/h  Seizure precautions in place for history of seizure on withdrawal     Anisocoria and nystagmus  Inpatient consult to neurology  CT head negative for acute findings  Physiologic anisocoria versus focal neurological deficit  Possible Warnicke's encephalopathy     Depression/anxiety  Restart patient's home antidepressants  Cymbalta 40 mg daily  Mirtazapine 15 mg nightly  Buspirone 15 mg 3 times daily     Essential hypertension  Restarted home medications  Carvedilol held  Chlorthalidone 25 mg daily  Cozaar 100 mg daily     Diet: Regular diet  Lovenox for DVT prophylaxis  Dispo: Possible transfer to [facility] based on psychiatry recommendations.     Afsaneh Salvador MD  9/3/2021  8:34 AM       I have discussed the care of Gabrielle Hutchison , including pertinent history and exam findings,    today with the resident. I have seen and examined the patient and the key elements of all parts of the encounter have been performed by me . I agree with the assessment, plan and orders as documented by the resident. Principal Problem:    Suicidal ideation  Active Problems:    Mood disorder ([facility] [facility]    Headache    Depression    Alcoholism ([facility]    Alcoholic intoxication, with unspecified complication ([facility] [facility]    Hypertension  Resolved Problems:    * No resolved hospital problems. *        Overall  course ;                                   are improving over time.         Patient is doing much better today  patient had nystagmus on clinical exam in emergency room  CT head was negative  chronic alcoholic  admitted with depression suicidal ideation was not taking his antidepressant  on [facility] protocol  started on  high dose of IV thiamine            Electronically signed by Dalia Navarro MD

## 2021-09-03 NOTE — PLAN OF CARE
Please have the patient or patient representative fill out the MRI screening form and fax to [phone] when complete. MRI will be scheduled after screening form is received. Any questions call [phone]. Thank you!

## 2021-09-03 NOTE — PROGRESS NOTES
This note is for care coordination only. Not to be used for billing. Department of Psychiatry   Psychiatric Assessment      Thank you very much for allowing us to participate in the care of this patient. Reason for Consult:  evaluation for suicidal ideation    HISTORY OF PRESENT ILLNESS:          The patient is a 39 y.o. male with significant history of alcohol abuse, polysubstance use disorder, anxiety and major depression. He is  admitted medically for alcohol withdrawal.  Patient is currently on CIWA protocol and alcohol withdrawal is being managed with lorazepam.  CIWA scores continue to be elevated. Patient is hypertensive received hydralazine IV for elevated blood pressure. While in the ED, patient had voiced suicidal ideation with a plan to overdose. Psychiatry consulted to assess for SI and patient safety. Patient endorsing continued feelings of hopelessness with thoughts that life is not worth living. He denies specific plan at this time, but states that he is aware of multiple methods. He does confirm prior suicide attempts in the past.  Patient believes his depression is affected by situational stressors. He states that he has been participating in [facility] [employer] program.  His anxiety was not well controlled and he began drinking alcohol approximately 1 month ago in an attempt to self medicate. He endorses consuming 1/5 of vodka on a daily basis for the past month. Per review of documentation, patient has now been discharged from the [facility] program and is reporting homelessness. Patient states his most recent medications include Cymbalta 40 mg, BuSpar 15 mg 3 times daily and Strattera 25 mg daily. He states that he has not been compliant with his medications for over a week as he did not find them beneficial.  Prior trials of medications will be listed below. Reviewed multiple medications and he denies prior  trials of Lexapro, risperidone, or olanzapine.   Would recommend patient    Recommend psychotropic medications to be discontinued at this time. withdrawal.  Will revisit psychotropic medications with improvement in withdrawal.    Reviewed labs, vitals, and imaging. All abnormal or significant results below:    Urine toxicology: Negative  EtOH: 0.209  Hyponatremia: 133  Hypokalemia: 3.5  BUN: 5  Liver profile: Within appropriate range    PSYCHIATRIC HISTORY:      · Outpatient psychiatric provider: Maria A Mckeon at [facility]  · Suicide attempts: Endorses multiple  · Inpatient psychiatric admissions: Endorses multiple, most recent admission to [facility] on 3/22/2021    Past psychiatric medications includes:   Cymbalta  BuSpar  Strattera  Celexa  Effexor  Wellbutrin  Latuda  Pristiq  Mirtazapine  Seroquel: Reports tiring and sedating      Adverse reactions from psychotropic medications:   yes -Effexor, Celexa and Wellbutrin: Panic and sexual side effects      Lifetime Psychiatric Review of Systems         Gunjan or Hypomania: denies      Panic Attacks: Reports     Phobias: denies     Obsessions and Compulsions:denies     Body or Vocal Tics:  denies     Hallucinations:denies     Delusions: Denies     Prior to Admission medications    Medication Sig Start Date End Date Taking?  Authorizing Provider   atomoxetine (STRATTERA) 25 MG capsule Take 25 mg by mouth daily   Yes Historical Provider, MD   busPIRone (BUSPAR) 15 MG tablet Take 15 mg by mouth 3 times daily   Yes Historical Provider, MD   DULoxetine (CYMBALTA) 20 MG extended release capsule Take 40 mg by mouth daily   Yes Historical Provider, MD   losartan (COZAAR) 100 MG tablet Take 100 mg by mouth daily   Yes Historical Provider, MD   Multiple Vitamins-Minerals (THERAPEUTIC MULTIVITAMIN-MINERALS) tablet Take 1 tablet by mouth daily 3/27/21  Yes Gal Charlton MD   carvedilol (COREG) 3.125 MG tablet Take 3.125 mg by mouth 2 times daily (with meals)    Historical Provider, MD   chlorthalidone (HYGROTON) 25 MG tablet Take 25 mg by mouth daily    Historical Provider, MD   thiamine mononitrate 100 MG tablet Take 1 tablet by mouth daily 3/27/21   Angie Gustafson MD        Medications:    Current Facility-Administered Medications: thiamine (B-1) 500 mg in sodium chloride 0.9 % 100 mL IVPB, 500 mg, IntraVENous, Q8H  hydrALAZINE (APRESOLINE) injection 5 mg, 5 mg, IntraVENous, Q6H PRN  nicotine (NICODERM CQ) 21 MG/24HR 1 patch, 1 patch, TransDERmal, Daily  folic acid 1 mg in dextrose 5 % 50 mL IVPB, 1 mg, IntraVENous, Daily  [Held by provider] atomoxetine (STRATTERA) capsule 25 mg, 25 mg, Oral, Daily  busPIRone (BUSPAR) tablet 15 mg, 15 mg, Oral, TID  chlorthalidone (HYGROTON) tablet 25 mg, 25 mg, Oral, Daily  DULoxetine (CYMBALTA) extended release capsule 40 mg, 40 mg, Oral, Daily  losartan (COZAAR) tablet 100 mg, 100 mg, Oral, Daily  mirtazapine (REMERON) tablet 15 mg, 15 mg, Oral, Nightly  therapeutic multivitamin-minerals 1 tablet, 1 tablet, Oral, Daily  sodium chloride flush 0.9 % injection 5-40 mL, 5-40 mL, IntraVENous, 2 times per day  sodium chloride flush 0.9 % injection 5-40 mL, 5-40 mL, IntraVENous, PRN  0.9 % sodium chloride infusion, 25 mL, IntraVENous, PRN  enoxaparin (LOVENOX) injection 40 mg, 40 mg, SubCUTAneous, Daily  ondansetron (ZOFRAN-ODT) disintegrating tablet 4 mg, 4 mg, Oral, Q8H PRN **OR** ondansetron (ZOFRAN) injection 4 mg, 4 mg, IntraVENous, Q6H PRN  polyethylene glycol (GLYCOLAX) packet 17 g, 17 g, Oral, Daily PRN  acetaminophen (TYLENOL) tablet 650 mg, 650 mg, Oral, Q6H PRN **OR** acetaminophen (TYLENOL) suppository 650 mg, 650 mg, Rectal, Q6H PRN  0.9 % sodium chloride infusion, , IntraVENous, Continuous  therapeutic multivitamin-minerals 1 tablet, 1 tablet, Oral, Daily  potassium chloride (KLOR-CON M) extended release tablet 40 mEq, 40 mEq, Oral, PRN **OR** potassium bicarb-citric acid (EFFER-K) effervescent tablet 40 mEq, 40 mEq, Oral, PRN **OR** potassium chloride 10 mEq/100 mL IVPB (Peripheral Line), 10 mEq, IntraVENous, PRN  LORazepam (ATIVAN) tablet 1 mg, 1 mg, Oral, Q1H PRN **OR** LORazepam (ATIVAN) injection 1 mg, 1 mg, IntraVENous, Q1H PRN **OR** LORazepam (ATIVAN) tablet 2 mg, 2 mg, Oral, Q1H PRN **OR** LORazepam (ATIVAN) injection 2 mg, 2 mg, IntraVENous, Q1H PRN **OR** LORazepam (ATIVAN) tablet 3 mg, 3 mg, Oral, Q1H PRN **OR** LORazepam (ATIVAN) injection 3 mg, 3 mg, IntraVENous, Q1H PRN **OR** LORazepam (ATIVAN) tablet 4 mg, 4 mg, Oral, Q1H PRN **OR** LORazepam (ATIVAN) injection 4 mg, 4 mg, IntraVENous, Q1H PRN     Past Medical History:        Diagnosis Date    Alcoholism ([facility] 01/31/2018    hx of alcoholism with intermittent sobriety;    [facility]    Hepatitis C 2010    pt states he tested + for antibodies. no live virus detected -no treatment needed    History of atrial fibrillation 2001    pt states after a suicide attempt, overdose oxycontin pt developed atrial fib x 5-6 months. NO RECENT ISSUES    Mental and behavioral disorders d/t use of alcohol, acute intoxication ([facility]     NO LONGER PERTINENT    Palpitations     DENIES    Seizures ([facility] 2006    during alcohol detox     Wears glasses        Past Surgical History:        Procedure Laterality Date    CYSTOSCOPY Bilateral 12/19/2018    Retrograde pyelogram    CYSTOSCOPY Bilateral 12/19/2018    CYSTOSCOPY, RETROGRADE PYELOGRAM performed by Timothy Pink MD at [address] Left 2004    second finger REATTACH LIGAMENT AND TENDONS UNDER LOCAL    TONSILLECTOMY  1983       Allergies: Dicloxacillin and Pcn [penicillins]      Social History:      BORN IN [facility], [facility] from age [age] then [place] [facility] from [age]. LEVEL OF EDUCATION: High school diploma, some college for biology/premed  MARITAL STATUS: Has been  twice, .  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 Reports struggling with sobriety. Relapsed 3 weeks ago. Consumes approximately 1/5 of vodka daily. Urine toxicology negative  Reports past history of polysubstance use. Family Medical and Psychiatric History:   Denies psychiatric family history  0 suicides in family  Two Uncles- drug and alcohol use: substance use in family        Problem Relation Age of Onset    Mental Illness Mother     Depression Mother     High Blood Pressure Father     High Cholesterol Father     Substance Abuse Brother     Breast Cancer Maternal Grandmother          Physical  BP (!) 148/103   Pulse 110   Temp 97.8 °F (36.6 °C) (Oral)   Resp 16   Ht 5' 10\" (1.778 m)   Wt 200 lb (90.7 kg)   SpO2 96%   BMI 28.70 kg/m²         Mental Status Examination:    Level of consciousness:  Within normal limits  Appearance:  Personal attire, laying in bed, fair grooming   Behavior/Motor:   Attitude toward examiner:  Cooperative, attentive  Speech: normal rate and volume, well articulated  Mood:  Depressed  Affect:  blunted  Thought processes:  Goal directed, linear  Thought content: fleeting suicidal ideations reporting ability to go forward with multiple methods              denies homicidal ideations               Denies hallucinations              denies delusions  Cognition:  Oriented to self, location, time, situation  Concentration clinically adequate  Memory: intact  Insight &Judgment: poor    DSM-5 DIAGNOSIS:      Major depressive disorder, recurrent, severe without psychotic features  Alcohol use disorder with withdrawal      Stressors     Severity of stressors is severe  Source of stressors include:   Other psychosocial and environmental stressors    PLAN:      Continue CIWA protocol  Continue one-to-one sitter secondary to continued suicidal ideation  Discontinue mirtazapine as this is not an active medication  Recommend discontinuing atomoxetine, mirtazapine, buspirone, and duloxetine  Will revisit initiation of psychotropic medications once through withdrawal      Additional recommendations will follow the clinical course. Thank you very much for allowing us to participate in the care of this patient. Time spent 60 min.       Electronically signed by KILLIAN Le CNP on 9/3/21 at 1:30 PM EDT

## 2021-09-03 NOTE — PROGRESS NOTES
Pt came to ER w/ suicide ideation and etoh intoxication. Pt voiced that many negative things were building up in their life and that it was causing the pt to start to have thoughts about ending their life. Pt did voice that they had actually attempted suicide twice (tried to cut wrists). Pt WAS living in a sober apartment complex (pt voiced that there were weekly group therapy sessions), but while in ER [facility] the pt was contacted by the apartment complex and was informed that the pt had been kicked out of the apartment. When asked if pt had anywhere to go after discharge, the pt voiced \"No\". Pt alert and oriented x3. Per pt- \"My anxiety is more internal and it's a 10. \". Pt able to lie still in bed, slight noticeable tremor in right hand. Pt oriented to room/call light/suicide watch policies.

## 2021-09-04 LAB
ANION GAP SERPL CALCULATED.3IONS-SCNC: 10 MMOL/L (ref 9–17)
BUN BLDV-MCNC: 5 MG/DL (ref 6–20)
BUN/CREAT BLD: ABNORMAL (ref 9–20)
CALCIUM SERPL-MCNC: 8.6 MG/DL (ref 8.6–10.4)
CHLORIDE BLD-SCNC: 104 MMOL/L (ref 98–107)
CO2: 24 MMOL/L (ref 20–31)
CREAT SERPL-MCNC: 0.73 MG/DL (ref 0.7–1.2)
GFR AFRICAN AMERICAN: >60 ML/MIN
GFR NON-AFRICAN AMERICAN: >60 ML/MIN
GFR SERPL CREATININE-BSD FRML MDRD: ABNORMAL ML/MIN/{1.73_M2}
GFR SERPL CREATININE-BSD FRML MDRD: ABNORMAL ML/MIN/{1.73_M2}
GLUCOSE BLD-MCNC: 115 MG/DL (ref 70–99)
HCT VFR BLD CALC: 42.5 % (ref 41–53)
HEMOGLOBIN: 14.7 G/DL (ref 13.5–17.5)
MAGNESIUM: 1.7 MG/DL (ref 1.6–2.6)
MCH RBC QN AUTO: 33.2 PG (ref 26–34)
MCHC RBC AUTO-ENTMCNC: 34.6 G/DL (ref 31–37)
MCV RBC AUTO: 96.1 FL (ref 80–100)
NRBC AUTOMATED: ABNORMAL PER 100 WBC
PDW BLD-RTO: 16.1 % (ref 11.5–14.9)
PLATELET # BLD: 172 K/UL (ref 150–450)
PMV BLD AUTO: 6.8 FL (ref 6–12)
POTASSIUM SERPL-SCNC: 3.5 MMOL/L (ref 3.7–5.3)
RBC # BLD: 4.42 M/UL (ref 4.5–5.9)
SODIUM BLD-SCNC: 138 MMOL/L (ref 135–144)
WBC # BLD: 5.3 K/UL (ref 3.5–11)

## 2021-09-04 PROCEDURE — 6370000000 HC RX 637 (ALT 250 FOR IP): Performed by: STUDENT IN AN ORGANIZED HEALTH CARE EDUCATION/TRAINING PROGRAM

## 2021-09-04 PROCEDURE — 2580000003 HC RX 258: Performed by: EMERGENCY MEDICINE

## 2021-09-04 PROCEDURE — 85027 COMPLETE CBC AUTOMATED: CPT

## 2021-09-04 PROCEDURE — 36415 COLL VENOUS BLD VENIPUNCTURE: CPT

## 2021-09-04 PROCEDURE — 99233 SBSQ HOSP IP/OBS HIGH 50: CPT | Performed by: INTERNAL MEDICINE

## 2021-09-04 PROCEDURE — 6370000000 HC RX 637 (ALT 250 FOR IP)

## 2021-09-04 PROCEDURE — 80048 BASIC METABOLIC PNL TOTAL CA: CPT

## 2021-09-04 PROCEDURE — 83735 ASSAY OF MAGNESIUM: CPT

## 2021-09-04 PROCEDURE — 6360000002 HC RX W HCPCS

## 2021-09-04 PROCEDURE — 2500000003 HC RX 250 WO HCPCS: Performed by: EMERGENCY MEDICINE

## 2021-09-04 PROCEDURE — 6360000002 HC RX W HCPCS: Performed by: STUDENT IN AN ORGANIZED HEALTH CARE EDUCATION/TRAINING PROGRAM

## 2021-09-04 PROCEDURE — 2580000003 HC RX 258

## 2021-09-04 PROCEDURE — 1200000000 HC SEMI PRIVATE

## 2021-09-04 PROCEDURE — 2580000003 HC RX 258: Performed by: STUDENT IN AN ORGANIZED HEALTH CARE EDUCATION/TRAINING PROGRAM

## 2021-09-04 PROCEDURE — 99232 SBSQ HOSP IP/OBS MODERATE 35: CPT | Performed by: PSYCHIATRY & NEUROLOGY

## 2021-09-04 RX ORDER — NICOTINE 21 MG/24HR
1 PATCH, TRANSDERMAL 24 HOURS TRANSDERMAL DAILY
Status: CANCELLED | OUTPATIENT
Start: 2021-09-05

## 2021-09-04 RX ORDER — CARVEDILOL 3.12 MG/1
3.12 TABLET ORAL 2 TIMES DAILY WITH MEALS
Status: DISCONTINUED | OUTPATIENT
Start: 2021-09-04 | End: 2021-09-05 | Stop reason: HOSPADM

## 2021-09-04 RX ORDER — CHLORDIAZEPOXIDE HYDROCHLORIDE 25 MG/1
25 CAPSULE, GELATIN COATED ORAL 3 TIMES DAILY
Status: DISCONTINUED | OUTPATIENT
Start: 2021-09-04 | End: 2021-09-05 | Stop reason: HOSPADM

## 2021-09-04 RX ORDER — POLYETHYLENE GLYCOL 3350 17 G/17G
17 POWDER, FOR SOLUTION ORAL DAILY PRN
Status: CANCELLED | OUTPATIENT
Start: 2021-09-04

## 2021-09-04 RX ORDER — POTASSIUM CHLORIDE 20 MEQ/1
40 TABLET, EXTENDED RELEASE ORAL ONCE
Status: DISCONTINUED | OUTPATIENT
Start: 2021-09-04 | End: 2021-09-05 | Stop reason: HOSPADM

## 2021-09-04 RX ORDER — POTASSIUM CHLORIDE 20 MEQ/1
40 TABLET, EXTENDED RELEASE ORAL ONCE
Status: CANCELLED | OUTPATIENT
Start: 2021-09-04

## 2021-09-04 RX ORDER — HYDRALAZINE HYDROCHLORIDE 20 MG/ML
10 INJECTION INTRAMUSCULAR; INTRAVENOUS EVERY 6 HOURS PRN
Status: DISCONTINUED | OUTPATIENT
Start: 2021-09-04 | End: 2021-09-05

## 2021-09-04 RX ORDER — CHLORTHALIDONE 25 MG/1
25 TABLET ORAL DAILY
Status: CANCELLED | OUTPATIENT
Start: 2021-09-05

## 2021-09-04 RX ORDER — M-VIT,TX,IRON,MINS/CALC/FOLIC 27MG-0.4MG
1 TABLET ORAL DAILY
Status: CANCELLED | OUTPATIENT
Start: 2021-09-05

## 2021-09-04 RX ORDER — LOSARTAN POTASSIUM 50 MG/1
100 TABLET ORAL DAILY
Status: CANCELLED | OUTPATIENT
Start: 2021-09-05

## 2021-09-04 RX ORDER — CHLORDIAZEPOXIDE HYDROCHLORIDE 25 MG/1
25 CAPSULE, GELATIN COATED ORAL 3 TIMES DAILY
Status: CANCELLED | OUTPATIENT
Start: 2021-09-04

## 2021-09-04 RX ORDER — ATOMOXETINE 25 MG/1
25 CAPSULE ORAL DAILY
Status: CANCELLED | OUTPATIENT
Start: 2021-09-05

## 2021-09-04 RX ORDER — CARVEDILOL 3.12 MG/1
3.12 TABLET ORAL 2 TIMES DAILY WITH MEALS
Status: CANCELLED | OUTPATIENT
Start: 2021-09-04

## 2021-09-04 RX ADMIN — THIAMINE HYDROCHLORIDE 500 MG: 100 INJECTION, SOLUTION INTRAMUSCULAR; INTRAVENOUS at 05:13

## 2021-09-04 RX ADMIN — ENOXAPARIN SODIUM 40 MG: 40 INJECTION SUBCUTANEOUS at 08:16

## 2021-09-04 RX ADMIN — CHLORDIAZEPOXIDE HYDROCHLORIDE 25 MG: 25 CAPSULE ORAL at 21:34

## 2021-09-04 RX ADMIN — FOLIC ACID 1 MG: 5 INJECTION, SOLUTION INTRAMUSCULAR; INTRAVENOUS; SUBCUTANEOUS at 09:39

## 2021-09-04 RX ADMIN — CHLORDIAZEPOXIDE HYDROCHLORIDE 25 MG: 25 CAPSULE ORAL at 17:06

## 2021-09-04 RX ADMIN — HYDRALAZINE HYDROCHLORIDE 5 MG: 20 INJECTION INTRAMUSCULAR; INTRAVENOUS at 13:41

## 2021-09-04 RX ADMIN — SODIUM CHLORIDE: 9 INJECTION, SOLUTION INTRAVENOUS at 14:28

## 2021-09-04 RX ADMIN — LORAZEPAM 2 MG: 1 TABLET ORAL at 11:32

## 2021-09-04 RX ADMIN — LORAZEPAM 2 MG: 1 TABLET ORAL at 08:15

## 2021-09-04 RX ADMIN — ONDANSETRON 4 MG: 4 TABLET, ORALLY DISINTEGRATING ORAL at 11:32

## 2021-09-04 RX ADMIN — CARVEDILOL 3.12 MG: 3.12 TABLET, FILM COATED ORAL at 17:06

## 2021-09-04 RX ADMIN — POTASSIUM CHLORIDE 40 MEQ: 1500 TABLET, EXTENDED RELEASE ORAL at 14:28

## 2021-09-04 RX ADMIN — ACETAMINOPHEN 650 MG: 325 TABLET, FILM COATED ORAL at 20:17

## 2021-09-04 RX ADMIN — CHLORTHALIDONE 25 MG: 25 TABLET ORAL at 11:32

## 2021-09-04 RX ADMIN — ACETAMINOPHEN 650 MG: 325 TABLET, FILM COATED ORAL at 05:13

## 2021-09-04 RX ADMIN — LORAZEPAM 2 MG: 1 TABLET ORAL at 13:41

## 2021-09-04 RX ADMIN — LORAZEPAM 2 MG: 2 INJECTION INTRAMUSCULAR; INTRAVENOUS at 05:13

## 2021-09-04 RX ADMIN — MULTIPLE VITAMINS W/ MINERALS TAB 1 TABLET: TAB at 08:14

## 2021-09-04 RX ADMIN — LORAZEPAM 3 MG: 2 INJECTION INTRAMUSCULAR; INTRAVENOUS at 20:18

## 2021-09-04 RX ADMIN — LOSARTAN POTASSIUM 100 MG: 100 TABLET, FILM COATED ORAL at 09:38

## 2021-09-04 ASSESSMENT — PAIN SCALES - GENERAL
PAINLEVEL_OUTOF10: 1
PAINLEVEL_OUTOF10: 4
PAINLEVEL_OUTOF10: 4
PAINLEVEL_OUTOF10: 1

## 2021-09-04 ASSESSMENT — ENCOUNTER SYMPTOMS
COUGH: 0
DIARRHEA: 0
VOMITING: 0
SHORTNESS OF BREATH: 0
NAUSEA: 1
SORE THROAT: 0
ABDOMINAL PAIN: 0
CONSTIPATION: 0
PHOTOPHOBIA: 0
BACK PAIN: 0

## 2021-09-04 NOTE — PLAN OF CARE
Problem: Pain:  Goal: Pain level will decrease  Description: Pain level will decrease  9/4/2021 0011 by Maria Ines Olvera RN  Outcome: Ongoing  9/3/2021 1508 by Germaine Smart RN  Outcome: Ongoing  Note: Denies pain at this time. Will continue to monitor. Goal: Control of acute pain  Description: Control of acute pain  9/4/2021 0011 by Maria Ines Olvera RN  Outcome: Ongoing  9/3/2021 1508 by Germaine Smart RN  Outcome: Ongoing  Goal: Control of chronic pain  Description: Control of chronic pain  9/4/2021 0011 by Maria Ines Olvera RN  Outcome: Ongoing  9/3/2021 1508 by Germaine Smart RN  Outcome: Ongoing     Problem: Falls - Risk of:  Goal: Will remain free from falls  Description: Will remain free from falls  9/4/2021 0011 by Maria Ines Olvera RN  Outcome: Ongoing  9/3/2021 1508 by Germaine Smart RN  Outcome: Ongoing  Note: No falls this shift. Call light within reach and siderails x2. Bed in lowest position. Patient safety maintained. Goal: Absence of physical injury  Description: Absence of physical injury  9/4/2021 0011 by Maria Ines Olvera RN  Outcome: Ongoing  9/3/2021 1508 by Germaine Smart RN  Outcome: Ongoing  Note: No falls this shift. Call light within reach and siderails x2. Bed in lowest position. Patient safety maintained. Problem: Suicide risk  Goal: Provide patient with safe environment  Description: Provide patient with safe environment  9/4/2021 0011 by Maria Ines Olvera RN  Outcome: Ongoing  9/3/2021 1508 by Germaine Smart RN  Outcome: Ongoing  Note: Safe and therapeutic environment maintained. Will continue to monitor.

## 2021-09-04 NOTE — PROGRESS NOTES
Patient agreeable to stay if, he can get nicotine gum instead of patch, have a new IV site, and shave. Residents notified.

## 2021-09-04 NOTE — PROGRESS NOTES
Elevated BP despite PRN hydralazine, and order for Librium discussed with residents. No new orders received at this time.

## 2021-09-04 NOTE — PROGRESS NOTES
[address]    PROGRESS NOTE             9/4/2021    8:40 AM    Name:   Janina Stiles  MRN:     [id number]     [place]:      [de-identified]   Room:   2072/2072-01  IP Day:  2  Admit Date:  9/2/2021 12:41 PM    PCP:  No primary care provider on file. Code Status:  Full Code    Subjective:     C/C:   Chief Complaint   Patient presents with    Mental Health Problem    Alcohol Problem     Interval History Status: not changed. Patient was seen and examined and chart was reviewed. Complains of continued withdrawal symptoms including tremors and shakiness. Patient admits to depression. He denies current suicidal ideation but admits to feeling anxious and believes he will have suicidal ideations if he goes home. States that he feels safe in the hospital but expresses concerns over external factors that would lead him to start drinking again or hurting himself once discharged. Patient requesting Ativan for anxiety. Seen by psych yesterday; recommended discontinue mirtazapine, atomoxetine, buspirone, duloxetine. Denies auditory or visual hallucinations but describes concerns over \"idea hallucinations\" that he describes as paranoid thoughts but is able to recognize that he is probably being paranoid. Reports decreased p.o. intake secondary to nausea; patient encouraged to eat and educated that he can ask for antiemetic if needed. Patient hypertensive and tachycardic overnight with max pulse of 110, heart rate now stable but BP continues to be high at 158/111. Patient has been hypokalemic for past 2 days, will give p.o. replacement. Seen by neuro yesterday; no evidence of significant cognitive impairment. MRI unremarkable. Brief History:     Briefly this is a 59-year-old male who presented to the ED for suicidal ideation. He was admitted. Patient has a 25-year drinking history but was recently sober.   For past 3 weeks he relapsed and started drinking approximately 1 L of vodka a day. During this time he ceased taking his antidepressant medication. He described feeling hopeless and suicidal.  Anxiety became acutely worse and he became suicidal.  This prompted him to come to the hospital.  Patient has a previous suicide attempt in which he tried to cut his wrist.  Past medical history significant for depression, anxiety, and essential hypertension. CT head and UDS the ED were negative. Ethanol at the time was 209. Patient started on thiamine folate multivitamins. IV fluid was begun. Patient admitted to [place] [address]    Review of Systems:     Review of Systems   Constitutional: Positive for chills. Negative for fatigue and fever. HENT: Negative for congestion, sore throat and tinnitus. Eyes: Negative for photophobia and visual disturbance. Respiratory: Negative for cough and shortness of breath. Cardiovascular: Negative for chest pain, palpitations and leg swelling. Gastrointestinal: Positive for nausea. Negative for abdominal pain, constipation, diarrhea and vomiting. Genitourinary: Negative for dysuria and hematuria. Musculoskeletal: Negative for back pain and gait problem. Neurological: Positive for dizziness, tremors, weakness and light-headedness. Negative for numbness. Tingling at ends of digits of upper and lower extremities bilaterally   Psychiatric/Behavioral: Negative for confusion, hallucinations and suicidal ideas. The patient is nervous/anxious. Feels depressed, and like he is having \"idea hallucination\"         Medications: Allergies:     Allergies   Allergen Reactions    Dicloxacillin Nausea Only     Flu like symptoms    Pcn [Penicillins] Nausea Only       Current Meds:   Scheduled Meds:    nicotine  1 patch TransDERmal Daily    folic acid IVPB  1 mg IntraVENous Daily    [Held by provider] atomoxetine  25 mg Oral Daily    chlorthalidone  25 mg Oral Daily    losartan  100 mg Oral Daily  sodium chloride flush  5-40 mL IntraVENous 2 times per day    enoxaparin  40 mg SubCUTAneous Daily    multivitamin  1 tablet Oral Daily     Continuous Infusions:    sodium chloride      sodium chloride 100 mL/hr at 21 0805     PRN Meds: hydrALAZINE, sodium chloride flush, sodium chloride, ondansetron **OR** ondansetron, polyethylene glycol, acetaminophen **OR** acetaminophen, potassium chloride **OR** potassium alternative oral replacement **OR** potassium chloride, LORazepam **OR** LORazepam **OR** LORazepam **OR** LORazepam **OR** LORazepam **OR** LORazepam **OR** LORazepam **OR** LORazepam    Data:     Past Medical History:   has a past medical history of Alcoholism ([facility] Anxiety, Hepatitis C, History of atrial fibrillation, Mental and behavioral disorders d/t use of alcohol, acute intoxication ([facility] Palpitations, Seizures ([facility] and Wears glasses. Social History:   reports that he has quit smoking. His smoking use included cigarettes. He has a 20.00 pack-year smoking history. He has quit using smokeless tobacco.  His smokeless tobacco use included snuff and chew. He reports current alcohol use. He reports previous drug use. Drug: Cocaine. Family History:   Family History   Problem Relation Age of Onset    Mental Illness Mother     Depression Mother     High Blood Pressure Father     High Cholesterol Father     Substance Abuse Brother     Breast Cancer Maternal Grandmother        Vitals:  BP (!) 146/98   Pulse 85   Temp 98.4 °F (36.9 °C) (Oral)   Resp 16   Ht 5' 10\" (1.778 m)   Wt 200 lb (90.7 kg)   SpO2 95%   BMI 28.70 kg/m²   Temp (24hrs), Av.1 °F (36.7 °C), Min:97.8 °F (36.6 °C), Max:98.4 °F (36.9 °C)    No results for input(s): POCGLU in the last 72 hours. I/O(24Hr):     Intake/Output Summary (Last 24 hours) at 2021 0840  Last data filed at 9/3/2021 1804  Gross per 24 hour   Intake 2235 ml   Output --   Net 2235 ml       Labs:  [unfilled]    Lab Results Component Value Date/Time    SPECIAL NOT REPORTED 07/27/2020 10:06 AM     Lab Results   Component Value Date/Time    CULTURE NO GROWTH 07/27/2020 10:06 AM       [unfilled]    Radiology:    CT HEAD WO CONTRAST    Result Date: 9/2/2021  EXAMINATION: CT OF THE HEAD WITHOUT CONTRAST  9/2/2021 1:29 pm TECHNIQUE: CT of the head was performed without the administration of intravenous contrast. Dose modulation, iterative reconstruction, and/or weight based adjustment of the mA/kV was utilized to reduce the radiation dose to as low as reasonably achievable. COMPARISON: None. HISTORY: ORDERING SYSTEM PROVIDED HISTORY: Headache, anisocoria Reason for Exam: ha, dialated pupils FINDINGS: BRAIN/VENTRICLES: No acute intracranial hemorrhage, mass effect or midline shift. No abnormal extra-axial fluid collection. The gray-white differentiation is maintained without evidence of an acute infarct. No evidence of hydrocephalus. ORBITS: The visualized portion of the orbits demonstrate no acute abnormality. SINUSES: The visualized paranasal sinuses and mastoid air cells appear clear. SOFT TISSUES/SKULL:  No acute abnormality of the visualized skull or soft tissues. Negative noncontrast CT examination of the brain. MRI BRAIN W WO CONTRAST    Result Date: 9/3/2021  EXAMINATION: MRI OF THE BRAIN WITHOUT AND WITH CONTRAST  9/3/2021 8:49 pm TECHNIQUE: Multiplanar multisequence MRI of the head/brain was performed without and with the administration of intravenous contrast. COMPARISON: CT head 09/02/2021 HISTORY: ORDERING SYSTEM PROVIDED HISTORY: progressive ataxia and new onset nystagmus TECHNOLOGIST PROVIDED HISTORY: progressive ataxia and new onset nystagmus Reason for Exam: progressive ataxia and new onset nystagmus FINDINGS: INTRACRANIAL STRUCTURES/VENTRICLES:  There is no acute infarct. No mass effect or midline shift. No evidence of an acute intracranial hemorrhage.  The ventricles and sulci are normal in size and configuration. There involutional changes brain notably involving the cerebellum. .  The sellar/suprasellar regions appear unremarkable. The normal signal voids within the major intracranial vessels appear maintained. No abnormal focus of enhancement is seen within the brain. ORBITS: The visualized portion of the orbits demonstrate no acute abnormality. SINUSES: Mild ethmoid sinus and maxillary sinus mucosal thickening. BONES/SOFT TISSUES: The bone marrow signal intensity appears normal. The soft tissues demonstrate no acute abnormality. Negative acute stroke, midline shift or mass effect. Physical Examination:        Physical Exam  Constitutional:       Appearance: Normal appearance. Comments: Patient found lying in bed   HENT:      Head: Normocephalic and atraumatic. Nose: Nose normal.   Eyes:      General: No scleral icterus. Right eye: No discharge. Left eye: No discharge. Conjunctiva/sclera: Conjunctivae normal.      Comments: Pupils asymmetric in size but round and reactive to light bilaterally   Cardiovascular:      Rate and Rhythm: Normal rate and regular rhythm. Pulmonary:      Effort: Pulmonary effort is normal. No respiratory distress. Breath sounds: No wheezing. Abdominal:      Palpations: Abdomen is soft. Tenderness: There is no abdominal tenderness. Musculoskeletal:         General: No tenderness. Right lower leg: No edema. Left lower leg: No edema. Skin:     Coloration: Skin is not jaundiced. Neurological:      Mental Status: He is alert and oriented to person, place, and time.       Comments: Bilateral hand tremors noted, tremors not aggravated by purposeful movement, tremors not present at rest when patient distracted    Psychiatric:      Comments: Appears anxious  Actively requests Ativan             Assessment:        Primary Problem  Suicidal ideation    Active Hospital Problems    Diagnosis Date Noted    [facility]     Priority: High    [facility] [S75.984] 09/02/2021    Hypertension [I10] 09/02/2021    Suicidal ideation [R45.851]     Depression [F32.9]     Alcoholism ([facility] [F10.20]     Headache [R51.9]        Plan:        Suicidal ideation  Suicide precaution including one-to-one sitter  Psychiatry consulted;    recommended discontinuing atomoxetine, mirtazapine, buspirone, and duloxetine with plan of possible reinitiation of psychotropic meds once patient out of withdrawal     Alcohol abuse  On [facility] protocol  Thiamine 200 3 times daily for 2 to 7 days  Folic acid and multivitamin  IV hydration with normal saline at 100 mL/h  Seizure precautions in place for history of seizure on withdrawal  Encourage p.o. intake  Patient concern of Wernicke's => seen by neuro; no evidence of significant cognitive impairment as per note     Anisocoria and nystagmus  CT head negative for acute findings  MRI unremarkable     Depression/anxiety  Psych meds held as per psychiatry recommendation => discontinued mirtazapine, atomoxetine, buspirone, duloxetine     Essential hypertension  Pt tachycardic & hypertensive overnight; restart Carvedilol 3.125 mg twice daily (home med) today  Chlorthalidone 25 mg daily  Cozaar 100 mg daily    Hypokalemia  K of 3.5 since 9/2  Oral potassium supplementation     Diet: Regular diet  Lovenox for DVT prophylaxis  Dispo: Possible transfer to [facility] pending further psychiatry recommendations    Trinity Cruz DO  9/4/2021  8:40 AM     Attending Physician Statement  I have discussed the care of Fuentes Sierra and I have examined the patient myselft and taken ros and hpi , including pertinent history and exam findings,  with the resident. I have reviewed the key elements of all parts of the encounter with the resident. I agree with the assessment, plan and orders as documented by the resident.   No DT  Medically clear for bhi  Oral thianmina 100 and folate  1  libirum  25 tid      Electronically signed by Nelly Hutchison MD

## 2021-09-04 NOTE — PROGRESS NOTES
Patient requesting psych physician to see him, as he does not feel as though he needs to go to psych. Writer spoke with Dr. Manny Mann, who stated patient is appropriate for psych, but that he would not be rounding on him today. Residents notified patient does not feel as thought he needs to go to [facility], and wants to go home. Residents to reassess patient.

## 2021-09-04 NOTE — CARE COORDINATION
Social work; will follow to help with discharge plans if pt does not go to psych straight from unit. He can make his own plans but may have alienated friends and family while using. Will investigate with him what options he has for safe housing at discharge.   Artice Cancer lsw

## 2021-09-04 NOTE — PROGRESS NOTES
Patient denies any suicidal ideation at this time. 1:1 continued at bedside, suicide precautions in place.

## 2021-09-04 NOTE — PROGRESS NOTES
NEUROLOGY INPATIENT PROGRESS NOTE    9/4/2021         Damian Ritchie is a  39 y.o. male admitted on 9/2/2021 with  Anisocoria [H57.02]  Suicidal ideation [R45.851]  Alcohol abuse [Z04.05]  [facility] [R47.834]      Briefly, this is a  39 y.o. male with hx of alcoholism, anxiety, hep c, a fib, alcohol withdrawal sz  was admitted on 9/2/2021 with suicidal ideations. Patient had 25-year history of alcoholism and recently was sober. For the past 3 weeks, he relapsed and he has been drinking 1Lit of vodka daily. He has not been taking his meds, BuSpar, duloxetine, mirtazapine. Patient has been having suicidal ideations. CT head on admit -ve. UDS -ve. Patient stated that he has been having gait difficulties with ongoing alcoholism. He denies blurred vision and double vision. Denied speech and swallowing difficulties. Admits to having memory problems with depression. 9/4/21: Chart reviewed and discussed with caregivers. No new neurologic deficits. Offers no new complaints. No current facility-administered medications on file prior to encounter.      Current Outpatient Medications on File Prior to Encounter   Medication Sig Dispense Refill    atomoxetine (STRATTERA) 25 MG capsule Take 25 mg by mouth daily      busPIRone (BUSPAR) 15 MG tablet Take 15 mg by mouth 3 times daily      DULoxetine (CYMBALTA) 20 MG extended release capsule Take 40 mg by mouth daily      losartan (COZAAR) 100 MG tablet Take 100 mg by mouth daily      Multiple Vitamins-Minerals (THERAPEUTIC MULTIVITAMIN-MINERALS) tablet Take 1 tablet by mouth daily 30 tablet 0    carvedilol (COREG) 3.125 MG tablet Take 3.125 mg by mouth 2 times daily (with meals)      chlorthalidone (HYGROTON) 25 MG tablet Take 25 mg by mouth daily      thiamine mononitrate 100 MG tablet Take 1 tablet by mouth daily 30 tablet 0     Allergies: Damian Ritchie is allergic to dicloxacillin and pcn [penicillins]. Past Medical History:   Diagnosis Date    Alcoholism ([facility] 01/31/2018    hx of alcoholism with intermittent sobriety;    [facility]    Hepatitis C 2010    pt states he tested + for antibodies. no live virus detected -no treatment needed    History of atrial fibrillation 2001    pt states after a suicide attempt, overdose oxycontin pt developed atrial fib x 5-6 months. NO RECENT ISSUES    Mental and behavioral disorders d/t use of alcohol, acute intoxication ([facility]     NO LONGER PERTINENT    Palpitations     DENIES    Seizures ([facility] 2006    during alcohol detox     Wears glasses        Past Surgical History:   Procedure Laterality Date    CYSTOSCOPY Bilateral 12/19/2018    Retrograde pyelogram    CYSTOSCOPY Bilateral 12/19/2018    CYSTOSCOPY, RETROGRADE PYELOGRAM performed by Jose Luis Baca MD at [address] Left 2004    second finger REATTACH R [address] TONSILLECTOMY  1983     Social History: Rhonda Kumar  reports that he has quit smoking. His smoking use included cigarettes. He has a 20.00 pack-year smoking history. He has quit using smokeless tobacco.  His smokeless tobacco use included snuff and chew. He reports current alcohol use. He reports previous drug use. Drug: Cocaine.     Family History   Problem Relation Age of Onset    Mental Illness Mother     Depression Mother     High Blood Pressure Father     High Cholesterol Father     Substance Abuse Brother     Breast Cancer Maternal Grandmother        Current Medications:     nicotine  1 patch TransDERmal Daily    folic acid IVPB  1 mg IntraVENous Daily    [Held by provider] atomoxetine  25 mg Oral Daily    chlorthalidone  25 mg Oral Daily    losartan  100 mg Oral Daily    sodium chloride flush  5-40 mL IntraVENous 2 times per day    enoxaparin  40 mg SubCUTAneous Daily    multivitamin  1 tablet Oral Daily     PRN Meds include: hydrALAZINE, sodium chloride flush, Fundus exam: intact venous pulsations; Visual fields intact to confrontation  III,IV,VI -horizontal nystagmus noted bilaterally; no afferent defect, no ptosis  V     -     Normal facial sensation  VII    -     Normal facial symmetry  VIII   -     Intact hearing  IX,X -     Symmetrical palate  XI    -     Symmetrical shoulder shrug  XII   -     Midline tongue, no atrophy    MOTOR FUNCTION:  Normal bulk, normal tone, normal power;  no involuntary movements, no tremor   SENSORY FUNCTION:  Normal touch, normal pin, normal vibration, normal proprioception   CEREBELLAR FUNCTION:  Intact fine motor control over upper limbs   REFLEX FUNCTION:  Symmetric, no perverted reflex, no Babinski sign   STATION and GAIT  normal station; wide-based gait and could not do tandem gait         Data:    Lab Results:     Lab Results   Component Value Date    CHOL 129 07/14/2020    LDLCHOLESTEROL 75 07/14/2020    HDL 31 (L) 07/14/2020    TRIG 117 07/14/2020    ALT 30 09/02/2021    AST 39 09/02/2021    TSH 2.32 07/24/2021    INR 1.08 07/09/2021    GLUF 104 (H) 09/20/2019    LABA1C 5.4 09/25/2020     Hematology:  Recent Labs     09/02/21  1402 09/04/21  0609   WBC 7.3 5.3   HGB 15.6 14.7   HCT 44.5 42.5    172     Chemistry:  Recent Labs     09/02/21  1402 09/04/21  0609   * 138   K 3.5* 3.5*   CL 98 104   CO2 23 24   GLUCOSE 133* 115*   BUN 5* 5*   CREATININE 0.74 0.73   MG  --  1.7   CALCIUM 8.9 8.6     Recent Labs     09/02/21  1402   PROT 8.0   LABALBU 4.5   AST 39   ALT 30       No results found for: PHENYTOIN, PHENYTOIN, VALPROATE, CBMZ        Diagnostic data reviewed:  CT Head 9/2/21: -ve. Urine toxicology 9/2/21: -ve. MRI Brain (w/wo) 9/3/21: No acute infarct. No mass-effect or midline shift. No evidence of an acute intracranial hemorrhage. No abnormal enhancement.           Impression and Plan: Mr. Kathy Nuñez is a 39 y.o. male with   Horizontal nystagmus with anisocoria; gait ataxia with chronic alcoholism.,    No evidence of vestibular pathways in brainstem. Nystagmus likely related to alcoholism. MRI brain is WNL. Neurologic exam did not demonstrate evidence of significant cognitive impairment; memory complaints are associated with underlying affective disorder, major depressive disorder. Presently on [facility] protocol. Will follow with you.

## 2021-09-04 NOTE — CARE COORDINATION
Social work: spoke to pt at [facility]. Pt is stating that he will be homeless unless he gets out of the hospital and physically takes his rent over to his \"sober living\" apt. Manager. He states that since his \"bad behavior\" there in the past he has only given pt to this evening to pay his rent in person. Asked who Mere Mann listed on the face sheet was and he states that she is his fiancee. Asked why can't she pay the rent for him. Pt claims he needs to be seen sober there by tonight or he is homeless. Asked why he could not stay with his fiancee if he looses this apt. She states that his reasons \"are personal\" why he cannot live with her. Asked about other family supports. Pt denies that anything can get in the way of him leaving today to get his rent paid. Asked if he wants to use. Pt denies that is a reason. He appears familiar with the [facility] OPTION to leave. Advised lead RN of pt response to options to get rent paid without him leaving the hospital.  He is determined to leave. RN to discuss with psych and physician if needed. PHysician in room with pt trying to offer good practice ideas for health related to blood pressure. Pt states \"I always wallk around with high blood pressure\". Will follow and assist as needed.   [address]

## 2021-09-05 VITALS
SYSTOLIC BLOOD PRESSURE: 154 MMHG | HEIGHT: 70 IN | DIASTOLIC BLOOD PRESSURE: 113 MMHG | BODY MASS INDEX: 28.63 KG/M2 | WEIGHT: 200 LBS | OXYGEN SATURATION: 99 % | RESPIRATION RATE: 16 BRPM | TEMPERATURE: 97.9 F | HEART RATE: 89 BPM

## 2021-09-05 LAB
-: NORMAL
ANION GAP SERPL CALCULATED.3IONS-SCNC: 8 MMOL/L (ref 9–17)
BUN BLDV-MCNC: 8 MG/DL (ref 6–20)
BUN/CREAT BLD: ABNORMAL (ref 9–20)
CALCIUM SERPL-MCNC: 8.6 MG/DL (ref 8.6–10.4)
CHLORIDE BLD-SCNC: 103 MMOL/L (ref 98–107)
CO2: 26 MMOL/L (ref 20–31)
CREAT SERPL-MCNC: 0.84 MG/DL (ref 0.7–1.2)
GFR AFRICAN AMERICAN: >60 ML/MIN
GFR NON-AFRICAN AMERICAN: >60 ML/MIN
GFR SERPL CREATININE-BSD FRML MDRD: ABNORMAL ML/MIN/{1.73_M2}
GFR SERPL CREATININE-BSD FRML MDRD: ABNORMAL ML/MIN/{1.73_M2}
GLUCOSE BLD-MCNC: 120 MG/DL (ref 70–99)
HCT VFR BLD CALC: 41.2 % (ref 41–53)
HEMOGLOBIN: 14.3 G/DL (ref 13.5–17.5)
MCH RBC QN AUTO: 33.5 PG (ref 26–34)
MCHC RBC AUTO-ENTMCNC: 34.7 G/DL (ref 31–37)
MCV RBC AUTO: 96.6 FL (ref 80–100)
NRBC AUTOMATED: ABNORMAL PER 100 WBC
PDW BLD-RTO: 16.4 % (ref 11.5–14.9)
PLATELET # BLD: 193 K/UL (ref 150–450)
PMV BLD AUTO: 7.3 FL (ref 6–12)
POTASSIUM SERPL-SCNC: 3.6 MMOL/L (ref 3.7–5.3)
RBC # BLD: 4.26 M/UL (ref 4.5–5.9)
REASON FOR REJECTION: NORMAL
SODIUM BLD-SCNC: 137 MMOL/L (ref 135–144)
WBC # BLD: 4.8 K/UL (ref 3.5–11)
ZZ NTE CLEAN UP: ORDERED TEST: NORMAL
ZZ NTE WITH NAME CLEAN UP: SPECIMEN SOURCE: NORMAL

## 2021-09-05 PROCEDURE — 99232 SBSQ HOSP IP/OBS MODERATE 35: CPT | Performed by: PSYCHIATRY & NEUROLOGY

## 2021-09-05 PROCEDURE — 6370000000 HC RX 637 (ALT 250 FOR IP): Performed by: INTERNAL MEDICINE

## 2021-09-05 PROCEDURE — 6360000002 HC RX W HCPCS

## 2021-09-05 PROCEDURE — 80048 BASIC METABOLIC PNL TOTAL CA: CPT

## 2021-09-05 PROCEDURE — 6370000000 HC RX 637 (ALT 250 FOR IP)

## 2021-09-05 PROCEDURE — 36415 COLL VENOUS BLD VENIPUNCTURE: CPT

## 2021-09-05 PROCEDURE — 6370000000 HC RX 637 (ALT 250 FOR IP): Performed by: STUDENT IN AN ORGANIZED HEALTH CARE EDUCATION/TRAINING PROGRAM

## 2021-09-05 PROCEDURE — 85027 COMPLETE CBC AUTOMATED: CPT

## 2021-09-05 PROCEDURE — 99239 HOSP IP/OBS DSCHRG MGMT >30: CPT | Performed by: INTERNAL MEDICINE

## 2021-09-05 RX ORDER — FOLIC ACID 1 MG/1
1 TABLET ORAL DAILY
Qty: 30 TABLET | Refills: 3 | Status: ON HOLD | OUTPATIENT
Start: 2021-09-06 | End: 2022-06-14 | Stop reason: SDUPTHER

## 2021-09-05 RX ORDER — FOLIC ACID 1 MG/1
1 TABLET ORAL DAILY
Status: DISCONTINUED | OUTPATIENT
Start: 2021-09-05 | End: 2021-09-05 | Stop reason: HOSPADM

## 2021-09-05 RX ORDER — AMLODIPINE BESYLATE 10 MG/1
10 TABLET ORAL DAILY
Status: DISCONTINUED | OUTPATIENT
Start: 2021-09-05 | End: 2021-09-05 | Stop reason: HOSPADM

## 2021-09-05 RX ADMIN — FOLIC ACID 1 MG: 1 TABLET ORAL at 11:38

## 2021-09-05 RX ADMIN — NICOTINE POLACRILEX 4 MG: 4 GUM, CHEWING BUCCAL at 09:29

## 2021-09-05 RX ADMIN — CHLORTHALIDONE 25 MG: 25 TABLET ORAL at 08:18

## 2021-09-05 RX ADMIN — CARVEDILOL 3.12 MG: 3.12 TABLET, FILM COATED ORAL at 08:18

## 2021-09-05 RX ADMIN — ENOXAPARIN SODIUM 40 MG: 40 INJECTION SUBCUTANEOUS at 08:30

## 2021-09-05 RX ADMIN — CHLORDIAZEPOXIDE HYDROCHLORIDE 25 MG: 25 CAPSULE ORAL at 08:18

## 2021-09-05 RX ADMIN — MULTIPLE VITAMINS W/ MINERALS TAB 1 TABLET: TAB at 08:17

## 2021-09-05 RX ADMIN — AMLODIPINE BESYLATE 10 MG: 10 TABLET ORAL at 11:38

## 2021-09-05 RX ADMIN — NICOTINE POLACRILEX 4 MG: 4 GUM, CHEWING BUCCAL at 11:31

## 2021-09-05 RX ADMIN — ACETAMINOPHEN 650 MG: 325 TABLET, FILM COATED ORAL at 04:05

## 2021-09-05 RX ADMIN — LOSARTAN POTASSIUM 100 MG: 100 TABLET, FILM COATED ORAL at 08:18

## 2021-09-05 RX ADMIN — LORAZEPAM 2 MG: 2 INJECTION INTRAMUSCULAR; INTRAVENOUS at 04:05

## 2021-09-05 ASSESSMENT — ENCOUNTER SYMPTOMS
VOMITING: 0
CHEST TIGHTNESS: 0
COUGH: 0
BACK PAIN: 0
DIARRHEA: 0
CONSTIPATION: 0
WHEEZING: 0
PHOTOPHOBIA: 0
CHOKING: 0
EYE PAIN: 0
APNEA: 0
TROUBLE SWALLOWING: 0
ABDOMINAL DISTENTION: 0
NAUSEA: 1
SHORTNESS OF BREATH: 0
ABDOMINAL PAIN: 0

## 2021-09-05 ASSESSMENT — PAIN SCALES - GENERAL
PAINLEVEL_OUTOF10: 5
PAINLEVEL_OUTOF10: 6

## 2021-09-05 NOTE — DISCHARGE SUMMARY
[address]    Discharge Summary     Patient ID: Mya Solis  :  1980   MRN: [id number]     ACCOUNT:  [de-identified]   Patient's PCP: No primary care provider on file. Admit Date: 2021   Discharge Date: 2021   Length of Stay: 3  Code Status:  Full Code  Admitting Physician: Lakeisha Barnett MD  Discharge Physician: Katerine Garner MD     Active Discharge Diagnoses:       Primary Problem  Suicidal ideation      [place] Problems    Diagnosis Date Noted    Mood disorder [facility]) [F39] 2016     Priority: High    Alcoholic intoxication, with unspecified complication ([facility] [P57.519] 2021    Hypertension [I10] 2021    Suicidal ideation [R45.851]     Depression [F32.9]     Alcoholism ([facility] [F10.20]     Headache [R51.9]        Admission Condition:  fair     Discharged Condition: good    Hospital Stay:       Hospital Course:  Mya Solis is a 39 y.o. male who was admitted for the management of   Suicidal ideation , presented to ER with Mental Health Problem and Alcohol Problem. Patient had a 25-year drinking history and was sober in a sober living facility. For the past 3 weeks he relapsed admitted to drinking approximately 1 L of vodka a day. During this time he ceased taking all antidepressant medication. He describes feelings of hopelessness and being suicidal and also his anxiety escalated to 10 out of 10. It the pervasive thoughts of suicide were disturbing to him and he came to the hospital to seek help. He had a previous suicide attempt in which he tried occasional firearm. Past medical history significant for depression, anxiety, essential hypertension. CT head and UDS and ED were negative. Did not the time was 80. Patient is on high-dose thiamine, folate, multivitamin. IV fluids were begun.           Significant therapeutic interventions: Patient had [facility] protocol in place for tissues. Negative noncontrast CT examination of the brain. MRI BRAIN W WO CONTRAST    Result Date: 9/3/2021  EXAMINATION: MRI OF THE BRAIN WITHOUT AND WITH CONTRAST  9/3/2021 8:49 pm TECHNIQUE: Multiplanar multisequence MRI of the head/brain was performed without and with the administration of intravenous contrast. COMPARISON: CT head 09/02/2021 HISTORY: ORDERING SYSTEM PROVIDED HISTORY: progressive ataxia and new onset nystagmus TECHNOLOGIST PROVIDED HISTORY: progressive ataxia and new onset nystagmus Reason for Exam: progressive ataxia and new onset nystagmus FINDINGS: INTRACRANIAL STRUCTURES/VENTRICLES:  There is no acute infarct. No mass effect or midline shift. No evidence of an acute intracranial hemorrhage. The ventricles and sulci are normal in size and configuration. There involutional changes brain notably involving the cerebellum. .  The sellar/suprasellar regions appear unremarkable. The normal signal voids within the major intracranial vessels appear maintained. No abnormal focus of enhancement is seen within the brain. ORBITS: The visualized portion of the orbits demonstrate no acute abnormality. SINUSES: Mild ethmoid sinus and maxillary sinus mucosal thickening. BONES/SOFT TISSUES: The bone marrow signal intensity appears normal. The soft tissues demonstrate no acute abnormality. Negative acute stroke, midline shift or mass effect. Consultations:    Consults:     Final Specialist Recommendations/Findings:   IP CONSULT TO INTERNAL MEDICINE  IP CONSULT TO SOCIAL WORK  IP CONSULT TO PSYCHIATRY  IP CONSULT TO SOCIAL WORK  IP CONSULT TO NEUROLOGY  IP CONSULT TO INTERNAL MEDICINE      The patient was seen and examined on day of discharge and this discharge summary is in conjunction with any daily progress note from day of discharge. Discharge plan:       Disposition: Home    Physician Follow Up:      Follow up with a primary care provider   for medication management Requiring Further Evaluation/Follow Up POST HOSPITALIZATION/Incidental Findings: Follow-up with outpatient psychiatry, consider seeing a neurologist if visual disturbances persist    Diet: regular diet    Activity: As tolerated    Instructions to Patient: Commit to being sober, continue taking antihypertensive medication    Discharge Medications:      Medication List      START taking these medications    folic acid 1 MG tablet  Commonly known as: FOLVITE  Take 1 tablet by mouth daily  Start taking on: September 6, 2021        CONTINUE taking these medications    atomoxetine 25 MG capsule  Commonly known as: STRATTERA     busPIRone 15 MG tablet  Commonly known as: BUSPAR     carvedilol 3.125 MG tablet  Commonly known as: COREG     chlorthalidone 25 MG tablet  Commonly known as: HYGROTON     DULoxetine 20 MG extended release capsule  Commonly known as: CYMBALTA     losartan 100 MG tablet  Commonly known as: COZAAR     therapeutic multivitamin-minerals tablet  Take 1 tablet by mouth daily     thiamine mononitrate 100 MG tablet  Take 1 tablet by mouth daily           Where to Get Your Medications      These medications were sent to [address] [place] [address]    Hours: 24-hours Phone: [phone]   · folic acid 1 MG tablet     Pharmacy Instructions:     Resume normal activity             Time Spent on discharge is  40 mins in patient examination, evaluation, counseling as well as medication reconciliation, prescriptions for required medications, discharge plan and follow up. Electronically signed by   Maria Del Carmen Moura MD  9/5/2021  2:54 PM      Thank you Dr. rAely Hammonds primary care provider on file. for the opportunity to be involved in this patient's care.

## 2021-09-05 NOTE — CARE COORDINATION
ONGOING DISCHARGE PLAN:    Patient is alert and oriented x4. Spoke with patient regarding discharge plan and patient confirms that plan is still to return to his [facility]. Pt. Does not want to go to [facility]. Sitter at the bedside. Psych on board. Hopeful for DC today. Will continue to follow for additional discharge needs.     Electronically signed by Eliud Guillen RN on 9/5/2021 at 10:00 AM

## 2021-09-05 NOTE — PROGRESS NOTES
Department of Psychiatry  Consult Service  Progress Note     Reason for Consult: Suicidal ideation    SUBJECTIVE:    Patient is currently denying any active suicidal ideation plan or intent today. Mentions he was feeling very helpless when he was inebriated. Reports he was living at [facility] sober living [facility] and has been secretly smoking alcohol into the sober living facility. Mentions that he spoke to the person who runs the program and is willing to accept him back. Patient reports that he is somewhat hopeful about his recovery. Denies any acute withdrawal symptoms today.     OBJECTIVE      Medications  Current Facility-Administered Medications: folic acid (FOLVITE) tablet 1 mg, 1 mg, Oral, Daily  amLODIPine (NORVASC) tablet 10 mg, 10 mg, Oral, Daily  carvedilol (COREG) tablet 3.125 mg, 3.125 mg, Oral, BID WC  potassium chloride (KLOR-CON M) extended release tablet 40 mEq, 40 mEq, Oral, Once  chlordiazePOXIDE (LIBRIUM) capsule 25 mg, 25 mg, Oral, TID  nicotine polacrilex (NICORETTE) gum 4 mg, 4 mg, Oral, PRN  nicotine (NICODERM CQ) 21 MG/24HR 1 patch, 1 patch, TransDERmal, Daily  [Held by provider] atomoxetine (STRATTERA) capsule 25 mg, 25 mg, Oral, Daily  chlorthalidone (HYGROTON) tablet 25 mg, 25 mg, Oral, Daily  losartan (COZAAR) tablet 100 mg, 100 mg, Oral, Daily  sodium chloride flush 0.9 % injection 5-40 mL, 5-40 mL, IntraVENous, 2 times per day  sodium chloride flush 0.9 % injection 5-40 mL, 5-40 mL, IntraVENous, PRN  0.9 % sodium chloride infusion, 25 mL, IntraVENous, PRN  enoxaparin (LOVENOX) injection 40 mg, 40 mg, SubCUTAneous, Daily  ondansetron (ZOFRAN-ODT) disintegrating tablet 4 mg, 4 mg, Oral, Q8H PRN **OR** ondansetron (ZOFRAN) injection 4 mg, 4 mg, IntraVENous, Q6H PRN  polyethylene glycol (GLYCOLAX) packet 17 g, 17 g, Oral, Daily PRN  acetaminophen (TYLENOL) tablet 650 mg, 650 mg, Oral, Q6H PRN **OR** acetaminophen (TYLENOL) suppository 650 mg, 650 mg, Rectal, Q6H PRN  0.9 % sodium chloride infusion, , IntraVENous, Continuous  therapeutic multivitamin-minerals 1 tablet, 1 tablet, Oral, Daily  potassium chloride (KLOR-CON M) extended release tablet 40 mEq, 40 mEq, Oral, PRN **OR** potassium bicarb-citric acid (EFFER-K) effervescent tablet 40 mEq, 40 mEq, Oral, PRN **OR** potassium chloride 10 mEq/100 mL IVPB (Peripheral Line), 10 mEq, IntraVENous, PRN  LORazepam (ATIVAN) tablet 1 mg, 1 mg, Oral, Q1H PRN **OR** LORazepam (ATIVAN) injection 1 mg, 1 mg, IntraVENous, Q1H PRN **OR** LORazepam (ATIVAN) tablet 2 mg, 2 mg, Oral, Q1H PRN **OR** LORazepam (ATIVAN) injection 2 mg, 2 mg, IntraVENous, Q1H PRN **OR** LORazepam (ATIVAN) tablet 3 mg, 3 mg, Oral, Q1H PRN **OR** LORazepam (ATIVAN) injection 3 mg, 3 mg, IntraVENous, Q1H PRN **OR** LORazepam (ATIVAN) tablet 4 mg, 4 mg, Oral, Q1H PRN **OR** LORazepam (ATIVAN) injection 4 mg, 4 mg, IntraVENous, Q1H PRN     Physical  BP (!) 152/114   Pulse 90   Temp 98.1 °F (36.7 °C) (Oral)   Resp 16   Ht 5' 10\" (1.778 m)   Wt 200 lb (90.7 kg)   SpO2 96%   BMI 28.70 kg/m²     Mental Status Examination:    Level of consciousness:  Within normal limits  Appearance: good grooming  Behavior/Motor: No abnormalities noted  Attitude toward examiner:  cooperative  Speech:  Spontaneous, normal rate and volume  Mood: Anxious  Affect:  Full range  Thought processes: coherent  Thought content: denies Suicidal ideations, denies any homicidal ideations.    denies hallucinations or delusions, does not appear to be responding to internal stimuli   Memory: age appropriate  Insight & Judgement: improving  Medication side effects:  denies       ASSESSMENT  Major depressive disorder, recurrent, severe without psychotic features  Alcohol use disorder with withdrawal    Patient Active Problem List   Diagnosis    Mood disorder ([facility]    Alcohol dependence ([facility]    Seizures ([facility]    Headache    Depression    Alcoholism ([facility]    Bipolar affective disorder ([facility]    Major depressive disorder, recurrent severe without psychotic features ([address]    Polysubstance dependence ([address]    Suicidal ideation    History of hiatal hernia    History of insomnia    Depression with suicidal ideation    Alcoholic intoxication, with unspecified complication ([address]    Hypertension        PLAN  Can discontinue one-to-one sitter and other suicide precautions. Can discharge him home after he is medically stable. Patient has a follow-up appointment at [facility]. We will sign off. Please call back with any questions.     Electronically signed by Laura Mitchell MD on 9/5/2021 at 12:35 PM

## 2021-09-05 NOTE — PROGRESS NOTES
Psych okay with patient discharging home and feels as if patient doesn't need pink slipped. Residents paged to notify. Residents responded.

## 2021-09-05 NOTE — PROGRESS NOTES
NEUROLOGY INPATIENT PROGRESS NOTE    9/5/2021         Mya Solis is a  39 y.o. male admitted on 9/2/2021 with  Anisocoria [H57.02]  Suicidal ideation [R45.851]  Alcohol abuse [I70.01]  Alcoholic intoxication, with unspecified complication ([facility] [P46.136]      Briefly, this is a  39 y.o. male with hx of alcoholism, anxiety, hep c, a fib, alcohol withdrawal sz  was admitted on 9/2/2021 with suicidal ideations. Patient had 25-year history of alcoholism and recently was sober. For the past 3 weeks, he relapsed and he has been drinking 1Lit of vodka daily. He has not been taking his meds, BuSpar, duloxetine, mirtazapine. Patient has been having suicidal ideations. CT head on admit -ve. UDS -ve. Patient stated that he has been having gait difficulties with ongoing alcoholism. He denies blurred vision and double vision. Denied speech and swallowing difficulties. Admits to having memory problems with depression. 9/4/21: Chart reviewed and discussed with caregivers. No new neurologic deficits. Offers no new complaints. 9/5/21: Mentation has been significantly improving. Presently he denies episodes of confusion. Vision disturbance had improved as per patient. Tremors also improved. No current facility-administered medications on file prior to encounter.      Current Outpatient Medications on File Prior to Encounter   Medication Sig Dispense Refill    atomoxetine (STRATTERA) 25 MG capsule Take 25 mg by mouth daily      busPIRone (BUSPAR) 15 MG tablet Take 15 mg by mouth 3 times daily      DULoxetine (CYMBALTA) 20 MG extended release capsule Take 40 mg by mouth daily      losartan (COZAAR) 100 MG tablet Take 100 mg by mouth daily      Multiple Vitamins-Minerals (THERAPEUTIC MULTIVITAMIN-MINERALS) tablet Take 1 tablet by mouth daily 30 tablet 0    carvedilol (COREG) 3.125 MG tablet Take 3.125 mg by mouth 2 times daily (with meals)      chlorthalidone (HYGROTON) 25 MG tablet Take 25 mg by mouth daily      thiamine mononitrate 100 MG tablet Take 1 tablet by mouth daily 30 tablet 0     Allergies: Kathy Nuñez is allergic to dicloxacillin and pcn [penicillins]. Past Medical History:   Diagnosis Date    Alcoholism ([facility] 01/31/2018    hx of alcoholism with intermittent sobriety;    [facility]    Hepatitis C 2010    pt states he tested + for antibodies. no live virus detected -no treatment needed    History of atrial fibrillation 2001    pt states after a suicide attempt, overdose oxycontin pt developed atrial fib x 5-6 months. NO RECENT ISSUES    Mental and behavioral disorders d/t use of alcohol, acute intoxication ([address]     NO LONGER PERTINENT    Palpitations     DENIES    Seizures ([facility] 2006    during alcohol detox     Wears glasses        Past Surgical History:   Procedure Laterality Date    CYSTOSCOPY Bilateral 12/19/2018    Retrograde pyelogram    CYSTOSCOPY Bilateral 12/19/2018    CYSTOSCOPY, RETROGRADE PYELOGRAM performed by Lam Lombardi MD at [address] Left 2004    second finger REATTACH R [address] TONSILLECTOMY  1983     Social History: Ktahy Nuñez  reports that he has quit smoking. His smoking use included cigarettes. He has a 20.00 pack-year smoking history. He has quit using smokeless tobacco.  His smokeless tobacco use included snuff and chew. He reports current alcohol use. He reports previous drug use. Drug: Cocaine.     Family History   Problem Relation Age of Onset    Mental Illness Mother     Depression Mother     High Blood Pressure Father     High Cholesterol Father     Substance Abuse Brother     Breast Cancer Maternal Grandmother        Current Medications:     folic acid  1 mg Oral Daily    carvedilol  3.125 mg Oral BID WC    potassium chloride  40 mEq Oral Once    chlordiazePOXIDE  25 mg Oral TID    nicotine  1 patch TransDERmal Daily    [Held by provider] atomoxetine  25 mg Oral Daily    chlorthalidone  25 mg Oral Daily    losartan  100 mg Oral Daily    sodium chloride flush  5-40 mL IntraVENous 2 times per day    enoxaparin  40 mg SubCUTAneous Daily    multivitamin  1 tablet Oral Daily     PRN Meds include: nicotine polacrilex, sodium chloride flush, sodium chloride, ondansetron **OR** ondansetron, polyethylene glycol, acetaminophen **OR** acetaminophen, potassium chloride **OR** potassium alternative oral replacement **OR** potassium chloride, LORazepam **OR** LORazepam **OR** LORazepam **OR** LORazepam **OR** LORazepam **OR** LORazepam **OR** LORazepam **OR** LORazepam    ROS:   Constitutional Negative for fever and chills   HEENT Negative for ear discharge, ear pain, nosebleed   Eyes Negative for photophobia, pain and discharge   Respiratory Negative for hemoptysis and sputum   Cardiovascular Negative for orthopnea, claudication and PND   Gastrointestinal Negative for abdominal pain, diarrhea, blood in stool   Musculoskeletal Negative for joint pain, negative for myalgia   Skin Negative for rash or itching   Endo/heme/allergies Negative for polydipsia, environmental allergy   Psychiatric Negative for suicidal ideation.   Patient is not anxious           Objective:   BP (!) 146/98   Pulse 80   Temp 98 °F (36.7 °C) (Oral)   Resp 16   Ht 5' 10\" (1.778 m)   Wt 200 lb (90.7 kg)   SpO2 96%   BMI 28.70 kg/m²     Blood pressure range: Systolic (32KFM), SATHYA:747 , Min:130 , WRK:688   ; Diastolic (32KGW), EIZ:059, Min:79, Max:118      Continuous infusions:    sodium chloride      sodium chloride 100 mL/hr at 09/04/21 1428       ON EXAMINATION:  GENERAL  Appears comfortable and in no distress   HEENT  NC/ AT   NECK  Supple and no bruits heard   Cardiovascular  S1, S2 heard; radial pulse intact   MENTAL STATUS:  Alert, awake, oriented x3; speech fluent and coherent; good naming and good repetition; could recall 2 out of 3 test items in 5 minutes on formal memory testing; able to name the objects well; able to repeat sentences well; serial subtractions well; no dysarthria noted; intact language. CRANIAL NERVES: II     -       Pupils reactive b/l; left 4 mm; right 3 mm and are reactive and with no change in dark and light suggesting physiologic anisocoria;., Fundus exam: intact venous pulsations;  Visual fields intact to confrontation  III,IV,VI -horizontal nystagmus noted bilaterally and is much improved on comparison to yesterday's exam; no afferent defect, no ptosis  V     -     Normal facial sensation  VII    -     Normal facial symmetry  VIII   -     Intact hearing  IX,X -     Symmetrical palate  XI    -     Symmetrical shoulder shrug  XII   -     Midline tongue, no atrophy    MOTOR FUNCTION:  Normal bulk, normal tone, normal power;  no involuntary movements, no tremor   SENSORY FUNCTION:  Normal touch, normal pin, normal vibration, normal proprioception   CEREBELLAR FUNCTION:  Intact fine motor control over upper limbs   REFLEX FUNCTION:  Symmetric, no perverted reflex, no Babinski sign   STATION and GAIT  normal station; wide-based gait and could not do tandem gait         Data:    Lab Results:     Lab Results   Component Value Date    CHOL 129 07/14/2020    LDLCHOLESTEROL 75 07/14/2020    HDL 31 (L) 07/14/2020    TRIG 117 07/14/2020    ALT 30 09/02/2021    AST 39 09/02/2021    TSH 2.32 07/24/2021    INR 1.08 07/09/2021    GLUF 104 (H) 09/20/2019    LABA1C 5.4 09/25/2020     Hematology:  Recent Labs     09/02/21  1402 09/04/21  0609 09/05/21  0556   WBC 7.3 5.3 4.8   HGB 15.6 14.7 14.3   HCT 44.5 42.5 41.2    172 193     Chemistry:  Recent Labs     09/02/21  1402 09/04/21  0609 09/05/21  0702   * 138 137   K 3.5* 3.5* 3.6*   CL 98 104 103   CO2 23 24 26   GLUCOSE 133* 115* 120*   BUN 5* 5* 8   CREATININE 0.74 0.73 0.84   MG  --  1.7  --    CALCIUM 8.9 8.6 8.6     Recent Labs     09/02/21  1402   PROT 8.0   LABALBU 4.5   AST 39   ALT 30       No results found for: PHENYTOIN, PHENYTOIN, VALPROATE, CBMZ        Diagnostic data reviewed:  CT Head 9/2/21: -ve. Urine toxicology 9/2/21: -ve. MRI Brain (w/wo) 9/3/21: No acute infarct. No mass-effect or midline shift. No evidence of an acute intracranial hemorrhage. No abnormal enhancement. Impression and Plan: Mr. Rina Rivero is a 39 y.o. male with   Horizontal nystagmus with anisocoria; gait ataxia with chronic alcoholism on thiamine, folate, Multivit. Nystagmus significantly improved. No evidence of vestibular pathways in brainstem. Nystagmus likely related to alcoholism. MRI brain is WNL. Neurologic exam did not demonstrate evidence of significant cognitive impairment; memory complaints are associated with underlying affective disorder, major depressive disorder. Neurology service is signing off. Please call us again if there are any significant changes or questions.               Valerie Hinds MD 9/5/2021 10:49 AM

## 2021-09-05 NOTE — PROGRESS NOTES
Patient discharged from hospital. IV discontinued no issues. Reviewed discharge paperwork with patient. No questions asked.  RN called for taxi to take patient to sober living [address] , [place] [id number]

## 2021-09-05 NOTE — PROGRESS NOTES
[address] [facility].    PROGRESS NOTE             9/5/2021    10:25 AM    Name:   Aishwarya Bridges  MRN:     [id number]     Rajanide:      [de-identified]   Room:   2072/2072-01  IP Day:  3  Admit Date:  9/2/2021 12:41 PM    PCP:  No primary care provider on file. Code Status:  Full Code    Subjective:     C/C:   Chief Complaint   Patient presents with    Mental Health Problem    Alcohol Problem     Interval History Status: improved. Patient seen and examined at bedside today. Patient seen she has no new complaints. Headache comes and goes but has overall improved in frequency and in pain level. Says his mood is stable and that he wishes to leave. Blood pressure today 146/98. Patient says he refuses transfer to [facility] and that he would do better going back to sober living. IV folate transpositioned into oral.    Brief History: This is a 59-year-old male who presents to the ED for suicidal ideation. He was admitted. Patient had a 25-year drinking history and was sober in a sober living facility. For the past 3 weeks he relapses to drinking approximately 1 L of vodka a day. During this time he ceased taking all antidepressant medication. He described feelings of hopelessness and being suicidal.  His anxiety escalated he described as a 10 out of 10. He had a previous suicide attempt in which she tried to use a firearm. Past medical history significant for depression, anxiety, essential hypertension. CT head and UDS in the ED were negative. Ethanol at the time was 209. Patient started on high-dose IV thiamine, folate, multivitamins. IV fluids was begun. Review of Systems:     Review of Systems   Constitutional: Negative for activity change, appetite change, chills, fatigue and fever. HENT: Negative for hearing loss, tinnitus and trouble swallowing. Eyes: Negative for photophobia, pain and visual disturbance.    Respiratory: Negative for apnea, cough, choking, chest tightness, shortness of breath and wheezing. Cardiovascular: Negative for chest pain and palpitations. Gastrointestinal: Positive for nausea. Negative for abdominal distention, abdominal pain, constipation, diarrhea and vomiting. Genitourinary: Negative for dysuria, flank pain, frequency and urgency. Musculoskeletal: Negative for arthralgias, back pain, gait problem, myalgias, neck pain and neck stiffness. Neurological: Positive for tremors and headaches. Negative for dizziness, seizures, syncope, facial asymmetry, speech difficulty, weakness, light-headedness and numbness. Psychiatric/Behavioral: Negative for agitation, behavioral problems, confusion, decreased concentration, dysphoric mood, hallucinations, sleep disturbance and suicidal ideas. The patient is nervous/anxious. The patient is not hyperactive. Medications: Allergies:     Allergies   Allergen Reactions    Dicloxacillin Nausea Only     Flu like symptoms    Pcn [Penicillins] Nausea Only       Current Meds:   Scheduled Meds:    folic acid  1 mg Oral Daily    carvedilol  3.125 mg Oral BID WC    potassium chloride  40 mEq Oral Once    chlordiazePOXIDE  25 mg Oral TID    nicotine  1 patch TransDERmal Daily    [Held by provider] atomoxetine  25 mg Oral Daily    chlorthalidone  25 mg Oral Daily    losartan  100 mg Oral Daily    sodium chloride flush  5-40 mL IntraVENous 2 times per day    enoxaparin  40 mg SubCUTAneous Daily    multivitamin  1 tablet Oral Daily     Continuous Infusions:    sodium chloride      sodium chloride 100 mL/hr at 09/04/21 1428     PRN Meds: nicotine polacrilex, sodium chloride flush, sodium chloride, ondansetron **OR** ondansetron, polyethylene glycol, acetaminophen **OR** acetaminophen, potassium chloride **OR** potassium alternative oral replacement **OR** potassium chloride, LORazepam **OR** LORazepam **OR** LORazepam **OR** LORazepam **OR** LORazepam **OR** LORazepam **OR** LORazepam **OR** LORazepam    Data:     Past Medical History:   has a past medical history of Alcoholism ([facility] [address] Anxiety, Hepatitis C, History of atrial fibrillation, Mental and behavioral disorders d/t use of alcohol, acute intoxication ([facility] [address] Palpitations, Seizures ([facility] [address] and Wears glasses. Social History:   reports that he has quit smoking. His smoking use included cigarettes. He has a 20.00 pack-year smoking history. He has quit using smokeless tobacco.  His smokeless tobacco use included snuff and chew. He reports current alcohol use. He reports previous drug use. Drug: Cocaine. Family History:   Family History   Problem Relation Age of Onset    Mental Illness Mother     Depression Mother     High Blood Pressure Father     High Cholesterol Father     Substance Abuse Brother     Breast Cancer Maternal Grandmother        Vitals:  BP (!) 146/98   Pulse 80   Temp 98 °F (36.7 °C) (Oral)   Resp 16   Ht 5' 10\" (1.778 m)   Wt 200 lb (90.7 kg)   SpO2 96%   BMI 28.70 kg/m²   Temp (24hrs), Av.2 °F (36.8 °C), Min:97.7 °F (36.5 °C), Max:99 °F (37.2 °C)    No results for input(s): POCGLU in the last 72 hours. I/O(24Hr): No intake or output data in the 24 hours ending 21 1025    Labs:    [unfilled]    Lab Results   Component Value Date/Time    SPECIAL NOT REPORTED 2020 10:06 AM     Lab Results   Component Value Date/Time    CULTURE NO GROWTH 2020 10:06 AM       [unfilled]    Radiology:    CT HEAD WO CONTRAST    Result Date: 2021  EXAMINATION: CT OF THE HEAD WITHOUT CONTRAST  2021 1:29 pm TECHNIQUE: CT of the head was performed without the administration of intravenous contrast. Dose modulation, iterative reconstruction, and/or weight based adjustment of the mA/kV was utilized to reduce the radiation dose to as low as reasonably achievable. COMPARISON: None.  HISTORY: ORDERING SYSTEM PROVIDED HISTORY: Headache, anisocoria Reason for Exam: ha, dialated pupils FINDINGS: BRAIN/VENTRICLES: No acute intracranial hemorrhage, mass effect or midline shift. No abnormal extra-axial fluid collection. The gray-white differentiation is maintained without evidence of an acute infarct. No evidence of hydrocephalus. ORBITS: The visualized portion of the orbits demonstrate no acute abnormality. SINUSES: The visualized paranasal sinuses and mastoid air cells appear clear. SOFT TISSUES/SKULL:  No acute abnormality of the visualized skull or soft tissues. Negative noncontrast CT examination of the brain. MRI BRAIN W WO CONTRAST    Result Date: 9/3/2021  EXAMINATION: MRI OF THE BRAIN WITHOUT AND WITH CONTRAST  9/3/2021 8:49 pm TECHNIQUE: Multiplanar multisequence MRI of the head/brain was performed without and with the administration of intravenous contrast. COMPARISON: CT head 09/02/2021 HISTORY: ORDERING SYSTEM PROVIDED HISTORY: progressive ataxia and new onset nystagmus TECHNOLOGIST PROVIDED HISTORY: progressive ataxia and new onset nystagmus Reason for Exam: progressive ataxia and new onset nystagmus FINDINGS: INTRACRANIAL STRUCTURES/VENTRICLES:  There is no acute infarct. No mass effect or midline shift. No evidence of an acute intracranial hemorrhage. The ventricles and sulci are normal in size and configuration. There involutional changes brain notably involving the cerebellum. .  The sellar/suprasellar regions appear unremarkable. The normal signal voids within the major intracranial vessels appear maintained. No abnormal focus of enhancement is seen within the brain. ORBITS: The visualized portion of the orbits demonstrate no acute abnormality. SINUSES: Mild ethmoid sinus and maxillary sinus mucosal thickening. BONES/SOFT TISSUES: The bone marrow signal intensity appears normal. The soft tissues demonstrate no acute abnormality. Negative acute stroke, midline shift or mass effect.          Physical Examination:        Physical Exam  Constitutional: General: He is not in acute distress. Appearance: Normal appearance. HENT:      Head: Normocephalic and atraumatic. Mouth/Throat:      Mouth: Mucous membranes are moist.      Pharynx: Oropharynx is clear. Eyes:      Extraocular Movements: Extraocular movements intact. Conjunctiva/sclera: Conjunctivae normal.      Pupils: Pupils are unequal.      Comments: Left eye 3 mm  Right eye 2 mm  Post pupils reactive to light  Visual acuity decreased in right eye when compared to left  Decreased visual acuity and extremes of gaze  Bilateral nystagmus on lateral gaze  Bilateral nystagmus at rest   Cardiovascular:      Rate and Rhythm: Normal rate and regular rhythm. Pulses: Normal pulses. Heart sounds: Normal heart sounds. Pulmonary:      Effort: Pulmonary effort is normal.      Breath sounds: Normal breath sounds. Abdominal:      General: Abdomen is flat. Palpations: Abdomen is soft. Musculoskeletal:         General: Normal range of motion. Cervical back: Normal range of motion and neck supple. Skin:     General: Skin is warm and dry. Neurological:      Mental Status: He is alert and oriented to person, place, and time. Cranial Nerves: Cranial nerves are intact. Sensory: Sensation is intact. Motor: Motor function is intact. Coordination: Finger-Nose-Finger Test abnormal. Heel to More Test normal.      Comments: Mild bilateral dysmetria on finger-to-nose test worse on the left  Bilateral action tremor  Resting tremor present   Psychiatric:         Mood and Affect: Mood normal.         Behavior: Behavior normal.         Thought Content:  Thought content normal.         Judgment: Judgment normal.           Assessment:        Primary Problem  Suicidal ideation    Active Hospital Problems    Diagnosis Date Noted    Mood disorder ([facility] [F39] 08/08/2016     Priority: High    Alcoholic intoxication, with unspecified complication ([facility] [O37.993] 09/02/2021    Hypertension [I10] 09/02/2021    Suicidal ideation [R45.851]     Depression [F32.9]     Alcoholism ([facility] [address] [F10.20]     Headache [R51.9]        Plan:        Suicidal ideation  Suicide precautions including one-to-one sitter  Psychiatry on board: Discontinuing all psychotropic medications was recommended and reassessing    Alcohol abuse  [facility] protocol  Thiamine 100 mg 3 times a day for 2 to 7 days  Folic acid switch to oral  Multivitamin  Continue IV hydration  Seizure precaution due to history of seizures on previous withdrawal  Warnicke's encephalopathy ruled out by neurology  Librium 25 3 times daily    Anisocoria nystagmus  CT head and MRI brain unremarkable    Depression/anxiety  Psych meds held as per psychiatry recommendation    Essential hypertension  IV hydralazine as needed discontinued  Currently on carvedilol 3.125 mg twice daily  Chlorthalidone 25 mg  Cozaar 100 mg daily    Patient medically cleared for [facility] if inpatient is necessary    Lovenox 40 mg for DVT prophylaxis    Erica Díaz MD  9/5/2021  10:25 AM   Attending Physician Statement  I have discussed the care of Dejan Woo and I have examined the patient myselft and taken ros and hpi , including pertinent history and exam findings,  with the resident. I have reviewed the key elements of all parts of the encounter with the resident.   I agree with the assessment, plan and orders as documented by the resident.   htn add norvasc  10  Pt refusing to go to i  Medically clear for dc to [facility] or home if cleared by psychiatry team    Electronically signed by Nelly Hutchison MD

## 2022-01-03 ENCOUNTER — HOSPITAL ENCOUNTER (EMERGENCY)
Age: 42
Discharge: HOME | End: 2022-01-03
Attending: EMERGENCY MEDICINE
Payer: MEDICAID

## 2022-01-03 VITALS
RESPIRATION RATE: 22 BRPM | DIASTOLIC BLOOD PRESSURE: 113 MMHG | WEIGHT: 190 LBS | TEMPERATURE: 98.2 F | HEART RATE: 72 BPM | SYSTOLIC BLOOD PRESSURE: 152 MMHG | OXYGEN SATURATION: 99 % | HEIGHT: 69 IN | BODY MASS INDEX: 28.14 KG/M2

## 2022-01-03 DIAGNOSIS — R31.9 HEMATURIA, UNSPECIFIED TYPE: Primary | ICD-10-CM

## 2022-01-03 LAB
-: ABNORMAL
AMORPHOUS: ABNORMAL
BACTERIA: ABNORMAL
BILIRUBIN URINE: NEGATIVE
CASTS UA: ABNORMAL /LPF (ref 0–2)
COLOR: YELLOW
CRYSTALS, UA: ABNORMAL /HPF
EPITHELIAL CELLS UA: ABNORMAL /HPF (ref 0–5)
GLUCOSE URINE: NEGATIVE
KETONES, URINE: NEGATIVE
LEUKOCYTE ESTERASE, URINE: NEGATIVE
MUCUS: ABNORMAL
NITRITE, URINE: NEGATIVE
OTHER OBSERVATIONS UA: ABNORMAL
PH UA: 7.5 (ref 5–8)
PROTEIN UA: NEGATIVE
RBC UA: ABNORMAL /HPF (ref 0–2)
RENAL EPITHELIAL, UA: ABNORMAL /HPF
SPECIFIC GRAVITY UA: 1.01 (ref 1–1.03)
TRICHOMONAS: ABNORMAL
TURBIDITY: CLEAR
URINE HGB: ABNORMAL
UROBILINOGEN, URINE: NORMAL
WBC UA: ABNORMAL /HPF (ref 0–5)
YEAST: ABNORMAL

## 2022-01-03 PROCEDURE — 81001 URINALYSIS AUTO W/SCOPE: CPT

## 2022-01-03 PROCEDURE — 99282 EMERGENCY DEPT VISIT SF MDM: CPT

## 2022-01-03 NOTE — ED PROVIDER NOTES
[address] [facility]     Emergency Department     Faculty Note/ Attestation      Pt Name: Corine Yeager                                       MRN: [id number]  [place] 1980  Date of evaluation: 1/3/2022    Patients PCP:    No primary care provider on file. Attestation  I performed a history and physical examination of the patient and discussed management with the resident. I reviewed the residents note and agree with the documented findings and plan of care. Any areas of disagreement are noted on the chart. I was personally present for the key portions of any procedures. I have documented in the chart those procedures where I was not present during the key portions. I have reviewed the emergency nurses triage note. I agree with the chief complaint, past medical history, past surgical history, allergies, medications, social and family history as documented unless otherwise noted below. For Physician Assistant/ Nurse Practitioner cases/documentation I have personally evaluated this patient and have completed at least one if not all key elements of the E/M (history, physical exam, and MDM). Additional findings are as noted. Initial Screens:             Vitals:    Vitals:    01/03/22 1419 01/03/22 1422   BP:  (!) 152/113   Pulse: 72    Resp: 22    Temp: 98.2 °F (36.8 °C)    TempSrc: Oral    SpO2: 99%    Weight: 190 lb (86.2 kg)    Height: 5' 9\" (1.753 m)        CHIEF COMPLAINT       Chief Complaint   Patient presents with    Hematuria    Flank Pain       The pt is her for hematuria and had to give urine and it was bloody this has been going on for 2 days now red. The pt currently not having pain on apixiban from a cardioversion awaiting follow up with cardiology after cardioversion. The pt had a 4mm non obstructing stone in 2020.           EMERGENCY DEPARTMENT COURSE:     -------------------------  BP: (!) 152/113, Temp: 98.2 °F (36.8 °C), Pulse: 72, Resp: 22  Physical Exam  Constitutional:       Appearance: He is well-developed. He is not diaphoretic. HENT:      Head: Normocephalic and atraumatic. Right Ear: External ear normal.      Left Ear: External ear normal.   Eyes:      General: No scleral icterus. Right eye: No discharge. Left eye: No discharge. Neck:      Trachea: No tracheal deviation. Pulmonary:      Effort: Pulmonary effort is normal. No respiratory distress. Breath sounds: No stridor. Musculoskeletal:         General: Normal range of motion. Cervical back: Normal range of motion. Skin:     General: Skin is warm and dry. Neurological:      Mental Status: He is alert and oriented to person, place, and time. Coordination: Coordination normal.   Psychiatric:         Behavior: Behavior normal.         Comments  An ultrasound has urine jets bilaterally. No hydronephrosis and pt has urinalysis not consistent with Urinary tract infection         Ramón Sher DO,, DO, RDMS.   Attending Emergency Physician          Ramón Sher DO  01/03/22 1600

## 2022-01-03 NOTE — ED PROVIDER NOTES
[address]  ED  Emergency Department Encounter  EmergencyMedicine Resident     Pt Lee Mckee  MRN: [id number]  Candice 1980  Date of evaluation: 1/3/22  PCP:  No primary care provider on file. CHIEF COMPLAINT       Chief Complaint   Patient presents with    Hematuria    Flank Pain       HISTORY OF PRESENT ILLNESS  (Location/Symptom, Timing/Onset, Context/Setting, Quality, Duration, Modifying Factors, Severity.)      Naty Garcia is a 39 y.o. male who presents with chief complaint hematuria. Started last night with a light pink, progressively more dark red today without any active bleeding or clots. Patient states he has a history of kidney stones, similar presentation has when he had kidney stones before. He is also on Eliquis, states for a heart rate that was cardioverted approximately 3 months ago. States his nurse practitioner prescribes it. He has follow-up later this month. Initially patient was not concerned about his hematuria as he has had this before, he staying at a [facility] house where he gets routine drug screens, workers at the house instructed him to go to the ER because of his red urine. He is denying any systemic symptoms such as nausea vomiting diarrhea fevers abdominal pain chest pain shortness of breath headache dizziness    PAST MEDICAL / SURGICAL / SOCIAL / FAMILY HISTORY      has a past medical history of Alcoholism ([address] Anxiety, Hepatitis C, History of atrial fibrillation, Mental and behavioral disorders d/t use of alcohol, acute intoxication ([address] Palpitations, Seizures ([address] and Wears glasses. has a past surgical history that includes Tonsillectomy (1983); Finger surgery (Left, 2004); Cystocopy (Bilateral, 12/19/2018); and Cystoscopy (Bilateral, 12/19/2018).     Social History     Socioeconomic History    Marital status:      Spouse name: Not on file    Number of children: 2    Years of education: Not on file    Highest education  Depression Mother     High Blood Pressure Father     High Cholesterol Father     Substance Abuse Brother     Breast Cancer Maternal Grandmother        Allergies:  Dicloxacillin and Pcn [penicillins]    Home Medications:  Prior to Admission medications    Medication Sig Start Date End Date Taking? Authorizing Provider   folic acid (FOLVITE) 1 MG tablet Take 1 tablet by mouth daily 9/6/21   Danae Sotelo MD   atomoxetine (STRATTERA) 25 MG capsule Take 25 mg by mouth daily    Historical Provider, MD   carvedilol (COREG) 3.125 MG tablet Take 3.125 mg by mouth 2 times daily (with meals)    Historical Provider, MD   chlorthalidone (HYGROTON) 25 MG tablet Take 25 mg by mouth daily    Historical Provider, MD   busPIRone (BUSPAR) 15 MG tablet Take 15 mg by mouth 3 times daily    Historical Provider, MD   DULoxetine (CYMBALTA) 20 MG extended release capsule Take 40 mg by mouth daily    Historical Provider, MD   losartan (COZAAR) 100 MG tablet Take 100 mg by mouth daily    Historical Provider, MD   thiamine mononitrate 100 MG tablet Take 1 tablet by mouth daily 3/27/21   Danita Cao MD   Multiple Vitamins-Minerals (THERAPEUTIC MULTIVITAMIN-MINERALS) tablet Take 1 tablet by mouth daily 3/27/21   Danita Cao MD       REVIEW OF SYSTEMS    (2-9 systems for level 4, 10 or more for level 5)      Review of Systems   Constitutional: Negative for fever. HENT: Negative for congestion. Eyes: Negative for photophobia. Respiratory: Negative for shortness of breath. Cardiovascular: Negative for chest pain. Gastrointestinal: Negative for abdominal pain and vomiting. Endocrine: Negative for polyuria. Genitourinary: Positive for hematuria  Musculoskeletal: Negative for arthralgias. Skin: Negative for color change. Allergic/Immunologic: Negative for immunocompromised state. Neurological: Negative for dizziness. Hematological: Does not bruise/bleed easily. Psychiatric/Behavioral: Negative for agitation. PHYSICAL EXAM   (up to 7 for level 4, 8 or more for level 5)      INITIAL VITALS:   BP (!) 152/113   Pulse 72   Temp 98.2 °F (36.8 °C) (Oral)   Resp 22   Ht 5' 9\" (1.753 m)   Wt 190 lb (86.2 kg)   SpO2 99%   BMI 28.06 kg/m²     Physical Exam  Constitutional:       General: Not in acute distress. Appearance: Normal appearance. Normal weight. Not toxic-appearing. HENT:      Head: Normocephalic and atraumatic. Nose: Nose normal.      Mouth/Throat: Mucous membranes are moist.  Uvula midline no oropharyngeal edema. Pharynx: Oropharynx is clear. Eyes:      Extraocular Movements: Extraocular movements intact. Conjunctiva/sclera: Conjunctivae normal.      Pupils: Pupils are equal, round, and reactive to light. Neck:      Musculoskeletal: Normal range of motion and neck supple. No neck rigidity. Cardiovascular:      Rate and Rhythm: Normal rate and regular rhythm. Pulses: Normal pulses. Heart sounds: Normal heart sounds. No murmur. Pulmonary:      Effort: Pulmonary effort is normal.      Breath sounds: Normal breath sounds. No wheezing. Abdominal:      General: Abdomen is flat. Bowel sounds are normal.      Tenderness: There is no abdominal tenderness. Musculoskeletal:     Normal range of motion. General: No swelling or tenderness. No LE edema    Skin:     General: Skin is warm. Capillary Refill: Capillary refill takes less than 2 seconds. Coloration: Skin is not jaundiced. Neurological:      General: No focal deficit present. Mental Status: Alert and oriented to person, place, and time. Mental status is at baseline. Motor: No weakness. DIFFERENTIAL  DIAGNOSIS     PLAN (LABS / IMAGING / EKG):  Orders Placed This Encounter   Procedures    URINALYSIS WITH MICROSCOPIC       MEDICATIONS ORDERED:  No orders of the defined types were placed in this encounter.           DIAGNOSTIC RESULTS / EMERGENCY DEPARTMENT COURSE / MDM LABS:  Results for orders placed or performed during the hospital encounter of 01/03/22   URINALYSIS WITH MICROSCOPIC   Result Value Ref Range    Color, UA Yellow Yellow    Turbidity UA Clear Clear    Glucose, Ur NEGATIVE NEGATIVE    Bilirubin Urine NEGATIVE NEGATIVE    Ketones, Urine NEGATIVE NEGATIVE    Specific Gravity, UA 1.006 1.005 - 1.030    Urine Hgb LARGE (A) NEGATIVE    pH, UA 7.5 5.0 - 8.0    Protein, UA NEGATIVE NEGATIVE    Urobilinogen, Urine Normal Normal    Nitrite, Urine NEGATIVE NEGATIVE    Leukocyte Esterase, Urine NEGATIVE NEGATIVE    -          WBC, UA 0 TO 2 0 - 5 /HPF    RBC, UA 50  0 - 2 /HPF    Casts UA NOT REPORTED 0 - 2 /LPF    Crystals, UA NOT REPORTED None /HPF    Epithelial Cells UA None 0 - 5 /HPF    Renal Epithelial, UA NOT REPORTED 0 /HPF    Bacteria, UA NOT REPORTED None    Mucus, UA NOT REPORTED None    Trichomonas, UA NOT REPORTED None    Amorphous, UA NOT REPORTED None    Other Observations UA NOT REPORTED NOT REQ. Yeast, UA NOT REPORTED None         RADIOLOGY:  None    EKG  None    All EKG's are interpreted by the Emergency Department Physician who either signs or Co-signs this chart in the absence of a cardiologist.    EMERGENCY DEPARTMENT COURSE:  Patient breathing quietly and unlabored on room air. Speech is normal and speaking in full sentences without requiring to pause to take a breath. Vital signs stable afebrile no systemic symptoms, patient denies any pain. No flank pain no dysuria no testicular pain. Physical exam unremarkable. Urinalysis shows RBC without any signs of infection. Instructed patient to follow-up with his prescribing doctor for Eliquis regarding need for continuing this medication. Bedside ultrasound did not show any urinary obstruction. Will discharge with PCP follow-up. PROCEDURES:  None    CONSULTS:  None    CRITICAL CARE:  None    FINAL IMPRESSION      1.  Hematuria, unspecified type          DISPOSITION / PLAN     DISPOSITION Decision To Discharge 01/03/2022 03:35:43 PM      PATIENT REFERRED TO:  [address] [address] [phone]  Schedule an appointment as soon as possible for a visit in 1 day        DISCHARGE MEDICATIONS:  Discharge Medication List as of 1/3/2022  3:57 PM          Mee Piedra MD  Emergency Medicine Resident    (Please note that portions of thisnote were completed with a voice recognition program.  Efforts were made to edit the dictations but occasionally words are mis-transcribed.)       Mee Piedra MD  Resident  01/03/22 7997

## 2022-02-04 ENCOUNTER — HOSPITAL ENCOUNTER (OUTPATIENT)
Age: 42
Setting detail: SPECIMEN
Discharge: HOME | End: 2022-02-04
Payer: MEDICAID

## 2022-02-04 LAB
ABSOLUTE EOS #: 0.3 K/UL (ref 0–0.44)
ABSOLUTE IMMATURE GRANULOCYTE: <0.03 K/UL (ref 0–0.3)
ABSOLUTE LYMPH #: 2.55 K/UL (ref 1.1–3.7)
ABSOLUTE MONO #: 0.67 K/UL (ref 0.1–1.2)
ALBUMIN SERPL-MCNC: 4.1 G/DL (ref 3.5–5.2)
ALBUMIN/GLOBULIN RATIO: 1.6 (ref 1–2.5)
ALP BLD-CCNC: 73 U/L (ref 40–129)
ALT SERPL-CCNC: 14 U/L (ref 5–41)
ANION GAP SERPL CALCULATED.3IONS-SCNC: 12 MMOL/L (ref 9–17)
AST SERPL-CCNC: 13 U/L
BASOPHILS # BLD: 0 % (ref 0–2)
BASOPHILS ABSOLUTE: <0.03 K/UL (ref 0–0.2)
BILIRUB SERPL-MCNC: 0.63 MG/DL (ref 0.3–1.2)
BILIRUBIN DIRECT: 0.16 MG/DL
BILIRUBIN, INDIRECT: 0.47 MG/DL (ref 0–1)
BUN BLDV-MCNC: 15 MG/DL (ref 6–20)
BUN/CREAT BLD: NORMAL (ref 9–20)
CALCIUM SERPL-MCNC: 8.7 MG/DL (ref 8.6–10.4)
CHLORIDE BLD-SCNC: 107 MMOL/L (ref 98–107)
CHOLESTEROL/HDL RATIO: 3.5
CHOLESTEROL: 127 MG/DL
CO2: 22 MMOL/L (ref 20–31)
CREAT SERPL-MCNC: 0.79 MG/DL (ref 0.7–1.2)
DIFFERENTIAL TYPE: NORMAL
EOSINOPHILS RELATIVE PERCENT: 4 % (ref 1–4)
GFR AFRICAN AMERICAN: >60 ML/MIN
GFR NON-AFRICAN AMERICAN: >60 ML/MIN
GFR SERPL CREATININE-BSD FRML MDRD: NORMAL ML/MIN/{1.73_M2}
GFR SERPL CREATININE-BSD FRML MDRD: NORMAL ML/MIN/{1.73_M2}
GLOBULIN: NORMAL G/DL (ref 1.5–3.8)
GLUCOSE BLD-MCNC: 89 MG/DL (ref 70–99)
HAV IGM SER IA-ACNC: NONREACTIVE
HBV SURFACE AB TITR SER: 244.2 MIU/ML
HCT VFR BLD CALC: 40.8 % (ref 40.7–50.3)
HDLC SERPL-MCNC: 36 MG/DL
HEMOGLOBIN: 14 G/DL (ref 13–17)
HEPATITIS B CORE IGM ANTIBODY: NONREACTIVE
HEPATITIS B SURFACE ANTIGEN: NONREACTIVE
HEPATITIS C ANTIBODY: REACTIVE
HIV AG/AB: NONREACTIVE
IMMATURE GRANULOCYTES: 0 %
INR BLD: 1
LDL CHOLESTEROL: 75 MG/DL (ref 0–130)
LYMPHOCYTES # BLD: 37 % (ref 24–43)
MCH RBC QN AUTO: 29.9 PG (ref 25.2–33.5)
MCHC RBC AUTO-ENTMCNC: 34.3 G/DL (ref 28.4–34.8)
MCV RBC AUTO: 87 FL (ref 82.6–102.9)
MONOCYTES # BLD: 10 % (ref 3–12)
NRBC AUTOMATED: 0 PER 100 WBC
PDW BLD-RTO: 12.8 % (ref 11.8–14.4)
PLATELET # BLD: 256 K/UL (ref 138–453)
PLATELET ESTIMATE: NORMAL
PMV BLD AUTO: 10.4 FL (ref 8.1–13.5)
POTASSIUM SERPL-SCNC: 4 MMOL/L (ref 3.7–5.3)
PROTHROMBIN TIME: 10.7 SEC (ref 9.1–12.3)
RBC # BLD: 4.69 M/UL (ref 4.21–5.77)
RBC # BLD: NORMAL 10*6/UL
SEG NEUTROPHILS: 49 % (ref 36–65)
SEGMENTED NEUTROPHILS ABSOLUTE COUNT: 3.39 K/UL (ref 1.5–8.1)
SODIUM BLD-SCNC: 141 MMOL/L (ref 135–144)
T. PALLIDUM, IGG: NONREACTIVE
TOTAL PROTEIN: 6.7 G/DL (ref 6.4–8.3)
TRIGL SERPL-MCNC: 80 MG/DL
VITAMIN D 25-HYDROXY: 27.6 NG/ML (ref 30–100)
VLDLC SERPL CALC-MCNC: ABNORMAL MG/DL (ref 1–30)
WBC # BLD: 6.9 K/UL (ref 3.5–11.3)
WBC # BLD: NORMAL 10*3/UL

## 2022-02-04 PROCEDURE — 86694 HERPES SIMPLEX NES ANTBDY: CPT

## 2022-02-04 PROCEDURE — 86780 TREPONEMA PALLIDUM: CPT

## 2022-02-04 PROCEDURE — 84520 ASSAY OF UREA NITROGEN: CPT

## 2022-02-04 PROCEDURE — 80048 BASIC METABOLIC PNL TOTAL CA: CPT

## 2022-02-04 PROCEDURE — 82306 VITAMIN D 25 HYDROXY: CPT

## 2022-02-04 PROCEDURE — 85610 PROTHROMBIN TIME: CPT

## 2022-02-04 PROCEDURE — 84460 ALANINE AMINO (ALT) (SGPT): CPT

## 2022-02-04 PROCEDURE — 86695 HERPES SIMPLEX TYPE 1 TEST: CPT

## 2022-02-04 PROCEDURE — 86696 HERPES SIMPLEX TYPE 2 TEST: CPT

## 2022-02-04 PROCEDURE — 85025 COMPLETE CBC W/AUTO DIFF WBC: CPT

## 2022-02-04 PROCEDURE — 80061 LIPID PANEL: CPT

## 2022-02-04 PROCEDURE — 87389 HIV-1 AG W/HIV-1&-2 AB AG IA: CPT

## 2022-02-04 PROCEDURE — 36415 COLL VENOUS BLD VENIPUNCTURE: CPT

## 2022-02-04 PROCEDURE — 80074 ACUTE HEPATITIS PANEL: CPT

## 2022-02-04 PROCEDURE — 84450 TRANSFERASE (AST) (SGOT): CPT

## 2022-02-04 PROCEDURE — 87902 NFCT AGT GNTYP ALYS HEP C: CPT

## 2022-02-04 PROCEDURE — 80076 HEPATIC FUNCTION PANEL: CPT

## 2022-02-04 PROCEDURE — 87522 HEPATITIS C REVRS TRNSCRPJ: CPT

## 2022-02-04 PROCEDURE — 86317 IMMUNOASSAY INFECTIOUS AGENT: CPT

## 2022-02-04 PROCEDURE — 82977 ASSAY OF GGT: CPT

## 2022-02-04 PROCEDURE — 83883 ASSAY NEPHELOMETRY NOT SPEC: CPT

## 2022-02-07 LAB
DIRECT EXAM: NORMAL
Lab: NORMAL
SPECIMEN DESCRIPTION: NORMAL

## 2022-02-08 LAB
ALANINE AMINOTRANSFERASE, FIBROMETER: 16 U/L (ref 5–50)
ALPHA-2-MACROGLOBULIN, FIBROMETER: 117 MG/DL (ref 131–293)
ASPARTATE AMINOTRANSFERASE, FIBROMETER: 15 U/L (ref 9–50)
CIRRHOMETER PATIENT SCORE: 0
EER FIBROMETER REPORT: ABNORMAL
FIBROMETER INTERPRETATION: ABNORMAL
FIBROMETER PATIENT SCORE: 0.27
FIBROMETER PLATELET COUNT: 269
FIBROMETER PROTHROMBIN INDEX: 73 % (ref 90–120)
FIBROSIS METAVIR CLASSIFICATION: ABNORMAL
GAMMA GLUTAMYL TRANSFERASE, FIBROMETER: 13 U/L (ref 7–51)
HERPES SIMPLEX VIRUS 1 IGG: 5.94
HERPES SIMPLEX VIRUS 2 IGG: 0.71
HERPES TYPE 1/2 IGM COMBINED: 0.51
INFLAMETER METAVIR CLASSIFICATION: ABNORMAL
INFLAMETER PATIENT SCORE: 0.24
UREA NITROGEN, FIBROMETER: 15 MG/DL (ref 7–20)

## 2022-02-10 LAB
HCV QUANTITATIVE: NORMAL
HEPATITIS C GENOTYPE: NORMAL

## 2022-02-16 ENCOUNTER — HOSPITAL ENCOUNTER (OUTPATIENT)
Dept: SLEEP CENTER | Age: 42
Discharge: HOME | End: 2022-02-18
Payer: MEDICAID

## 2022-02-16 PROCEDURE — G0399 HOME SLEEP TEST/TYPE 3 PORTA: HCPCS

## 2022-02-27 LAB — STATUS: NORMAL

## 2022-03-11 ENCOUNTER — HOSPITAL ENCOUNTER (EMERGENCY)
Age: 42
Discharge: HOME | End: 2022-03-12
Attending: EMERGENCY MEDICINE
Payer: MEDICAID

## 2022-03-11 VITALS
TEMPERATURE: 97.5 F | OXYGEN SATURATION: 96 % | DIASTOLIC BLOOD PRESSURE: 89 MMHG | HEART RATE: 96 BPM | SYSTOLIC BLOOD PRESSURE: 134 MMHG | RESPIRATION RATE: 18 BRPM

## 2022-03-11 DIAGNOSIS — F10.20 ALCOHOL USE DISORDER, SEVERE, DEPENDENCE (HCC): Primary | ICD-10-CM

## 2022-03-11 LAB
ABSOLUTE EOS #: 0.09 K/UL (ref 0–0.44)
ABSOLUTE IMMATURE GRANULOCYTE: <0.03 K/UL (ref 0–0.3)
ABSOLUTE LYMPH #: 3.96 K/UL (ref 1.1–3.7)
ABSOLUTE MONO #: 0.55 K/UL (ref 0.1–1.2)
ALBUMIN SERPL-MCNC: 4.8 G/DL (ref 3.5–5.2)
ALBUMIN/GLOBULIN RATIO: 1.5 (ref 1–2.5)
ALP BLD-CCNC: 111 U/L (ref 40–129)
ALT SERPL-CCNC: 16 U/L (ref 5–41)
ANION GAP SERPL CALCULATED.3IONS-SCNC: 14 MMOL/L (ref 9–17)
AST SERPL-CCNC: 17 U/L
BASOPHILS # BLD: 0 % (ref 0–2)
BASOPHILS ABSOLUTE: <0.03 K/UL (ref 0–0.2)
BILIRUB SERPL-MCNC: 0.72 MG/DL (ref 0.3–1.2)
BUN BLDV-MCNC: 14 MG/DL (ref 6–20)
CALCIUM SERPL-MCNC: 9.1 MG/DL (ref 8.6–10.4)
CHLORIDE BLD-SCNC: 108 MMOL/L (ref 98–107)
CO2: 27 MMOL/L (ref 20–31)
CREAT SERPL-MCNC: 0.8 MG/DL (ref 0.7–1.2)
EOSINOPHILS RELATIVE PERCENT: 1 % (ref 1–4)
ETHANOL PERCENT: 0.23 %
ETHANOL: 231 MG/DL
GFR AFRICAN AMERICAN: >60 ML/MIN
GFR NON-AFRICAN AMERICAN: >60 ML/MIN
GFR SERPL CREATININE-BSD FRML MDRD: ABNORMAL ML/MIN/{1.73_M2}
GLUCOSE BLD-MCNC: 131 MG/DL (ref 70–99)
HCT VFR BLD CALC: 48.8 % (ref 40.7–50.3)
HEMOGLOBIN: 16.7 G/DL (ref 13–17)
IMMATURE GRANULOCYTES: 0 %
LYMPHOCYTES # BLD: 45 % (ref 24–43)
MAGNESIUM: 2 MG/DL (ref 1.6–2.6)
MCH RBC QN AUTO: 29.7 PG (ref 25.2–33.5)
MCHC RBC AUTO-ENTMCNC: 34.2 G/DL (ref 28.4–34.8)
MCV RBC AUTO: 86.8 FL (ref 82.6–102.9)
MONOCYTES # BLD: 6 % (ref 3–12)
NRBC AUTOMATED: 0 PER 100 WBC
PDW BLD-RTO: 13.1 % (ref 11.8–14.4)
PHOSPHORUS: 3.3 MG/DL (ref 2.5–4.5)
PLATELET # BLD: 345 K/UL (ref 138–453)
PMV BLD AUTO: 9.7 FL (ref 8.1–13.5)
POTASSIUM SERPL-SCNC: 4.2 MMOL/L (ref 3.7–5.3)
RBC # BLD: 5.62 M/UL (ref 4.21–5.77)
SARS-COV-2, RAPID: NOT DETECTED
SEG NEUTROPHILS: 48 % (ref 36–65)
SEGMENTED NEUTROPHILS ABSOLUTE COUNT: 4.13 K/UL (ref 1.5–8.1)
SODIUM BLD-SCNC: 149 MMOL/L (ref 135–144)
SPECIMEN DESCRIPTION: NORMAL
TOTAL PROTEIN: 8.1 G/DL (ref 6.4–8.3)
WBC # BLD: 8.8 K/UL (ref 3.5–11.3)

## 2022-03-11 PROCEDURE — 6360000002 HC RX W HCPCS: Performed by: STUDENT IN AN ORGANIZED HEALTH CARE EDUCATION/TRAINING PROGRAM

## 2022-03-11 PROCEDURE — 2580000003 HC RX 258: Performed by: STUDENT IN AN ORGANIZED HEALTH CARE EDUCATION/TRAINING PROGRAM

## 2022-03-11 PROCEDURE — 80053 COMPREHEN METABOLIC PANEL: CPT

## 2022-03-11 PROCEDURE — 96365 THER/PROPH/DIAG IV INF INIT: CPT

## 2022-03-11 PROCEDURE — 96375 TX/PRO/DX INJ NEW DRUG ADDON: CPT

## 2022-03-11 PROCEDURE — 87635 SARS-COV-2 COVID-19 AMP PRB: CPT

## 2022-03-11 PROCEDURE — 84100 ASSAY OF PHOSPHORUS: CPT

## 2022-03-11 PROCEDURE — 93005 ELECTROCARDIOGRAM TRACING: CPT | Performed by: STUDENT IN AN ORGANIZED HEALTH CARE EDUCATION/TRAINING PROGRAM

## 2022-03-11 PROCEDURE — 85025 COMPLETE CBC W/AUTO DIFF WBC: CPT

## 2022-03-11 PROCEDURE — 96361 HYDRATE IV INFUSION ADD-ON: CPT

## 2022-03-11 PROCEDURE — G0480 DRUG TEST DEF 1-7 CLASSES: HCPCS

## 2022-03-11 PROCEDURE — 83735 ASSAY OF MAGNESIUM: CPT

## 2022-03-11 PROCEDURE — 99284 EMERGENCY DEPT VISIT MOD MDM: CPT

## 2022-03-11 RX ORDER — LORAZEPAM 2 MG/ML
2 INJECTION INTRAMUSCULAR ONCE
Status: COMPLETED | OUTPATIENT
Start: 2022-03-11 | End: 2022-03-11

## 2022-03-11 RX ORDER — 0.9 % SODIUM CHLORIDE 0.9 %
1000 INTRAVENOUS SOLUTION INTRAVENOUS ONCE
Status: COMPLETED | OUTPATIENT
Start: 2022-03-11 | End: 2022-03-11

## 2022-03-11 RX ADMIN — THIAMINE HYDROCHLORIDE 200 MG: 100 INJECTION, SOLUTION INTRAMUSCULAR; INTRAVENOUS at 22:00

## 2022-03-11 RX ADMIN — SODIUM CHLORIDE 1000 ML: 9 INJECTION, SOLUTION INTRAVENOUS at 21:25

## 2022-03-11 RX ADMIN — LORAZEPAM 2 MG: 2 INJECTION INTRAMUSCULAR at 21:26

## 2022-03-11 ASSESSMENT — ENCOUNTER SYMPTOMS
VOMITING: 0
DIARRHEA: 0
ABDOMINAL PAIN: 0
PHOTOPHOBIA: 0
NAUSEA: 0
BACK PAIN: 0
SHORTNESS OF BREATH: 0

## 2022-03-12 LAB
EKG ATRIAL RATE: 85 BPM
EKG P AXIS: 49 DEGREES
EKG P-R INTERVAL: 152 MS
EKG Q-T INTERVAL: 370 MS
EKG QRS DURATION: 86 MS
EKG QTC CALCULATION (BAZETT): 440 MS
EKG R AXIS: -14 DEGREES
EKG T AXIS: 29 DEGREES
EKG VENTRICULAR RATE: 85 BPM

## 2022-03-12 RX ORDER — SODIUM CHLORIDE 0.9 % (FLUSH) 0.9 %
5-40 SYRINGE (ML) INJECTION PRN
Status: DISCONTINUED | OUTPATIENT
Start: 2022-03-12 | End: 2022-03-12 | Stop reason: HOSPADM

## 2022-03-12 RX ORDER — SODIUM CHLORIDE 0.9 % (FLUSH) 0.9 %
5-40 SYRINGE (ML) INJECTION EVERY 12 HOURS SCHEDULED
Status: DISCONTINUED | OUTPATIENT
Start: 2022-03-12 | End: 2022-03-12 | Stop reason: HOSPADM

## 2022-03-12 RX ORDER — SODIUM CHLORIDE 9 MG/ML
25 INJECTION, SOLUTION INTRAVENOUS PRN
Status: DISCONTINUED | OUTPATIENT
Start: 2022-03-12 | End: 2022-03-12 | Stop reason: HOSPADM

## 2022-03-12 NOTE — ED PROVIDER NOTES
[facility] ED  Emergency Department Encounter  EmergencyMedicine Resident     Pt Mark Buchanan  MRN: [id number]  Lyn[place]malcom 1980  Date of evaluation: 3/11/22  PCP:  No primary care provider on file. This patient was evaluated in the Emergency Department for symptoms described in the history of present illness. The patient was evaluated in the context of the global COVID-19 pandemic, which necessitated consideration that the patient might be at risk for infection with the SARS-CoV-2 virus that causes COVID-19. Institutional protocols and algorithms that pertain to the evaluation of patients at risk for COVID-19 are in a state of rapid change based on information released by regulatory bodies including the CDC and federal and state organizations. These policies and algorithms were followed during the patient's care in the ED. CHIEF COMPLAINT       No chief complaint on file. Alcohol withdrawal    HISTORY OF PRESENT ILLNESS  (Location/Symptom, Timing/Onset, Context/Setting, Quality, Duration, Modifying Factors, Severity.)      Jb Johnson is a 39 y.o. male who presents with request for help with alcohol use disorder. Patient states that he has a history of alcoholism he relapsed last several days drank approximately 1 L of vodka yesterday apparent this morning last drink was 9 AM today. Patient has a history of withdrawal including with seizures. No homicidal ideation or suicidal ideation or trauma. Patient has a history of A. fib has been off his Eliquis for some time. 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 has a past surgical history that includes Tonsillectomy (1983); Finger surgery (Left, 2004);  Cystocopy (Bilateral, 12/19/2018); and Cystoscopy (Bilateral, 12/19/2018). Social History     Socioeconomic History    Marital status:      Spouse name: Not on file    Number of children: 2    Years of education: Not on file    Highest education level: Not on file   Occupational History    Not on file   Tobacco Use    Smoking status: Former Smoker     Packs/day: 1.00     Years: 20.00     Pack years: 20.00     Types: Cigarettes    Smokeless tobacco: Former User     Types: Snuff, Chew   Vaping Use    Vaping Use: Every day   Substance and Sexual Activity    Alcohol use: Yes     Comment: hx of alcoholism with intermittent sobriety;     Drug use: Not Currently     Types: Cocaine     Comment: reports using on 1/17/19    Sexual activity: Not Currently   Other Topics Concern    Not on file   Social History Narrative    Not on file     Social Determinants of Health     Financial Resource Strain:     Difficulty of Paying Living Expenses: Not on file   Food Insecurity:     Worried About [address] in the Last Year: Not on file    [address] in the Last Year: Not on file   Transportation Needs:     Lack of Transportation (Medical): Not on file    Lack of Transportation (Non-Medical):  Not on file   Physical Activity:     Days of Exercise per Week: Not on file    Minutes of Exercise per Session: Not on file   Stress:     Feeling of Stress : Not on file   Social Connections:     Frequency of Communication with Friends and Family: Not on file    Frequency of Social Gatherings with Friends and Family: Not on file    Attends Sabianist Services: Not on file    Active Member of Clubs or Organizations: Not on file    Attends Club or Organization Meetings: Not on file    Marital Status: Not on file   Intimate Partner Violence:     Fear of Current or Ex-Partner: Not on file    Emotionally Abused: Not on file    Physically Abused: Not on file    Sexually Abused: Not on file   Housing Stability:     Unable to Pay for Housing in the Last Year: Not on file    Number of Places Lived in the Last Year: Not on file    Unstable Housing in the Last Year: Not on file       Family History   Problem Relation Age of Onset    Mental Illness Mother     Depression Mother     High Blood Pressure Father     High Cholesterol Father     Substance Abuse Brother     Breast Cancer Maternal Grandmother        Allergies:  Dicloxacillin and Pcn [penicillins]    Home Medications:  Prior to Admission medications    Medication Sig Start Date End Date Taking? Authorizing Provider   folic acid (FOLVITE) 1 MG tablet Take 1 tablet by mouth daily 9/6/21   Elkin Maldonado MD   atomoxetine (STRATTERA) 25 MG capsule Take 25 mg by mouth daily    Historical Provider, MD   carvedilol (COREG) 3.125 MG tablet Take 3.125 mg by mouth 2 times daily (with meals)    Historical Provider, MD   chlorthalidone (HYGROTON) 25 MG tablet Take 25 mg by mouth daily    Historical Provider, MD   busPIRone (BUSPAR) 15 MG tablet Take 15 mg by mouth 3 times daily    Historical Provider, MD   DULoxetine (CYMBALTA) 20 MG extended release capsule Take 40 mg by mouth daily    Historical Provider, MD   losartan (COZAAR) 100 MG tablet Take 100 mg by mouth daily    Historical Provider, MD   thiamine mononitrate 100 MG tablet Take 1 tablet by mouth daily 3/27/21   Connie Romero MD   Multiple Vitamins-Minerals (THERAPEUTIC MULTIVITAMIN-MINERALS) tablet Take 1 tablet by mouth daily 3/27/21   Connie Romero MD       REVIEW OF SYSTEMS    (2-9 systems for level 4, 10 or more for level 5)      Review of Systems   Constitutional: Negative for fever. HENT: Negative for congestion. Eyes: Negative for photophobia. Respiratory: Negative for shortness of breath. Cardiovascular: Negative for chest pain. Gastrointestinal: Negative for abdominal pain, diarrhea, nausea and vomiting. Genitourinary: Negative for flank pain.    Musculoskeletal: Negative for back pain and gait problem. Skin: Negative for pallor, rash and wound. Allergic/Immunologic: Negative for environmental allergies and food allergies. Neurological: Negative for facial asymmetry and weakness. Psychiatric/Behavioral: The patient is nervous/anxious. PHYSICAL EXAM   (up to 7 for level 4, 8 or more for level 5)      INITIAL VITALS:   /89   Pulse 96   Temp 97.5 °F (36.4 °C) (Oral)   Resp 18   SpO2 96%     Physical Exam  Vitals and nursing note reviewed. Constitutional:       General: He is not in acute distress. Appearance: Normal appearance. He is normal weight. He is not ill-appearing, toxic-appearing or diaphoretic. HENT:      Head: Normocephalic and atraumatic. Right Ear: External ear normal.      Left Ear: External ear normal.      Nose: Nose normal.      Mouth/Throat:      Pharynx: Oropharynx is clear. Eyes:      General: No scleral icterus. Conjunctiva/sclera: Conjunctivae normal.   Cardiovascular:      Rate and Rhythm: Regular rhythm. Tachycardia present. Pulses: Normal pulses. Pulmonary:      Effort: Pulmonary effort is normal.   Abdominal:      Palpations: Abdomen is soft. Tenderness: There is no abdominal tenderness. There is no guarding. Musculoskeletal:         General: Normal range of motion. Cervical back: Normal range of motion. Right lower leg: No edema. Left lower leg: No edema. Skin:     General: Skin is warm. Capillary Refill: Capillary refill takes less than 2 seconds. Neurological:      Mental Status: He is alert and oriented to person, place, and time.    Psychiatric:         Mood and Affect: Mood normal.         DIFFERENTIAL  DIAGNOSIS     PLAN (LABS / IMAGING / EKG):  Orders Placed This Encounter   Procedures    COVID-19, Rapid    CBC with Auto Differential    Comprehensive Metabolic Panel    Magnesium    Phosphorus    Ethanol    Urine Drug Screen    Notify physician    Initiate Oxygen Therapy Protocol    EKG 12 Lead    Alcohol and or drug assessment    Seizure precautions    Fall precautions       MEDICATIONS ORDERED:  Orders Placed This Encounter   Medications    LORazepam (ATIVAN) injection 2 mg    thiamine (B-1) 200 mg in sodium chloride 0.9 % 100 mL IVPB    0.9 % sodium chloride bolus    sodium chloride flush 0.9 % injection 5-40 mL    sodium chloride flush 0.9 % injection 5-40 mL    0.9 % sodium chloride infusion       DDX: alcohol withdrawal, alcohol use disorder    DIAGNOSTIC RESULTS / EMERGENCY DEPARTMENT COURSE / MDM   LAB RESULTS:  Results for orders placed or performed during the hospital encounter of 03/11/22   COVID-19, Rapid    Specimen: Nasopharyngeal Swab   Result Value Ref Range    Specimen Description . NASOPHARYNGEAL SWAB     SARS-CoV-2, Rapid Not Detected Not Detected   CBC with Auto Differential   Result Value Ref Range    WBC 8.8 3.5 - 11.3 k/uL    RBC 5.62 4.21 - 5.77 m/uL    Hemoglobin 16.7 13.0 - 17.0 g/dL    Hematocrit 48.8 40.7 - 50.3 %    MCV 86.8 82.6 - 102.9 fL    MCH 29.7 25.2 - 33.5 pg    MCHC 34.2 28.4 - 34.8 g/dL    RDW 13.1 11.8 - 14.4 %    Platelets 720 905 - 496 k/uL    MPV 9.7 8.1 - 13.5 fL    NRBC Automated 0.0 0.0 per 100 WBC    Seg Neutrophils 48 36 - 65 %    Lymphocytes 45 (H) 24 - 43 %    Monocytes 6 3 - 12 %    Eosinophils % 1 1 - 4 %    Basophils 0 0 - 2 %    Immature Granulocytes 0 0 %    Segs Absolute 4.13 1.50 - 8.10 k/uL    Absolute Lymph # 3.96 (H) 1.10 - 3.70 k/uL    Absolute Mono # 0.55 0.10 - 1.20 k/uL    Absolute Eos # 0.09 0.00 - 0.44 k/uL    Basophils Absolute <0.03 0.00 - 0.20 k/uL    Absolute Immature Granulocyte <0.03 0.00 - 0.30 k/uL   Comprehensive Metabolic Panel   Result Value Ref Range    Glucose 131 (H) 70 - 99 mg/dL    BUN 14 6 - 20 mg/dL    CREATININE 0.80 0.70 - 1.20 mg/dL    Calcium 9.1 8.6 - 10.4 mg/dL    Sodium 149 (H) 135 - 144 mmol/L    Potassium 4.2 3.7 - 5.3 mmol/L    Chloride 108 (H) 98 - 107 mmol/L    CO2 27 20 - 31 mmol/L    Anion Gap 14 9 - 17 mmol/L    Alkaline Phosphatase 111 40 - 129 U/L    ALT 16 5 - 41 U/L    AST 17 <40 U/L    Total Bilirubin 0.72 0.3 - 1.2 mg/dL    Total Protein 8.1 6.4 - 8.3 g/dL    Albumin 4.8 3.5 - 5.2 g/dL    Albumin/Globulin Ratio 1.5 1.0 - 2.5    GFR Non-African American >60 >60 mL/min    GFR African American >60 >60 mL/min    GFR Comment         Magnesium   Result Value Ref Range    Magnesium 2.0 1.6 - 2.6 mg/dL   Phosphorus   Result Value Ref Range    Phosphorus 3.3 2.5 - 4.5 mg/dL   Ethanol   Result Value Ref Range    Ethanol 231 (H) <10 mg/dL    Ethanol percent 0.231 (H) <0.010 %       IMPRESSION: Patient is alert oriented nontoxic mildly tremulous hypertensive on initial examination he has a history of alcohol use disorder has relapsed. No trauma no HI/SI. Plan will be labs supportive care , social work consult for placement    RADIOLOGY:  No results found. EKG      All EKG's are interpreted by the Emergency Department Physician who either signs or Co-signs this chart in the absence of a cardiologist.    EMERGENCY DEPARTMENT COURSE:  Patient was seen and evaluated in consultation with the social work service labs largely unremarkable patient provided a small dose of Ativan with improvement in his symptoms of blood pressure. Patient discharged to [facility]      PROCEDURES:      CONSULTS:  None    CRITICAL CARE:      FINAL IMPRESSION      1. Alcohol use disorder, severe, dependence ([facility] [address]          DISPOSITION / PLAN     DISPOSITION Decision To Discharge 03/12/2022 01:48:42 AM      PATIENT REFERRED TO:  [address] [place] [id number] [phone]  Call today  for follow-up and reevaluation in 1 to 2 days as needed.     [facility]  [address] [facility] [address] [phone]  Go to   As needed      DISCHARGE MEDICATIONS:  Discharge Medication List as of 3/12/2022  1:49 AM          Blank Hays DO  Emergency Medicine Resident    (Please note that portions of thisnote were completed with a voice recognition program.  Efforts were made to edit the dictations but occasionally words are mis-transcribed.)       Tameka Schaefer,   Resident  03/12/22 2894

## 2022-03-12 NOTE — ED NOTES
The patient is a 39year old male that came to the ED today for detox help. Patient states that he relapsed on Alcohol 3 days ago and has been drinking since. Patient reports that prior to relapse he has been sober alcohol for 6 months. Patient states that when he drinks he drinks at least 1 liter of liquor per day. Per the patient, when he detox on his own he experiences symptoms of tremors, sweats, and mild hallucinations of voices. Patient also states that in 2006 he had a seizure after trying to detox on his own. Patient reports that his alcohol use and relapses has cause a lot of distress with family relationships and now patient concerned that it may disrupt his employment opportunities. Patient states that is is now also concerned that he maybe homeless due to his alcohol use because he is currently living at the [facility]. Patient reports that he does suffer from depression but denied any suicidal or homicidal thoughts or plans. Patient states that he is prescribed Trinellix and is not compliant with his medications.

## 2022-03-12 NOTE — ED NOTES
Patient requesting to detox at [facility]. Patient did assessment over the phone with [facility]. SW awaiting [facility] to make contact on whether or not the patient can be admitted.

## 2022-03-12 NOTE — ED NOTES
PT. Wants help with his drinking issue. PT. Tried to stop a few years ago and when he was trying to quit he started to feel like hurting himself. Pt. Does not feel this way now but he knows he will by tomorrow since his last drink was at 0900.       Louis Currie RN  03/11/22 7856

## 2022-03-12 NOTE — ED NOTES
[] Mary Alice    [] [facility]    [x]  [facility] ASSESSMENT      Y  N     [x] [] In the past two weeks have you had thoughts of hurting yourself in any way? [x] [] In the past two weeks have you had thoughts that you would be better off dead? [x] [] Have you made a suicide attempt in the past two months? [x] [] Do you have a plan for hurting yourself or suicide? [x] [] Presence of hallucinations/voices related to hurting himself or herself or someone else. SUICIDE/SECURITY WATCH PRECAUTION CHECKLIST     Orders    [x]  Suicide/Security Watch Precautions initiated as checked below:   3/11/22 11:32 PM EST BH31/BH31C    [x] Notified physician:  Sheryle Salina, MD  3/11/22 11:32 PM EST    [x] Orders obtained as appropriate:     [x] 1:1 Observer     [] Psych Consult     [] Psych Consult    Name:  Date:  Time:    [x] 1:1 Observer, Notified by:  Cindi Medina RN    Contact Nurse Supervisor    [x] Remove all personal clothes from room and place in snap/paper gown/pants. Slipper only    [x] Remove all personal belongings from room and secured away from patient. Documentation    [x] Initiate Suicide/Security Watch Precaution Flow Sheet    [x] Initiate individualized Care Plan/Problem    [x] Document why precautions initiated on flow sheet (Initiate Nursing Care Plan/Problem)    [x] 1:1 Observer in place; instructions provided. Suicide precautions require observer be within arms length. [x] Nurse-Observer Communication Hand-off initiated by RN, reviewed with Observer. Subsequently used as Hand Off between Observers. [x] Initiate every 15 minute observations per observer as delegated by the RN.     [x] Initiate RN assessment and documentation    Environmental Scan  Search Criteria and Process: OPTIONAL, see Search Policy    [] Reason for search:    [] Nursing in presence of second person to search patient    [] Patient notified of reason for body assessment and belongings search:     Persons present during search:   Results of search and disposition:       Searchers Name: Vickervintonya Farrukh    These items or items similar should be removed from the room:   [x] Chairs   [x] Telephone   [x] Trash cans and liners   [x] Plastic utensils (order Patient Safety tray)   [x] Empty or remove Sharps containers   [x] All personal clothing/belongings removed   [x] All unnecessary lead wires, electrical cords, draw cords, etc.   [x] Flowers and plants   [x] Double check for lighters, matches, razors, any glass items etc that can be used as weapons. Person completing Checklist: Shahbaz Smith RN       GENERAL INFORMATION     Y  N     [x] [] Has the patient been informed that they are on a watch and what that means? [x] [] Can the patient get out of Bed without nursing assistance? [x] [] Can the patient use the restroom without nursing assistance? [x] [] Can the patient walk the halls to [place] their legs? \"   [x] [] Does the patient have metal utensils? [x] [] Have the patient's belongings been placed out of control of the patient? [x] [] Have the patient and his/her belongings been checked for contraband? [] [] Is the patient under any visitor restrictions? If Yes, explain:   [] [] Is the patient under an alias? Alias Name:   Authorized visitors (no more than two are to be on the list)   Name/Relationship:   Name/Relationship:    Name of Staff member that you  Received this information from?: Prabhjot Chadwick    General Description:    Gali Primrose male 39 y.o. Admission weight:      Race: []  [] Black  []   []   [] [place] [] Other  Facial Hair:  [] Yes  [] No  If yes, please describe: Identifying Marks (i.e. Visible tattoos, scars, etc... ):     NURSING CARE PLAN    Nursing Diagnosis: Risk of Self Directed Harm  [] Actual  [] Potential  Date Started: 3/11/22      Etiological Factors: (related to)  [] Expressed or implied suicidal ideation/behavior  [] Depression  [] Suicide attempt      [] Low self-esteem  [] Hallucinations      [] Feeling of Hopelessness  [] Substance abuse or withdrawal    [] Dysfunctional family  [] Major traumatic event, eg., divorce, etc   [] Excessive stress/anxiety    3/11/22    Expected Outcomes    Patient will:   [x] Patient will remain safe for the duration of their stay   [x] Patient's environment will be safe, eg. Free of potential suicide weapons   [] Verbalize Recovery from suicidal episode and improvement in self-worth   [x] Discuss feeling that precipitated suicide attempt/thoughts/behavior   [] Will describe available resources for crisis prevention and management   [] Will verbalize positive coping skills     Nursing Intervention   [x] Assessment and Observations hourly   [x] Suicide Precautions implemented with patient, should be 1:1 observation   [x] Document observation [id number] and RN assessment hourly   [] Consult physician for:    [] Psychiatric consult    [] Pharmacological therapy    [] Other:    [x] Patient search completed by security   [x] Initiated appropriate safety protocols by removing from the patient's environment anything that could be used to inflict self injury, eg. Order safe tray, snap gown, etc   [x] Maintain open, warm, caring, non-judgmental attitude/manner towards patient   [] Discuss advantages and disadvantages of existing coping methods/skills   [x] Assist and educate patient with identifying present strengths and coping skills   [x] Keep patient informed regarding plan of care and provide clear concise explanations. Provide the patient/family education information as well as telephone numbers and other information about crisis centers, hot lines, and counselors.     Discharge Planning:   [] Referral  [] Groups [] Health agencies  [] Other:            Marycarmen Holt RN  03/11/22 7480

## 2022-03-12 NOTE — ED PROVIDER NOTES
Faculty Sign-Out Attestation  Handoff taken on the following patient from prior Attending Physician: Yasmani Last    I was available and discussed any additional care issues that arose and coordinated the management plans with the resident(s) caring for the patient during my duty period. Any areas of disagreement with residents documentation of care or procedures are noted on the chart. I was personally present for the key portions of any/all procedures during my duty period. I have documented in the chart those procedures where I was not present during the key portions.     Relapsed on etoh, suicidal without plan,   Given ativan, needing social work to discuss with I or arrowhead Ilsa Burkitt, DO  Attending Physician     Ilsa Burkitt, DO  03/12/22 0142    Transferring to arrowhead Ilsa Burkitt, DO  03/12/22 0155

## 2022-03-12 NOTE — ED PROVIDER NOTES
[address] [facility]     Emergency Department     Faculty Attestation    I performed a history and physical examination of the patient and discussed management with the resident. I reviewed the residents note and agree with the documented findings and plan of care. Any areas of disagreement are noted on the chart. I was personally present for the key portions of any procedures. I have documented in the chart those procedures where I was not present during the key portions. I have reviewed the emergency nurses triage note. I agree with the chief complaint, past medical history, past surgical history, allergies, medications, social and family history as documented unless otherwise noted below. For Physician Assistant/ Nurse Practitioner cases/documentation I have personally evaluated this patient and have completed at least one if not all key elements of the E/M (history, physical exam, and MDM). Additional findings are as noted. I have personally seen and evaluated the patient. I find the patient's history and physical exam are consistent with the NP/PA documentation. I agree with the care provided, treatment rendered, disposition and follow-up plan. This patient was evaluated in the Emergency Department for symptoms described in the history of present illness. The patient was evaluated in the context of the global COVID-19 pandemic, which necessitated consideration that the patient might be at risk for infection with the SARS-CoV-2 virus that causes COVID-19. Institutional protocols and algorithms that pertain to the evaluation of patients at risk for COVID-19 are in a state of rapid change based on information released by regulatory bodies including the CDC and federal and state organizations. These policies and algorithms were followed during the patient's care in the ED.     77-year-old male with a history of alcoholism, previously sober but relapsed 3 days ago presenting with thoughts of harming himself. Patient was started to have visual disturbance that he has had in the past with alcohol withdrawal, but after Ativan is no longer having those symptoms. Exam:  General: Laying on the bed, awake, alert and in no acute distress  CV: normal rate and regular rhythm  Lungs: Breathing comfortably on room air with no tachypnea, hypoxia, or increased work of breathing  Neuro: Awake, alert, moving all extremities. No tremor.     Assessment: Relapse of alcohol use disorder, suicidal ideation    Plan:  Labs for medical clearance  Ativan for withdrawal symptoms  We will collaborate with social work for appropriate disposition of patient        Thu Ochoa MD   Attending Emergency  Physician    (Please note that portions of this note were completed with a voice recognition program. Efforts were made to edit the dictations but occasionally words are mis-transcribed.)              Thu Ochoa MD  03/11/22 7952

## 2022-06-09 ENCOUNTER — HOSPITAL ENCOUNTER (INPATIENT)
Age: 42
LOS: 6 days | Discharge: NURSING, REHAB OR LONG-TERM CARE | DRG: 751 | End: 2022-06-15
Attending: PSYCHIATRY & NEUROLOGY | Admitting: PSYCHIATRY & NEUROLOGY
Payer: MEDICAID

## 2022-06-09 PROBLEM — F10.130 ALCOHOL ABUSE WITH WITHDRAWAL, UNCOMPLICATED (HCC): Status: ACTIVE | Noted: 2022-06-09

## 2022-06-09 PROCEDURE — 6370000000 HC RX 637 (ALT 250 FOR IP): Performed by: PSYCHIATRY & NEUROLOGY

## 2022-06-09 PROCEDURE — 6370000000 HC RX 637 (ALT 250 FOR IP): Performed by: NURSE PRACTITIONER

## 2022-06-09 PROCEDURE — APPSS60 APP SPLIT SHARED TIME 46-60 MINUTES: Performed by: NURSE PRACTITIONER

## 2022-06-09 PROCEDURE — 1240000000 HC EMOTIONAL WELLNESS R&B

## 2022-06-09 RX ORDER — LORAZEPAM 2 MG/ML
2 INJECTION INTRAMUSCULAR
Status: DISCONTINUED | OUTPATIENT
Start: 2022-06-09 | End: 2022-06-15 | Stop reason: HOSPADM

## 2022-06-09 RX ORDER — LORAZEPAM 2 MG/ML
3 INJECTION INTRAMUSCULAR
Status: DISCONTINUED | OUTPATIENT
Start: 2022-06-09 | End: 2022-06-15 | Stop reason: HOSPADM

## 2022-06-09 RX ORDER — LORAZEPAM 1 MG/1
1 TABLET ORAL
Status: DISCONTINUED | OUTPATIENT
Start: 2022-06-09 | End: 2022-06-15 | Stop reason: HOSPADM

## 2022-06-09 RX ORDER — LORAZEPAM 1 MG/1
3 TABLET ORAL
Status: DISCONTINUED | OUTPATIENT
Start: 2022-06-09 | End: 2022-06-15 | Stop reason: HOSPADM

## 2022-06-09 RX ORDER — LORAZEPAM 1 MG/1
4 TABLET ORAL
Status: DISCONTINUED | OUTPATIENT
Start: 2022-06-09 | End: 2022-06-15 | Stop reason: HOSPADM

## 2022-06-09 RX ORDER — MAGNESIUM HYDROXIDE/ALUMINUM HYDROXICE/SIMETHICONE 120; 1200; 1200 MG/30ML; MG/30ML; MG/30ML
30 SUSPENSION ORAL EVERY 6 HOURS PRN
Status: DISCONTINUED | OUTPATIENT
Start: 2022-06-09 | End: 2022-06-15 | Stop reason: HOSPADM

## 2022-06-09 RX ORDER — LORAZEPAM 1 MG/1
2 TABLET ORAL
Status: DISCONTINUED | OUTPATIENT
Start: 2022-06-09 | End: 2022-06-15 | Stop reason: HOSPADM

## 2022-06-09 RX ORDER — HYDROXYZINE 50 MG/1
50 TABLET, FILM COATED ORAL 3 TIMES DAILY PRN
Status: DISCONTINUED | OUTPATIENT
Start: 2022-06-09 | End: 2022-06-15 | Stop reason: HOSPADM

## 2022-06-09 RX ORDER — FOLIC ACID 1 MG/1
1 TABLET ORAL DAILY
Status: DISCONTINUED | OUTPATIENT
Start: 2022-06-09 | End: 2022-06-15 | Stop reason: HOSPADM

## 2022-06-09 RX ORDER — TRAZODONE HYDROCHLORIDE 50 MG/1
50 TABLET ORAL NIGHTLY PRN
Status: DISCONTINUED | OUTPATIENT
Start: 2022-06-09 | End: 2022-06-15 | Stop reason: HOSPADM

## 2022-06-09 RX ORDER — POLYETHYLENE GLYCOL 3350 17 G/17G
17 POWDER, FOR SOLUTION ORAL DAILY PRN
Status: DISCONTINUED | OUTPATIENT
Start: 2022-06-09 | End: 2022-06-15 | Stop reason: HOSPADM

## 2022-06-09 RX ORDER — IBUPROFEN 400 MG/1
400 TABLET ORAL EVERY 6 HOURS PRN
Status: DISCONTINUED | OUTPATIENT
Start: 2022-06-09 | End: 2022-06-15 | Stop reason: HOSPADM

## 2022-06-09 RX ORDER — LORAZEPAM 2 MG/ML
1 INJECTION INTRAMUSCULAR
Status: DISCONTINUED | OUTPATIENT
Start: 2022-06-09 | End: 2022-06-15 | Stop reason: HOSPADM

## 2022-06-09 RX ORDER — GAUZE BANDAGE 2" X 2"
100 BANDAGE TOPICAL DAILY
Status: DISCONTINUED | OUTPATIENT
Start: 2022-06-09 | End: 2022-06-15 | Stop reason: HOSPADM

## 2022-06-09 RX ORDER — ACETAMINOPHEN 325 MG/1
650 TABLET ORAL EVERY 6 HOURS PRN
Status: DISCONTINUED | OUTPATIENT
Start: 2022-06-09 | End: 2022-06-15 | Stop reason: HOSPADM

## 2022-06-09 RX ORDER — LORAZEPAM 2 MG/ML
4 INJECTION INTRAMUSCULAR
Status: DISCONTINUED | OUTPATIENT
Start: 2022-06-09 | End: 2022-06-15 | Stop reason: HOSPADM

## 2022-06-09 RX ORDER — M-VIT,TX,IRON,MINS/CALC/FOLIC 27MG-0.4MG
1 TABLET ORAL DAILY
Status: DISCONTINUED | OUTPATIENT
Start: 2022-06-09 | End: 2022-06-15 | Stop reason: HOSPADM

## 2022-06-09 RX ADMIN — MULTIPLE VITAMINS W/ MINERALS TAB 1 TABLET: TAB at 17:37

## 2022-06-09 RX ADMIN — HYDROXYZINE HYDROCHLORIDE 50 MG: 50 TABLET, FILM COATED ORAL at 21:43

## 2022-06-09 RX ADMIN — LORAZEPAM 1 MG: 1 TABLET ORAL at 21:43

## 2022-06-09 RX ADMIN — IBUPROFEN 400 MG: 400 TABLET ORAL at 21:43

## 2022-06-09 RX ADMIN — TRAZODONE HYDROCHLORIDE 50 MG: 50 TABLET ORAL at 21:43

## 2022-06-09 RX ADMIN — LORAZEPAM 1 MG: 1 TABLET ORAL at 17:37

## 2022-06-09 RX ADMIN — NICOTINE POLACRILEX 2 MG: 2 GUM, CHEWING BUCCAL at 17:42

## 2022-06-09 RX ADMIN — THIAMINE HCL TAB 100 MG 100 MG: 100 TAB at 17:38

## 2022-06-09 RX ADMIN — HYDROXYZINE HYDROCHLORIDE 50 MG: 50 TABLET, FILM COATED ORAL at 13:40

## 2022-06-09 RX ADMIN — FOLIC ACID 1 MG: 1 TABLET ORAL at 17:37

## 2022-06-09 ASSESSMENT — SLEEP AND FATIGUE QUESTIONNAIRES
DO YOU USE A SLEEP AID: NO
AVERAGE NUMBER OF SLEEP HOURS: 8
DO YOU HAVE DIFFICULTY SLEEPING: NO

## 2022-06-09 ASSESSMENT — PAIN SCALES - GENERAL: PAINLEVEL_OUTOF10: 5

## 2022-06-09 ASSESSMENT — LIFESTYLE VARIABLES
HOW OFTEN DO YOU HAVE A DRINK CONTAINING ALCOHOL: 4 OR MORE TIMES A WEEK
HOW MANY STANDARD DRINKS CONTAINING ALCOHOL DO YOU HAVE ON A TYPICAL DAY: 10 OR MORE

## 2022-06-09 ASSESSMENT — PATIENT HEALTH QUESTIONNAIRE - PHQ9: SUM OF ALL RESPONSES TO PHQ QUESTIONS 1-9: 18

## 2022-06-09 NOTE — GROUP NOTE
Group Therapy Note    Date: 6/9/2022    Group Start Time: 1430  Group End Time: 9293  Group Topic: Cognitive Skills    [facility] D Reina Sacks, CTRS        Group Therapy Note    Attendees: 3/11       Pt did not participate in Cognitive Skills Group at 1430 when encouraged by RT due to going through admission process.       Discipline Responsible: Psychoeducational Specialist        Signature:  Nori Lou
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
## 2022-06-09 NOTE — BH NOTE
[facility]  Admission Note     Admission Type:        Reason for admission:       PATIENT STRENGTHS:       Patient Strengths and Limitations:       Addictive Behavior:   Addictive Behavior  In the Past 3 Months, Have You Felt or Has Someone Told You That You Have a Problem With  : Other (comment) (Drink to much)    Medical Problems:   Past Medical History:   Diagnosis Date    Alcoholism ([facility] [address] 01/31/2018    hx of alcoholism with intermittent sobriety;    [facility]    Hepatitis C 2010    pt states he tested + for antibodies. no live virus detected -no treatment needed    History of atrial fibrillation 2001    pt states after a suicide attempt, overdose oxycontin pt developed atrial fib x 5-6 months. NO RECENT ISSUES    Mental and behavioral disorders d/t use of alcohol, acute intoxication ([facility] [address]     NO LONGER PERTINENT    Palpitations     DENIES    Seizures ([facility] [address] 2006    during alcohol detox     Wears glasses        Status EXAM:  Mental Status and Behavioral Exam  Normal: Yes  Level of Assistance: Independent/Self  Facial Expression: Brightened  Affect: Blunt  Level of Consciousness: Alert  Frequency of Checks: 4 times per hour, close  Mood:Normal: No  Mood: Irritable,Anxious  Motor Activity:Normal: No  Motor Activity: Agitated (Get easily agitated)  Eye Contact: Good  Observed Behavior: Agitated,Cooperative  Sexual Misconduct History: Current - no  Preception: [place] to person,[place] to time,[place] to place,[place] to situation  Attention:Normal: No  Thought Processes: Blocking  Thought Content:Normal: No  Depression Symptoms: Feelings of helplessness,Feelings of hopelessess,Feelings of worthlessness,Increased irritability  Anxiety Symptoms: Generalized  Gunjan Symptoms: No problems reported or observed.   Hallucinations: None  Delusions: No  Memory:Normal: No  Memory: Confabulation  Insight and Judgment: No  Insight and Judgment: Poor judgment,Poor insight    Tobacco Screening:  Practical Counseling, on admission, esthela X, if applicable and completed (first 3 are required if patient doesn't refuse): (x )  Recognizing danger situations (included triggers and roadblocks)                    (x )  Coping skills (new ways to manage stress, exercise, relaxation techniques, changing routine, distraction)                                                           (x )  Basic information about quitting (benefits of quitting, techniques in how to quit, available resources  (x ) Referral for counseling faxed to [facility]                                           (x ) Patient refused counseling  (x ) Patient has not smoked in the last 30 days    Metabolic Screening:    Lab Results   Component Value Date    LABA1C 5.4 09/25/2020       Lab Results   Component Value Date    CHOL 127 02/04/2022    CHOL 129 07/14/2020    CHOL 123 01/20/2019    CHOL 129 11/06/2018     Lab Results   Component Value Date    TRIG 80 02/04/2022    TRIG 117 07/14/2020    TRIG 73 01/20/2019    TRIG 89 11/06/2018     Lab Results   Component Value Date    HDL 36 (L) 02/04/2022    HDL 31 (L) 07/14/2020    HDL 35 (L) 03/24/2020    HDL 53 01/20/2019    HDL 36 (L) 11/06/2018     No components found for: LDLCAL  No results found for: LABVLDL      Body mass index is 27.84 kg/m². BP Readings from Last 2 Encounters:   06/09/22 (!) 135/97   03/11/22 134/89           Pt admitted with followings belongings:  Dental Appliances: None  Vision - Corrective Lenses: Eyeglasses  Hearing Aid: None  Jewelry: Bracelet,Ring  Body Piercings Removed: No  Clothing: Shorts,Shirt,Belt  Other Valuables:  (Credit carda)     Patient's home medications were yes. Patient oriented to surroundings and program expectations and copy of patient rights given. Received admission packet:  yes. Consents reviewed, signed yes. Refused yes. Patient verbalize understanding:  yes.   Patient education on precautions: yes      Patient admitted to room 228- 2 bed [facility] at 12: 30 pm.   Patient changed into hospital attire, scanned with metal detector. Food and beverages provided to patient. Patient currently negative thoughts of self harm.                                 Leslie Davey RN

## 2022-06-09 NOTE — BH NOTE
Patient admitted to room 228- 2 bed [facility] at 12: 30 pm.   Patient changed into hospital attire, scanned with metal detector. Food and beverages provided to patient. Patient currently negative thoughts of self harm.

## 2022-06-09 NOTE — PROGRESS NOTES
RT unable to complete assessment at end of shift. RT will seek out pt during next shift on 6/10/2022 to complete assessment.

## 2022-06-10 PROCEDURE — 6370000000 HC RX 637 (ALT 250 FOR IP): Performed by: NURSE PRACTITIONER

## 2022-06-10 PROCEDURE — 1240000000 HC EMOTIONAL WELLNESS R&B

## 2022-06-10 PROCEDURE — 6370000000 HC RX 637 (ALT 250 FOR IP): Performed by: INTERNAL MEDICINE

## 2022-06-10 PROCEDURE — 6370000000 HC RX 637 (ALT 250 FOR IP): Performed by: PSYCHIATRY & NEUROLOGY

## 2022-06-10 PROCEDURE — 99223 1ST HOSP IP/OBS HIGH 75: CPT | Performed by: INTERNAL MEDICINE

## 2022-06-10 PROCEDURE — 99223 1ST HOSP IP/OBS HIGH 75: CPT | Performed by: PSYCHIATRY & NEUROLOGY

## 2022-06-10 RX ORDER — FOLIC ACID 1 MG/1
1 TABLET ORAL DAILY
Status: DISCONTINUED | OUTPATIENT
Start: 2022-06-10 | End: 2022-06-10 | Stop reason: SDUPTHER

## 2022-06-10 RX ORDER — CARVEDILOL 3.12 MG/1
3.12 TABLET ORAL 2 TIMES DAILY WITH MEALS
Status: DISCONTINUED | OUTPATIENT
Start: 2022-06-10 | End: 2022-06-15 | Stop reason: HOSPADM

## 2022-06-10 RX ORDER — LOSARTAN POTASSIUM 100 MG/1
100 TABLET ORAL DAILY
Status: DISCONTINUED | OUTPATIENT
Start: 2022-06-10 | End: 2022-06-15 | Stop reason: HOSPADM

## 2022-06-10 RX ORDER — GAUZE BANDAGE 2" X 2"
100 BANDAGE TOPICAL DAILY
Status: DISCONTINUED | OUTPATIENT
Start: 2022-06-10 | End: 2022-06-10 | Stop reason: SDUPTHER

## 2022-06-10 RX ADMIN — VORTIOXETINE 10 MG: 10 TABLET, FILM COATED ORAL at 12:55

## 2022-06-10 RX ADMIN — MULTIPLE VITAMINS W/ MINERALS TAB 1 TABLET: TAB at 08:34

## 2022-06-10 RX ADMIN — HYDROXYZINE HYDROCHLORIDE 50 MG: 50 TABLET, FILM COATED ORAL at 20:59

## 2022-06-10 RX ADMIN — HYDROXYZINE HYDROCHLORIDE 50 MG: 50 TABLET, FILM COATED ORAL at 08:38

## 2022-06-10 RX ADMIN — LORAZEPAM 1 MG: 1 TABLET ORAL at 18:27

## 2022-06-10 RX ADMIN — LOSARTAN POTASSIUM 100 MG: 100 TABLET, FILM COATED ORAL at 12:11

## 2022-06-10 RX ADMIN — LORAZEPAM 1 MG: 1 TABLET ORAL at 12:10

## 2022-06-10 RX ADMIN — TRAZODONE HYDROCHLORIDE 50 MG: 50 TABLET ORAL at 20:59

## 2022-06-10 RX ADMIN — CARVEDILOL 3.12 MG: 3.12 TABLET, FILM COATED ORAL at 17:32

## 2022-06-10 RX ADMIN — THIAMINE HCL TAB 100 MG 100 MG: 100 TAB at 08:34

## 2022-06-10 RX ADMIN — LORAZEPAM 1 MG: 1 TABLET ORAL at 21:08

## 2022-06-10 RX ADMIN — FOLIC ACID 1 MG: 1 TABLET ORAL at 08:34

## 2022-06-10 RX ADMIN — CARVEDILOL 3.12 MG: 3.12 TABLET, FILM COATED ORAL at 12:11

## 2022-06-10 RX ADMIN — LURASIDONE HYDROCHLORIDE 40 MG: 40 TABLET, FILM COATED ORAL at 12:55

## 2022-06-10 NOTE — PROGRESS NOTES
Behavioral Services  Medicare Certification Upon Admission    I certify that this patient's inpatient psychiatric hospital admission is medically necessary for:    [x] (1) Treatment which could reasonably be expected to improve this patient's condition,       [x] (2) Or for diagnostic study;     AND     [x](2) The inpatient psychiatric services are provided while the individual is under the care of a physician and are included in the individualized plan of care.     Estimated length of stay/service 3-9 days    Plan for post-hospital care -outpatient care    Electronically signed by Dallas Ledbetter MD on 6/10/2022 at 9:51 AM

## 2022-06-10 NOTE — PROGRESS NOTES
Comprehensive Nutrition Assessment    Type and Reason for Visit:  Initial,Positive Nutrition Screen (Poor appetite and intake)    Nutrition Recommendations/Plan:   1. Continue current diet. 2. Provide Ensure Enlive twice daily. Malnutrition Assessment:  Malnutrition Status: At risk for malnutrition (Comment) (06/10/22 5131)    Context:  Acute Illness     Findings of the 6 clinical characteristics of malnutrition:  Energy Intake:  Mild decrease in energy intake (Comment) (Estimated)  Weight Loss:  No significant weight loss (3% within 9 months based on available data)     Body Fat Loss:  Unable to assess     Muscle Mass Loss:  Unable to assess    Fluid Accumulation:  No significant fluid accumulation     Strength:  Not Performed    Nutrition Assessment:    Pt admitted with depression and suicidal ideation. Nurse reports pt at well at breakfast but not much for lunch; seems to be having some withdrawal issues. Will add Ensure Enlive twice daily. Nutrition Related Findings:    Hx: hypertension, atrial fibrillation, depression, anxiety, PTSD, and alcohol use disorder. No edema. Multivitamin with minerals, Folic Acid, Thiamine. Other meds reviewed. No labs appear ordered. Wound Type: None       Current Nutrition Intake & Therapies:    Average Meal Intake: 26-50%     ADULT DIET; Regular    Anthropometric Measures:  Height: 5' 10\" (177.8 cm)  Ideal Body Weight (IBW): 166 lbs (75 kg)    Admission Body Weight: 194 lb 0.1 oz (88 kg)  Current Body Weight: 194 lb 0.1 oz (88 kg), 116.9 % IBW. Weight Source: Not Specified  Current BMI (kg/m2): 27.8  Usual Body Weight: 199 lb 15.3 oz (90.7 kg) (9/2/21)  % Weight Change (Calculated): -3                    BMI Categories: Overweight (BMI 25.0-29. 9)    Estimated Daily Nutrient Needs:  Energy Requirements Based On: Formula  Weight Used for Energy Requirements: Admission  Energy (kcal/day): 3961-3611 based on Frannie Walden with 1.2-1.3 factor  Weight Used for Protein Requirements: Ideal  Protein (g/day):  based on 1.2-1.4 gm per kg     Nutrition Diagnosis:   · Inadequate oral intake related to psychological cause or life stress (Etoh abuse; possible withdrawal symptoms) as evidenced by intake 26-50%,weight loss      Nutrition Interventions:   Food and/or Nutrient Delivery: Continue Current Diet,Start Oral Nutrition Supplement  Nutrition Education/Counseling: No recommendation at this time  Coordination of Nutrition Care: Continue to monitor while inpatient       Goals:     Goals: Meet at least 75% of estimated needs       Nutrition Monitoring and Evaluation:   Behavioral-Environmental Outcomes: None Identified  Food/Nutrient Intake Outcomes: Food and Nutrient Intake,Supplement Intake  Physical Signs/Symptoms Outcomes: Biochemical Data,GI Status,Weight    Discharge Planning:    Continue current diet     Mirta Gloria RD, LD  Contact: [phone]

## 2022-06-10 NOTE — PLAN OF CARE
Problem: Self Harm/Suicidality  Goal: Will have no self-injury during hospital stay  Description: INTERVENTIONS:  1. Q 30 MINUTES: Routine safety checks  2. Q SHIFT & PRN: Assess risk to determine if routine checks are adequate to maintain patient safety  Outcome: Progressing     Problem: Anxiety  Goal: Will report anxiety at manageable levels  Description: INTERVENTIONS:  1. Administer medication as ordered  2. Teach and rehearse alternative coping skills  3. Provide emotional support with 1:1 interaction with staff  Outcome: Progressing   Patient denies suicidal ideas at this time. Patient denies homicidal ideas at this time. Patient verbalized depressive symptoms at this time. Patient has been in room. Patient is free of self harm at this time. Patient agrees to seek out staff if thoughts to harm self arise. Staff will provide support and reassurance as needed. Safety checks maintained every 15 minutes.

## 2022-06-10 NOTE — PLAN OF CARE
Problem: Self Harm/Suicidality  Goal: Will have no self-injury during hospital stay  Description: INTERVENTIONS:  1. Q 30 MINUTES: Routine safety checks  2. Q SHIFT & PRN: Assess risk to determine if routine checks are adequate to maintain patient safety  6/10/2022 0957 by Vanessa Ribeiro RN  Outcome: Progressing  Note: Patient denies thoughts of harm to self or others. Visual safety checks are completed every 15 minutes and randomly. 6/10/2022 0836 by CINDY Martinez  Outcome: Progressing  6/9/2022 2206 by Roverto Hernández LPN  Outcome: Progressing     Problem: Anxiety  Goal: Will report anxiety at manageable levels  Description: INTERVENTIONS:  1. Administer medication as ordered  2. Teach and rehearse alternative coping skills  3. Provide emotional support with 1:1 interaction with staff  6/10/2022 0957 by Vanessa Ribeiro RN  Outcome: Progressing  Flowsheets (Taken 6/10/2022 8115)  Will report anxiety at manageable levels:   Administer medication as ordered   Provide emotional support with 1:1 interaction with staff   Teach and rehearse alternative coping skills  Note: Patient continues to report high anxiety. PRN Atarax given. Please see MAR. Patient is in behavioral control. He is compliant with medications. Patient is isolative to room. He does come out for meals. 6/10/2022 0836 by CINDY Martinez  Outcome: Progressing  6/9/2022 2206 by Roverto Hernández LPN  Outcome: Progressing     Problem: Coping  Goal: Pt/Family able to verbalize concerns and demonstrate effective coping strategies  Description: INTERVENTIONS:  1. Assist patient/family to identify coping skills, available support systems and cultural and spiritual values  2. Provide emotional support, including active listening and acknowledgement of concerns of patient and caregivers  3. Reduce environmental stimuli, as able  4. Instruct patient/family in relaxation techniques, as appropriate  5.  Assess for spiritual pain/suffering and initiate Spiritual Care, Psychosocial Clinical Specialist consults as needed  6/10/2022 0957 by Janes Sandoval RN  Outcome: Progressing  Flowsheets (Taken 6/10/2022 2596)  Patient/family able to verbalize anxieties, fears, and concerns, and demonstrate effective coping:   Assist patient/family to identify coping skills, available support systems and cultural and spiritual values   Provide emotional support, including active listening and acknowledgement of concerns of patient and caregivers   Instruct patient/family in relaxation techniques, as appropriate   Reduce environmental stimuli, as able  6/10/2022 0836 by CINDY Vyas  Outcome: Progressing     Problem: Decision Making  Goal: Pt/Family able to effectively weigh alternatives and participate in decision making related to treatment and care  Description: INTERVENTIONS:  1. Determine when there are differences between patient's view, family's view, and healthcare provider's view of condition  2. Facilitate patient and family articulation of goals for care  3. Help patient and family identify pros/cons of alternative solutions  4. Provide information as requested by patient/family  5. Respect patient/family right to receive or not to receive information  6. Serve as a liaison between patient and family and health care team  7.  Initiate Consults from Ethics, Palliative Care or initiate [facility] as is appropriate  6/10/2022 0957 by Janes Sandoval RN  Outcome: Progressing  Flowsheets  Taken 6/10/2022 0900  Patient/family able to effectively weigh alternatives and participate in decision making related to treatment and care: Determine when there are differences between patient's view, family's view, and healthcare provider's view of condition  Taken 6/10/2022 0855  Patient/family able to effectively weigh alternatives and participate in decision making related to treatment and care: Determine when there are differences between patient's view, family's view, and healthcare provider's view of condition  6/10/2022 0836 by CINDY Barron  Outcome: Progressing     Problem: Behavior  Goal: Pt/Family maintain appropriate behavior and adhere to behavioral management agreement, if implemented  Description: INTERVENTIONS:  1. Assess patient/family's coping skills and  non-compliant behavior (including use of illegal substances)  2. Notify security of behavior or suspected illegal substances which indicate the need for search of the patient and/or belongings  3. Encourage verbalization of thoughts and concerns in a socially appropriate manner  4. Utilize positive, consistent limit setting strategies supporting safety of patient, staff and others  5. Encourage participation in the decision making process about the behavioral management agreement  6. Implement a Health Care Agreement if patient meets criteria  7. If a patient's behavior jeopardizes the safety of the patient, staff, or others refer to organization policy. If a visitor's behavior poses a threat to safety call refer to organization policy. 8. Initiate consult with , Psychosocial CNS, Spiritual Care as appropriate  6/10/2022 0957 by Lee Ann Durant RN  Outcome: Progressing  Flowsheets  Taken 6/10/2022 0900  Patient/family maintains appropriate behavior and adheres to behavioral management agreement, if implemented: Encourage verbalization of thoughts and concerns in a socially appropriate manner  Taken 6/10/2022 0855  Patient/family maintains appropriate behavior and adheres to behavioral management agreement, if implemented: Encourage verbalization of thoughts and concerns in a socially appropriate manner  6/10/2022 0836 by CINDY Barron  Outcome: Progressing     Problem: Depression/Self Harm  Goal: Effect of psychiatric condition will be minimized and patient will be protected from self harm  Description: INTERVENTIONS:  1.  Assess impact of patient's symptoms on level of functioning, self care needs and offer support as indicated  2. Assess patient/family knowledge of depression, impact on illness and need for teaching  3. Provide emotional support, presence and reassurance  4. Assess for possible suicidal thoughts or ideation. If patient expresses suicidal thoughts or statements do not leave alone, initiate Suicide Precautions, move to a room close to the nursing station and obtain sitter  5. Initiate consults as appropriate with Mental Health Professional, Spiritual Care, Psychosocial CNS, and consider a recommendation to the LIP for a Psychiatric Consultation  6/10/2022 0957 by Narinder Marr RN  Outcome: Progressing  Flowsheets  Taken 6/10/2022 0859  Effect of psychiatric condition will be minimized and patient will be protected from self harm:   Assess impact of patients symptoms on level of functioning, self care needs and offer support as indicated   Assess patient/family knowledge of depression, impact on illness and need for teaching   Provide emotional support, presence and reassurance   Assess for suicidal thoughts or ideation. If patient expresses suicidal thoughts or statements do not leave alone, initiate Suicide Precautions, move near nurse station, obtain sitter  Taken 6/10/2022 0855  Effect of psychiatric condition will be minimized and patient will be protected from self harm: Assess impact of patients symptoms on level of functioning, self care needs and offer support as indicated  6/10/2022 0836 by CINDY De Jesus  Outcome: Progressing     Problem: Drug Abuse/Detox  Goal: Will have no detox symptoms and will verbalize plan for changing drug-related behavior  Description: INTERVENTIONS:  1. Administer medication as ordered  2. Monitor physical status  3. Provide emotional support with 1:1 interaction with staff  4.  Encourage  recovery focused treatment   6/10/2022 0957 by Narinder Marr RN  Outcome: Progressing  Flowsheets (Taken 6/10/2022 2296)  Will have no detox symptoms and will verbalize plan for changing drug-related behavior:   Monitor physical status   Administer medication as ordered   Provide emotional support with 1:1 interaction with staff   Encourage recovery focused treatment  Note: Patient denies withdrawal symptoms at this time.    6/10/2022 0836 by Iveth Lawson, CTRS  Outcome: Progressing

## 2022-06-10 NOTE — GROUP NOTE
Group Therapy Note    Date: 6/10/2022    Group Start Time: 1430  Group End Time: 7502  Group Topic: Cognitive Skills    EDWIN Villafuerte Notice, CTRS        Group Therapy Note    Attendees: 3/15         Pt did not participate in Cognitive Skills Group at 1430 when encouraged by RT due to resting in room. Pt was offered talk time as an alternative to group but declined.          Discipline Responsible: Psychoeducational Specialist        Signature:  Rob Opitz

## 2022-06-10 NOTE — H&P
[facility] Internal Medicine  David Escalante MD; Marla Acosta MD; Amalia Gabriel MD; MD Be Rosario MD; MD KAVITHA Farris [facility] Internal Medicine   [facility]    HISTORY AND PHYSICAL EXAMINATION            Date:   6/10/2022  Patient name:  Rina Rivero  Date of admission:  6/9/2022  3:02 PM  MRN:   [id number]  Account:  [de-identified]  YOB: 1980  PCP:    No primary care provider on file. Room:   [address] [place] [address]  Code Status:    Full Code    Chief Complaint:     No chief complaint on file. Hypertension atrial fibrillation paroxysmal alcohol abuse history of hep C    History Obtained From:     Patient medical record and nursing staff    History of Present Illness:     Rina Rivero is a 39 y.o. Non- / non  male who presents with No chief complaint on file. and is admitted to the hospital for the management of Major depressive disorder, recurrent severe without psychotic features ([address] 42-year-old gentleman who is a habitual alcohol drinker has history of paroxysmal atrial fibrillation secondary to alcohol abuse  History of hypertension has been taking losartan at home and carvedilol history of hep C without treatment  Denies any fever chills no headache no abdominal pain    Past Medical History:     Past Medical History:   Diagnosis Date    Alcoholism ([address] 01/31/2018    hx of alcoholism with intermittent sobriety;    [address] [place] [address]    Hepatitis C 2010    pt states he tested + for antibodies. no live virus detected -no treatment needed    History of atrial fibrillation 2001    pt states after a suicide attempt, overdose oxycontin pt developed atrial fib x 5-6 months.  NO RECENT ISSUES    Mental and behavioral disorders d/t use of alcohol, acute intoxication ([address]     NO LONGER PERTINENT    Palpitations     DENIES    Seizures ([address] 2006    during alcohol detox     Wears glasses Past Surgical History:     Past Surgical History:   Procedure Laterality Date    CYSTOSCOPY Bilateral 12/19/2018    Retrograde pyelogram    CYSTOSCOPY Bilateral 12/19/2018    CYSTOSCOPY, RETROGRADE PYELOGRAM performed by Tay Eagle MD at [address] 2004    second finger REATTACH [address]        Medications Prior to Admission:     Prior to Admission medications    Medication Sig Start Date End Date Taking? Authorizing Provider   folic acid (FOLVITE) 1 MG tablet Take 1 tablet by mouth daily 9/6/21   Matheus Hardwick MD   atomoxetine (STRATTERA) 25 MG capsule Take 25 mg by mouth daily    Historical Provider, MD   carvedilol (COREG) 3.125 MG tablet Take 3.125 mg by mouth 2 times daily (with meals)    Historical Provider, MD   chlorthalidone (HYGROTON) 25 MG tablet Take 25 mg by mouth daily    Historical Provider, MD   busPIRone (BUSPAR) 15 MG tablet Take 15 mg by mouth 3 times daily    Historical Provider, MD   DULoxetine (CYMBALTA) 20 MG extended release capsule Take 40 mg by mouth daily    Historical Provider, MD   losartan (COZAAR) 100 MG tablet Take 100 mg by mouth daily    Historical Provider, MD   thiamine mononitrate 100 MG tablet Take 1 tablet by mouth daily 3/27/21   Essence Howard MD   Multiple Vitamins-Minerals (THERAPEUTIC MULTIVITAMIN-MINERALS) tablet Take 1 tablet by mouth daily 3/27/21   Essence Howard MD        Allergies:     Dicloxacillin and Pcn [penicillins]    Social History:     Tobacco:    reports that he has quit smoking. His smoking use included cigarettes. He has a 20.00 pack-year smoking history. He has quit using smokeless tobacco.  His smokeless tobacco use included snuff and chew. Alcohol:      reports current alcohol use. Drug Use:  reports previous drug use. Drug: Cocaine.     Family History:     Family History   Problem Relation Age of Onset    Mental Illness Mother     Depression Mother     High Blood normal effort  Cardiovascular: normal rate, regular rhythm, no murmur, gallop, rub  Abdomen: Soft, nontender, nondistended, normal bowel sounds, no hepatomegaly or splenomegaly  Neurologic: There are no new focal motor or sensory deficits, normal muscle tone and bulk, no abnormal sensation, normal speech, cranial nerves II through XII grossly intact  Skin: No gross lesions, rashes, bruising or bleeding on exposed skin area  Extremities: peripheral pulses palpable, no pedal edema or calf pain with palpation      Investigations:      Laboratory Testing:  No results found for this or any previous visit (from the past 24 hour(s)). Imaging/Diagnostics:  No results found. Assessment :      Hospital Problems           Last Modified POA    * (Principal) Major depressive disorder, recurrent severe without psychotic features ([facility] 6/9/2022 Yes    Alcohol abuse with withdrawal, uncomplicated ([facility] 5/7/9657 Yes          Plan:     44-year-old gentleman with a history of paroxysmal atrial fibrillation secondary to alcohol abuse  Start carvedilol 3.125 twice a day  Hypertension start losartan 100 mg daily  Discontinue chlorthalidone 25 mg  Alcohol abuse uncomplicated withdrawal oral folic acid 1 mg and thiamine 100 mg daily  History of hepatitis C outpatient follow-up with GI may be a candidate for Harvoni  Counseled against the use of alcohol with hepatitis C high risk of liver cirrhosis                Nida Urban MD  6/10/2022  10:45 AM    Copy sent to Dr. Drew Templeton primary care provider on file. Please note that this chart was generated using voice recognition Dragon dictation software. Although every effort was made to ensure the accuracy of this automated transcription, some errors in transcription may have occurred.

## 2022-06-10 NOTE — PLAN OF CARE
[facility]  Initial Interdisciplinary Treatment Plan NOTE      Original treatment plan Date & Time: 6/10/2022                        837am    Admission Type:       Reason for admission:        Estimated Length of Stay:  5-7days  Estimated Discharge Date: to be determined by physician    PATIENT STRENGTHS:  Patient Strengths:   Patient Strengths and Limitations:   Addictive Behavior: Addictive Behavior  In the Past 3 Months, Have You Felt or Has Someone Told You That You Have a Problem With  : Other (comment) (Drink to much)  Medical Problems:  Past Medical History:   Diagnosis Date    Alcoholism ([facility] 01/31/2018    hx of alcoholism with intermittent sobriety; Anxiety 1996    ON RX    Hepatitis C 2010    pt states he tested + for antibodies. no live virus detected -no treatment needed    History of atrial fibrillation 2001    pt states after a suicide attempt, overdose oxycontin pt developed atrial fib x 5-6 months.  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, acute intoxication ([facility]     NO LONGER PERTINENT    Palpitations     DENIES    Seizures ([facility] 2006    during alcohol detox     Wears glasses      Status EXAM:Mental Status and Behavioral Exam  Normal: Yes  Level of Assistance: Independent/Self  Facial Expression: Flat  Affect: Blunt  Level of Consciousness: Alert  Frequency of Checks: 4 times per hour, close  Mood:Normal: No  Mood: Depressed,Anxious  Motor Activity:Normal: Yes  Motor Activity: Agitated (Get easily agitated)  Eye Contact: Good  Observed Behavior: Cooperative,Friendly,Preoccupied  Sexual Misconduct History: Current - no  Preception: Stephens to person,Stephens to place,Stephens to situation  Attention:Normal: No  Attention: Distractible  Thought Processes: Blocking  Thought Content:Normal: No  Depression Symptoms: Feelings of hopelessess,Impaired concentration,Isolative  Anxiety Symptoms: Generalized  Gunjan Symptoms: No problems reported or observed.   Hallucinations: None  Delusions: No  Memory:Normal: Yes  Memory: Confabulation  Insight and Judgment: No  Insight and Judgment: Poor insight,Poor judgment    EDUCATION:   Learner Progress Toward Treatment Goals: reviewed group plans and strategies for care    Method:group therapy, medication compliance, individualized assessments and care planning    Outcome: needs reinforcement    PATIENT GOALS: to be discussed with patient within 72 hours    PLAN/TREATMENT RECOMMENDATIONS:     continue group therapy , medications compliance, goal setting, individualized assessments and care, continue to monitor pt on unit      SHORT-TERM GOALS:   Time frame for Short-Term Goals: 5-7 days    LONG-TERM GOALS:  Time frame for Long-Term Goals: 6 months  Members Present in Team Meeting: See Signature Sheet    Kirsten Cisse

## 2022-06-11 PROBLEM — B18.2 CHRONIC HEPATITIS C WITHOUT HEPATIC COMA (HCC): Status: ACTIVE | Noted: 2022-06-11

## 2022-06-11 PROCEDURE — 1240000000 HC EMOTIONAL WELLNESS R&B

## 2022-06-11 PROCEDURE — 6370000000 HC RX 637 (ALT 250 FOR IP): Performed by: INTERNAL MEDICINE

## 2022-06-11 PROCEDURE — 6370000000 HC RX 637 (ALT 250 FOR IP): Performed by: PSYCHIATRY & NEUROLOGY

## 2022-06-11 PROCEDURE — 99231 SBSQ HOSP IP/OBS SF/LOW 25: CPT | Performed by: NURSE PRACTITIONER

## 2022-06-11 PROCEDURE — 6370000000 HC RX 637 (ALT 250 FOR IP): Performed by: NURSE PRACTITIONER

## 2022-06-11 RX ADMIN — NICOTINE POLACRILEX 2 MG: 2 GUM, CHEWING BUCCAL at 18:07

## 2022-06-11 RX ADMIN — FOLIC ACID 1 MG: 1 TABLET ORAL at 08:37

## 2022-06-11 RX ADMIN — CARVEDILOL 3.12 MG: 3.12 TABLET, FILM COATED ORAL at 08:37

## 2022-06-11 RX ADMIN — MULTIPLE VITAMINS W/ MINERALS TAB 1 TABLET: TAB at 08:38

## 2022-06-11 RX ADMIN — VORTIOXETINE 10 MG: 10 TABLET, FILM COATED ORAL at 08:37

## 2022-06-11 RX ADMIN — TRAZODONE HYDROCHLORIDE 50 MG: 50 TABLET ORAL at 20:58

## 2022-06-11 RX ADMIN — LURASIDONE HYDROCHLORIDE 40 MG: 40 TABLET, FILM COATED ORAL at 08:37

## 2022-06-11 RX ADMIN — HYDROXYZINE HYDROCHLORIDE 50 MG: 50 TABLET, FILM COATED ORAL at 11:34

## 2022-06-11 RX ADMIN — LORAZEPAM 1 MG: 1 TABLET ORAL at 14:37

## 2022-06-11 RX ADMIN — LORAZEPAM 1 MG: 1 TABLET ORAL at 18:07

## 2022-06-11 RX ADMIN — THIAMINE HCL TAB 100 MG 100 MG: 100 TAB at 08:38

## 2022-06-11 RX ADMIN — LOSARTAN POTASSIUM 100 MG: 100 TABLET, FILM COATED ORAL at 08:38

## 2022-06-11 RX ADMIN — CARVEDILOL 3.12 MG: 3.12 TABLET, FILM COATED ORAL at 18:07

## 2022-06-11 RX ADMIN — LORAZEPAM 1 MG: 1 TABLET ORAL at 20:58

## 2022-06-11 RX ADMIN — HYDROXYZINE HYDROCHLORIDE 50 MG: 50 TABLET, FILM COATED ORAL at 20:58

## 2022-06-11 NOTE — PLAN OF CARE
Problem: Self Harm/Suicidality  Goal: Will have no self-injury during hospital stay  Description: INTERVENTIONS:  1. Q 30 MINUTES: Routine safety checks  2. Q SHIFT & PRN: Assess risk to determine if routine checks are adequate to maintain patient safety  6/11/2022 1934 by Haley Henderson  Outcome: Progressing  Note: Patient remains free from self harm at this time. Pt denies thoughts of self harm and is agreeable to seeking out should thoughts of self harm arise. Safe environment maintained. Q15 minute checks for safety cont per unit policy. Will cont to monitor for safety and provides support and reassurance as needed. Isolative to room but out for needs. Has no scheduled night medications, took PRN medications for anxiety and sleep. Safety checks maintained q15min and irregular rounding. Problem: Depression/Self Harm  Goal: Effect of psychiatric condition will be minimized and patient will be protected from self harm  Description: INTERVENTIONS:  1. Assess impact of patient's symptoms on level of functioning, self care needs and offer support as indicated  2. Assess patient/family knowledge of depression, impact on illness and need for teaching  3. Provide emotional support, presence and reassurance  4. Assess for possible suicidal thoughts or ideation. If patient expresses suicidal thoughts or statements do not leave alone, initiate Suicide Precautions, move to a room close to the nursing station and obtain sitter  5.  Initiate consults as appropriate with Mental Health Professional, Spiritual Care, Psychosocial CNS, and consider a recommendation to the LIP for a Psychiatric Consultation  6/11/2022 1934 by Haley Henderson  Outcome: Progressing

## 2022-06-11 NOTE — PLAN OF CARE
Problem: Anxiety  Goal: Will report anxiety at manageable levels  Description: INTERVENTIONS:  1. Administer medication as ordered  2. Teach and rehearse alternative coping skills  3. Provide emotional support with 1:1 interaction with staff  Outcome: Progressing     Problem: Coping  Goal: Pt/Family able to verbalize concerns and demonstrate effective coping strategies  Description: INTERVENTIONS:  1. Assist patient/family to identify coping skills, available support systems and cultural and spiritual values  2. Provide emotional support, including active listening and acknowledgement of concerns of patient and caregivers  3. Reduce environmental stimuli, as able  4. Instruct patient/family in relaxation techniques, as appropriate  5. Assess for spiritual pain/suffering and initiate Spiritual Care, Psychosocial Clinical Specialist consults as needed  Outcome: Progressing     Problem: Decision Making  Goal: Pt/Family able to effectively weigh alternatives and participate in decision making related to treatment and care  Description: INTERVENTIONS:  1. Determine when there are differences between patient's view, family's view, and healthcare provider's view of condition  2. Facilitate patient and family articulation of goals for care  3. Help patient and family identify pros/cons of alternative solutions  4. Provide information as requested by patient/family  5. Respect patient/family right to receive or not to receive information  6. Serve as a liaison between patient and family and health care team  7. Initiate Consults from Ethics, Palliative Care or initiate [address] as is appropriate  Outcome: Progressing     Problem: Behavior  Goal: Pt/Family maintain appropriate behavior and adhere to behavioral management agreement, if implemented  Description: INTERVENTIONS:  1. Assess patient/family's coping skills and  non-compliant behavior (including use of illegal substances)  2.  Notify security of behavior or suspected illegal substances which indicate the need for search of the patient and/or belongings  3. Encourage verbalization of thoughts and concerns in a socially appropriate manner  4. Utilize positive, consistent limit setting strategies supporting safety of patient, staff and others  5. Encourage participation in the decision making process about the behavioral management agreement  6. Implement a Health Care Agreement if patient meets criteria  7. If a patient's behavior jeopardizes the safety of the patient, staff, or others refer to organization policy. If a visitor's behavior poses a threat to safety call refer to organization policy. 8. Initiate consult with , Psychosocial CNS, Spiritual Care as appropriate  6/11/2022 9008 by Jeremias Bassett RN  Outcome: Progressing     Problem: Behavior  Goal: Pt/Family maintain appropriate behavior and adhere to behavioral management agreement, if implemented  Description: INTERVENTIONS:  1. Assess patient/family's coping skills and  non-compliant behavior (including use of illegal substances)  2. Notify security of behavior or suspected illegal substances which indicate the need for search of the patient and/or belongings  3. Encourage verbalization of thoughts and concerns in a socially appropriate manner  4. Utilize positive, consistent limit setting strategies supporting safety of patient, staff and others  5. Encourage participation in the decision making process about the behavioral management agreement  6. Implement a Health Care Agreement if patient meets criteria  7. If a patient's behavior jeopardizes the safety of the patient, staff, or others refer to organization policy. If a visitor's behavior poses a threat to safety call refer to organization policy.   8. Initiate consult with , Psychosocial CNS, Spiritual Care as appropriate  6/11/2022 4131 by Jeremias Bassett RN  Outcome: Progressing     Problem: Depression/Self Harm  Goal: Effect of psychiatric condition will be minimized and patient will be protected from self harm  Description: INTERVENTIONS:  1. Assess impact of patient's symptoms on level of functioning, self care needs and offer support as indicated  2. Assess patient/family knowledge of depression, impact on illness and need for teaching  3. Provide emotional support, presence and reassurance  4. Assess for possible suicidal thoughts or ideation. If patient expresses suicidal thoughts or statements do not leave alone, initiate Suicide Precautions, move to a room close to the nursing station and obtain sitter  5.  Initiate consults as appropriate with Mental Health Professional, Spiritual Care, Psychosocial CNS, and consider a recommendation to the LIP for a Psychiatric Consultation  Outcome: Progressing

## 2022-06-11 NOTE — PLAN OF CARE
[facility]  Day 3 Interdisciplinary Treatment Plan NOTE    Review Date & Time: 6/11/2022   1058    Admission Type:        Reason for admission:     Estimated Length of Stay: 5-7 days  Estimated Discharge Date Update: to be determined by physician    PATIENT STRENGTHS:  Patient Strengths    Patient Strengths and Limitations:Limitations: Inappropriate/potentially harmful leisure interests,Difficulty problem solving/relies on others to help solve problems,Multiple barriers to leisure interests  Addictive Behavior:Addictive Behavior  In the Past 3 Months, Have You Felt or Has Someone Told You That You Have a Problem With  : Other (comment) (Drink to much)  Medical Problems:  Past Medical History:   Diagnosis Date    Alcoholism ([facility] [address] 01/31/2018    hx of alcoholism with intermittent sobriety; Anxiety 1996    ON RX    Hepatitis C 2010    pt states he tested + for antibodies. no live virus detected -no treatment needed    History of atrial fibrillation 2001    pt states after a suicide attempt, overdose oxycontin pt developed atrial fib x 5-6 months.  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, acute intoxication ([facility] [address]     NO LONGER PERTINENT    Palpitations     DENIES    Seizures ([facility] [address] 2006    during alcohol detox     Wears glasses        Risk:  Fall Risk   Jason Scale Jason Scale Score: 22  BVC    Change in scores no Changes to plan of Care no    Status EXAM:   Mental Status and Behavioral Exam  Normal: No  Level of Assistance: Independent/Self  Facial Expression: Flat  Affect: Blunt  Level of Consciousness: Alert  Frequency of Checks: 4 times per hour, close  Mood:Normal: No  Mood: Depressed,Anxious  Motor Activity:Normal: No  Motor Activity: Decreased  Eye Contact: Fair  Observed Behavior: Cooperative  Sexual Misconduct History: Current - no  Preception: Franklin to person,Franklin to time,Franklin to place,[place]lin to situation  Attention:Normal: No  Attention: Distractible  Thought Processes: Circumstantial  Thought Content:Normal: No  Thought Content: Preoccupations  Depression Symptoms: Feelings of helplessness,Feelings of hopelessess  Anxiety Symptoms: Generalized  Gunjan Symptoms: No problems reported or observed. Hallucinations: None  Delusions: No  Memory:Normal: No  Memory: Poor recent  Insight and Judgment: No  Insight and Judgment: Poor judgment,Poor insight    Daily Assessment Last Entry:   Daily Sleep (WDL): Within Defined Limits            Daily Nutrition (WDL): Within Defined Limits  Level of Assistance: Independent/Self    Patient Monitoring:  Frequency of Checks: 4 times per hour, close    Psychiatric Symptoms:   Depression Symptoms  Depression Symptoms: Feelings of helplessness,Feelings of hopelessess  Anxiety Symptoms  Anxiety Symptoms: Generalized  Gunjan Symptoms  Gunjan Symptoms: No problems reported or observed.           Suicide Risk CSSR-S:  1) Within the past month, have you wished you were dead or wished you could go to sleep and not wake up? : No  2) Have you actually had any thoughts of killing yourself? : No  6) Have you ever done anything, started to do anything, or prepared to do anything to end your life?: No  Change in Result no Change in Plan of care no      EDUCATION:   EDUCATION:   Learner Progress Toward Treatment Goals: Reviewed results and recommendations of this team, Reviewed group plan and strategies, Reviewed signs, symptoms and risk of self harm and violent behavior, Reviewed goals and plan of care    Method:small group, individual verbal education    Outcome:verbalized by patient, but needs reinforcement to obtain goals    PATIENT GOALS:  Short term: pt refused to attend treatment team to develop goals   Long term: pt refused to attend treatment team to develop goals     PLAN/TREATMENT RECOMMENDATIONS UPDATE: continue with group therapies, increased socialization, continue planning for after discharge goals, continue with medication compliance    SHORT-TERM GOALS UPDATE:   Time frame for Short-Term Goals: 5-7 days    LONG-TERM GOALS UPDATE:   Time frame for Long-Term Goals: 6 months  Members Present in Team Meeting: See Signature Sheet    ALBION Montgomery

## 2022-06-11 NOTE — GROUP NOTE
Group Therapy Note    Date: 6/11/2022    Group Start Time: 1330  Group End Time: 3234  Group Topic: Cognitive Skills    EDWIN MELARAI JOEL Lowery, KIYAS    Pt did not attend 1330 cognitive skills group d/t resting in room despite staff invitation to attend. 1:1 talk time offered as alternative to group session, pt declined.        Signature:  Aurea Thompson

## 2022-06-11 NOTE — PLAN OF CARE
Problem: Behavior  Goal: Pt/Family maintain appropriate behavior and adhere to behavioral management agreement, if implemented  Description: INTERVENTIONS:  1. Assess patient/family's coping skills and  non-compliant behavior (including use of illegal substances)  2. Notify security of behavior or suspected illegal substances which indicate the need for search of the patient and/or belongings  3. Encourage verbalization of thoughts and concerns in a socially appropriate manner  4. Utilize positive, consistent limit setting strategies supporting safety of patient, staff and others  5. Encourage participation in the decision making process about the behavioral management agreement  6. Implement a Health Care Agreement if patient meets criteria  7. If a patient's behavior jeopardizes the safety of the patient, staff, or others refer to organization policy. If a visitor's behavior poses a threat to safety call refer to organization policy. 8. Initiate consult with , Psychosocial CNS, Spiritual Care as appropriate  Outcome: Progressing  Flowsheets (Taken 6/11/2022 0006)  Patient/family maintains appropriate behavior and adheres to behavioral management agreement, if implemented: Assess patient/familys coping skills and  non-compliant behavior (including use of illegal substances)  Note: Patient denies suicidal ideation at this time. Isolative to room. Cooperative. States he has just been having anxiety. Safety checks maintained q15min and irregular rounding. Problem: Self Harm/Suicidality  Goal: Will have no self-injury during hospital stay  Description: INTERVENTIONS:  1. Q 30 MINUTES: Routine safety checks  2. Q SHIFT & PRN: Assess risk to determine if routine checks are adequate to maintain patient safety  Outcome: Progressing  Note: Patient remains free from self harm at this time. Pt denies thoughts of self harm and is agreeable to seeking out should thoughts of self harm arise.   Safe environment maintained. Q15 minute checks for safety cont per unit policy. Will cont to monitor for safety and provides support and reassurance as needed.

## 2022-06-11 NOTE — PROGRESS NOTES
Daily Progress Note  6/11/2022    Patient Name: Marisabel Si: Depression with suicidal ideation with plan and intent to cut self         SUBJECTIVE:   Patient was seen for follow-up assessment today. Nursing staff report patient has been medication adherent and behaviorally in control. He has not required any emergency medications during this admission. He is frequently isolative to his room and aloof. He was approached in his room and was agreeable to assessment in privacy of unit interview room. Patient presented as depressed and somewhat anxious. He is endorsing ongoing symptoms of alcohol withdrawal and describes some restlessness, modest tremors, nausea, neuropathy in his hands, and nervousness. He has been utilizing as needed Ativan. Patient expresses ongoing thoughts and feelings of hopelessness and helplessness. He states \"I have lost everyone, I have no one, I am homeless and I have no job\". He discussed a frustrating cycle of 3 months of sobriety followed by relapse and that he has repeated this several times over the last few years. He shares that he does have thoughts of \"giving up\" because at times he does not think he will ever get better. He then stated \"I know I just have to keep going, keep trying\". He is very frustrated with himself and expresses feelings of worthlessness. He reports that he will be working with  on Monday for placement at [facility]. He is hopeful that he will be placed in the early part of next week. At this time patient is unable to contract for safety in the community and warrants further hospitalization for safety and stabilization.     Appetite:  [] Normal/Adequate/Unchanged  [] Increased  [x] Decreased      Sleep:       [] Normal/Adequate/Unchanged  [x] Fair  [] Poor      Group Attendance on Unit:   [] Yes  [] Selectively    [x] No    Medication Side Effects: Denies         Mental Status Exam  Level of consciousness: Alert and awake   Appearance: Appropriate attire for setting, seated in chair, with good  grooming and hygiene   Behavior/Motor: Approachable, restless  Attitude toward examiner: Cooperative, attentive, good eye contact   Speech: spontaneous, normal rate, normal volume and well articulated, depressed tone  Mood: Depressed  Affect: Mood congruent  Thought processes: linear, goal directed and coherent   Thought content: Denies homicidal ideation  Suicidal Ideation: Passive; unable to contract for safety in community  Delusions: Not evident  Perceptual Disturbance: patient is not observed responding to internal stimuli; denies hallucinations  Cognition: Oriented to self, location, time, and situation  Memory: intact  Insight: Fair   Judgment: Poor    Data   height is 5' 10\" (1.778 m) and weight is 194 lb (88 kg). His oral temperature is 97.3 °F (36.3 °C). His blood pressure is 119/83 and his pulse is 75. His respiration is 14. Labs:   No visits with results within 2 Day(s) from this visit.    Latest known visit with results is:   Admission on 03/11/2022, Discharged on 03/12/2022   Component Date Value Ref Range Status    WBC 03/11/2022 8.8  3.5 - 11.3 k/uL Final    RBC 03/11/2022 5.62  4.21 - 5.77 m/uL Final    Hemoglobin 03/11/2022 16.7  13.0 - 17.0 g/dL Final    Hematocrit 03/11/2022 48.8  40.7 - 50.3 % Final    MCV 03/11/2022 86.8  82.6 - 102.9 fL Final    MCH 03/11/2022 29.7  25.2 - 33.5 pg Final    MCHC 03/11/2022 34.2  28.4 - 34.8 g/dL Final    RDW 03/11/2022 13.1  11.8 - 14.4 % Final    Platelets 04/60/2454 345  138 - 453 k/uL Final    MPV 03/11/2022 9.7  8.1 - 13.5 fL Final    NRBC Automated 03/11/2022 0.0  0.0 per 100 WBC Final    Seg Neutrophils 03/11/2022 48  36 - 65 % Final    Lymphocytes 03/11/2022 45* 24 - 43 % Final    Monocytes 03/11/2022 6  3 - 12 % Final    Eosinophils % 03/11/2022 1  1 - 4 % Final    Basophils 03/11/2022 0  0 - 2 % Final    Immature Granulocytes 03/11/2022 0  0 % Final    Segs Absolute 03/11/2022 4.13  1.50 - 8.10 k/uL Final    Absolute Lymph # 03/11/2022 3.96* 1.10 - 3.70 k/uL Final    Absolute Mono # 03/11/2022 0.55  0.10 - 1.20 k/uL Final    Absolute Eos # 03/11/2022 0.09  0.00 - 0.44 k/uL Final    Basophils Absolute 03/11/2022 <0.03  0.00 - 0.20 k/uL Final    Absolute Immature Granulocyte 03/11/2022 <0.03  0.00 - 0.30 k/uL Final    Glucose 03/11/2022 131* 70 - 99 mg/dL Final    BUN 03/11/2022 14  6 - 20 mg/dL Final    CREATININE 03/11/2022 0.80  0.70 - 1.20 mg/dL Final    Calcium 03/11/2022 9.1  8.6 - 10.4 mg/dL Final    Sodium 03/11/2022 149* 135 - 144 mmol/L Final    Potassium 03/11/2022 4.2  3.7 - 5.3 mmol/L Final    Chloride 03/11/2022 108* 98 - 107 mmol/L Final    CO2 03/11/2022 27  20 - 31 mmol/L Final    Anion Gap 03/11/2022 14  9 - 17 mmol/L Final    Alkaline Phosphatase 03/11/2022 111  40 - 129 U/L Final    ALT 03/11/2022 16  5 - 41 U/L Final    AST 03/11/2022 17  <40 U/L Final    Total Bilirubin 03/11/2022 0.72  0.3 - 1.2 mg/dL Final    Total Protein 03/11/2022 8.1  6.4 - 8.3 g/dL Final    Albumin 03/11/2022 4.8  3.5 - 5.2 g/dL Final    Albumin/Globulin Ratio 03/11/2022 1.5  1.0 - 2.5 Final    GFR Non- 03/11/2022 >60  >60 mL/min Final    GFR  03/11/2022 >60  >60 mL/min Final    GFR Comment 03/11/2022        Final    Comment: Average GFR for 38-51 years old:   80 mL/min/1.73sq m  Chronic Kidney Disease:   <60 mL/min/1.73sq m  Kidney failure:   <15 mL/min/1.73sq m              eGFR calculated using average adult body mass.  Additional eGFR calculator available at:        Mapbar.br            Magnesium 03/11/2022 2.0  1.6 - 2.6 mg/dL Final    Phosphorus 03/11/2022 3.3  2.5 - 4.5 mg/dL Final    Ventricular Rate 03/11/2022 85  BPM Final    Atrial Rate 03/11/2022 85  BPM Final    P-R Interval 03/11/2022 152  ms Final    QRS Duration 03/11/2022 86  ms Final    Q-T Interval 03/11/2022 370  ms Final    QTc Calculation (Bazett) 03/11/2022 440  ms Final    P Axis 03/11/2022 49  degrees Final    R Axis 03/11/2022 -14  degrees Final    T Axis 03/11/2022 29  degrees Final    Specimen Description 03/11/2022 . NASOPHARYNGEAL SWAB   Final    SARS-CoV-2, Rapid 03/11/2022 Not Detected  Not Detected Final    Comment:       Rapid NAAT:  The specimen is NEGATIVE for SARS-CoV-2, the novel coronavirus associated with   COVID-19. The ID NOW COVID-19 assay is designed to detect the virus that causes COVID-19 in patients   with signs and symptoms of infection who are suspected of COVID-19. An individual without symptoms of COVID-19 and who is not shedding SARS-CoV-2 virus would   expect to have a negative (not detected) result in this assay. Negative results should be treated as presumptive and, if inconsistent with clinical signs   and symptoms or necessary for patient management,  should be tested with an alternative molecular assay. Negative results do not preclude   SARS-CoV-2 infection and   should not be used as the sole basis for patient management decisions. Fact sheet for Healthcare Providers: BuildHer.es  Fact sheet for Patients: BuildHer.es          Methodology: Isothermal Nucleic Acid Amplification      Ethanol 03/11/2022 231* <10 mg/dL Final    Ethanol percent 03/11/2022 0.231* <0.010 % Final         Reviewed patient's current plan of care and vital signs with nursing staff.     Labs reviewed: [x] Yes  Last EKG in EMR reviewed: [x] Yes    Medications  Current Facility-Administered Medications: carvedilol (COREG) tablet 3.125 mg, 3.125 mg, Oral, BID WC  losartan (COZAAR) tablet 100 mg, 100 mg, Oral, Daily  VORTIoxetine (TRINTELLIX) tablet 10 mg, 10 mg, Oral, Daily  lurasidone (LATUDA) tablet 40 mg, 40 mg, Oral, Daily  acetaminophen (TYLENOL) tablet 650 mg, 650 mg, Oral, Q6H PRN  ibuprofen (ADVIL;MOTRIN) tablet 400 mg, 400 mg, Oral, Q6H PRN  hydrOXYzine HCl (ATARAX) tablet 50 mg, 50 mg, Oral, TID PRN  traZODone (DESYREL) tablet 50 mg, 50 mg, Oral, Nightly PRN  polyethylene glycol (GLYCOLAX) packet 17 g, 17 g, Oral, Daily PRN  aluminum & magnesium hydroxide-simethicone (MAALOX) 200-200-20 MG/5ML suspension 30 mL, 30 mL, Oral, Q6H PRN  nicotine polacrilex (NICORETTE) gum 2 mg, 2 mg, Oral, Q2H PRN  thiamine mononitrate tablet 100 mg, 100 mg, Oral, Daily  therapeutic multivitamin-minerals 1 tablet, 1 tablet, Oral, Daily  LORazepam (ATIVAN) tablet 1 mg, 1 mg, Oral, Q1H PRN **OR** LORazepam (ATIVAN) injection 1 mg, 1 mg, IntraMUSCular, Q1H PRN **OR** LORazepam (ATIVAN) tablet 2 mg, 2 mg, Oral, Q1H PRN **OR** LORazepam (ATIVAN) injection 2 mg, 2 mg, IntraMUSCular, Q1H PRN **OR** LORazepam (ATIVAN) tablet 3 mg, 3 mg, Oral, Q1H PRN **OR** LORazepam (ATIVAN) injection 3 mg, 3 mg, IntraMUSCular, Q1H PRN **OR** LORazepam (ATIVAN) tablet 4 mg, 4 mg, Oral, Q1H PRN **OR** LORazepam (ATIVAN) injection 4 mg, 4 mg, IntraMUSCular, T2C PRN  folic acid (FOLVITE) tablet 1 mg, 1 mg, Oral, Daily    ASSESSMENT  Major depressive disorder, recurrent severe without psychotic features ([facility] [address]         PLAN  Patient symptoms are: Modestly improving  Continue current medication regimen. Monitor need and frequency of PRN medications. Encourage participation in groups and milieu. Attempt to develop insight. Supportive Therapy conducted. Probable discharge is per attending physician. Follow-up daily while inpatient. Patient continues to be monitored in the inpatient psychiatric facility at [facility] for safety and stabilization. Patient continues to need, on a daily basis, active treatment furnished directly by or requiring the supervision of inpatient psychiatric personnel. Electronically signed by KILLIAN Osorio CNP on 6/11/2022 at 5:23 PM    **This report has been created using voice recognition software.  It may contain minor errors which are inherent in voice recognition technology. **

## 2022-06-12 PROCEDURE — 1240000000 HC EMOTIONAL WELLNESS R&B

## 2022-06-12 PROCEDURE — 6370000000 HC RX 637 (ALT 250 FOR IP): Performed by: INTERNAL MEDICINE

## 2022-06-12 PROCEDURE — 6370000000 HC RX 637 (ALT 250 FOR IP): Performed by: NURSE PRACTITIONER

## 2022-06-12 PROCEDURE — 6370000000 HC RX 637 (ALT 250 FOR IP)

## 2022-06-12 PROCEDURE — 99232 SBSQ HOSP IP/OBS MODERATE 35: CPT

## 2022-06-12 PROCEDURE — 6370000000 HC RX 637 (ALT 250 FOR IP): Performed by: PSYCHIATRY & NEUROLOGY

## 2022-06-12 RX ORDER — BUSPIRONE HYDROCHLORIDE 15 MG/1
15 TABLET ORAL 3 TIMES DAILY
Status: DISCONTINUED | OUTPATIENT
Start: 2022-06-12 | End: 2022-06-15 | Stop reason: HOSPADM

## 2022-06-12 RX ADMIN — LURASIDONE HYDROCHLORIDE 40 MG: 40 TABLET, FILM COATED ORAL at 09:07

## 2022-06-12 RX ADMIN — LORAZEPAM 1 MG: 1 TABLET ORAL at 19:44

## 2022-06-12 RX ADMIN — LORAZEPAM 1 MG: 1 TABLET ORAL at 09:17

## 2022-06-12 RX ADMIN — LOSARTAN POTASSIUM 100 MG: 100 TABLET, FILM COATED ORAL at 09:07

## 2022-06-12 RX ADMIN — FOLIC ACID 1 MG: 1 TABLET ORAL at 09:07

## 2022-06-12 RX ADMIN — NICOTINE POLACRILEX 2 MG: 2 GUM, CHEWING BUCCAL at 21:50

## 2022-06-12 RX ADMIN — BUSPIRONE HYDROCHLORIDE 15 MG: 15 TABLET ORAL at 21:48

## 2022-06-12 RX ADMIN — CARVEDILOL 3.12 MG: 3.12 TABLET, FILM COATED ORAL at 17:19

## 2022-06-12 RX ADMIN — BUSPIRONE HYDROCHLORIDE 15 MG: 15 TABLET ORAL at 17:19

## 2022-06-12 RX ADMIN — MULTIPLE VITAMINS W/ MINERALS TAB 1 TABLET: TAB at 09:07

## 2022-06-12 RX ADMIN — HYDROXYZINE HYDROCHLORIDE 50 MG: 50 TABLET, FILM COATED ORAL at 21:48

## 2022-06-12 RX ADMIN — NICOTINE POLACRILEX 2 MG: 2 GUM, CHEWING BUCCAL at 18:09

## 2022-06-12 RX ADMIN — NICOTINE POLACRILEX 2 MG: 2 GUM, CHEWING BUCCAL at 16:06

## 2022-06-12 RX ADMIN — THIAMINE HCL TAB 100 MG 100 MG: 100 TAB at 09:07

## 2022-06-12 RX ADMIN — TRAZODONE HYDROCHLORIDE 50 MG: 50 TABLET ORAL at 21:48

## 2022-06-12 RX ADMIN — VORTIOXETINE 10 MG: 10 TABLET, FILM COATED ORAL at 09:07

## 2022-06-12 NOTE — PROGRESS NOTES
Daily Progress Note  6/12/2022    Patient Name: Tamanna Carry: Depression with suicidal ideation with plan and intent to cut self         SUBJECTIVE:      Patient is seen today for a follow up assessment. Patient is compliant with scheduled medications. Patient has not required emergency medications in the past 24 hours. Patient reports improvement in symptoms of withdrawal however continues to feel very anxious. We will discontinue CIWA at this time. Patient reports increased depression and anxiety and rates both as an 8 (0-10 scale 0 being on 10 being worst). He reports in the past he has utilized BuSpar for anxiety and after discussing risks, benefits, and alternatives it was mutually agreed to restart this for patient. Patient denies any problems with sleep and states that he feels well rested today however feels \"bored\" and has remained isolative to his room. He denies any issues with his appetite. Patient endorses fleeting suicidal ideation with thoughts that continue to come and go throughout the day. He denies homicidal ideation. He continues to feel that he would be extremely unsafe out of the hospital at this time. He continues to endorse feelings of severe hopelessness and helplessness. He denies any auditory or visual hallucinations at this time. Patient reports that he is waiting for the weekend to pass so that he can start making phone calls to find treatment for his alcohol use. He states that he would like to go to treatment at [facility] recovery\" which she describes as an [facility] program.  He denies any medication side effects or other medical concerns at this time. Appetite:  [x] Normal/Adequate/Unchanged  [] Increased  [] Decreased      Sleep:       [x] Normal/Adequate/Unchanged  [] Fair  [] Poor      Group Attendance on Unit:   [] Yes  [] Selectively    [x] No    Medication Side Effects:  Patient denies any medication side effects at the time of assessment. Mental Status Exam  Level of consciousness: Alert and awake. Appearance: Appropriate attire for setting, resting in bed, with fair  grooming and hygiene. Behavior/Motor: Approachable, psychomotor slowing  Attitude toward examiner: Cooperative, attentive, fair eye contact. Speech: Normal rate, normal volume, normal tone. Mood:  Patient reports \"doing okay\". Affect: Depressed, anxious  Thought processes: Linear and coherent. Thought content: Denies homicidal ideation. Suicidal Ideation: Fleeting suicidal ideations  Delusions: No evidence of delusions. Denies paranoia. Perceptual Disturbance: Patient does not appear to be responding to internal stimuli. Denies auditory hallucinations. Denies visual hallucinations. Cognition: Oriented to self, location, time, and situation. Memory: Intact. Insight & Judgement: Poor. Data   height is 5' 10\" (1.778 m) and weight is 194 lb (88 kg). His oral temperature is 97.5 °F (36.4 °C). His blood pressure is 125/84 and his pulse is 71. His respiration is 14. Labs:   No visits with results within 2 Day(s) from this visit.    Latest known visit with results is:   Admission on 03/11/2022, Discharged on 03/12/2022   Component Date Value Ref Range Status    WBC 03/11/2022 8.8  3.5 - 11.3 k/uL Final    RBC 03/11/2022 5.62  4.21 - 5.77 m/uL Final    Hemoglobin 03/11/2022 16.7  13.0 - 17.0 g/dL Final    Hematocrit 03/11/2022 48.8  40.7 - 50.3 % Final    MCV 03/11/2022 86.8  82.6 - 102.9 fL Final    MCH 03/11/2022 29.7  25.2 - 33.5 pg Final    MCHC 03/11/2022 34.2  28.4 - 34.8 g/dL Final    RDW 03/11/2022 13.1  11.8 - 14.4 % Final    Platelets 53/38/2249 345  138 - 453 k/uL Final    MPV 03/11/2022 9.7  8.1 - 13.5 fL Final    NRBC Automated 03/11/2022 0.0  0.0 per 100 WBC Final    Seg Neutrophils 03/11/2022 48  36 - 65 % Final    Lymphocytes 03/11/2022 45* 24 - 43 % Final    Monocytes 03/11/2022 6  3 - 12 % Final    Eosinophils % 03/11/2022 1  1 - 4 % Final    Basophils 03/11/2022 0  0 - 2 % Final    Immature Granulocytes 03/11/2022 0  0 % Final    Segs Absolute 03/11/2022 4.13  1.50 - 8.10 k/uL Final    Absolute Lymph # 03/11/2022 3.96* 1.10 - 3.70 k/uL Final    Absolute Mono # 03/11/2022 0.55  0.10 - 1.20 k/uL Final    Absolute Eos # 03/11/2022 0.09  0.00 - 0.44 k/uL Final    Basophils Absolute 03/11/2022 <0.03  0.00 - 0.20 k/uL Final    Absolute Immature Granulocyte 03/11/2022 <0.03  0.00 - 0.30 k/uL Final    Glucose 03/11/2022 131* 70 - 99 mg/dL Final    BUN 03/11/2022 14  6 - 20 mg/dL Final    CREATININE 03/11/2022 0.80  0.70 - 1.20 mg/dL Final    Calcium 03/11/2022 9.1  8.6 - 10.4 mg/dL Final    Sodium 03/11/2022 149* 135 - 144 mmol/L Final    Potassium 03/11/2022 4.2  3.7 - 5.3 mmol/L Final    Chloride 03/11/2022 108* 98 - 107 mmol/L Final    CO2 03/11/2022 27  20 - 31 mmol/L Final    Anion Gap 03/11/2022 14  9 - 17 mmol/L Final    Alkaline Phosphatase 03/11/2022 111  40 - 129 U/L Final    ALT 03/11/2022 16  5 - 41 U/L Final    AST 03/11/2022 17  <40 U/L Final    Total Bilirubin 03/11/2022 0.72  0.3 - 1.2 mg/dL Final    Total Protein 03/11/2022 8.1  6.4 - 8.3 g/dL Final    Albumin 03/11/2022 4.8  3.5 - 5.2 g/dL Final    Albumin/Globulin Ratio 03/11/2022 1.5  1.0 - 2.5 Final    GFR Non- 03/11/2022 >60  >60 mL/min Final    GFR  03/11/2022 >60  >60 mL/min Final    GFR Comment 03/11/2022        Final    Comment: Average GFR for 38-51 years old:   80 mL/min/1.73sq m  Chronic Kidney Disease:   <60 mL/min/1.73sq m  Kidney failure:   <15 mL/min/1.73sq m              eGFR calculated using average adult body mass.  Additional eGFR calculator available at:        [employer]            Magnesium 03/11/2022 2.0  1.6 - 2.6 mg/dL Final    Phosphorus 03/11/2022 3.3  2.5 - 4.5 mg/dL Final    Ventricular Rate 03/11/2022 85  BPM Final    Atrial Rate 03/11/2022 85  BPM Final    P-R Interval 03/11/2022 152  ms Final    QRS Duration 03/11/2022 86  ms Final    Q-T Interval 03/11/2022 370  ms Final    QTc Calculation (Bazett) 03/11/2022 440  ms Final    P Axis 03/11/2022 49  degrees Final    R Axis 03/11/2022 -14  degrees Final    T Axis 03/11/2022 29  degrees Final    Specimen Description 03/11/2022 . NASOPHARYNGEAL SWAB   Final    SARS-CoV-2, Rapid 03/11/2022 Not Detected  Not Detected Final    Comment:       Rapid NAAT:  The specimen is NEGATIVE for SARS-CoV-2, the novel coronavirus associated with   COVID-19. The ID NOW COVID-19 assay is designed to detect the virus that causes COVID-19 in patients   with signs and symptoms of infection who are suspected of COVID-19. An individual without symptoms of COVID-19 and who is not shedding SARS-CoV-2 virus would   expect to have a negative (not detected) result in this assay. Negative results should be treated as presumptive and, if inconsistent with clinical signs   and symptoms or necessary for patient management,  should be tested with an alternative molecular assay. Negative results do not preclude   SARS-CoV-2 infection and   should not be used as the sole basis for patient management decisions. Fact sheet for Healthcare Providers: Nohemy.es  Fact sheet for Patients: Nohemy.es          Methodology: Isothermal Nucleic Acid Amplification      Ethanol 03/11/2022 231* <10 mg/dL Final    Ethanol percent 03/11/2022 0.231* <0.010 % Final         Reviewed patient's current plan of care and vital signs with nursing staff.     Labs reviewed: [x] Yes  Last EKG in EMR reviewed: [x] Yes  QTc: 440    Medications  Current Facility-Administered Medications: carvedilol (COREG) tablet 3.125 mg, 3.125 mg, Oral, BID WC  losartan (COZAAR) tablet 100 mg, 100 mg, Oral, Daily  VORTIoxetine (TRINTELLIX) tablet 10 mg, 10 mg, Oral, Daily  lurasidone (LATUDA) tablet 40 mg, 40 mg, Oral, Daily  acetaminophen (TYLENOL) tablet 650 mg, 650 mg, Oral, Q6H PRN  ibuprofen (ADVIL;MOTRIN) tablet 400 mg, 400 mg, Oral, Q6H PRN  hydrOXYzine HCl (ATARAX) tablet 50 mg, 50 mg, Oral, TID PRN  traZODone (DESYREL) tablet 50 mg, 50 mg, Oral, Nightly PRN  polyethylene glycol (GLYCOLAX) packet 17 g, 17 g, Oral, Daily PRN  aluminum & magnesium hydroxide-simethicone (MAALOX) 200-200-20 MG/5ML suspension 30 mL, 30 mL, Oral, Q6H PRN  nicotine polacrilex (NICORETTE) gum 2 mg, 2 mg, Oral, Q2H PRN  thiamine mononitrate tablet 100 mg, 100 mg, Oral, Daily  therapeutic multivitamin-minerals 1 tablet, 1 tablet, Oral, Daily  LORazepam (ATIVAN) tablet 1 mg, 1 mg, Oral, Q1H PRN **OR** LORazepam (ATIVAN) injection 1 mg, 1 mg, IntraMUSCular, Q1H PRN **OR** LORazepam (ATIVAN) tablet 2 mg, 2 mg, Oral, Q1H PRN **OR** LORazepam (ATIVAN) injection 2 mg, 2 mg, IntraMUSCular, Q1H PRN **OR** LORazepam (ATIVAN) tablet 3 mg, 3 mg, Oral, Q1H PRN **OR** LORazepam (ATIVAN) injection 3 mg, 3 mg, IntraMUSCular, Q1H PRN **OR** LORazepam (ATIVAN) tablet 4 mg, 4 mg, Oral, Q1H PRN **OR** LORazepam (ATIVAN) injection 4 mg, 4 mg, IntraMUSCular, A7H PRN  folic acid (FOLVITE) tablet 1 mg, 1 mg, Oral, Daily    ASSESSMENT  Major depressive disorder, recurrent severe without psychotic features ([facility] [address]         PLAN  Patient symptoms are: Remains Unstable. Start BuSpar 15 mg 3 times daily  Monitor need and frequency of PRN medications. Encourage participation in groups and milieu. Attempt to develop insight. Psycho-education conducted. Supportive Therapy conducted. Probable discharge is to be determined by MD.   Follow-up daily while inpatient. Patient continues to be monitored in the inpatient psychiatric facility at [facility] for safety and stabilization. Patient continues to need, on a daily basis, active treatment furnished directly by or requiring the supervision of inpatient psychiatric personnel.     Electronically signed by KILLIAN Nicole CNP on 6/12/2022 at 3:52 PM    **This report has been created using voice recognition software. It may contain minor errors which are inherent in voice recognition technology. **

## 2022-06-12 NOTE — GROUP NOTE
Group Therapy Note    Date: 6/12/2022    Group Start Time: 1330  Group End Time: 5814  Group Topic: Relaxation    [facility] JOANN Tafoya, CTRS    Pt did not attend 1330 relaxation skills group d/t resting in room despite staff invitation to attend. 1:1 talk time offered as alternative to group session, pt declined.          Signature:  Chago Duarte

## 2022-06-12 NOTE — PLAN OF CARE
Problem: Self Harm/Suicidality  Goal: Will have no self-injury during hospital stay  Description: INTERVENTIONS:  1. Q 30 MINUTES: Routine safety checks  2. Q SHIFT & PRN: Assess risk to determine if routine checks are adequate to maintain patient safety  6/12/2022 0929 by Mark Anthony Mills RN  Outcome: Progressing    Problem: Anxiety  Goal: Will report anxiety at manageable levels  Description: INTERVENTIONS:  1. Administer medication as ordered  2. Teach and rehearse alternative coping skills  3. Provide emotional support with 1:1 interaction with staff  Outcome: Progressing  Flowsheets (Taken 6/12/2022 0925)  Will report anxiety at manageable levels:   Administer medication as ordered   Provide emotional support with 1:1 interaction with staff     Problem: Coping  Goal: Pt/Family able to verbalize concerns and demonstrate effective coping strategies  Description: INTERVENTIONS:  1. Assist patient/family to identify coping skills, available support systems and cultural and spiritual values  2. Provide emotional support, including active listening and acknowledgement of concerns of patient and caregivers  3. Reduce environmental stimuli, as able  4. Instruct patient/family in relaxation techniques, as appropriate  5. Assess for spiritual pain/suffering and initiate Spiritual Care, Psychosocial Clinical Specialist consults as needed  Outcome: Progressing     Problem: Decision Making  Goal: Pt/Family able to effectively weigh alternatives and participate in decision making related to treatment and care  Description: INTERVENTIONS:  1. Determine when there are differences between patient's view, family's view, and healthcare provider's view of condition  2. Facilitate patient and family articulation of goals for care  3. Help patient and family identify pros/cons of alternative solutions  4. Provide information as requested by patient/family  5.  Respect patient/family right to receive or not to receive information  6. Serve as a liaison between patient and family and health care team  7. Initiate Consults from Ethics, Palliative Care or initiate [address] [place] [address] as is appropriate  Outcome: Progressing     Problem: Behavior  Goal: Pt/Family maintain appropriate behavior and adhere to behavioral management agreement, if implemented  Description: INTERVENTIONS:  1. Assess patient/family's coping skills and  non-compliant behavior (including use of illegal substances)  2. Notify security of behavior or suspected illegal substances which indicate the need for search of the patient and/or belongings  3. Encourage verbalization of thoughts and concerns in a socially appropriate manner  4. Utilize positive, consistent limit setting strategies supporting safety of patient, staff and others  5. Encourage participation in the decision making process about the behavioral management agreement  6. Implement a Health Care Agreement if patient meets criteria  7. If a patient's behavior jeopardizes the safety of the patient, staff, or others refer to organization policy. If a visitor's behavior poses a threat to safety call refer to organization policy. 8. Initiate consult with , Psychosocial CNS, Spiritual Care as appropriate  Outcome: Progressing     Problem: Depression/Self Harm  Goal: Effect of psychiatric condition will be minimized and patient will be protected from self harm  Description: INTERVENTIONS:  1. Assess impact of patient's symptoms on level of functioning, self care needs and offer support as indicated  2. Assess patient/family knowledge of depression, impact on illness and need for teaching  3. Provide emotional support, presence and reassurance  4. Assess for possible suicidal thoughts or ideation. If patient expresses suicidal thoughts or statements do not leave alone, initiate Suicide Precautions, move to a room close to the nursing station and obtain sitter  5.  Initiate consults as appropriate with Mental Health Professional, Spiritual Care, Psychosocial CNS, and consider a recommendation to the LIP for a Psychiatric Consultation  6/12/2022 0929 by Ebony Seaman RN  Outcome: Progressing    Problem: Drug Abuse/Detox  Goal: Will have no detox symptoms and will verbalize plan for changing drug-related behavior  Description: INTERVENTIONS:  1. Administer medication as ordered  2. Monitor physical status  3. Provide emotional support with 1:1 interaction with staff  4.  Encourage  recovery focused treatment   Outcome: Progressing     Problem: Nutrition Deficit:  Goal: Optimize nutritional status  Outcome: Progressing

## 2022-06-13 PROCEDURE — 1240000000 HC EMOTIONAL WELLNESS R&B

## 2022-06-13 PROCEDURE — APPSS15 APP SPLIT SHARED TIME 0-15 MINUTES: Performed by: NURSE PRACTITIONER

## 2022-06-13 PROCEDURE — 99232 SBSQ HOSP IP/OBS MODERATE 35: CPT | Performed by: PSYCHIATRY & NEUROLOGY

## 2022-06-13 PROCEDURE — 6370000000 HC RX 637 (ALT 250 FOR IP)

## 2022-06-13 PROCEDURE — 6370000000 HC RX 637 (ALT 250 FOR IP): Performed by: INTERNAL MEDICINE

## 2022-06-13 PROCEDURE — 6370000000 HC RX 637 (ALT 250 FOR IP): Performed by: PSYCHIATRY & NEUROLOGY

## 2022-06-13 PROCEDURE — 6370000000 HC RX 637 (ALT 250 FOR IP): Performed by: NURSE PRACTITIONER

## 2022-06-13 RX ADMIN — LOSARTAN POTASSIUM 100 MG: 100 TABLET, FILM COATED ORAL at 08:30

## 2022-06-13 RX ADMIN — THIAMINE HCL TAB 100 MG 100 MG: 100 TAB at 08:30

## 2022-06-13 RX ADMIN — CARVEDILOL 3.12 MG: 3.12 TABLET, FILM COATED ORAL at 17:25

## 2022-06-13 RX ADMIN — FOLIC ACID 1 MG: 1 TABLET ORAL at 08:29

## 2022-06-13 RX ADMIN — BUSPIRONE HYDROCHLORIDE 15 MG: 15 TABLET ORAL at 14:04

## 2022-06-13 RX ADMIN — LURASIDONE HYDROCHLORIDE 40 MG: 40 TABLET, FILM COATED ORAL at 08:29

## 2022-06-13 RX ADMIN — NICOTINE POLACRILEX 2 MG: 2 GUM, CHEWING BUCCAL at 17:34

## 2022-06-13 RX ADMIN — ACETAMINOPHEN 650 MG: 325 TABLET ORAL at 20:17

## 2022-06-13 RX ADMIN — VORTIOXETINE 10 MG: 10 TABLET, FILM COATED ORAL at 08:30

## 2022-06-13 RX ADMIN — HYDROXYZINE HYDROCHLORIDE 50 MG: 50 TABLET, FILM COATED ORAL at 17:34

## 2022-06-13 RX ADMIN — HYDROXYZINE HYDROCHLORIDE 50 MG: 50 TABLET, FILM COATED ORAL at 12:43

## 2022-06-13 RX ADMIN — NICOTINE POLACRILEX 2 MG: 2 GUM, CHEWING BUCCAL at 10:37

## 2022-06-13 RX ADMIN — NICOTINE POLACRILEX 2 MG: 2 GUM, CHEWING BUCCAL at 13:31

## 2022-06-13 RX ADMIN — TRAZODONE HYDROCHLORIDE 50 MG: 50 TABLET ORAL at 21:48

## 2022-06-13 RX ADMIN — MULTIPLE VITAMINS W/ MINERALS TAB 1 TABLET: TAB at 08:30

## 2022-06-13 RX ADMIN — BUSPIRONE HYDROCHLORIDE 15 MG: 15 TABLET ORAL at 21:48

## 2022-06-13 RX ADMIN — BUSPIRONE HYDROCHLORIDE 15 MG: 15 TABLET ORAL at 08:29

## 2022-06-13 RX ADMIN — CARVEDILOL 3.12 MG: 3.12 TABLET, FILM COATED ORAL at 08:29

## 2022-06-13 RX ADMIN — NICOTINE POLACRILEX 2 MG: 2 GUM, CHEWING BUCCAL at 20:16

## 2022-06-13 ASSESSMENT — PAIN DESCRIPTION - DESCRIPTORS: DESCRIPTORS: ACHING

## 2022-06-13 ASSESSMENT — PAIN SCALES - GENERAL: PAINLEVEL_OUTOF10: 6

## 2022-06-13 ASSESSMENT — PAIN DESCRIPTION - LOCATION: LOCATION: HEAD

## 2022-06-13 NOTE — PROGRESS NOTES
Daily Progress Note  6/13/2022    Patient Name: Sander Mccracken: Depression with suicidal ideation         SUBJECTIVE:      Patient is seen today for a follow up assessment. Perico Leung reports that he has been working with social work to try and find placements for rehabilitation. He reports that he has previously been to [facility] and would prefer going somewhere different. [facility] seems that [facility] available for him on Wednesday. He reports that if this is the case he would like to transition to this program.  He continues to endorse depression and specifically irritability. He is unsure if it is related to the medication that he is currently taking or if a dosage adjustment would help improve this. He reports that he feels anxious \"all of the time\". He states that his outpatient provider recently increased his Latuda to 80 mg. It does not appear that he is receiving that dose on the inpatient unit, therefore we may consider further titration prior to discharge. He does feel that the medication helped improve his mood somewhat however he reports that he keeps falling into a pattern of drinking which leads to worsened guilt and feelings of worthlessness. He verbalizes understanding that he has to continue looking forward though does acknowledge that it is difficult to not dwell on past choices. He remains able to contract for safety on the inpatient unit currently though does feel without transition to a rehabilitation program he would not be able to remain free of self-harm.     Appetite:  [x] Adequate/Unchanged  [] Increased  [] Decreased      Sleep:       [x] Adequate/Unchanged  [] Fair  [] Poor      Group Attendance on Unit:   [] Yes   [x] Selectively    [] No    Compliant with scheduled medications: [x] Yes  [] No    Received emergency medications in past 24 hrs: [] Yes   [x] No    Medication Side Effects: Denies          Mental Status Exam  Level tablet 650 mg, 650 mg, Oral, Q6H PRN  ibuprofen (ADVIL;MOTRIN) tablet 400 mg, 400 mg, Oral, Q6H PRN  hydrOXYzine HCl (ATARAX) tablet 50 mg, 50 mg, Oral, TID PRN  traZODone (DESYREL) tablet 50 mg, 50 mg, Oral, Nightly PRN  polyethylene glycol (GLYCOLAX) packet 17 g, 17 g, Oral, Daily PRN  aluminum & magnesium hydroxide-simethicone (MAALOX) 200-200-20 MG/5ML suspension 30 mL, 30 mL, Oral, Q6H PRN  nicotine polacrilex (NICORETTE) gum 2 mg, 2 mg, Oral, Q2H PRN  thiamine mononitrate tablet 100 mg, 100 mg, Oral, Daily  therapeutic multivitamin-minerals 1 tablet, 1 tablet, Oral, Daily  LORazepam (ATIVAN) tablet 1 mg, 1 mg, Oral, Q1H PRN **OR** LORazepam (ATIVAN) injection 1 mg, 1 mg, IntraMUSCular, Q1H PRN **OR** LORazepam (ATIVAN) tablet 2 mg, 2 mg, Oral, Q1H PRN **OR** LORazepam (ATIVAN) injection 2 mg, 2 mg, IntraMUSCular, Q1H PRN **OR** LORazepam (ATIVAN) tablet 3 mg, 3 mg, Oral, Q1H PRN **OR** LORazepam (ATIVAN) injection 3 mg, 3 mg, IntraMUSCular, Q1H PRN **OR** LORazepam (ATIVAN) tablet 4 mg, 4 mg, Oral, Q1H PRN **OR** LORazepam (ATIVAN) injection 4 mg, 4 mg, IntraMUSCular, O0T PRN  folic acid (FOLVITE) tablet 1 mg, 1 mg, Oral, Daily    ASSESSMENT  Major depressive disorder, recurrent severe without psychotic features ([facility] [address]         HANDOFF  Patient symptoms are:  Slowly improving  Medications as determined by attending physician, consider titration of [address]  Encourage participation in groups and milieu. Probable discharge is to be determined by MD    Electronically signed by KILLIAN Calderón CNP on 6/13/2022 at 5:53 PM    **This report has been created using voice recognition software. It may contain minor errors which are inherent in voice recognition technology. **  I independently saw and evaluated the patient. I reviewed the  documentation above. Any additional comments or changes to the   documentation are stated below otherwise agree with assessment.       The patient reports some improvement in his mood but continues to have irritability and anxiety. He is not reporting any side effect from his medication. He is looking into rehab opportunities. PLAN  Medications as noted above  Attempt to develop insight  Psycho-education conducted. Estimated Length of Stay is 2-5  days  Supportive Therapy conducted.   Follow-up daily while on inpatient unit    Electronically signed by Chiquita Conway MD on 6/13/22 at 7:21 PM EDT

## 2022-06-13 NOTE — GROUP NOTE
Group Therapy Note    Date: 6/13/2022    Group Start Time: 1100  Group End Time: 1959  Group Topic: Cognitive Skills    [facility] D    CINDY Moore        Group Therapy Note    Attendees: 8/20         Patient's Goal:  To increase social interaction and to practice decision making, and communication skills. Notes: Pt attended and fully participated in group. Pt was able to practice decision making, and communication skills independently. Status After Intervention:  Improved     Participation Level:  Active Listener,  appropriate, sharing     Participation Quality:  Appropriate, Attentive,supportive of peers        Speech:  Normal     Thought Process/Content: Logical ,linear r/t group task     Affective Functioning: Congruent, brightened      Mood: Euthymic     Level of consciousness:  Alert, and Attentive        Response to Learning:  Able to verbalize current knowledge, able to acknowledge/verbalize new learning,  and Progressing to goal        Endings: None Reported     Modes of Intervention: Education, Support, Socialization, Exploration, Clarifying and Problem-solving        Discipline Responsible: Psychoeducational Specialist        Signature:  CINDY Sanchez

## 2022-06-13 NOTE — GROUP NOTE
Group Therapy Note    Date: 6/13/2022    Group Start Time: 1000  Group End Time: 1030  Group Topic: Psychotherapy    EDWIN Bryant        Group Therapy Note     Patient refused to attend psychotherapy group after encouragement from staff. 1:1 talk time offered but refused. Signature:   Karlos Bryant

## 2022-06-13 NOTE — PLAN OF CARE
Problem: Anxiety  Goal: Will report anxiety at manageable levels  Description: INTERVENTIONS:  1. Administer medication as ordered  2. Teach and rehearse alternative coping skills  3. Provide emotional support with 1:1 interaction with staff  6/12/2022 2005 by Kwame Coates  Outcome: Progressing  Flowsheets (Taken 6/12/2022 2005)  Will report anxiety at manageable levels: Administer medication as ordered  Note: Out in the milieu, cooperative. Social. Agreeable to taking all prescribed medications for this shift. Problem: Self Harm/Suicidality  Goal: Will have no self-injury during hospital stay  Description: INTERVENTIONS:  1. Q 30 MINUTES: Routine safety checks  2. Q SHIFT & PRN: Assess risk to determine if routine checks are adequate to maintain patient safety  6/12/2022 2005 by Kwame Coates  Outcome: Progressing  Note: Patient remains free from self harm at this time. Pt denies thoughts of self harm and is agreeable to seeking out should thoughts of self harm arise. Safe environment maintained. Q15 minute checks for safety cont per unit policy. Will cont to monitor for safety and provides support and reassurance as needed.

## 2022-06-13 NOTE — PLAN OF CARE
Problem: Self Harm/Suicidality  Goal: Will have no self-injury during hospital stay  Description: INTERVENTIONS:  1. Q 30 MINUTES: Routine safety checks  2. Q SHIFT & PRN: Assess risk to determine if routine checks are adequate to maintain patient safety  Outcome: Progressing  Note: Patient denies thoughts of harm to self or others. Visual safety checks are completed every 15 minutes. Problem: Anxiety  Goal: Will report anxiety at manageable levels  Description: INTERVENTIONS:  1. Administer medication as ordered  2. Teach and rehearse alternative coping skills  3. Provide emotional support with 1:1 interaction with staff  Outcome: Progressing  Flowsheets  Taken 6/13/2022 1001  Will report anxiety at manageable levels:   Administer medication as ordered   Teach and rehearse alternative coping skills   Provide emotional support with 1:1 interaction with staff  Taken 6/13/2022 0956  Will report anxiety at manageable levels:   Administer medication as ordered   Teach and rehearse alternative coping skills   Provide emotional support with 1:1 interaction with staff  Note: Patient reports anxiety is improving. Patient is compliant with medications and remains in behavioral control. Problem: Coping  Goal: Pt/Family able to verbalize concerns and demonstrate effective coping strategies  Description: INTERVENTIONS:  1. Assist patient/family to identify coping skills, available support systems and cultural and spiritual values  2. Provide emotional support, including active listening and acknowledgement of concerns of patient and caregivers  3. Reduce environmental stimuli, as able  4. Instruct patient/family in relaxation techniques, as appropriate  5.  Assess for spiritual pain/suffering and initiate Spiritual Care, Psychosocial Clinical Specialist consults as needed  Outcome: Progressing  Flowsheets  Taken 6/13/2022 1001  Patient/family able to verbalize anxieties, fears, and concerns, and demonstrate effective coping:   Assist patient/family to identify coping skills, available support systems and cultural and spiritual values   Provide emotional support, including active listening and acknowledgement of concerns of patient and caregivers   Reduce environmental stimuli, as able   Instruct patient/family in relaxation techniques, as appropriate  Taken 6/13/2022 0956  Patient/family able to verbalize anxieties, fears, and concerns, and demonstrate effective coping:   Assist patient/family to identify coping skills, available support systems and cultural and spiritual values   Provide emotional support, including active listening and acknowledgement of concerns of patient and caregivers   Reduce environmental stimuli, as able   Instruct patient/family in relaxation techniques, as appropriate     Problem: Decision Making  Goal: Pt/Family able to effectively weigh alternatives and participate in decision making related to treatment and care  Description: INTERVENTIONS:  1. Determine when there are differences between patient's view, family's view, and healthcare provider's view of condition  2. Facilitate patient and family articulation of goals for care  3. Help patient and family identify pros/cons of alternative solutions  4. Provide information as requested by patient/family  5. Respect patient/family right to receive or not to receive information  6. Serve as a liaison between patient and family and health care team  7.  Initiate Consults from Ethics, Palliative Care or initiate [facility] as is appropriate  Outcome: Progressing  Flowsheets  Taken 6/13/2022 1001  Patient/family able to effectively weigh alternatives and participate in decision making related to treatment and care: Determine when there are differences between patient's view, family's view, and healthcare provider's view of condition  Taken 6/13/2022 0956  Patient/family able to effectively weigh alternatives and participate in decision making related to treatment and care: Determine when there are differences between patient's view, family's view, and healthcare provider's view of condition     Problem: Behavior  Goal: Pt/Family maintain appropriate behavior and adhere to behavioral management agreement, if implemented  Description: INTERVENTIONS:  1. Assess patient/family's coping skills and  non-compliant behavior (including use of illegal substances)  2. Notify security of behavior or suspected illegal substances which indicate the need for search of the patient and/or belongings  3. Encourage verbalization of thoughts and concerns in a socially appropriate manner  4. Utilize positive, consistent limit setting strategies supporting safety of patient, staff and others  5. Encourage participation in the decision making process about the behavioral management agreement  6. Implement a Health Care Agreement if patient meets criteria  7. If a patient's behavior jeopardizes the safety of the patient, staff, or others refer to organization policy. If a visitor's behavior poses a threat to safety call refer to organization policy. 8. Initiate consult with , Psychosocial CNS, Spiritual Care as appropriate  Outcome: Progressing  Flowsheets  Taken 6/13/2022 1001  Patient/family maintains appropriate behavior and adheres to behavioral management agreement, if implemented: Encourage verbalization of thoughts and concerns in a socially appropriate manner  Taken 6/13/2022 0956  Patient/family maintains appropriate behavior and adheres to behavioral management agreement, if implemented: Encourage verbalization of thoughts and concerns in a socially appropriate manner     Problem: Depression/Self Harm  Goal: Effect of psychiatric condition will be minimized and patient will be protected from self harm  Description: INTERVENTIONS:  1. Assess impact of patient's symptoms on level of functioning, self care needs and offer support as indicated  2.  Assess patient/family knowledge of depression, impact on illness and need for teaching  3. Provide emotional support, presence and reassurance  4. Assess for possible suicidal thoughts or ideation. If patient expresses suicidal thoughts or statements do not leave alone, initiate Suicide Precautions, move to a room close to the nursing station and obtain sitter  5. Initiate consults as appropriate with Mental Health Professional, Spiritual Care, Psychosocial CNS, and consider a recommendation to the LIP for a Psychiatric Consultation  Outcome: Progressing  Flowsheets  Taken 6/13/2022 1001  Effect of psychiatric condition will be minimized and patient will be protected from self harm:   Assess impact of patients symptoms on level of functioning, self care needs and offer support as indicated   Assess patient/family knowledge of depression, impact on illness and need for teaching   Provide emotional support, presence and reassurance   Assess for suicidal thoughts or ideation. If patient expresses suicidal thoughts or statements do not leave alone, initiate Suicide Precautions, move near nurse station, obtain sitter  Taken 6/13/2022 0956  Effect of psychiatric condition will be minimized and patient will be protected from self harm:   Assess impact of patients symptoms on level of functioning, self care needs and offer support as indicated   Assess patient/family knowledge of depression, impact on illness and need for teaching   Provide emotional support, presence and reassurance   Assess for suicidal thoughts or ideation. If patient expresses suicidal thoughts or statements do not leave alone, initiate Suicide Precautions, move near nurse station, obtain sitter     Problem: Drug Abuse/Detox  Goal: Will have no detox symptoms and will verbalize plan for changing drug-related behavior  Description: INTERVENTIONS:  1. Administer medication as ordered  2. Monitor physical status  3.  Provide emotional support with 1:1 interaction with staff  4. Encourage  recovery focused treatment   Outcome: Progressing  Flowsheets  Taken 6/13/2022 1003  Will have no detox symptoms and will verbalize plan for changing drug-related behavior:   Monitor physical status   Provide emotional support with 1:1 interaction with staff  Taken 6/13/2022 1001  Will have no detox symptoms and will verbalize plan for changing drug-related behavior: Monitor physical status  Taken 6/13/2022 0956  Will have no detox symptoms and will verbalize plan for changing drug-related behavior: Monitor physical status  Note: Patient denies withdrawal symptoms thus far, this shift.       Problem: Nutrition Deficit:  Goal: Optimize nutritional status  Outcome: Progressing

## 2022-06-13 NOTE — BH NOTE
PRN NOTE:  Patient pacing in dayroom. Refused one to one talk time, taking a shower and alone time in the quiet room. PO PRN atarax 50mg given at 1243.

## 2022-06-13 NOTE — CARE COORDINATION
SOCIAL SERVICE NOTE: SW faxes pt clinical information on this date to [facility] at [phone] due to Pt request. Pt signed a release of information for [facility] on this date. SW will continue to monitor the situation.

## 2022-06-13 NOTE — CARE COORDINATION
DISCHARGE PLANNING UPDATE:  - Writer speaks with Sathish Dick from [facility] and he confirms Pt for D/C Wed.,[facility] 6/15 by 1:00pm [facility] [place] [address] paperwork faxed to [facility]; call into [facility];  Pt staff splitting

## 2022-06-14 PROCEDURE — 6370000000 HC RX 637 (ALT 250 FOR IP): Performed by: PSYCHIATRY & NEUROLOGY

## 2022-06-14 PROCEDURE — 99232 SBSQ HOSP IP/OBS MODERATE 35: CPT | Performed by: PSYCHIATRY & NEUROLOGY

## 2022-06-14 PROCEDURE — 6370000000 HC RX 637 (ALT 250 FOR IP)

## 2022-06-14 PROCEDURE — 1240000000 HC EMOTIONAL WELLNESS R&B

## 2022-06-14 PROCEDURE — APPSS30 APP SPLIT SHARED TIME 16-30 MINUTES

## 2022-06-14 PROCEDURE — 6370000000 HC RX 637 (ALT 250 FOR IP): Performed by: NURSE PRACTITIONER

## 2022-06-14 PROCEDURE — 6370000000 HC RX 637 (ALT 250 FOR IP): Performed by: INTERNAL MEDICINE

## 2022-06-14 RX ORDER — BUSPIRONE HYDROCHLORIDE 15 MG/1
15 TABLET ORAL 3 TIMES DAILY
Qty: 90 TABLET | Refills: 0 | Status: SHIPPED | OUTPATIENT
Start: 2022-06-14

## 2022-06-14 RX ORDER — FOLIC ACID 1 MG/1
1 TABLET ORAL DAILY
Qty: 30 TABLET | Refills: 3 | Status: SHIPPED | OUTPATIENT
Start: 2022-06-14

## 2022-06-14 RX ORDER — GAUZE BANDAGE 2" X 2"
100 BANDAGE TOPICAL DAILY
Qty: 30 TABLET | Refills: 0 | Status: SHIPPED | OUTPATIENT
Start: 2022-06-14

## 2022-06-14 RX ORDER — LOSARTAN POTASSIUM 100 MG/1
100 TABLET ORAL DAILY
Qty: 30 TABLET | Refills: 3 | Status: SHIPPED | OUTPATIENT
Start: 2022-06-14

## 2022-06-14 RX ORDER — M-VIT,TX,IRON,MINS/CALC/FOLIC 27MG-0.4MG
1 TABLET ORAL DAILY
Qty: 30 TABLET | Refills: 0 | Status: SHIPPED | OUTPATIENT
Start: 2022-06-14

## 2022-06-14 RX ORDER — CARVEDILOL 3.12 MG/1
3.12 TABLET ORAL 2 TIMES DAILY WITH MEALS
Qty: 60 TABLET | Refills: 3 | Status: SHIPPED | OUTPATIENT
Start: 2022-06-14

## 2022-06-14 RX ADMIN — LOSARTAN POTASSIUM 100 MG: 100 TABLET, FILM COATED ORAL at 08:34

## 2022-06-14 RX ADMIN — BUSPIRONE HYDROCHLORIDE 15 MG: 15 TABLET ORAL at 08:34

## 2022-06-14 RX ADMIN — NICOTINE POLACRILEX 2 MG: 2 GUM, CHEWING BUCCAL at 15:00

## 2022-06-14 RX ADMIN — CARVEDILOL 3.12 MG: 3.12 TABLET, FILM COATED ORAL at 08:34

## 2022-06-14 RX ADMIN — HYDROXYZINE HYDROCHLORIDE 50 MG: 50 TABLET, FILM COATED ORAL at 13:20

## 2022-06-14 RX ADMIN — NICOTINE POLACRILEX 2 MG: 2 GUM, CHEWING BUCCAL at 21:19

## 2022-06-14 RX ADMIN — THIAMINE HCL TAB 100 MG 100 MG: 100 TAB at 08:34

## 2022-06-14 RX ADMIN — ACETAMINOPHEN 650 MG: 325 TABLET ORAL at 05:22

## 2022-06-14 RX ADMIN — ACETAMINOPHEN 650 MG: 325 TABLET ORAL at 22:09

## 2022-06-14 RX ADMIN — HYDROXYZINE HYDROCHLORIDE 50 MG: 50 TABLET, FILM COATED ORAL at 22:06

## 2022-06-14 RX ADMIN — FOLIC ACID 1 MG: 1 TABLET ORAL at 08:34

## 2022-06-14 RX ADMIN — TRAZODONE HYDROCHLORIDE 50 MG: 50 TABLET ORAL at 22:07

## 2022-06-14 RX ADMIN — NICOTINE POLACRILEX 2 MG: 2 GUM, CHEWING BUCCAL at 17:51

## 2022-06-14 RX ADMIN — NICOTINE POLACRILEX 2 MG: 2 GUM, CHEWING BUCCAL at 11:35

## 2022-06-14 RX ADMIN — HYDROXYZINE HYDROCHLORIDE 50 MG: 50 TABLET, FILM COATED ORAL at 05:22

## 2022-06-14 RX ADMIN — BUSPIRONE HYDROCHLORIDE 15 MG: 15 TABLET ORAL at 13:20

## 2022-06-14 RX ADMIN — CARVEDILOL 3.12 MG: 3.12 TABLET, FILM COATED ORAL at 17:54

## 2022-06-14 RX ADMIN — VORTIOXETINE 10 MG: 10 TABLET, FILM COATED ORAL at 08:34

## 2022-06-14 RX ADMIN — LURASIDONE HYDROCHLORIDE 40 MG: 40 TABLET, FILM COATED ORAL at 08:34

## 2022-06-14 RX ADMIN — MULTIPLE VITAMINS W/ MINERALS TAB 1 TABLET: TAB at 08:35

## 2022-06-14 RX ADMIN — NICOTINE POLACRILEX 2 MG: 2 GUM, CHEWING BUCCAL at 09:05

## 2022-06-14 RX ADMIN — BUSPIRONE HYDROCHLORIDE 15 MG: 15 TABLET ORAL at 22:06

## 2022-06-14 ASSESSMENT — PAIN SCALES - GENERAL
PAINLEVEL_OUTOF10: 5
PAINLEVEL_OUTOF10: 3
PAINLEVEL_OUTOF10: 0

## 2022-06-14 ASSESSMENT — PAIN - FUNCTIONAL ASSESSMENT: PAIN_FUNCTIONAL_ASSESSMENT: ACTIVITIES ARE NOT PREVENTED

## 2022-06-14 ASSESSMENT — PAIN DESCRIPTION - LOCATION
LOCATION: BACK
LOCATION: BACK

## 2022-06-14 NOTE — GROUP NOTE
Group Therapy Note    Date: 6/14/2022    Group Start Time: 0900  Group End Time: 0930  Group Topic: Group Therapy    [facility] D    Myranda Rangel        Group Therapy Note    Attendees: 4/16         Patient's Goal:  To get into recovery center      Status After Intervention:  Unchanged    Participation Level:  Active Listener and Interactive    Participation Quality: Appropriate and Attentive      Speech:  normal      Thought Process/Content: Logical      Affective Functioning: Congruent      Mood: euthymic      Level of consciousness:  Alert and Oriented x4      Response to Learning: Able to verbalize current knowledge/experience and Able to verbalize/acknowledge new learning      Endings: None Reported    Modes of Intervention: Education and Support      Discipline Responsible: Behavorial Health Tech      Signature:  Susanne Houser

## 2022-06-14 NOTE — GROUP NOTE
Group Therapy Note    Date: 6/14/2022    Group Start Time: 1100  Group End Time: 36  Group Topic: Cognitive Skills    [facility]        Group Therapy Note    Attendees: 5         Patient's Goal:  Improve cognitive skills     Notes:  Pt was pleasant and participated well     Status After Intervention:  Improved    Participation Level:  Active Listener and Interactive    Participation Quality: Appropriate, Attentive and Sharing      Speech:  normal      Thought Process/Content: Logical      Affective Functioning: Congruent      Mood: euthymic      Level of consciousness:  Alert      Response to Learning: Able to verbalize current knowledge/experience, Capable of insight and Progressing to goal      Endings: None Reported    Modes of Intervention: Socialization and Activity      Discipline Responsible: Psychoeducational Specialist      Signature:  Chago Duarte

## 2022-06-14 NOTE — PROGRESS NOTES
Daily Progress Note  6/14/2022    Patient Name: Joellen Olivarez: Depression with suicidal ideation         SUBJECTIVE:      Patient is seen today for a follow up assessment. Patient has been compliant with scheduled medication at this time and has not required emergency medication in the past 24 hours. When approached for interview patient states that he is feeling anxious about new placement however is hopeful for his goal of sobriety. Patient endorses depression likely is low and is denying suicidal ideation at this time. Patient states that motivation to prevent relapse going forward is himself and fear that he could harm himself in the future if he continues to drink. Patient states he is able to contract for safety outside the hospital and looking forward to the future. Appetite:  [x] Adequate/Unchanged  [] Increased  [] Decreased      Sleep:       [x] Adequate/Unchanged  [] Fair  [] Poor      Group Attendance on Unit:   [] Yes   [x] Selectively    [] No    Medication Side Effects: Denies          Mental Status Exam  Level of consciousness: Alert and awake   Appearance: Appropriate attire for setting, seated in chair, with good  grooming and hygiene   Behavior/Motor: Approachable, engages with interviewer  Attitude toward examiner: Cooperative, attentive, good eye contact  Speech: Normal rate, volume and tone  Mood: \"Better\"  Affect: Somewhat congruent, anxious  Thought processes: Linear and coherent, goal directed  Thought content: Denies homicidal ideation  Suicidal Ideation: Denies suicidal ideations, contracts for safety on the unit. Delusions: No evidence of delusions. Perceptual Disturbance: Denies, patient does not appear to be responding to internal stimuli. Cognition: Oriented to self, location, time, and situation  Memory: intact  Insight: fair   Judgement: fair       Data   height is 5' 10\" (1.778 m) and weight is 194 lb (88 kg).  His oral temperature is 97.7 °F (36.5 °C). His blood pressure is 125/69 and his pulse is 69. His respiration is 14. Labs:   No visits with results within 2 Day(s) from this visit.    Latest known visit with results is:   Admission on 03/11/2022, Discharged on 03/12/2022   Component Date Value Ref Range Status    WBC 03/11/2022 8.8  3.5 - 11.3 k/uL Final    RBC 03/11/2022 5.62  4.21 - 5.77 m/uL Final    Hemoglobin 03/11/2022 16.7  13.0 - 17.0 g/dL Final    Hematocrit 03/11/2022 48.8  40.7 - 50.3 % Final    MCV 03/11/2022 86.8  82.6 - 102.9 fL Final    MCH 03/11/2022 29.7  25.2 - 33.5 pg Final    MCHC 03/11/2022 34.2  28.4 - 34.8 g/dL Final    RDW 03/11/2022 13.1  11.8 - 14.4 % Final    Platelets 53/31/0253 345  138 - 453 k/uL Final    MPV 03/11/2022 9.7  8.1 - 13.5 fL Final    NRBC Automated 03/11/2022 0.0  0.0 per 100 WBC Final    Seg Neutrophils 03/11/2022 48  36 - 65 % Final    Lymphocytes 03/11/2022 45* 24 - 43 % Final    Monocytes 03/11/2022 6  3 - 12 % Final    Eosinophils % 03/11/2022 1  1 - 4 % Final    Basophils 03/11/2022 0  0 - 2 % Final    Immature Granulocytes 03/11/2022 0  0 % Final    Segs Absolute 03/11/2022 4.13  1.50 - 8.10 k/uL Final    Absolute Lymph # 03/11/2022 3.96* 1.10 - 3.70 k/uL Final    Absolute Mono # 03/11/2022 0.55  0.10 - 1.20 k/uL Final    Absolute Eos # 03/11/2022 0.09  0.00 - 0.44 k/uL Final    Basophils Absolute 03/11/2022 <0.03  0.00 - 0.20 k/uL Final    Absolute Immature Granulocyte 03/11/2022 <0.03  0.00 - 0.30 k/uL Final    Glucose 03/11/2022 131* 70 - 99 mg/dL Final    BUN 03/11/2022 14  6 - 20 mg/dL Final    CREATININE 03/11/2022 0.80  0.70 - 1.20 mg/dL Final    Calcium 03/11/2022 9.1  8.6 - 10.4 mg/dL Final    Sodium 03/11/2022 149* 135 - 144 mmol/L Final    Potassium 03/11/2022 4.2  3.7 - 5.3 mmol/L Final    Chloride 03/11/2022 108* 98 - 107 mmol/L Final    CO2 03/11/2022 27  20 - 31 mmol/L Final    Anion Gap 03/11/2022 14  9 - 17 mmol/L Final    Alkaline Phosphatase 03/11/2022 111  40 - 129 U/L Final    ALT 03/11/2022 16  5 - 41 U/L Final    AST 03/11/2022 17  <40 U/L Final    Total Bilirubin 03/11/2022 0.72  0.3 - 1.2 mg/dL Final    Total Protein 03/11/2022 8.1  6.4 - 8.3 g/dL Final    Albumin 03/11/2022 4.8  3.5 - 5.2 g/dL Final    Albumin/Globulin Ratio 03/11/2022 1.5  1.0 - 2.5 Final    GFR Non- 03/11/2022 >60  >60 mL/min Final    GFR  03/11/2022 >60  >60 mL/min Final    GFR Comment 03/11/2022        Final    Comment: Average GFR for 38-51 years old:   80 mL/min/1.73sq m  Chronic Kidney Disease:   <60 mL/min/1.73sq m  Kidney failure:   <15 mL/min/1.73sq m              eGFR calculated using average adult body mass. Additional eGFR calculator available at:        [employer]            Magnesium 03/11/2022 2.0  1.6 - 2.6 mg/dL Final    Phosphorus 03/11/2022 3.3  2.5 - 4.5 mg/dL Final    Ventricular Rate 03/11/2022 85  BPM Final    Atrial Rate 03/11/2022 85  BPM Final    P-R Interval 03/11/2022 152  ms Final    QRS Duration 03/11/2022 86  ms Final    Q-T Interval 03/11/2022 370  ms Final    QTc Calculation (Bazett) 03/11/2022 440  ms Final    P Axis 03/11/2022 49  degrees Final    R Axis 03/11/2022 -14  degrees Final    T Axis 03/11/2022 29  degrees Final    Specimen Description 03/11/2022 . NASOPHARYNGEAL SWAB   Final    SARS-CoV-2, Rapid 03/11/2022 Not Detected  Not Detected Final    Comment:       Rapid NAAT:  The specimen is NEGATIVE for SARS-CoV-2, the novel coronavirus associated with   COVID-19. The ID NOW COVID-19 assay is designed to detect the virus that causes COVID-19 in patients   with signs and symptoms of infection who are suspected of COVID-19. An individual without symptoms of COVID-19 and who is not shedding SARS-CoV-2 virus would   expect to have a negative (not detected) result in this assay.   Negative results should be treated as presumptive and, if inconsistent with clinical signs   and symptoms or necessary for patient management,  should be tested with an alternative molecular assay. Negative results do not preclude   SARS-CoV-2 infection and   should not be used as the sole basis for patient management decisions. Fact sheet for Healthcare Providers: [place]  Fact sheet for Patients: [place]          Methodology: Isothermal Nucleic Acid Amplification      Ethanol 03/11/2022 231* <10 mg/dL Final    Ethanol percent 03/11/2022 0.231* <0.010 % Final         Reviewed patient's current plan of care and vital signs with nursing staff.     Labs reviewed: [x] Yes    Medications  Current Facility-Administered Medications: busPIRone (BUSPAR) tablet 15 mg, 15 mg, Oral, TID  carvedilol (COREG) tablet 3.125 mg, 3.125 mg, Oral, BID WC  losartan (COZAAR) tablet 100 mg, 100 mg, Oral, Daily  VORTIoxetine (TRINTELLIX) tablet 10 mg, 10 mg, Oral, Daily  lurasidone (LATUDA) tablet 40 mg, 40 mg, Oral, Daily  acetaminophen (TYLENOL) tablet 650 mg, 650 mg, Oral, Q6H PRN  ibuprofen (ADVIL;MOTRIN) tablet 400 mg, 400 mg, Oral, Q6H PRN  hydrOXYzine HCl (ATARAX) tablet 50 mg, 50 mg, Oral, TID PRN  traZODone (DESYREL) tablet 50 mg, 50 mg, Oral, Nightly PRN  polyethylene glycol (GLYCOLAX) packet 17 g, 17 g, Oral, Daily PRN  aluminum & magnesium hydroxide-simethicone (MAALOX) 200-200-20 MG/5ML suspension 30 mL, 30 mL, Oral, Q6H PRN  nicotine polacrilex (NICORETTE) gum 2 mg, 2 mg, Oral, Q2H PRN  thiamine mononitrate tablet 100 mg, 100 mg, Oral, Daily  therapeutic multivitamin-minerals 1 tablet, 1 tablet, Oral, Daily  LORazepam (ATIVAN) tablet 1 mg, 1 mg, Oral, Q1H PRN **OR** LORazepam (ATIVAN) injection 1 mg, 1 mg, IntraMUSCular, Q1H PRN **OR** LORazepam (ATIVAN) tablet 2 mg, 2 mg, Oral, Q1H PRN **OR** LORazepam (ATIVAN) injection 2 mg, 2 mg, IntraMUSCular, Q1H PRN **OR** LORazepam (ATIVAN) tablet 3 mg, 3 mg, Oral, Q1H PRN **OR** LORazepam (ATIVAN) injection 3 mg, 3 mg, IntraMUSCular, Q1H PRN **OR** LORazepam (ATIVAN) tablet 4 mg, 4 mg, Oral, Q1H PRN **OR** LORazepam (ATIVAN) injection 4 mg, 4 mg, IntraMUSCular, K3T PRN  folic acid (FOLVITE) tablet 1 mg, 1 mg, Oral, Daily    ASSESSMENT  Major depressive disorder, recurrent severe without psychotic features ([address]         HANDOFF  Patient symptoms: Showing improved  Medications as determined by attending physician  Encourage participation in groups and milieu. Probable discharge is to be determined by MD    Electronically signed by KILLIAN Bragg CNP on 6/14/2022 at 5:33 PM    **This report has been created using voice recognition software. It may contain minor errors which are inherent in voice recognition technology. **    I independently saw and evaluated the patient. I reviewed the  documentation above. Any additional comments or changes to the   documentation are stated below otherwise agree with assessment. The patient reports an improvement in his mood. He is still interested in going to a residential rehab facility. He continues to report depressive symptoms but has not had suicidal thoughts. He has tolerated higher doses of medications before. He is hoping for discharge tomorrow. His Trintellix dose has been increased to 20 mg daily. No changes have been made to his Latuda dose. PLAN  Medications as noted above  Attempt to develop insight  Psycho-education conducted. Estimated Length of Stay is 2-5 days  Supportive Therapy conducted.   Follow-up daily while on inpatient unit    Electronically signed by Amalia Armas MD on 6/14/22 at 7:52 PM EDT

## 2022-06-14 NOTE — PROGRESS NOTES
CLINICAL PHARMACY NOTE: MEDS TO BEDS    Total # of Prescriptions Filled: 6   The following medications were delivered to the patient:  · Buspirone HCL 96AZ  · Folic Acid 1mg  · Trintellix 20mg  · Thiamine Mononitrate 100mg  · Multivitamin Adult 0 Tab  · Latuda 40mg    Additional Documentation:  [facility]

## 2022-06-14 NOTE — GROUP NOTE
Group Therapy Note    Date: 6/14/2022    Group Start Time: 1000  Group End Time: 1030  Group Topic: Psychotherapy    [facility] D    Atif Low        Group Therapy Note    Attendees: 6/16         Patient's Goal: PT will demonstrate increased interpersonal interaction and a clear understanding on multiple types of coping skills relating to the here-and-now therapeutic practice. Notes:  Patient is making progress, AEB participating in group discussion, actively listening, and supporting other group members. PT participates in group and encourages others to participate     Status After Intervention:  Improved    Participation Level: Active Listener and Interactive    Participation Quality: Appropriate and Attentive      Speech:  normal      Thought Process/Content: Logical      Affective Functioning: Flat      Mood: stable      Level of consciousness:  Alert, Oriented x4 and Attentive      Response to Learning: Able to verbalize/acknowledge new learning and Progressing to goal      Endings: None Reported    Modes of Intervention: Support, Socialization, Exploration, Clarifying and Problem-solving      Discipline Responsible: /Counselor      Signature:   Atif Low

## 2022-06-14 NOTE — GROUP NOTE
Group Therapy Note    Date: 6/14/2022    Group Start Time: 1330  Group End Time: 9816  Group Topic: Psychoeducation    [facility] KIYA DunawayS        Group Therapy Note    Attendees: 8         Patient's Goal: Improve decision-making skills. Notes:  Patient was friendly with peers and writer. Patient was active and remained engaged throughout group. Status After Intervention:  Improved    Participation Level:  Active Listener and Interactive    Participation Quality: Appropriate, Attentive and Supportive      Speech:  normal      Thought Process/Content: Logical  Linear      Affective Functioning: Congruent      Mood: euthymic      Level of consciousness:  Attentive      Response to Learning: Able to verbalize current knowledge/experience, Capable of insight and Progressing to goal      Endings: None Reported    Modes of Intervention: Socialization and Activity      Discipline Responsible: Psychoeducational Specialist      Signature:  Luis Ford

## 2022-06-14 NOTE — CARE COORDINATION
DISCHARGE UPDATE:  - Pt advises staff confirmed to go to [facility] on 6/15 for an intake at 1:00pm.    - [facility] arrival on 6/15 by 1:00pm canceled.  - Dr. Aurelia Sal ordered meds-to-beds

## 2022-06-14 NOTE — PLAN OF CARE
Problem: Self Harm/Suicidality  Goal: Will have no self-injury during hospital stay  6/14/2022 1743 by Emil Pereyra RN  Outcome: Progressing     Problem: Anxiety  Goal: Will report anxiety at manageable levels  6/14/2022 1743 by Emil Pereyra RN  Outcome: Progressing  [address] (Taken 6/14/2022 0907)  Will report anxiety at manageable levels:   Administer medication as ordered   Provide emotional support with 1:1 interaction with staff     Problem: Coping  Goal: Pt/Family able to verbalize concerns and demonstrate effective coping strategies  Outcome: Progressing     Problem: Decision Making  Goal: Pt/Family able to effectively weigh alternatives and participate in decision making related to treatment and care  Outcome: Progressing     Pt denies harm to self and others. 15 minute visual checks completed. Pt up in day room most of the day interacting with peers. Med's given as ordered per provider.

## 2022-06-14 NOTE — PLAN OF CARE
Problem: Anxiety  Goal: Will report anxiety at manageable levels  Description: INTERVENTIONS:  1. Administer medication as ordered  2. Teach and rehearse alternative coping skills  3. Provide emotional support with 1:1 interaction with staff  6/14/2022 0409 by Yordy Redding RN    Outcome: Progressing  Flowsheets  Taken 6/13/2022 1001  Will report anxiety at manageable levels:   Administer medication as ordered   Teach and rehearse alternative coping skills   Provide emotional support with 1:1 interaction with staff  Taken 6/13/2022 0956  Will report anxiety at manageable levels:   Administer medication as ordered   Teach and rehearse alternative coping skills   Provide emotional support with 1:1 interaction with staff  Note: Patient reports anxiety is improving. Patient is compliant with medications and remains in behavioral control. Problem: Self Harm/Suicidality  Goal: Will have no self-injury during hospital stay  Description: INTERVENTIONS:  1. Q 30 MINUTES: Routine safety checks  2. Q SHIFT & PRN: Assess risk to determine if routine checks are adequate to maintain patient safety    Outcome: Progressing  Note: Patient denies thoughts of harm to self or others. Visual safety checks are completed every 15 minutes. Pt denies suicidal ideations at this time. Pt agreed to seek staff at anytime he felt like any urges to harm self would arise. Safety checks maintained [id number]. Patient is complaining of anxiety at this time. Stating that they feel restless and are having trouble sleeping and calming down in order to rest this evening. Medication was given as prescribed for increased anxiety see MAR. Pt remains free from self harm this shift. Pt denies wanting to cause harm to self or others at this time. Pt encouraged to seek nursing staff at anytime if he felt at danger to themselves or others. Pt states understanding.  Safety checks maintained [id number]

## 2022-06-14 NOTE — GROUP NOTE
Group Therapy Note    Date: 6/14/2022    Group Start Time: 2030  Group End Time: 2100  Group Topic: Wrap-Up    [facility] JOEL Ha RN        Group Therapy Note    Attendees: 5/15           Status After Intervention:  Improved    Participation Level:  Active Listener    Participation Quality: Appropriate      Speech:  normal      Thought Process/Content: Logical      Affective Functioning: Congruent      Level of consciousness:  Alert      Response to Learning: Able to verbalize current knowledge/experience      Endings: None Reported    Modes of Intervention: Education      Discipline Responsible[facility]      Signature:  Mac Ha RN

## 2022-06-15 VITALS
SYSTOLIC BLOOD PRESSURE: 120 MMHG | BODY MASS INDEX: 27.77 KG/M2 | HEIGHT: 70 IN | HEART RATE: 54 BPM | TEMPERATURE: 97.4 F | DIASTOLIC BLOOD PRESSURE: 81 MMHG | RESPIRATION RATE: 15 BRPM | WEIGHT: 194 LBS

## 2022-06-15 PROCEDURE — 6370000000 HC RX 637 (ALT 250 FOR IP)

## 2022-06-15 PROCEDURE — 99239 HOSP IP/OBS DSCHRG MGMT >30: CPT | Performed by: PSYCHIATRY & NEUROLOGY

## 2022-06-15 PROCEDURE — 6370000000 HC RX 637 (ALT 250 FOR IP): Performed by: INTERNAL MEDICINE

## 2022-06-15 PROCEDURE — 6370000000 HC RX 637 (ALT 250 FOR IP): Performed by: PSYCHIATRY & NEUROLOGY

## 2022-06-15 PROCEDURE — 6370000000 HC RX 637 (ALT 250 FOR IP): Performed by: NURSE PRACTITIONER

## 2022-06-15 RX ADMIN — LURASIDONE HYDROCHLORIDE 40 MG: 40 TABLET, FILM COATED ORAL at 08:02

## 2022-06-15 RX ADMIN — CARVEDILOL 3.12 MG: 3.12 TABLET, FILM COATED ORAL at 08:00

## 2022-06-15 RX ADMIN — BUSPIRONE HYDROCHLORIDE 15 MG: 15 TABLET ORAL at 08:02

## 2022-06-15 RX ADMIN — LOSARTAN POTASSIUM 100 MG: 100 TABLET, FILM COATED ORAL at 08:01

## 2022-06-15 RX ADMIN — FOLIC ACID 1 MG: 1 TABLET ORAL at 08:02

## 2022-06-15 RX ADMIN — NICOTINE POLACRILEX 2 MG: 2 GUM, CHEWING BUCCAL at 09:00

## 2022-06-15 RX ADMIN — MULTIPLE VITAMINS W/ MINERALS TAB 1 TABLET: TAB at 08:01

## 2022-06-15 RX ADMIN — VORTIOXETINE 20 MG: 20 TABLET, FILM COATED ORAL at 08:02

## 2022-06-15 RX ADMIN — THIAMINE HCL TAB 100 MG 100 MG: 100 TAB at 08:01

## 2022-06-15 RX ADMIN — HYDROXYZINE HYDROCHLORIDE 50 MG: 50 TABLET, FILM COATED ORAL at 10:02

## 2022-06-15 ASSESSMENT — PAIN SCALES - GENERAL: PAINLEVEL_OUTOF10: 0

## 2022-06-15 NOTE — PLAN OF CARE
Problem: Self Harm/Suicidality  Goal: Will have no self-injury during hospital stay  Description: INTERVENTIONS:  1. Q 30 MINUTES: Routine safety checks  2. Q SHIFT & PRN: Assess risk to determine if routine checks are adequate to maintain patient safety  6/14/2022 2042 by Janet Lucero RN  Note: Patient has remained free of self-harm. Problem: Anxiety  Goal: Will report anxiety at manageable levels  Description: INTERVENTIONS:  1. Administer medication as ordered  2. Teach and rehearse alternative coping skills  3. Provide emotional support with 1:1 interaction with staff  6/14/2022 2042 by Janet Lucero RN  Note: Patient reports anxiety at manageable levels, and states his readiness for discharge tomorrow. Patient denies suicidal ideations, reports adequate sleep and appetite. He denies any hallucinations. Patient taking his medications as prescribed.

## 2022-06-15 NOTE — CARE COORDINATION
Name: Tex Gonzalez    : 1980    Discharge Date: 6/15/2022    Primary Auth/Cert #: [id number]    Destination: [facility]    Discharge Medications:      Medication List      START taking these medications    lurasidone 40 MG Tabs tablet  Commonly known as: LATUDA  Take 1 tablet by mouth daily  Notes to patient: Antidepressant     VORTIoxetine HBr 20 MG Tabs tablet  Commonly known as: TRINTELLIX  Take 1 tablet by mouth daily  Notes to patient: Antidepressant        CONTINUE taking these medications    busPIRone 15 MG tablet  Commonly known as: BUSPAR  Take 15 mg by mouth 3 times daily  Notes to patient: For anxiety     carvedilol 3.125 MG tablet  Commonly known as: COREG  Take 1 tablet by mouth 2 times daily (with meals)  Notes to patient: For blood pressure     folic acid 1 MG tablet  Commonly known as: FOLVITE  Take 1 tablet by mouth daily  Notes to patient: Vitamin     losartan 100 MG tablet  Commonly known as: COZAAR  Take 1 tablet by mouth daily  Notes to patient:  For blood pressure     therapeutic multivitamin-minerals tablet  Take 1 tablet by mouth daily  Notes to patient: Vitamin     thiamine mononitrate 100 MG tablet  Take 1 tablet by mouth daily  Notes to patient: Vitamin        STOP taking these medications    atomoxetine 25 MG capsule  Commonly known as: STRATTERA     chlorthalidone 25 MG tablet  Commonly known as: HYGROTON     DULoxetine 20 MG extended release capsule  Commonly known as: CYMBALTA           Where to Get Your Medications      These medications were sent to [address] [place] [address] [address], [address] [address]    Phone: [phone]   · busPIRone 15 MG tablet  · carvedilol 6.012 MG tablet  · folic acid 1 MG tablet  · losartan 100 MG tablet  · lurasidone 40 MG Tabs tablet  · therapeutic multivitamin-minerals tablet  · thiamine mononitrate 100 MG tablet  · VORTIoxetine HBr 20 MG Tabs tablet Follow Up Appointment: Discharge to [facility]:  [address]  [place], [address] [facility]  Phone: [phone]  MEDS-TO-BEDS    Go on 6/15/2022  Please send by [facility] to arrive by 11:00 am.    Dr. Fredy Jonas., [address] [place] [address]  Phone:  [phone]  NO FAX AVS REQUIRED  Go on 6/27/2022 Monday, June 27 at 1:30 pm with Dr. Jimbo Britton for a hospital discharge appointment.
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
## 2022-06-15 NOTE — BH NOTE
Patient given tobacco quitline number 80750503484 at this time, refusing to call at this time, states \" I just dont want to quit now\"- patient given information as to the dangers of long term tobacco use. Continue to reinforce the importance of tobacco cessation.

## 2022-06-15 NOTE — BH NOTE
Patient discharge order put in at 9:33 AM. Writer called a [employer] cab at 9:52 AM. Patient being sent with [facility] to [facility]. ETA of cab is 40 minutes from call time.

## 2022-06-15 NOTE — BH NOTE
[facility]  Discharge Note    Pt discharged with followings belongings:   Dental Appliances: None  Vision - Corrective Lenses: Eyeglasses  Hearing Aid: None  Jewelry: Bracelet,Ring  Body Piercings Removed: No  Clothing: Shorts,Shirt,Belt  Other Valuables:  (Credit carda)   Valuables sent home with patient and returned to patient. Patient education on aftercare instructions: yes  Information faxed to Dr. Neha Oakley by social work  at 10:17 AM .Patient verbalize understanding of AVS:  yes. Status EXAM upon discharge: Alert and oriented x4. Patient denies suicidal ideations and thoughts to harm self or others. Mental Status and Behavioral Exam  Normal: No  Level of Assistance: Independent/Self  Facial Expression: Flat  Affect: Blunt  Level of Consciousness: Alert  Frequency of Checks: 4 times per hour, close  Mood:Normal: No  Mood: Anxious  Motor Activity:Normal: Yes  Motor Activity: Decreased  Eye Contact: Good  Observed Behavior: Cooperative  Sexual Misconduct History: Current - no  Preception: [place] to time,[place] to person,[place] to place,[place] to situation  Attention:Normal: Yes  Attention: Distractible  Thought Processes: Circumstantial  Thought Content:Normal: Yes  Thought Content: Preoccupations  Depression Symptoms: Sleep disturbance  Anxiety Symptoms: Generalized  [place] Symptoms: No problems reported or observed.   Hallucinations: None  Delusions: No  Memory:Normal: No  Memory: Poor recent,Poor remote  Insight and Judgment: No  Insight and Judgment: Poor insight      Metabolic Screening:    Lab Results   Component Value Date    LABA1C 5.4 09/25/2020       Lab Results   Component Value Date    CHOL 127 02/04/2022    CHOL 129 07/14/2020    CHOL 123 01/20/2019    CHOL 129 11/06/2018     Lab Results   Component Value Date    TRIG 80 02/04/2022    TRIG 117 07/14/2020    TRIG 73 01/20/2019    TRIG 89 11/06/2018     Lab Results   Component Value Date    HDL 36 (L) 02/04/2022    HDL 31 (L) 07/14/2020 HDL 35 (L) 03/24/2020    HDL 53 01/20/2019    HDL 36 (L) 11/06/2018     No components found for: LDLCAL  No results found for: LABVLDL     Patient discharged with valuables and belongings with Rony Dash and Vanessa zaman to [facility] in [place].     Cabrera Gonsalves RN

## 2022-07-23 ENCOUNTER — HOSPITAL ENCOUNTER (EMERGENCY)
Age: 42
Discharge: HOME | End: 2022-07-24
Attending: STUDENT IN AN ORGANIZED HEALTH CARE EDUCATION/TRAINING PROGRAM

## 2022-07-23 DIAGNOSIS — F10.920 ACUTE ALCOHOLIC INTOXICATION WITHOUT COMPLICATION (HCC): Primary | ICD-10-CM

## 2022-07-23 PROCEDURE — 96372 THER/PROPH/DIAG INJ SC/IM: CPT

## 2022-07-23 PROCEDURE — 99285 EMERGENCY DEPT VISIT HI MDM: CPT

## 2022-07-24 VITALS
DIASTOLIC BLOOD PRESSURE: 85 MMHG | RESPIRATION RATE: 20 BRPM | HEIGHT: 69 IN | SYSTOLIC BLOOD PRESSURE: 130 MMHG | HEART RATE: 88 BPM | BODY MASS INDEX: 29.77 KG/M2 | TEMPERATURE: 98.3 F | OXYGEN SATURATION: 97 % | WEIGHT: 201 LBS

## 2022-07-24 LAB
-: ABNORMAL
ABSOLUTE EOS #: 0.11 K/UL (ref 0–0.44)
ABSOLUTE IMMATURE GRANULOCYTE: 0.02 K/UL (ref 0–0.3)
ABSOLUTE LYMPH #: 3.11 K/UL (ref 1.1–3.7)
ABSOLUTE MONO #: 0.51 K/UL (ref 0.1–1.2)
ACETAMINOPHEN LEVEL: <10 UG/ML (ref 10–30)
ALBUMIN SERPL-MCNC: 4.4 G/DL (ref 3.5–5.2)
ALP BLD-CCNC: 54 U/L (ref 40–129)
ALT SERPL-CCNC: 19 U/L (ref 5–41)
AMPHETAMINE SCREEN URINE: NEGATIVE
ANION GAP SERPL CALCULATED.3IONS-SCNC: 11 MMOL/L (ref 9–17)
AST SERPL-CCNC: 21 U/L
BARBITURATE SCREEN URINE: NEGATIVE
BASOPHILS # BLD: 1 % (ref 0–2)
BASOPHILS ABSOLUTE: 0.04 K/UL (ref 0–0.2)
BENZODIAZEPINE SCREEN, URINE: POSITIVE
BILIRUB SERPL-MCNC: 0.51 MG/DL (ref 0.3–1.2)
BILIRUBIN URINE: NEGATIVE
BUN BLDV-MCNC: 6 MG/DL (ref 6–20)
BUN/CREAT BLD: 6 (ref 9–20)
CALCIUM SERPL-MCNC: 8.7 MG/DL (ref 8.6–10.4)
CANNABINOID SCREEN URINE: NEGATIVE
CASTS UA: ABNORMAL /LPF
CASTS UA: ABNORMAL /LPF
CHLORIDE BLD-SCNC: 110 MMOL/L (ref 98–107)
CO2: 26 MMOL/L (ref 20–31)
COCAINE METABOLITE, URINE: NEGATIVE
COLOR: YELLOW
CREAT SERPL-MCNC: 0.96 MG/DL (ref 0.7–1.2)
EOSINOPHILS RELATIVE PERCENT: 2 % (ref 1–4)
EPITHELIAL CELLS UA: ABNORMAL /HPF (ref 0–5)
ETHANOL PERCENT: 0.09 %
ETHANOL PERCENT: 0.13 %
ETHANOL PERCENT: 0.23 %
ETHANOL PERCENT: 0.32 %
ETHANOL: 126 MG/DL
ETHANOL: 230 MG/DL
ETHANOL: 317 MG/DL
ETHANOL: 93 MG/DL
FENTANYL URINE: NEGATIVE
GFR AFRICAN AMERICAN: >60 ML/MIN
GFR NON-AFRICAN AMERICAN: >60 ML/MIN
GFR SERPL CREATININE-BSD FRML MDRD: ABNORMAL ML/MIN/{1.73_M2}
GLUCOSE BLD-MCNC: 117 MG/DL (ref 70–99)
GLUCOSE URINE: NEGATIVE
HCT VFR BLD CALC: 42.8 % (ref 40.7–50.3)
HEMOGLOBIN: 14.3 G/DL (ref 13–17)
IMMATURE GRANULOCYTES: 0 %
KETONES, URINE: NEGATIVE
LEUKOCYTE ESTERASE, URINE: NEGATIVE
LYMPHOCYTES # BLD: 49 % (ref 24–43)
MCH RBC QN AUTO: 29.7 PG (ref 25.2–33.5)
MCHC RBC AUTO-ENTMCNC: 33.4 G/DL (ref 28.4–34.8)
MCV RBC AUTO: 88.8 FL (ref 82.6–102.9)
METHADONE SCREEN, URINE: NEGATIVE
MONOCYTES # BLD: 8 % (ref 3–12)
MUCUS: ABNORMAL
NITRITE, URINE: NEGATIVE
NRBC AUTOMATED: 0 PER 100 WBC
OPIATES, URINE: NEGATIVE
OXYCODONE SCREEN URINE: NEGATIVE
PDW BLD-RTO: 14.2 % (ref 11.8–14.4)
PH UA: 6 (ref 5–8)
PHENCYCLIDINE, URINE: NEGATIVE
PLATELET # BLD: 285 K/UL (ref 138–453)
PMV BLD AUTO: 9.4 FL (ref 8.1–13.5)
POTASSIUM SERPL-SCNC: 3.9 MMOL/L (ref 3.7–5.3)
PROTEIN UA: NEGATIVE
RBC # BLD: 4.82 M/UL (ref 4.21–5.77)
RBC UA: ABNORMAL /HPF (ref 0–2)
SALICYLATE LEVEL: <1 MG/DL (ref 3–10)
SEG NEUTROPHILS: 40 % (ref 36–65)
SEGMENTED NEUTROPHILS ABSOLUTE COUNT: 2.54 K/UL (ref 1.5–8.1)
SODIUM BLD-SCNC: 147 MMOL/L (ref 135–144)
SPECIFIC GRAVITY UA: 1.02 (ref 1–1.03)
TEST INFORMATION: ABNORMAL
TOTAL PROTEIN: 7.4 G/DL (ref 6.4–8.3)
TURBIDITY: ABNORMAL
URINE HGB: NEGATIVE
UROBILINOGEN, URINE: NORMAL
WBC # BLD: 6.3 K/UL (ref 3.5–11.3)
WBC UA: ABNORMAL /HPF (ref 0–5)

## 2022-07-24 PROCEDURE — 81001 URINALYSIS AUTO W/SCOPE: CPT

## 2022-07-24 PROCEDURE — 6360000002 HC RX W HCPCS: Performed by: EMERGENCY MEDICINE

## 2022-07-24 PROCEDURE — 80053 COMPREHEN METABOLIC PANEL: CPT

## 2022-07-24 PROCEDURE — 80143 DRUG ASSAY ACETAMINOPHEN: CPT

## 2022-07-24 PROCEDURE — 6370000000 HC RX 637 (ALT 250 FOR IP): Performed by: STUDENT IN AN ORGANIZED HEALTH CARE EDUCATION/TRAINING PROGRAM

## 2022-07-24 PROCEDURE — 96372 THER/PROPH/DIAG INJ SC/IM: CPT

## 2022-07-24 PROCEDURE — 80179 DRUG ASSAY SALICYLATE: CPT

## 2022-07-24 PROCEDURE — 85025 COMPLETE CBC W/AUTO DIFF WBC: CPT

## 2022-07-24 PROCEDURE — 80307 DRUG TEST PRSMV CHEM ANLYZR: CPT

## 2022-07-24 PROCEDURE — 36415 COLL VENOUS BLD VENIPUNCTURE: CPT

## 2022-07-24 PROCEDURE — G0480 DRUG TEST DEF 1-7 CLASSES: HCPCS

## 2022-07-24 RX ORDER — MIDAZOLAM HYDROCHLORIDE 1 MG/ML
2 INJECTION INTRAMUSCULAR; INTRAVENOUS ONCE
Status: DISCONTINUED | OUTPATIENT
Start: 2022-07-24 | End: 2022-07-24

## 2022-07-24 RX ORDER — HALOPERIDOL 5 MG/ML
5 INJECTION INTRAMUSCULAR ONCE
Status: DISCONTINUED | OUTPATIENT
Start: 2022-07-24 | End: 2022-07-24

## 2022-07-24 RX ORDER — DIPHENHYDRAMINE HYDROCHLORIDE 50 MG/ML
50 INJECTION INTRAMUSCULAR; INTRAVENOUS ONCE
Status: DISCONTINUED | OUTPATIENT
Start: 2022-07-24 | End: 2022-07-24

## 2022-07-24 RX ORDER — LORAZEPAM 2 MG/ML
1 INJECTION INTRAMUSCULAR ONCE
Status: COMPLETED | OUTPATIENT
Start: 2022-07-24 | End: 2022-07-24

## 2022-07-24 RX ORDER — LORAZEPAM 2 MG/ML
1 INJECTION INTRAMUSCULAR ONCE
Status: DISCONTINUED | OUTPATIENT
Start: 2022-07-24 | End: 2022-07-24

## 2022-07-24 RX ORDER — LORAZEPAM 1 MG/1
2 TABLET ORAL ONCE
Status: COMPLETED | OUTPATIENT
Start: 2022-07-24 | End: 2022-07-24

## 2022-07-24 RX ADMIN — LORAZEPAM 1 MG: 2 INJECTION INTRAMUSCULAR; INTRAVENOUS at 14:08

## 2022-07-24 RX ADMIN — Medication 1 MG: at 09:20

## 2022-07-24 RX ADMIN — LORAZEPAM 2 MG: 1 TABLET ORAL at 01:28

## 2022-07-24 ASSESSMENT — PAIN - FUNCTIONAL ASSESSMENT: PAIN_FUNCTIONAL_ASSESSMENT: NONE - DENIES PAIN

## 2022-07-24 NOTE — FLOWSHEET NOTE
Pt has remained calm and co operative during ed stay asleep most of the time.  Pt denies any further suicide ideation and states he only said that because he was drunk pt now alert oriented offered several options for inpt alcohol rehab attempted to call 2 places of pt's choice but they were unavailable on the week end pt states he would like to be released so that he can make his own arrangements for treatment

## 2022-07-24 NOTE — ED NOTES
Patient verbally disruptive in room. Accusing officer sitting on patient of being racist and \"writing a report\" about him. Patient advised multiple times that this is the suicide safety checklist that the officer is documenting. Patient continues to be argumentative with staff members. Dr David Mustache in room to speak to patient.       Minal Angelo, LILIANE  07/24/22 2725

## 2022-07-24 NOTE — ED PROVIDER NOTES
[facility]  Emergency Department Encounter     Pt Name: Amrit Espino  MRN: [id number]  [place] 1980  Date of evaluation: 7/24/22  PCP:  No primary care provider on file. CHIEF COMPLAINT       Chief Complaint   Patient presents with    Suicidal    Alcohol Intoxication     States he was in sober living and drank 1/5 of 80 proof tonight         HISTORY OFPRESENT ILLNESS  (Location/Symptom, Timing/Onset, Context/Setting, Quality, Duration, Modifying Factors,Severity.)      Amrit Espino is a 39 y.o. male who presents with reported alcohol intoxication and suicidal thoughts. Patient is chronic alcoholic. Recently in sober living but reportedly relapsed. Patient is also having aggressive behavior wanting to throw things at others. Extensive history of alcohol abuse and dependence. Was recently at [facility] in June for suicidal ideation. 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 has a past surgical history that includes Tonsillectomy (1983); Finger surgery (Left, 2004); Cystocopy (Bilateral, 12/19/2018); and Cystoscopy (Bilateral, 12/19/2018).     Social History     Socioeconomic History    Marital status:      Spouse name: Not on file    Number of children: 2    Years of education: Not on file    Highest education level: Not on file   Occupational History    Not on file   Tobacco Use    Smoking status: Former     Packs/day: 1.00     Years: 20.00     Pack years: 20.00     Types: Cigarettes    Smokeless tobacco: Former     Types: Snuff, Chew   Vaping Use    Vaping Use: Every day   Substance and Sexual Activity    Alcohol use: Yes     Comment: hx of alcoholism with intermittent sobriety;     Drug use: Not Currently Types: Cocaine     Comment: reports using on 1/17/19    Sexual activity: Not Currently   Other Topics Concern    Not on file   Social History Narrative    Not on file     Social Determinants of Health     Financial Resource Strain: Not on file   Food Insecurity: Not on file   Transportation Needs: Not on file   Physical Activity: Not on file   Stress: Not on file   Social Connections: Not on file   Intimate Partner Violence: Not on file   Housing Stability: Not on file       Family History   Problem Relation Age of Onset    Mental Illness Mother     Depression Mother     High Blood Pressure Father     High Cholesterol Father     Substance Abuse Brother     Breast Cancer Maternal Grandmother        Allergies:  Dicloxacillin and Pcn [penicillins]    Home Medications:  Prior to Admission medications    Medication Sig Start Date End Date Taking?  Authorizing Provider   busPIRone (BUSPAR) 15 MG tablet Take 15 mg by mouth 3 times daily 6/14/22   Tamra Valladares MD   carvedilol (COREG) 3.125 MG tablet Take 1 tablet by mouth 2 times daily (with meals) 6/14/22   Tamra Valladares MD   folic acid (FOLVITE) 1 MG tablet Take 1 tablet by mouth daily 6/14/22   Tamra Valladares MD   losartan (COZAAR) 100 MG tablet Take 1 tablet by mouth daily 6/14/22   Tamra Valladares MD   VORTIoxetine (TRINTELLIX) 20 MG TABS tablet Take 1 tablet by mouth daily 6/15/22   Tamra Valladares MD   thiamine mononitrate 100 MG tablet Take 1 tablet by mouth daily 6/14/22   Tamra Valladares MD   Multiple Vitamins-Minerals (THERAPEUTIC MULTIVITAMIN-MINERALS) tablet Take 1 tablet by mouth daily 6/14/22   Tamra Valladares MD   lurasidone (LATUDA) 40 MG TABS tablet Take 1 tablet by mouth daily 6/15/22   Tamra Valladares MD       REVIEW OF SYSTEMS    (2-9 systems for level 4, 10 or more for level 5)      Review of Systems   Unable to perform ROS: Other refused to answer questions    PHYSICAL EXAM   (up to 7 for level 4, 8 or more for level 5)     INITIAL VITALS:    height is 5' 9\" (1.753 m) and weight is 201 lb (91.2 kg). His temperature is 98.3 °F (36.8 °C). His blood pressure is 105/65 and his pulse is 86. His respiration is 16 and oxygen saturation is 95%. Physical Exam  Vitals and nursing note reviewed. Constitutional:       Appearance: He is well-developed. HENT:      Head: Normocephalic and atraumatic. Nose: Nose normal.      Mouth/Throat:      Mouth: Mucous membranes are moist.   Eyes:      General: No scleral icterus. Conjunctiva/sclera: Conjunctivae normal.      Pupils: Pupils are equal, round, and reactive to light. Cardiovascular:      Rate and Rhythm: Normal rate and regular rhythm. Heart sounds: Normal heart sounds. No murmur heard. No friction rub. No gallop. Pulmonary:      Effort: Pulmonary effort is normal. No respiratory distress. Breath sounds: Normal breath sounds. No wheezing or rales. Musculoskeletal:         General: Normal range of motion. Skin:     General: Skin is warm and dry. Findings: No erythema or rash. Neurological:      Mental Status: He is alert and oriented to person, place, and time.       Comments: Slurring words   Psychiatric:      Comments: Agitated, yelling, poor eye contact       DIFFERENTIAL  DIAGNOSIS     PLAN (LABS / Gisela Sheila / EKG):  Orders Placed This Encounter   Procedures    CBC with Auto Differential    Comprehensive Metabolic Panel w/ Reflex to MG    Ethanol    Urinalysis    URINE DRUG SCREEN    Acetaminophen Level    Salicylate    Ethanol       MEDICATIONS ORDERED:  Orders Placed This Encounter   Medications    DISCONTD: midazolam (VERSED) injection 2 mg    DISCONTD: haloperidol lactate (HALDOL) injection 5 mg    DISCONTD: diphenhydrAMINE (BENADRYL) injection 50 mg    LORazepam (ATIVAN) tablet 2 mg    LORazepam (ATIVAN) injection 1 mg       DDX: Alcohol intoxication versus alcohol dependence versus drug-seeking behavior versus suicidal ideation    Initial MDM/Plan: 39 y.o. male who presents with long history of alcohol dependence. Reported previous threats of self-harm however appears markedly intoxicated. Requesting Ativan. Yelling and agitated. May require sedation. DIAGNOSTIC RESULTS / EMERGENCY DEPARTMENT COURSE / MDM     LABS:  Labs Reviewed   CBC WITH AUTO DIFFERENTIAL - Abnormal; Notable for the following components:       Result Value    Lymphocytes 49 (*)     All other components within normal limits   COMPREHENSIVE METABOLIC PANEL W/ REFLEX TO MG FOR LOW K - Abnormal; Notable for the following components:    Glucose 117 (*)     Bun/Cre Ratio 6 (*)     Sodium 147 (*)     Chloride 110 (*)     All other components within normal limits   ETHANOL - Abnormal; Notable for the following components:    Ethanol 317 (*)     Ethanol percent 0.317 (*)     All other components within normal limits   ACETAMINOPHEN LEVEL - Abnormal; Notable for the following components:    Acetaminophen Level <10 (*)     All other components within normal limits   SALICYLATE LEVEL - Abnormal; Notable for the following components:    Salicylate Lvl <1 (*)     All other components within normal limits   ETHANOL - Abnormal; Notable for the following components:    Ethanol 230 (*)     Ethanol percent 0.230 (*)     All other components within normal limits   URINALYSIS   URINE DRUG SCREEN         RADIOLOGY:  No results found. [facility] DEPARTMENT COURSE:  ED Course as of 07/24/22 0854   Bernardino Bernabe Jul 24, 2022   [address] [place] [address](!!): 317 [MS]      ED Course User Index  [MS] Richa Luna DO   Patient was able to be redirected given 2 mg p.o. Ativan. Did not require any sedation. Patient's alcohol elevated at 317. Patient signed out to oncoming physician awaiting's sobriety and reassessment. PROCEDURES:  None    CONSULTS:  None    CRITICAL CARE:  0    FINAL IMPRESSION      1. Acute alcoholic intoxication without complication ([facility]    2.  Suicidal ideations          DISPOSITION / PLAN     DISPOSITION      Per oncoming physician    PATIENTREFERRED TO:  No follow-up provider specified.     DISCHARGE MEDICATIONS:  New Prescriptions    No medications on file       Lisa Leigh DO  EmergencyMedicine Attending    (Please note that portions of this note were completed with a voice recognition program.  Efforts were made to edit the dictations but occasionally words are mis-transcribed.)       Lisa Leigh DO  07/24/22 8699

## 2022-07-24 NOTE — ED NOTES
Pt presenting to the ED with suicidal ideation and thoughts of harm to others as well as himself. Pt does states that he does have a plan to end his life. Pt states his plan is \"To drink enough for my heart to stop beating. \" Pt states he recently was in [facility] and relapsed, so they kicked me out and I have nothing left to live for. \" Pt states \"my mind is telling me to throw things and hurt others as well with things. \"   Pt states \" even if you keep me for 30 years, I still will end up killing himself. \"   Pt A&Ox4.       Stevan Nash RN  07/24/22 414

## 2022-07-24 NOTE — ED PROVIDER NOTES
EMERGENCY DEPARTMENT ENCOUNTER    Pt Name: Kristin Victoria  MRN: [id number]  [place] 1980  Date of evaluation: 7/24/22      MEDICAL DECISION MAKING:   Signout from Dr. Cindy Carballo. 49-year-old male presents intoxicated with suicidal ideations. Patient was in long-term treatment/recovery program for alcohol however he violated their rules and was discharged. Patient is undomiciled. After 15 hours his alcohol level has decreased from 317 down to 93. He is awake, alert, cooperative denies SI, HI. He needs assistance in finding another recovery program that offers housing. Social work will be consulted to help find placement. Fluid bolus ordered with a volume of 1000 ml. The 30 ml/kg fluid bolus is not ordered due to concern for fluid overload and/or heart failure. ED Course as of 07/24/22 1732   Suzan Mackenzie Jul 24, 2022   [address] [place] [address](!!): 317 [MS]      ED Course User Index  [MS] Luis Angel Pastor DO       DIAGNOSTIC RESULTS   EKG:All EKG's are interpreted by the Emergency Department Physician who either signs or Co-signs this chart in the absence of a cardiologist.        RADIOLOGY:All plain film, CT, MRI, and formal ultrasound images (except ED bedside ultrasound) are read by the radiologist, see reports below, unless otherwisenoted in MDM or here. No orders to display     LABS: All lab results were reviewed by myself, and all abnormals are listed below.   Labs Reviewed   CBC WITH AUTO DIFFERENTIAL - Abnormal; Notable for the following components:       Result Value    Lymphocytes 49 (*)     All other components within normal limits   COMPREHENSIVE METABOLIC PANEL W/ REFLEX TO MG FOR LOW K - Abnormal; Notable for the following components:    Glucose 117 (*)     Bun/Cre Ratio 6 (*)     Sodium 147 (*)     Chloride 110 (*)     All other components within normal limits   ETHANOL - Abnormal; Notable for the following components:    Ethanol 317 (*)     Ethanol percent 0.317 (*)     All other components within normal limits   URINALYSIS - Abnormal; Notable for the following components:    Turbidity UA Cloudy (*)     All other components within normal limits   URINE DRUG SCREEN - Abnormal; Notable for the following components:    Benzodiazepine Screen, Urine POSITIVE (*)     All other components within normal limits   ACETAMINOPHEN LEVEL - Abnormal; Notable for the following components:    Acetaminophen Level <10 (*)     All other components within normal limits   SALICYLATE LEVEL - Abnormal; Notable for the following components:    Salicylate Lvl <1 (*)     All other components within normal limits   ETHANOL - Abnormal; Notable for the following components:    Ethanol 230 (*)     Ethanol percent 0.230 (*)     All other components within normal limits   ETHANOL - Abnormal; Notable for the following components:    Ethanol 126 (*)     Ethanol percent 0.126 (*)     All other components within normal limits   MICROSCOPIC URINALYSIS - Abnormal; Notable for the following components:    Mucus, UA 4+ (*)     All other components within normal limits   ETHANOL - Abnormal; Notable for the following components:    Ethanol 93 (*)     Ethanol percent 0.093 (*)     All other components within normal limits       EMERGENCY DEPARTMENTCOURSE:   Social work consulted. Patient given list of recovery treatment centers. Patient is clinically sober. Ambulating without difficulty. Denies SI, HI. Does not pose harm to self or others. Patient stable for discharge.       Vitals:    Vitals:    07/23/22 2339 07/23/22 2342 07/24/22 0800 07/24/22 1345   BP: 123/81  105/65 130/85   Pulse: (!) 122  86 88   Resp: 18  16 20   Temp: 98.3 °F (36.8 °C)      SpO2: 95%  95% 97%   Weight:  201 lb (91.2 kg)     Height:  5' 9\" (1.753 m)         The patient was given the following medications while in the emergency department:  Orders Placed This Encounter   Medications    DISCONTD: midazolam (VERSED) injection 2 mg    DISCONTD: haloperidol lactate (HALDOL) injection 5 mg    DISCONTD: diphenhydrAMINE (BENADRYL) injection 50 mg    LORazepam (ATIVAN) tablet 2 mg    DISCONTD: LORazepam (ATIVAN) injection 1 mg    LORazepam (ATIVAN) injection 1 mg    LORazepam (ATIVAN) injection 1 mg     CONSULTS:  None    FINAL IMPRESSION      1. Acute alcoholic intoxication without complication [facility])          DISPOSITION/PLAN   DISPOSITION Decision To Discharge 07/24/2022 05:31:41 PM      PATIENT REFERRED TO:  No follow-up provider specified.   DISCHARGE MEDICATIONS:  New Prescriptions    No medications on file     Neha Graham MD  Attending Emergency Physician                    Dianne Thacker MD  07/24/22 2113

## 2022-07-25 ENCOUNTER — HOSPITAL ENCOUNTER (EMERGENCY)
Age: 42
Discharge: HOME | End: 2022-07-26
Attending: EMERGENCY MEDICINE
Payer: MEDICAID

## 2022-07-25 DIAGNOSIS — F10.920 ACUTE ALCOHOLIC INTOXICATION WITHOUT COMPLICATION (HCC): Primary | ICD-10-CM

## 2022-07-25 PROCEDURE — 99283 EMERGENCY DEPT VISIT LOW MDM: CPT

## 2022-07-25 ASSESSMENT — PAIN SCALES - GENERAL: PAINLEVEL_OUTOF10: 8

## 2022-07-25 ASSESSMENT — PAIN - FUNCTIONAL ASSESSMENT: PAIN_FUNCTIONAL_ASSESSMENT: 0-10

## 2022-07-25 NOTE — ED NOTES
Pt presents to ed via ems for alcohol intoxication. Per ems pt was just released from [address] [facility] 3 hrs ago. Ems states pt was found by PD walking [address] [facility]. Pt denies Suicidal ideations. Pt c/o head and neck pain rated 8/10.       [facility]  07/25/22 0470

## 2022-07-25 NOTE — ED PROVIDER NOTES
[facility] ED  eMERGENCY dEPARTMENT eNCOUnter      Pt Name: Juan Ulloa  MRN: [id number]  [place] 1980  Date of evaluation: 7/25/2022  Provider: KILLIAN Casillas CNP    CHIEF COMPLAINT       Chief Complaint   Patient presents with    Alcohol Intoxication         HISTORY OF PRESENT ILLNESS  (Location/Symptom, Timing/Onset, Context/Setting, Quality, Duration, Modifying Factors, Severity.)   Juan Ulloa is a 39 y.o. male who presents to the emergency department via EMS for alcohol intoxication. He was found walking on a sidewalk. He appeared altered and EMS was called. He was discharged from [facility] approx 3 hours PTA. The patient admitted to consuming alcohol. He denies falling. Denies a head injury. Denies pain. Denies fever, chills, dizziness, emesis, diarrhea. Nursing Notes were reviewed. ALLERGIES     Dicloxacillin and Pcn [penicillins]    CURRENT MEDICATIONS       Previous Medications    BUSPIRONE (BUSPAR) 15 MG TABLET    Take 15 mg by mouth 3 times daily    CARVEDILOL (COREG) 3.125 MG TABLET    Take 1 tablet by mouth 2 times daily (with meals)    FOLIC ACID (FOLVITE) 1 MG TABLET    Take 1 tablet by mouth daily    LOSARTAN (COZAAR) 100 MG TABLET    Take 1 tablet by mouth daily    LURASIDONE (LATUDA) 40 MG TABS TABLET    Take 1 tablet by mouth daily    MULTIPLE VITAMINS-MINERALS (THERAPEUTIC MULTIVITAMIN-MINERALS) TABLET    Take 1 tablet by mouth daily    THIAMINE MONONITRATE 100 MG TABLET    Take 1 tablet by mouth daily    VORTIOXETINE (TRINTELLIX) 20 MG TABS TABLET    Take 1 tablet by mouth daily       PAST MEDICAL HISTORY         Diagnosis Date    Alcoholism ([facility] 01/31/2018    hx of alcoholism with intermittent sobriety; Anxiety 1996    ON RX    Hepatitis C 2010    pt states he tested + for antibodies.  no live virus detected -no treatment needed    History of atrial fibrillation 2001    pt states after a suicide attempt, overdose oxycontin pt developed atrial fib x 5-6 months. 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, acute intoxication ([address]     NO LONGER PERTINENT    Palpitations     DENIES    Seizures ([address] 2006    during alcohol detox     Wears glasses        SURGICAL HISTORY           Procedure Laterality Date    CYSTOSCOPY Bilateral 12/19/2018    Retrograde pyelogram    CYSTOSCOPY Bilateral 12/19/2018    CYSTOSCOPY, RETROGRADE PYELOGRAM performed by Elan Bishop MD at [address] Left 2004    second finger REATTACH LIGAMENT AND TENDONS UNDER LOCAL    TONSILLECTOMY  1983         FAMILY HISTORY           Problem Relation Age of Onset    Mental Illness Mother     Depression Mother     High Blood Pressure Father     High Cholesterol Father     Substance Abuse Brother     Breast Cancer Maternal Grandmother      Family Status   Relation Name Status    Mother Jeremy Talbot    Father BRANCH Alive    Brother TANIA Alive    MGM  Alive    MGF  Alive    PGM  Alive    PGF  Alive        SOCIAL HISTORY      reports that he has quit smoking. His smoking use included cigarettes. He has a 20.00 pack-year smoking history. He has quit using smokeless tobacco.  His smokeless tobacco use included snuff and chew. He reports current alcohol use. He reports that he does not currently use drugs after having used the following drugs: Cocaine. REVIEW OF SYSTEMS    (2-9 systems for level 4, 10 or more for level 5)     Review of Systems   Constitutional:  Negative for chills, diaphoresis, fatigue and fever. HENT:  Negative for congestion and sore throat. Respiratory:  Negative for cough and shortness of breath. Cardiovascular:  Negative for chest pain. Gastrointestinal:  Negative for abdominal pain, diarrhea, nausea and vomiting. Genitourinary:  Negative for dysuria. Musculoskeletal:  Negative for back pain, myalgias and neck pain. Skin:  Negative for color change, rash and wound.    Neurological:  Negative for facial asymmetry, weakness and headaches. Except as noted above the remainder of the review of systems was reviewed and negative. PHYSICAL EXAM    (up to 7 for level 4, 8 or more for level 5)     ED Triage Vitals   BP Temp Temp Source Heart Rate Resp SpO2 Height Weight   07/25/22 1746 07/25/22 1749 07/25/22 1749 07/25/22 1746 07/25/22 1746 07/25/22 1746 07/25/22 1746 07/25/22 1746   115/81 99 °F (37.2 °C) Oral (!) 130 18 94 % 5' 9\" (1.753 m) 201 lb (91.2 kg)     Physical Exam  Vitals (Exam limited due to patient mental status) reviewed. Constitutional:       General: He is not in acute distress. Appearance: He is well-developed. He is not diaphoretic. HENT:      Right Ear: External ear normal.      Left Ear: External ear normal.   Eyes:      Conjunctiva/sclera: Conjunctivae normal.      Pupils: Pupils are equal, round, and reactive to light. Cardiovascular:      Rate and Rhythm: Normal rate and regular rhythm. Pulmonary:      Effort: Pulmonary effort is normal. No respiratory distress. Breath sounds: No stridor. No wheezing or rales. Abdominal:      General: There is no distension. Palpations: Abdomen is soft. Tenderness: There is no guarding. Skin:     General: Skin is warm and dry. Findings: No rash. Neurological:      GCS: GCS eye subscore is 3. GCS verbal subscore is 4. GCS motor subscore is 5. Comments: Moves extremities. No facial droop noted. Psychiatric:         Behavior: Behavior normal.        EMERGENCY DEPARTMENT COURSE and DIFFERENTIAL DIAGNOSIS/MDM:   Vitals:    Vitals:    07/25/22 1746 07/25/22 1749 07/25/22 1930   BP: 115/81  110/74   Pulse: (!) 130  (!) 103   Resp: 18  18   Temp:  99 °F (37.2 °C)    TempSrc:  Oral    SpO2: 94%     Weight: 201 lb (91.2 kg)     Height: 5' 9\" (1.753 m)           CLINICAL DECISION MAKING:  The patient presented alert with a nontoxic appearance and was seen in conjunction with Dr. Stephanie Louis.  The patient will be observed here in the ED until he becomes alert and oriented with a steady gait and has a ride home. Care was resumed to the physician. See her dictation for further evaluation and treatment. Care was provided during an unprecedented national emergency due to the novel coronavirus, Covid-19. FINAL IMPRESSION      1. Acute alcoholic intoxication without complication ([facility] [address]            Problem List  Patient Active Problem List   Diagnosis Code    Mood disorder ([facility] F39    Alcohol dependence ([facility] F10.20    Seizures ([address] R56.9    Headache R51.9    Depression F32. A    Alcoholism ([facility] [address] F10.20    Bipolar affective disorder ([address] F31.9    Major depressive disorder, recurrent severe without psychotic features ([address] F33.2    Polysubstance dependence ([address] F19.20    Suicidal ideation R45.851    History of hiatal hernia Z87.19    History of insomnia Z87.898    Depression with suicidal ideation F32. A, U87.344    Alcoholic intoxication, with unspecified complication ([address] P69.560    Hypertension I10    Alcohol abuse with withdrawal, uncomplicated ([address] Z66.896    Chronic hepatitis C without hepatic coma ([address] B18.2         DISPOSITION/PLAN   DISPOSITION        PATIENT REFERRED TO:   Call Lisette Mott to establish care for follow up    Schedule an appointment as soon as possible for a visit       DISCHARGE MEDICATIONS:     New Prescriptions    No medications on file           (Please note that portions of this note were completed with a voice recognition program.  Efforts were made to edit the dictations but occasionally words are mis-transcribed.)    KILLIAN Hamilton - KILLIAN Hollingsworth CNP  07/27/22 6230

## 2022-07-25 NOTE — ED NOTES
Pt connected to cardiac monitor. States he can not stay sober.       Pratima Gilliam RN  07/25/22 0787

## 2022-07-26 VITALS
SYSTOLIC BLOOD PRESSURE: 120 MMHG | OXYGEN SATURATION: 98 % | BODY MASS INDEX: 29.77 KG/M2 | HEIGHT: 69 IN | RESPIRATION RATE: 17 BRPM | DIASTOLIC BLOOD PRESSURE: 74 MMHG | HEART RATE: 89 BPM | WEIGHT: 201 LBS | TEMPERATURE: 98.4 F

## 2022-07-26 NOTE — ED PROVIDER NOTES
eMERGENCY dEPARTMENT eNCOUnter   Independent Attestation     Pt Name: Han De Los Santos  MRN: [id number]  [place] 1980  Date of evaluation: 7/25/22     Han De Los Santos is a 39 y.o. male with CC: Alcohol Intoxication      This visit was performed by both a physician and an APC. I performed all aspects of the MDM as documented. The care is provided during an unprecedented national emergency due to the novel coronavirus, COVID 19.     Jay Jacobson DO  Attending Emergency Physician                 Guy Price DO  07/25/22 4944

## 2022-07-26 NOTE — ED NOTES
Pt discharged home at this time     Ro Garcia, [facility]  07/26/22 2278

## 2022-07-26 NOTE — ED NOTES
Writer at bedside pt asleep  Pt to be discharge first thing in the       [place], LILIANE  07/26/22 0583

## 2022-07-27 ASSESSMENT — ENCOUNTER SYMPTOMS
ABDOMINAL PAIN: 0
SORE THROAT: 0
COUGH: 0
NAUSEA: 0
SHORTNESS OF BREATH: 0
VOMITING: 0
DIARRHEA: 0
COLOR CHANGE: 0
BACK PAIN: 0

## 2023-07-04 ENCOUNTER — HOSPITAL ENCOUNTER (INPATIENT)
Age: 43
LOS: 2 days | Discharge: HOME | DRG: 751 | End: 2023-07-07
Attending: EMERGENCY MEDICINE | Admitting: PSYCHIATRY & NEUROLOGY
Payer: MEDICAID

## 2023-07-04 DIAGNOSIS — R45.851 DEPRESSION WITH SUICIDAL IDEATION: ICD-10-CM

## 2023-07-04 DIAGNOSIS — F10.920 ACUTE ALCOHOLIC INTOXICATION WITHOUT COMPLICATION (HCC): Primary | ICD-10-CM

## 2023-07-04 DIAGNOSIS — F32.A DEPRESSION WITH SUICIDAL IDEATION: ICD-10-CM

## 2023-07-04 LAB
ALBUMIN SERPL-MCNC: 4.4 G/DL (ref 3.5–5.2)
ALP SERPL-CCNC: 81 U/L (ref 40–129)
ALT SERPL-CCNC: 17 U/L (ref 5–41)
AMPHET UR QL SCN: NEGATIVE
ANION GAP SERPL CALCULATED.3IONS-SCNC: 15 MMOL/L (ref 9–17)
APAP SERPL-MCNC: <5 UG/ML (ref 10–30)
AST SERPL-CCNC: 17 U/L
BARBITURATES UR QL SCN: NEGATIVE
BENZODIAZ UR QL: NEGATIVE
BILIRUB SERPL-MCNC: 0.3 MG/DL (ref 0.3–1.2)
BUN SERPL-MCNC: 8 MG/DL (ref 6–20)
CALCIUM SERPL-MCNC: 9.9 MG/DL (ref 8.6–10.4)
CANNABINOIDS UR QL SCN: POSITIVE
CHLORIDE SERPL-SCNC: 105 MMOL/L (ref 98–107)
CO2 SERPL-SCNC: 24 MMOL/L (ref 20–31)
COCAINE UR QL SCN: NEGATIVE
CREAT SERPL-MCNC: 0.72 MG/DL (ref 0.7–1.2)
ERYTHROCYTE [DISTWIDTH] IN BLOOD BY AUTOMATED COUNT: 15 % (ref 11.5–14.9)
ETHANOL PERCENT: 0.29 %
ETHANOLAMINE SERPL-MCNC: 293 MG/DL
FENTANYL UR QL: NEGATIVE
GFR SERPL CREATININE-BSD FRML MDRD: >60 ML/MIN/1.73M2
GLUCOSE SERPL-MCNC: 99 MG/DL (ref 70–99)
HCT VFR BLD AUTO: 42.4 % (ref 41–53)
HGB BLD-MCNC: 14.3 G/DL (ref 13.5–17.5)
MCH RBC QN AUTO: 30.4 PG (ref 26–34)
MCHC RBC AUTO-ENTMCNC: 33.6 G/DL (ref 31–37)
MCV RBC AUTO: 90.6 FL (ref 80–100)
METHADONE UR QL: NEGATIVE
OPIATES UR QL SCN: NEGATIVE
OXYCODONE UR QL SCN: NEGATIVE
PCP UR QL SCN: NEGATIVE
PLATELET # BLD AUTO: 236 K/UL (ref 150–450)
PMV BLD AUTO: 7.1 FL (ref 6–12)
POTASSIUM SERPL-SCNC: 3.5 MMOL/L (ref 3.7–5.3)
PROT SERPL-MCNC: 7.4 G/DL (ref 6.4–8.3)
RBC # BLD AUTO: 4.69 M/UL (ref 4.5–5.9)
SALICYLATES SERPL-MCNC: <1 MG/DL (ref 3–10)
SODIUM SERPL-SCNC: 144 MMOL/L (ref 135–144)
TEST INFORMATION: ABNORMAL
TOXIC TRICYCLIC SC,BLOOD: ABNORMAL
TRICYCLIC ANTIDEP,URINE: NEGATIVE
TSH SERPL DL<=0.05 MIU/L-ACNC: 1.17 UIU/ML (ref 0.3–5)
WBC OTHER # BLD: 6.7 K/UL (ref 3.5–11)

## 2023-07-04 PROCEDURE — 99285 EMERGENCY DEPT VISIT HI MDM: CPT

## 2023-07-04 PROCEDURE — 80053 COMPREHEN METABOLIC PANEL: CPT

## 2023-07-04 PROCEDURE — G0480 DRUG TEST DEF 1-7 CLASSES: HCPCS

## 2023-07-04 PROCEDURE — 6370000000 HC RX 637 (ALT 250 FOR IP): Performed by: EMERGENCY MEDICINE

## 2023-07-04 PROCEDURE — 36415 COLL VENOUS BLD VENIPUNCTURE: CPT

## 2023-07-04 PROCEDURE — 85027 COMPLETE CBC AUTOMATED: CPT

## 2023-07-04 PROCEDURE — 80307 DRUG TEST PRSMV CHEM ANLYZR: CPT

## 2023-07-04 PROCEDURE — 80179 DRUG ASSAY SALICYLATE: CPT

## 2023-07-04 PROCEDURE — 96372 THER/PROPH/DIAG INJ SC/IM: CPT

## 2023-07-04 PROCEDURE — 6360000002 HC RX W HCPCS: Performed by: EMERGENCY MEDICINE

## 2023-07-04 PROCEDURE — 80143 DRUG ASSAY ACETAMINOPHEN: CPT

## 2023-07-04 PROCEDURE — 84443 ASSAY THYROID STIM HORMONE: CPT

## 2023-07-04 RX ORDER — HALOPERIDOL 5 MG/ML
5 INJECTION INTRAMUSCULAR ONCE
Status: COMPLETED | OUTPATIENT
Start: 2023-07-04 | End: 2023-07-04

## 2023-07-04 RX ORDER — DIPHENHYDRAMINE HYDROCHLORIDE 50 MG/ML
50 INJECTION INTRAMUSCULAR; INTRAVENOUS ONCE
Status: COMPLETED | OUTPATIENT
Start: 2023-07-04 | End: 2023-07-04

## 2023-07-04 RX ORDER — HYDROXYZINE 50 MG/1
50 TABLET, FILM COATED ORAL ONCE
Status: COMPLETED | OUTPATIENT
Start: 2023-07-04 | End: 2023-07-04

## 2023-07-04 RX ADMIN — DIPHENHYDRAMINE HYDROCHLORIDE 50 MG: 50 INJECTION, SOLUTION INTRAMUSCULAR; INTRAVENOUS at 18:47

## 2023-07-04 RX ADMIN — HYDROXYZINE HYDROCHLORIDE 50 MG: 50 TABLET, FILM COATED ORAL at 18:34

## 2023-07-04 RX ADMIN — HALOPERIDOL LACTATE 5 MG: 5 INJECTION, SOLUTION INTRAMUSCULAR at 18:47

## 2023-07-04 ASSESSMENT — LIFESTYLE VARIABLES
HOW OFTEN DO YOU HAVE A DRINK CONTAINING ALCOHOL: 4 OR MORE TIMES A WEEK
HOW MANY STANDARD DRINKS CONTAINING ALCOHOL DO YOU HAVE ON A TYPICAL DAY: 7 TO 9

## 2023-07-04 ASSESSMENT — ENCOUNTER SYMPTOMS
CONSTIPATION: 0
EYE PAIN: 0
SORE THROAT: 0
COLOR CHANGE: 0
TROUBLE SWALLOWING: 0
FACIAL SWELLING: 0
BLOOD IN STOOL: 0
COUGH: 0
VOMITING: 0
SHORTNESS OF BREATH: 0
RHINORRHEA: 0
WHEEZING: 0
CHEST TIGHTNESS: 0
BACK PAIN: 0
EYE DISCHARGE: 0
SINUS PRESSURE: 0
EYE REDNESS: 0
DIARRHEA: 0
ABDOMINAL PAIN: 0
NAUSEA: 0

## 2023-07-04 ASSESSMENT — PAIN - FUNCTIONAL ASSESSMENT: PAIN_FUNCTIONAL_ASSESSMENT: NONE - DENIES PAIN

## 2023-07-04 NOTE — ED NOTES
Mode of arrival (squad #, walk in, police, etc) : Walk in         Chief complaint(s): mental health problem         Arrival Note (brief scenario, treatment PTA, etc). : Pt states he is SI. Pt states he just moved here and his medications are not regulated. Pt states different dr.s are prescribing different things. Pt states he wants to hang himself. Pt denies being HI but states he has the potential to get there. Pt just repeats I am not right and its not right. Pt states he needs to detox. Pt states having 5 alcoholic drinks in the last 24 hours. Pt denies any drug use. C= \"Have you ever felt that you should Cut down on your drinking? \"  Yes  A= \"Have people Annoyed you by criticizing your drinking? \"  Yes  G= \"Have you ever felt bad or Guilty about your drinking? \"  Yes  E= \"Have you ever had a drink as an Eye-opener first thing in the morning to steady your nerves or to help a hangover? \"  Yes    Deferred []      Reason for deferring: N/A    *If yes to two or more: probable alcohol abuse. Tani Mathew RN  07/04/23 7226

## 2023-07-04 NOTE — ED NOTES
Safeguard in [facility] AN AFFILIATE OF [facility] for patient watch. Safeguard informed that they need to stay with the patient at all time, must be present in the room and if they need a break or relief to let the nurse know so they can be replaced. Safeguard verbalizes understanding. Belongings and patient checked by security. Belongings locked up. Pt in blue gown.        Sammy Begum LPN  17/47/05 [id number]

## 2023-07-04 NOTE — ED NOTES
Pt received Atarax dose per request for anxiety medication. After taking med pt started yelling and throwing cups of water at wall, stating that he is going through withdrawal and needs something stronger than Atarax, Pt yelling at [facility] [place] stating that he needs Ativan or xanax.       Varinder Dyson, MONIQUEN  58/90/55 [id number]

## 2023-07-05 PROBLEM — F10.920 ACUTE ALCOHOLIC INTOXICATION WITHOUT COMPLICATION (HCC): Status: ACTIVE | Noted: 2023-07-05

## 2023-07-05 PROCEDURE — APPSS60 APP SPLIT SHARED TIME 46-60 MINUTES: Performed by: PSYCHIATRY & NEUROLOGY

## 2023-07-05 PROCEDURE — 6370000000 HC RX 637 (ALT 250 FOR IP): Performed by: PSYCHIATRY & NEUROLOGY

## 2023-07-05 PROCEDURE — 99222 1ST HOSP IP/OBS MODERATE 55: CPT | Performed by: INTERNAL MEDICINE

## 2023-07-05 PROCEDURE — 99223 1ST HOSP IP/OBS HIGH 75: CPT | Performed by: PSYCHIATRY & NEUROLOGY

## 2023-07-05 PROCEDURE — 6360000002 HC RX W HCPCS: Performed by: PSYCHIATRY & NEUROLOGY

## 2023-07-05 PROCEDURE — 1240000000 HC EMOTIONAL WELLNESS R&B

## 2023-07-05 RX ORDER — LORAZEPAM 2 MG/ML
2 INJECTION INTRAMUSCULAR
Status: DISCONTINUED | OUTPATIENT
Start: 2023-07-05 | End: 2023-07-07 | Stop reason: HOSPADM

## 2023-07-05 RX ORDER — LORAZEPAM 2 MG/ML
3 INJECTION INTRAMUSCULAR
Status: DISCONTINUED | OUTPATIENT
Start: 2023-07-05 | End: 2023-07-07 | Stop reason: HOSPADM

## 2023-07-05 RX ORDER — FOLIC ACID 1 MG/1
1 TABLET ORAL DAILY
Status: DISCONTINUED | OUTPATIENT
Start: 2023-07-05 | End: 2023-07-07 | Stop reason: HOSPADM

## 2023-07-05 RX ORDER — HYDROXYZINE 50 MG/1
50 TABLET, FILM COATED ORAL 3 TIMES DAILY PRN
Status: DISCONTINUED | OUTPATIENT
Start: 2023-07-05 | End: 2023-07-07 | Stop reason: HOSPADM

## 2023-07-05 RX ORDER — LORAZEPAM 1 MG/1
3 TABLET ORAL
Status: DISCONTINUED | OUTPATIENT
Start: 2023-07-05 | End: 2023-07-07 | Stop reason: HOSPADM

## 2023-07-05 RX ORDER — POLYETHYLENE GLYCOL 3350 17 G
2 POWDER IN PACKET (EA) ORAL
Status: DISCONTINUED | OUTPATIENT
Start: 2023-07-05 | End: 2023-07-07 | Stop reason: HOSPADM

## 2023-07-05 RX ORDER — ONDANSETRON 4 MG/1
4 TABLET, ORALLY DISINTEGRATING ORAL EVERY 8 HOURS PRN
Status: DISCONTINUED | OUTPATIENT
Start: 2023-07-05 | End: 2023-07-07 | Stop reason: HOSPADM

## 2023-07-05 RX ORDER — CHLORPROMAZINE HYDROCHLORIDE 25 MG/1
50 TABLET, FILM COATED ORAL 3 TIMES DAILY PRN
Status: DISCONTINUED | OUTPATIENT
Start: 2023-07-05 | End: 2023-07-07 | Stop reason: HOSPADM

## 2023-07-05 RX ORDER — ONDANSETRON 2 MG/ML
4 INJECTION INTRAMUSCULAR; INTRAVENOUS EVERY 6 HOURS PRN
Status: DISCONTINUED | OUTPATIENT
Start: 2023-07-05 | End: 2023-07-07 | Stop reason: HOSPADM

## 2023-07-05 RX ORDER — M-VIT,TX,IRON,MINS/CALC/FOLIC 27MG-0.4MG
1 TABLET ORAL DAILY
Status: DISCONTINUED | OUTPATIENT
Start: 2023-07-05 | End: 2023-07-07 | Stop reason: HOSPADM

## 2023-07-05 RX ORDER — LOSARTAN POTASSIUM 50 MG/1
100 TABLET ORAL DAILY
Status: DISCONTINUED | OUTPATIENT
Start: 2023-07-05 | End: 2023-07-07 | Stop reason: HOSPADM

## 2023-07-05 RX ORDER — ENOXAPARIN SODIUM 100 MG/ML
40 INJECTION SUBCUTANEOUS DAILY
Status: DISCONTINUED | OUTPATIENT
Start: 2023-07-05 | End: 2023-07-06

## 2023-07-05 RX ORDER — IBUPROFEN 400 MG/1
400 TABLET ORAL EVERY 6 HOURS PRN
Status: DISCONTINUED | OUTPATIENT
Start: 2023-07-05 | End: 2023-07-07 | Stop reason: HOSPADM

## 2023-07-05 RX ORDER — LORAZEPAM 1 MG/1
1 TABLET ORAL
Status: DISCONTINUED | OUTPATIENT
Start: 2023-07-05 | End: 2023-07-07 | Stop reason: HOSPADM

## 2023-07-05 RX ORDER — TRAZODONE HYDROCHLORIDE 50 MG/1
50 TABLET ORAL NIGHTLY PRN
Status: DISCONTINUED | OUTPATIENT
Start: 2023-07-05 | End: 2023-07-07 | Stop reason: HOSPADM

## 2023-07-05 RX ORDER — POLYETHYLENE GLYCOL 3350 17 G/17G
17 POWDER, FOR SOLUTION ORAL DAILY PRN
Status: DISCONTINUED | OUTPATIENT
Start: 2023-07-05 | End: 2023-07-07 | Stop reason: HOSPADM

## 2023-07-05 RX ORDER — LORAZEPAM 2 MG/ML
1 INJECTION INTRAMUSCULAR
Status: DISCONTINUED | OUTPATIENT
Start: 2023-07-05 | End: 2023-07-07 | Stop reason: HOSPADM

## 2023-07-05 RX ORDER — ACETAMINOPHEN 650 MG/1
650 SUPPOSITORY RECTAL EVERY 6 HOURS PRN
Status: DISCONTINUED | OUTPATIENT
Start: 2023-07-05 | End: 2023-07-07 | Stop reason: HOSPADM

## 2023-07-05 RX ORDER — CHLORPROMAZINE HYDROCHLORIDE 25 MG/ML
50 INJECTION INTRAMUSCULAR 3 TIMES DAILY PRN
Status: DISCONTINUED | OUTPATIENT
Start: 2023-07-05 | End: 2023-07-07 | Stop reason: HOSPADM

## 2023-07-05 RX ORDER — LORAZEPAM 1 MG/1
4 TABLET ORAL
Status: DISCONTINUED | OUTPATIENT
Start: 2023-07-05 | End: 2023-07-07 | Stop reason: HOSPADM

## 2023-07-05 RX ORDER — MAGNESIUM HYDROXIDE/ALUMINUM HYDROXICE/SIMETHICONE 120; 1200; 1200 MG/30ML; MG/30ML; MG/30ML
30 SUSPENSION ORAL EVERY 6 HOURS PRN
Status: DISCONTINUED | OUTPATIENT
Start: 2023-07-05 | End: 2023-07-07 | Stop reason: HOSPADM

## 2023-07-05 RX ORDER — LORAZEPAM 1 MG/1
2 TABLET ORAL
Status: DISCONTINUED | OUTPATIENT
Start: 2023-07-05 | End: 2023-07-07 | Stop reason: HOSPADM

## 2023-07-05 RX ORDER — ACETAMINOPHEN 325 MG/1
650 TABLET ORAL EVERY 6 HOURS PRN
Status: DISCONTINUED | OUTPATIENT
Start: 2023-07-05 | End: 2023-07-07 | Stop reason: HOSPADM

## 2023-07-05 RX ORDER — BISACODYL 5 MG/1
5 TABLET, DELAYED RELEASE ORAL DAILY PRN
Status: DISCONTINUED | OUTPATIENT
Start: 2023-07-05 | End: 2023-07-07 | Stop reason: HOSPADM

## 2023-07-05 RX ORDER — CARVEDILOL 3.12 MG/1
3.12 TABLET ORAL 2 TIMES DAILY WITH MEALS
Status: DISCONTINUED | OUTPATIENT
Start: 2023-07-05 | End: 2023-07-07 | Stop reason: HOSPADM

## 2023-07-05 RX ORDER — BUSPIRONE HYDROCHLORIDE 15 MG/1
15 TABLET ORAL 3 TIMES DAILY
Status: DISCONTINUED | OUTPATIENT
Start: 2023-07-05 | End: 2023-07-07 | Stop reason: HOSPADM

## 2023-07-05 RX ORDER — GAUZE BANDAGE 2" X 2"
100 BANDAGE TOPICAL DAILY
Status: DISCONTINUED | OUTPATIENT
Start: 2023-07-05 | End: 2023-07-07 | Stop reason: HOSPADM

## 2023-07-05 RX ORDER — LORAZEPAM 2 MG/ML
4 INJECTION INTRAMUSCULAR
Status: DISCONTINUED | OUTPATIENT
Start: 2023-07-05 | End: 2023-07-07 | Stop reason: HOSPADM

## 2023-07-05 RX ADMIN — THIAMINE HCL TAB 100 MG 100 MG: 100 TAB at 10:36

## 2023-07-05 RX ADMIN — VORTIOXETINE 10 MG: 10 TABLET, FILM COATED ORAL at 15:27

## 2023-07-05 RX ADMIN — CARVEDILOL 3.12 MG: 3.12 TABLET, FILM COATED ORAL at 15:28

## 2023-07-05 RX ADMIN — BUSPIRONE HYDROCHLORIDE 15 MG: 15 TABLET ORAL at 15:27

## 2023-07-05 RX ADMIN — LORAZEPAM 1 MG: 1 TABLET ORAL at 14:59

## 2023-07-05 RX ADMIN — MULTIPLE VITAMINS W/ MINERALS TAB 1 TABLET: TAB at 15:28

## 2023-07-05 RX ADMIN — BUSPIRONE HYDROCHLORIDE 15 MG: 15 TABLET ORAL at 21:37

## 2023-07-05 RX ADMIN — TRAZODONE HYDROCHLORIDE 50 MG: 50 TABLET ORAL at 21:37

## 2023-07-05 RX ADMIN — FOLIC ACID 1 MG: 1 TABLET ORAL at 15:28

## 2023-07-05 RX ADMIN — LOSARTAN POTASSIUM 100 MG: 50 TABLET, FILM COATED ORAL at 15:27

## 2023-07-05 RX ADMIN — LORAZEPAM 1 MG: 1 TABLET ORAL at 10:36

## 2023-07-05 RX ADMIN — HYDROXYZINE HYDROCHLORIDE 50 MG: 50 TABLET, FILM COATED ORAL at 21:37

## 2023-07-05 RX ADMIN — ENOXAPARIN SODIUM 40 MG: 100 INJECTION SUBCUTANEOUS at 14:46

## 2023-07-05 ASSESSMENT — LIFESTYLE VARIABLES
HOW MANY STANDARD DRINKS CONTAINING ALCOHOL DO YOU HAVE ON A TYPICAL DAY: 10 OR MORE
HOW OFTEN DO YOU HAVE A DRINK CONTAINING ALCOHOL: 4 OR MORE TIMES A WEEK

## 2023-07-05 ASSESSMENT — SLEEP AND FATIGUE QUESTIONNAIRES
DO YOU HAVE DIFFICULTY SLEEPING: YES
DO YOU USE A SLEEP AID: NO
SLEEP PATTERN: DIFFICULTY FALLING ASLEEP;DISTURBED/INTERRUPTED SLEEP;INSOMNIA;RESTLESSNESS
AVERAGE NUMBER OF SLEEP HOURS: 4

## 2023-07-05 ASSESSMENT — PAIN SCALES - GENERAL: PAINLEVEL_OUTOF10: 0

## 2023-07-05 NOTE — BH NOTE
Patient given tobacco quitline number [phone] at this time, refusing to call at this time, states \" I just dont want to quit now\"- patient given information as to the dangers of long term tobacco use. Continue to reinforce the importance of tobacco cessation.

## 2023-07-05 NOTE — ED NOTES
Pt calm, resting on cart, easily aroused, pwd, good chest rise and fall     Cherelle Bassett RN  07/05/23 [id number]

## 2023-07-05 NOTE — BH NOTE
[facility]  Admission Note     Admission Type:   Admission Type: Voluntary    Reason for admission:  Reason for Admission: suicidal ideations to harm self, thoughts to harm dogs and wants to detox from alcohol (left detox center in [place] and started drinking again)      Addictive Behavior:   Addictive Behavior  In the Past 3 Months, Have You Felt or Has Someone Told You That You Have a Problem With  : None    Medical Problems:   Past Medical History:   Diagnosis Date    Alcoholism ([address]) 01/31/2018    hx of alcoholism with intermittent sobriety; Anxiety 1996    ON RX    Hepatitis C 2010    pt states he tested + for antibodies. no live virus detected -no treatment needed    History of atrial fibrillation 2001    pt states after a suicide attempt, overdose oxycontin pt developed atrial fib x 5-6 months.  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, acute intoxication     NO LONGER PERTINENT    Palpitations     DENIES    Seizures ([address]) 2006    during alcohol detox     Wears glasses        Status EXAM:  Mental Status and Behavioral Exam  Normal: No  Level of Assistance: Independent/Self  Facial Expression: Flat  Affect: Appropriate  Level of Consciousness: Alert  Frequency of Checks: 4 times per hour, close  Mood:Normal: No  Mood: Depressed, Anxious, Helpless  Motor Activity:Normal: Yes  Eye Contact: Good  Observed Behavior: Cooperative, Preoccupied, Agitated  Sexual Misconduct History: Current - no  Preception: [place] to person, [place] to time, [place] to place, [place] to situation  Attention:Normal: No  Attention: Distractible  Thought Processes: Loose association, Tangential  Thought Content:Normal: No  Thought Content: Preoccupations (focused on how he is feeling cold currently)  Depression Symptoms: Feelings of helplessness, Feelings of hopelessess, Feelings of worthlessness, Impaired concentration, Change in energy level, Sleep disturbance  Anxiety Symptoms:

## 2023-07-05 NOTE — ED NOTES
Pt is legally sober, reassessed for SI, pt still feels suicidal and wants to be admitted to [facility].       SURJIT, [place]  37/54/62 [id number]

## 2023-07-05 NOTE — ED NOTES
Pt currently resting in MILAGROS bed, right sided carlson position. Chest rise and fall noted by writer.      SURJIT [place]  85/64/00 5774
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
## 2023-07-05 NOTE — ED NOTES
Pt currently sleeping in MILAGROS bed, right carlson, chest rise/fall WNL     Carolyn Officer, LISA  03/83/30 [id number]

## 2023-07-05 NOTE — GROUP NOTE
Psych-Ed/Relapse Prevention Group Note        Date: July 5, 2023 Start Time: 11am  End Time: 11:45am      Number of Participants in Group & Unit Census:  10/20    Topic: Problem Solving    Goal of Group:Patient will identify ways to advocate and educate for mental health. Patient will demonstrate improved problem solving. Comments:     Patient did not participate in Psych-Ed/Relapse Prevention group, despite staff encouragement and explanation of benefits. Patient remain seclusive to self. Q15 minute safety checks maintained for patient safety and will continue to encourage patient to attend unit programming.          Signature:  Ramya Bailey

## 2023-07-05 NOTE — BH NOTE
[facility]  Admission Note     Admission Type:   Admission Type: Voluntary    Reason for admission:  Reason for Admission: suicidal ideations to harm self, thoughts to harm dogs and wants to detox from alcohol (left detox center in [place] and started drinking again)      Addictive Behavior:   Addictive Behavior  In the Past 3 Months, Have You Felt or Has Someone Told You That You Have a Problem With  : None    Medical Problems:   Past Medical History:   Diagnosis Date    Alcoholism ([address]) 01/31/2018    hx of alcoholism with intermittent sobriety; Anxiety 1996    ON RX    Hepatitis C 2010    pt states he tested + for antibodies. no live virus detected -no treatment needed    History of atrial fibrillation 2001    pt states after a suicide attempt, overdose oxycontin pt developed atrial fib x 5-6 months.  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, acute intoxication     NO LONGER PERTINENT    Palpitations     DENIES    Seizures ([address]) 2006    during alcohol detox     Wears glasses        Status EXAM:  Mental Status and Behavioral Exam  Normal: No  Level of Assistance: Independent/Self  Facial Expression: Flat  Affect: Appropriate  Level of Consciousness: Alert  Frequency of Checks: 4 times per hour, close  Mood:Normal: No  Mood: Depressed, Anxious, Helpless  Motor Activity:Normal: Yes  Eye Contact: Good  Observed Behavior: Cooperative, Preoccupied, Agitated  Sexual Misconduct History: Current - no  Preception: [place] to person, [place] to time, [place] to place, [place] to situation  Attention:Normal: No  Attention: Distractible  Thought Processes: Loose association, Tangential  Thought Content:Normal: No  Thought Content: Preoccupations (focused on how he is feeling cold currently)  Depression Symptoms: Feelings of helplessness, Feelings of hopelessess, Feelings of worthlessness, Impaired concentration, Change in energy level, Sleep disturbance  Anxiety Symptoms:

## 2023-07-06 PROCEDURE — 6370000000 HC RX 637 (ALT 250 FOR IP): Performed by: PSYCHIATRY & NEUROLOGY

## 2023-07-06 PROCEDURE — 99231 SBSQ HOSP IP/OBS SF/LOW 25: CPT | Performed by: INTERNAL MEDICINE

## 2023-07-06 PROCEDURE — 1240000000 HC EMOTIONAL WELLNESS R&B

## 2023-07-06 PROCEDURE — 99232 SBSQ HOSP IP/OBS MODERATE 35: CPT | Performed by: PSYCHIATRY & NEUROLOGY

## 2023-07-06 PROCEDURE — APPSS30 APP SPLIT SHARED TIME 16-30 MINUTES: Performed by: PSYCHIATRY & NEUROLOGY

## 2023-07-06 RX ADMIN — THIAMINE HCL TAB 100 MG 100 MG: 100 TAB at 08:50

## 2023-07-06 RX ADMIN — NICOTINE POLACRILEX 2 MG: 2 LOZENGE ORAL at 11:27

## 2023-07-06 RX ADMIN — HYDROXYZINE HYDROCHLORIDE 50 MG: 50 TABLET, FILM COATED ORAL at 21:12

## 2023-07-06 RX ADMIN — HYDROXYZINE HYDROCHLORIDE 50 MG: 50 TABLET, FILM COATED ORAL at 15:00

## 2023-07-06 RX ADMIN — BUSPIRONE HYDROCHLORIDE 15 MG: 15 TABLET ORAL at 08:50

## 2023-07-06 RX ADMIN — BUSPIRONE HYDROCHLORIDE 15 MG: 15 TABLET ORAL at 21:12

## 2023-07-06 RX ADMIN — LOSARTAN POTASSIUM 100 MG: 50 TABLET, FILM COATED ORAL at 08:50

## 2023-07-06 RX ADMIN — TRAZODONE HYDROCHLORIDE 50 MG: 50 TABLET ORAL at 21:12

## 2023-07-06 RX ADMIN — VORTIOXETINE 10 MG: 10 TABLET, FILM COATED ORAL at 08:50

## 2023-07-06 RX ADMIN — MULTIPLE VITAMINS W/ MINERALS TAB 1 TABLET: TAB at 08:50

## 2023-07-06 RX ADMIN — NICOTINE POLACRILEX 2 MG: 2 LOZENGE ORAL at 21:12

## 2023-07-06 RX ADMIN — FOLIC ACID 1 MG: 1 TABLET ORAL at 08:50

## 2023-07-06 RX ADMIN — LORAZEPAM 1 MG: 1 TABLET ORAL at 08:49

## 2023-07-06 RX ADMIN — NICOTINE POLACRILEX 2 MG: 2 LOZENGE ORAL at 14:36

## 2023-07-06 RX ADMIN — BUSPIRONE HYDROCHLORIDE 15 MG: 15 TABLET ORAL at 14:17

## 2023-07-06 RX ADMIN — CARVEDILOL 3.12 MG: 3.12 TABLET, FILM COATED ORAL at 08:50

## 2023-07-06 RX ADMIN — NICOTINE POLACRILEX 2 MG: 2 LOZENGE ORAL at 16:59

## 2023-07-06 RX ADMIN — NICOTINE POLACRILEX 2 MG: 2 LOZENGE ORAL at 19:01

## 2023-07-06 RX ADMIN — CARVEDILOL 3.12 MG: 3.12 TABLET, FILM COATED ORAL at 16:59

## 2023-07-06 NOTE — GROUP NOTE
Psych-Ed/Relapse Prevention Group Note        Date: July 6, 2023 Start Time:  10:30am   End Time:  11:15am      Number of Participants in Group & Unit Census:  8/18    Topic: Peer Support and Socialization    Goal of Group:Patient will demonstrate improved interpersonal skills and off peer support      Comments:     Patient did not participate in Psych-Ed/Relapse Prevention group, despite staff encouragement and explanation of benefits. Patient remain seclusive to self. Q15 minute safety checks maintained for patient safety and will continue to encourage patient to attend unit programming.          Signature:  Ramya Nation

## 2023-07-06 NOTE — PROGRESS NOTES
[facility] Internal [facility]  Lianne Whitfield MD; Rhiannon Spain MD; Guadalupe Haque MD; MD Thania Xie MD; Yamil Morgan MD    [facility] Internal Medicine   [address] [place] [address]    HISTORY AND PHYSICAL EXAMINATION            Date:   7/6/2023  Patient name:  Regenia Closs  Date of admission:  7/4/2023  5:19 PM  MRN:   [id number]  Account:  [de-identified]  YOB: 1980  PCP:    No primary care provider on file. Room:   [address] [place] [address]  Code Status:    Full Code    Chief Complaint:     Chief Complaint   Patient presents with    Mental Health Problem   Htn      History Obtained From:     Pt medical record and nursing staff    History of Present Illness:     Regenia Closs is a 43 y.o. Non- / non  male who presents with Mental Health Problem   and is admitted to the hospital for the management of Major depressive disorder, recurrent severe without psychotic features ([address]). HTN  Onset more than 2 years ago  dylan mild to mod  Controlled with current po meds  Not associated with headaches or blurry vision  No chest pain        Past Medical History:     Past Medical History:   Diagnosis Date    Alcoholism ([address]) 01/31/2018    hx of alcoholism with intermittent sobriety; Anxiety 1996    ON RX    Hepatitis C 2010    pt states he tested + for antibodies. no live virus detected -no treatment needed    History of atrial fibrillation 2001    pt states after a suicide attempt, overdose oxycontin pt developed atrial fib x 5-6 months.  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, acute intoxication     NO LONGER PERTINENT    Palpitations     DENIES    Seizures ([address]) 2006    during alcohol detox     Wears glasses         Past Surgical History:     Past Surgical History:   Procedure Laterality Date    CYSTOSCOPY Bilateral 12/19/2018    Retrograde pyelogram    CYSTOSCOPY Bilateral 12/19/2018    CYSTOSCOPY,
Behavioral Services  Medicare Certification Upon Admission    I certify that this patient's inpatient psychiatric hospital admission is medically necessary for:    [x] (1) Treatment which could reasonably be expected to improve this patient's condition,       [x] (2) Or for diagnostic study;     AND     [x](2) The inpatient psychiatric services are provided while the individual is under the care of a physician and are included in the individualized plan of care.     Estimated length of stay/service 4 to 7 days    Plan for post-hospital care Home with outpatient community mental health follow-up    Electronically signed by Lorenzo Mercado MD on 7/5/2023 at 2:56 PM
RT ASSESSMENT TREATMENT GOALS    [x]Pt Goal:  Pt will identify 1-2 positive coping skills by time of discharge. []Pt Goal:  Pt will identify 1-2 positive aspects of self by time of discharge. []Pt Goal:  Pt will remain on task/topic for 15-30 minutes during group by time of discharge. [x]Pt Goal:  Pt will identify 1-2 aspects of relapse prevention plan by time of discharge. []Pt Goal:  Pt will join in conversation with peers 1-2 times per group by time of discharge. []Pt Goal:  Pt will identify 1-2 new leisure interests by time of discharge. []Pt Goal: Pt will maintain behavorial control until the time of discharge.
2 mg, 2 mg, IntraMUSCular, Q1H PRN **OR** LORazepam (ATIVAN) tablet 3 mg, 3 mg, Oral, Q1H PRN **OR** LORazepam (ATIVAN) injection 3 mg, 3 mg, IntraMUSCular, Q1H PRN **OR** LORazepam (ATIVAN) tablet 4 mg, 4 mg, Oral, Q1H PRN **OR** LORazepam (ATIVAN) injection 4 mg, 4 mg, IntraMUSCular, Q1H PRN  bisacodyl (DULCOLAX) EC tablet 5 mg, 5 mg, Oral, Daily PRN  busPIRone (BUSPAR) tablet 15 mg, 15 mg, Oral, TID  carvedilol (COREG) tablet 3.125 mg, 3.125 mg, Oral, BID WC  folic acid (FOLVITE) tablet 1 mg, 1 mg, Oral, Daily  losartan (COZAAR) tablet 100 mg, 100 mg, Oral, Daily  therapeutic multivitamin-minerals 1 tablet, 1 tablet, Oral, Daily  VORTIoxetine (TRINTELLIX) tablet 10 mg, 10 mg, Oral, Daily    ASSESSMENT  Major depressive disorder, recurrent severe without psychotic features ([address])   Consider substance induced psychosis       PATIENT HANDOFF  Patient symptoms are: remain unstable  Monitor need and frequency of PRN medications. Encourage participation in groups and milieu. Medication changes and discharge planning per attending  Follow-up daily while inpatient. Patient continues to be monitored in the inpatient psychiatric facility at [facility] for safety and stabilization. Patient continues to need, on a daily basis, active treatment furnished directly by or requiring the supervision of inpatient psychiatric personnel. Electronically signed by KILLIAN Alexander CNP on 7/6/2023 at 2:58 PM    **This report has been created using voice recognition software. It may contain minor errors which are inherent in voice recognition technology. **     I independently saw and evaluated the patient. I reviewed the nurse practitioners documentation above. Any additional comments or changes to the nurse practitioners documentation are stated below otherwise agree with assessment. Plan will be as follows:  Patient denying side effects to medication. Still reporting withdrawal symptoms and took Ativan this morning.

## 2023-07-06 NOTE — GROUP NOTE
Psych-Ed/Relapse Prevention Group Note        Date: July 6, 2023 Start Time: 1:30pm  End Time:  2:15pm      Number of Participants in Group & Unit Census:  8/18    Topic: Coping skills    Goal of Group:Patient will identify benefits of music for coping and stress management      Comments:     Patient did not participate in Psych-Ed/Relapse Prevention group, despite staff encouragement and explanation of benefits. Patient remain seclusive to self. Q15 minute safety checks maintained for patient safety and will continue to encourage patient to attend unit programming.          Signature:  George Kerns

## 2023-07-06 NOTE — BH NOTE
Patient scored 8 on CIWA scale. 1mg oral Ativan given as ordered for withdrawal symptoms. Patient to be reevaluated in 1 hour per protocol.

## 2023-07-07 VITALS
DIASTOLIC BLOOD PRESSURE: 101 MMHG | OXYGEN SATURATION: 97 % | BODY MASS INDEX: 28.14 KG/M2 | HEART RATE: 79 BPM | HEIGHT: 69 IN | RESPIRATION RATE: 14 BRPM | SYSTOLIC BLOOD PRESSURE: 143 MMHG | WEIGHT: 190 LBS | TEMPERATURE: 98 F

## 2023-07-07 PROCEDURE — 6370000000 HC RX 637 (ALT 250 FOR IP): Performed by: PSYCHIATRY & NEUROLOGY

## 2023-07-07 PROCEDURE — 99239 HOSP IP/OBS DSCHRG MGMT >30: CPT | Performed by: PSYCHIATRY & NEUROLOGY

## 2023-07-07 RX ORDER — LOSARTAN POTASSIUM 100 MG/1
100 TABLET ORAL DAILY
Qty: 30 TABLET | Refills: 3 | Status: SHIPPED | OUTPATIENT
Start: 2023-07-07

## 2023-07-07 RX ORDER — FOLIC ACID 1 MG/1
1 TABLET ORAL DAILY
Qty: 30 TABLET | Refills: 3 | Status: SHIPPED | OUTPATIENT
Start: 2023-07-07

## 2023-07-07 RX ORDER — LURASIDONE HYDROCHLORIDE 40 MG/1
40 TABLET, FILM COATED ORAL DAILY
Qty: 30 TABLET | Refills: 0 | Status: SHIPPED | OUTPATIENT
Start: 2023-07-07

## 2023-07-07 RX ORDER — TRAZODONE HYDROCHLORIDE 50 MG/1
50 TABLET ORAL NIGHTLY PRN
Qty: 30 TABLET | Refills: 0 | Status: SHIPPED | OUTPATIENT
Start: 2023-07-07

## 2023-07-07 RX ORDER — BUSPIRONE HYDROCHLORIDE 15 MG/1
15 TABLET ORAL 3 TIMES DAILY
Qty: 90 TABLET | Refills: 0 | Status: SHIPPED | OUTPATIENT
Start: 2023-07-07

## 2023-07-07 RX ORDER — M-VIT,TX,IRON,MINS/CALC/FOLIC 27MG-0.4MG
1 TABLET ORAL DAILY
Qty: 30 TABLET | Refills: 0 | Status: SHIPPED | OUTPATIENT
Start: 2023-07-07

## 2023-07-07 RX ORDER — GAUZE BANDAGE 2" X 2"
100 BANDAGE TOPICAL DAILY
Qty: 30 TABLET | Refills: 0 | Status: SHIPPED | OUTPATIENT
Start: 2023-07-07

## 2023-07-07 RX ORDER — CARVEDILOL 3.12 MG/1
3.12 TABLET ORAL 2 TIMES DAILY WITH MEALS
Qty: 60 TABLET | Refills: 3 | Status: SHIPPED | OUTPATIENT
Start: 2023-07-07

## 2023-07-07 RX ADMIN — CARVEDILOL 3.12 MG: 3.12 TABLET, FILM COATED ORAL at 08:34

## 2023-07-07 RX ADMIN — VORTIOXETINE 10 MG: 10 TABLET, FILM COATED ORAL at 08:34

## 2023-07-07 RX ADMIN — MULTIPLE VITAMINS W/ MINERALS TAB 1 TABLET: TAB at 08:34

## 2023-07-07 RX ADMIN — BUSPIRONE HYDROCHLORIDE 15 MG: 15 TABLET ORAL at 08:35

## 2023-07-07 RX ADMIN — NICOTINE POLACRILEX 2 MG: 2 LOZENGE ORAL at 12:21

## 2023-07-07 RX ADMIN — HYDROXYZINE HYDROCHLORIDE 50 MG: 50 TABLET, FILM COATED ORAL at 08:35

## 2023-07-07 RX ADMIN — LOSARTAN POTASSIUM 100 MG: 50 TABLET, FILM COATED ORAL at 08:34

## 2023-07-07 RX ADMIN — BUSPIRONE HYDROCHLORIDE 15 MG: 15 TABLET ORAL at 13:03

## 2023-07-07 RX ADMIN — NICOTINE POLACRILEX 2 MG: 2 LOZENGE ORAL at 08:35

## 2023-07-07 RX ADMIN — THIAMINE HCL TAB 100 MG 100 MG: 100 TAB at 08:35

## 2023-07-07 RX ADMIN — HYDROXYZINE HYDROCHLORIDE 50 MG: 50 TABLET, FILM COATED ORAL at 13:03

## 2023-07-07 RX ADMIN — FOLIC ACID 1 MG: 1 TABLET ORAL at 08:35

## 2023-07-07 RX ADMIN — IBUPROFEN 400 MG: 400 TABLET, FILM COATED ORAL at 13:04

## 2023-07-07 ASSESSMENT — PAIN DESCRIPTION - ORIENTATION: ORIENTATION: MID

## 2023-07-07 ASSESSMENT — PAIN SCALES - GENERAL
PAINLEVEL_OUTOF10: 0
PAINLEVEL_OUTOF10: 8

## 2023-07-07 ASSESSMENT — PAIN DESCRIPTION - DESCRIPTORS: DESCRIPTORS: DULL

## 2023-07-07 ASSESSMENT — PAIN DESCRIPTION - LOCATION: LOCATION: HEAD

## 2023-07-07 NOTE — GROUP NOTE
Group Therapy Note    Date: 7/6/2023    Group Start Time: 2030  Group End Time: 2115  Group Topic: Wrap-Up    EDWIN Salcido        Group Therapy Note    Attendees: 12/20       Patient's Goal:  Patient expressed the goal of attending school sober. Notes:  Patient participated in group by engaging with staff and socializing with peers. Status After Intervention:  Improved    Participation Level:  Active Listener    Participation Quality: Appropriate      Speech:  normal      Thought Process/Content: Logical      Affective Functioning: Congruent      Mood: elevated      Level of consciousness:  Alert      Response to Learning: Able to verbalize current knowledge/experience      Endings: None Reported    Modes of Intervention: Education      Discipline Responsible: [address] [employer]      Signature:  Orlando Salcido

## 2023-07-07 NOTE — GROUP NOTE
Group Therapy Note    Date: 7/7/2023    Group Start Time: 0900  Group End Time: 0940  Group Topic: Group Documentation    EDWIN Muellernavarrotonya        Group Therapy Note    Attendees: 7/21         Patient's Goal:  develop behavior plan to aid his relationship    Notes:  Morning goal group session, patient has opportunity to reflect on what they need to accomplish to achieve discharge, and decide on a step towards that that they would like to accomplish by the end of the day today. Status After Intervention:  Improved    Participation Level:  Active Listener and Interactive    Participation Quality: Appropriate, Attentive, Sharing, and Supportive      Speech:  normal      Thought Process/Content: Logical  Linear      Affective Functioning: Congruent      Mood: euthymic      Level of consciousness:  Alert, Oriented x4, and Attentive      Response to Learning: Capable of insight, Able to change behavior, and Progressing to goal      Endings: None Reported    Modes of Intervention: Support and Socialization      Discipline Responsible: Behavorial Health Tech      Signature:  Jannette Cortes

## 2023-07-07 NOTE — DISCHARGE SUMMARY
DISCHARGE SUMMARY      Patient ID:  David Sarmiento  [id number]  13 y.o.  1980    Admit date: 7/4/2023    Discharge date and time: 7/7/2023    Disposition: Home      Admitting Physician: Karen Ortega MD     Discharge Physician: Dr Betsy Wooten MD    Admission Diagnoses: Depression with suicidal ideation [F32. A, X24.616]  Acute alcoholic intoxication without complication ([address]) [P66.891]    Admission Condition: poor    Discharged Condition: stable    Admission Circumstance: David Sarmiento is a 43 y.o. male who has a past medical history of depression, anxiety, PTSD, alcohol use disorder, hypertension and atrial fibrillation. Patient presented to the ED due to auditory hallucinations command in nature. Per emergency department documentation :David Sarmiento is a 43 y.o. male who presents complaining of psychiatric evaluation. Patient has a history of depression and anxiety and alcohol abuse. Patient just moved back a week ago from [place] where he was out trying to detox from alcohol. Patient states since getting moved in things been extremely stressful he has been arguing a lot with his significant other. Patient states that he is starting to hear voices and they are telling him to do things such as harming the dogs and he has had thoughts of potentially cutting himself which he has had significant cuts to himself in the past.  Patient states he has not been on his any of his medications recently since the move he does still have his counselor appear. Patient states that he went to flower last night and they want to send him to the [facility] but they were unable to take him because of the holiday. Patient states that he is just has nowhere else to go. Patient denies any somatic complaints at this time. Mr. Caprice Arellano is agreeable to assessment at bedside where he states [place] man I did something really fucked up yesterday\".   He does endorse thoughts of ending his own life and was experiencing auditory

## 2023-07-07 NOTE — GROUP NOTE
Group Therapy Note    Date: 7/7/2023    Group Start Time: 1045  Group End Time: 1130  Group Topic: Cognitive Skills    EDWIN B[facility] CINDY Hummel        Group Therapy Note    Attendees: 13/21     Patient's Goal:  To improve interpersonal skills and memory recall through collaborating with peers and concentrating on a presented task. Notes:  Patient attended and participated in group. Patient was able to collaborate with peers and concentrate on a presented task. Patient was pleasant and cooperative. Status After Intervention:  Improved    Participation Level:  Active Listener and Interactive    Participation Quality: Appropriate, Attentive, and Sharing      Speech:  normal      Thought Process/Content: Logical and Linear       Affective Functioning: Congruent        Mood: Euthymic       Level of consciousness:  Alert and Attentive    Response to Learning: Able to verbalize current knowledge/experience, Able to retain information, and Progressing to goal      Endings: None Reported    Modes of Intervention: Socialization, Exploration, Problem-solving, and Activity      Discipline Responsible: Psychoeducational Specialist      Signature:  Ramya Crocker

## 2023-07-07 NOTE — CARE COORDINATION
Name: Regenia Closs    : 1980    Discharge Date: 23    Primary Auth/Cert #:     Destination: home     Discharge Medications:      Medication List        START taking these medications      traZODone 50 MG tablet  Commonly known as: DESYREL  Take 1 tablet by mouth nightly as needed for Sleep  Notes to patient: Sleep            CHANGE how you take these medications      VORTIoxetine 10 MG Tabs tablet  Commonly known as: TRINTELLIX  Take 1 tablet by mouth daily  Start taking on: 2023  What changed:   medication strength  how much to take  Notes to patient: mood            CONTINUE taking these medications      busPIRone 15 MG tablet  Commonly known as: BUSPAR  Take 15 mg by mouth 3 times daily  Notes to patient: anxiety     carvedilol 3.125 MG tablet  Commonly known as: COREG  Take 1 tablet by mouth 2 times daily (with meals)  Notes to patient: heart     folic acid 1 MG tablet  Commonly known as: FOLVITE  Take 1 tablet by mouth daily  Notes to patient: Vitamin     losartan 100 MG tablet  Commonly known as: COZAAR  Take 1 tablet by mouth daily  Notes to patient: heart     lurasidone 40 MG Tabs tablet  Commonly known as: LATUDA  Take 1 tablet by mouth daily  Notes to patient: Clear thoughts     therapeutic multivitamin-minerals tablet  Take 1 tablet by mouth daily  Notes to patient: vitamin     thiamine mononitrate 100 MG tablet  Take 1 tablet by mouth daily  Notes to patient: vitamin               Where to Get Your Medications        These medications were sent to [address] [place] [address]      Phone: [phone]   busPIRone 15 MG tablet  carvedilol 7.056 MG tablet  folic acid 1 MG tablet  losartan 100 MG tablet  lurasidone 40 MG Tabs tablet  therapeutic multivitamin-minerals tablet  thiamine mononitrate 100 MG tablet  traZODone 50 MG tablet  VORTIoxetine 10 MG Tabs tablet         Follow Up Appointment: Aditya Brown

## 2023-07-07 NOTE — DISCHARGE INSTRUCTIONS
shortness of breath or some trouble breathing but never needed to be in the hospital.) You can end isolation at the end of Day 10. If you were very sick and needed to be in the hospital, or if you have a weakened immune system: You can end isolation at the end of Day 10 or later. Talk to your doctor to find out when it's safe to end isolation. You may need a viral test.  After you end isolation, if your symptoms come back or get worse: Restart your isolation at Day 0. Do this even if it happens after you took medicine for COVID. Avoid travel and stay away from people at high risk for serious disease for at least 10 days. Those who can't wear a mask because they are under 3years old or have certain disabilities should isolate for at least 10 full days. Check the CDC website at cdc.gov for the most current information on how to protect yourself. How can you self-isolate when you have COVID-19? If you have COVID-19, there are things you can do to help avoid spreading the virus to others. Stay home, and avoid contact with other people. Limit contact with people in your home. If possible, stay in a separate bedroom and use a separate bathroom. Wear a high-quality mask when you are around other people. Improve airflow. If you have to spend time indoors with others, open windows and doors. Or you can use a fan to blow air away from people and out a window. Avoid contact with pets and other animals. Cover your mouth and nose with a tissue when you cough or sneeze. Then throw it in the trash right away. Wash your hands often, especially after you cough or sneeze. Use soap and water, and scrub for at least 20 seconds. If soap and water aren't available, use an alcohol-based hand . Don't share personal household items. These include bedding, towels, cups and glasses, and eating utensils. [address] in the warmest water allowed for the fabric type, and dry it completely.  It's okay to wash other

## 2023-07-07 NOTE — GROUP NOTE
Group Therapy Note    Date: 7/7/2023    Group Start Time: 1330  Group End Time: 0136  Group Topic: Psychoeducation    CINDY Cheng        Group Therapy Note    Attendees: 7/18     Patient's Goal:  To improve interpersonal skills and coping skills awareness through collaborating with peers and demonstrating self-expression. Notes:  Patient attended and participated in group. Patient was able to collaborate with peers and demonstrate self-expression. Patient was pleasant and cooperative. Status After Intervention:  Improved    Participation Level:  Active Listener and Interactive    Participation Quality: Appropriate, Attentive, Sharing, and Supportive      Speech:  normal      Thought Process/Content: Logical and Linear      Affective Functioning: Congruent      Mood: Euthymic       Level of consciousness:  Alert and Attentive      Response to Learning: Able to verbalize current knowledge/experience, Able to retain information, Capable of insight, and Progressing to goal      Endings: None Reported    Modes of Intervention: Support, Socialization, and Exploration      Discipline Responsible: Psychoeducational Specialist      Signature:  Ramya Hartley

## 2023-07-07 NOTE — PLAN OF CARE
[facility]  Initial Interdisciplinary Treatment Plan NO      Original treatment plan Date & Time: 7.5.23 1245    Admission Type:  Admission Type: Voluntary    Reason for admission:   Reason for Admission: suicidal ideations to harm self, thoughts to harm dogs and wants to detox from alcohol (left detox center in [place] and started drinking again)    Estimated Length of Stay: 5-7days  Estimated Discharge Date: to be determined by physician    PATIENT STRENGTHS:  Patient Strengths:   Patient Strengths and Limitations:   Addictive Behavior: Addictive Behavior  In the Past 3 Months, Have You Felt or Has Someone Told You That You Have a Problem With  : None  Medical Problems:  Past Medical History:   Diagnosis Date    Alcoholism ([address]) 01/31/2018    hx of alcoholism with intermittent sobriety; Anxiety 1996    ON RX    Hepatitis C 2010    pt states he tested + for antibodies. no live virus detected -no treatment needed    History of atrial fibrillation 2001    pt states after a suicide attempt, overdose oxycontin pt developed atrial fib x 5-6 months.  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, acute intoxication     NO LONGER PERTINENT    Palpitations     DENIES    Seizures ([address]) 2006    during alcohol detox     Wears glasses      Status EXAM:Mental Status and Behavioral Exam  Normal: No  Level of Assistance: Independent/Self  Facial Expression: Flat  Affect: Appropriate  Level of Consciousness: Alert  Frequency of Checks: 4 times per hour, close  Mood:Normal: No  Mood: Depressed, Anxious, Helpless  Motor Activity:Normal: Yes  Eye Contact: Good  Observed Behavior: Cooperative, Preoccupied, Agitated  Sexual Misconduct History: Current - no  Preception: [place] to person, [place] to time, [place] to place, [place] to situation  Attention:Normal: No  Attention: Distractible  Thought Processes: Loose association, Tangential  Thought Content:Normal: No  Thought Content:
Problem: Behavior  Goal: Pt/Family maintain appropriate behavior and adhere to behavioral management agreement, if implemented  Description: INTERVENTIONS:  1. Assess patient/family's coping skills and  non-compliant behavior (including use of illegal substances)  2. Notify security of behavior or suspected illegal substances which indicate the need for search of the family and/or belongings  3. Encourage verbalization of thoughts and concerns in a socially appropriate manner  4. Utilize positive, consistent limit setting strategies supporting safety of patient, staff and others  5. Encourage participation in the decision making process about the behavioral management agreement  6. If a visitor's behavior poses a threat to safety call refer to organization policy. 7. Initiate consult with , Psychosocial CNS, Spiritual Care as appropriate  7/5/2023 1944 by Alexis Esparza RN  Outcome: Progressing  Note: Patient has been in behavior control this shift so far. Patient has been taking medications as prescribed. Q 15 minute checks done on pt for safety      Problem: Anxiety  Goal: Will report anxiety at manageable levels  Description: INTERVENTIONS:  1. Administer medication as ordered  2. Teach and rehearse alternative coping skills  3. Provide emotional support with 1:1 interaction with staff  7/5/2023 1944 by Alexis Esparza RN  Outcome: Progressing  Note: Patient reports \" a lot of anxiety. \" Patient was given 1:1 talk time, quiet time in room and emotional support. Patient requested atarax with night medications to help reduce anxiety enough to sleep. Medication will be provided as ordered. Q 15 minute checks done on pt for safety      Problem: Depression/Self Harm  Goal: Effect of psychiatric condition will be minimized and patient will be protected from self harm  Description: INTERVENTIONS:  1.  Assess impact of patient's symptoms on level of functioning, self care needs and offer support as
Problem: Behavior  Goal: Pt/Family maintain appropriate behavior and adhere to behavioral management agreement, if implemented  Description: INTERVENTIONS:  1. Assess patient/family's coping skills and  non-compliant behavior (including use of illegal substances)  2. Notify security of behavior or suspected illegal substances which indicate the need for search of the family and/or belongings  3. Encourage verbalization of thoughts and concerns in a socially appropriate manner  4. Utilize positive, consistent limit setting strategies supporting safety of patient, staff and others  5. Encourage participation in the decision making process about the behavioral management agreement  6. If a visitor's behavior poses a threat to safety call refer to organization policy. 7. Initiate consult with , Psychosocial CNS, Spiritual Care as appropriate  Outcome: Progressing     Problem: Anxiety  Goal: Will report anxiety at manageable levels  Description: INTERVENTIONS:  1. Administer medication as ordered  2. Teach and rehearse alternative coping skills  3. Provide emotional support with 1:1 interaction with staff  Outcome: Not Progressing     Problem: Pain  Goal: Verbalizes/displays adequate comfort level or baseline comfort level  Outcome: Progressing     Problem: Depression/Self Harm  Goal: Effect of psychiatric condition will be minimized and patient will be protected from self harm  Description: INTERVENTIONS:  1. Assess impact of patient's symptoms on level of functioning, self care needs and offer support as indicated  2. Assess patient/family knowledge of depression, impact on illness and need for teaching  3. Provide emotional support, presence and reassurance  4. Assess for possible suicidal thoughts or ideation. If patient expresses suicidal thoughts or statements do not leave alone, initiate Suicide Precautions, move to a room close to the nursing station and obtain sitter  5.  Initiate consults as
Problem: Behavior  Goal: Pt/Family maintain appropriate behavior and adhere to behavioral management agreement, if implemented  Description: INTERVENTIONS:  1. Assess patient/family's coping skills and  non-compliant behavior (including use of illegal substances)  2. Notify security of behavior or suspected illegal substances which indicate the need for search of the family and/or belongings  3. Encourage verbalization of thoughts and concerns in a socially appropriate manner  4. Utilize positive, consistent limit setting strategies supporting safety of patient, staff and others  5. Encourage participation in the decision making process about the behavioral management agreement  6. If a visitor's behavior poses a threat to safety call refer to organization policy. 7. Initiate consult with , Psychosocial CNS, Spiritual Care as appropriate  Outcome: Progressing   Patient stated that he had a visit from his partner. Patient described the visit as pleasant and supportive. Patient will continue to utilize coping skills when feeling triggered. Problem: Anxiety  Goal: Will report anxiety at manageable levels  Description: INTERVENTIONS:  1. Administer medication as ordered  2. Teach and rehearse alternative coping skills  3. Provide emotional support with 1:1 interaction with staff  Outcome: Progressing    Patient stated that he was feeling anxiety and depression today. Patient stated he was at a 7 on a scale from 1-10. Patient stated that he is utilizing his coping skills and staff to manage levels of anxiety.
reassurance as needed. Problem: Anxiety  Goal: Will report anxiety at manageable levels  Description: INTERVENTIONS:  1. Administer medication as ordered  2. Teach and rehearse alternative coping skills  3.  Provide emotional support with 1:1 interaction with staff  Outcome: Progressing  Flowsheets (Taken 7/6/2023 1242)  Will report anxiety at manageable levels:   Administer medication as ordered   Teach and rehearse alternative coping skills   Provide emotional support with 1:1 interaction with staff     Problem: Pain  Goal: Verbalizes/displays adequate comfort level or baseline comfort level  Outcome: Progressing  Flowsheets (Taken 7/6/2023 1242)  Verbalizes/displays adequate comfort level or baseline comfort level:   Encourage patient to monitor pain and request assistance   Assess pain using appropriate pain scale   Administer analgesics based on type and severity of pain and evaluate response   Implement non-pharmacological measures as appropriate and evaluate response
the nursing station and obtain sitter  5. Initiate consults as appropriate with Mental Health Professional, Spiritual Care, Psychosocial CNS, and consider a recommendation to the [facility] for a Psychiatric Consultation  Outcome: Progressing     Patient is friendly, brightened, and cooperative upon approach. Patient endorses \"a little bit\" of anxiety and depression but states it is a manageable levels. Patient denies thoughts of self harm/suicide, homicidal ideations, and auditory/visual hallucinations. Patient independently attends to ADLs and reports adequate sleep and nutrition. Patient is medication compliant and behavior controlled. Q15 minute safety checks maintained, patient remains safe at this time.

## 2023-07-07 NOTE — BH NOTE
Patient given tobacco quitline number [phone] at this time, refusing to call at this time, states \" I just dont want to quit now\"- patient given information as to the dangers of long term tobacco use. Continue to reinforce the importance of tobacco cessation.

## 2023-07-07 NOTE — BH NOTE
[facility]  Discharge Note    Pt discharged with followings belongings:   Dental Appliances: None  Vision - Corrective Lenses: Eyeglasses  Hearing Aid: None  Jewelry: Ring, Earrings, Bracelet (string bracelet)  Body Piercings Removed: No  Clothing: Footwear, Pants, Shorts, Shirt, Undergarments  Other Valuables: Wallet, Other (Comment), Keys (2 visas, a mastercard and [place] directions card, vape)   Valuables sent home with pt or returned to patient. Patient educated on aftercare instructions: yes  Information [facility] to [facility] by Nurse  at 3:06 PM .Patient verbalize understanding of AVS:  yes. Status EXAM upon discharge:  Mental Status and Behavioral Exam  Normal: Yes  Level of Assistance: Independent/Self  Facial Expression: Brightened  Affect: Congruent  Level of Consciousness: Alert  Frequency of Checks: 4 times per hour, close  Mood:Normal: No  Mood: Depressed, Anxious (\"just a little depressed & anxious\" per pt)  Motor Activity:Normal: Yes  Eye Contact: Good  Observed Behavior: Friendly  Sexual Misconduct History: Current - no  Preception: [place] to person, [place] to time, [place] to place, [place] to situation  Attention:Normal: Yes  Attention: Distractible  Thought Processes: Unremarkable  Thought Content:Normal: Yes  Thought Content: Preoccupations  Depression Symptoms: Feelings of hopelessess  Anxiety Symptoms: Generalized  [place] Symptoms: No problems reported or observed.   Hallucinations: None  Delusions: No  Memory:Normal: Yes  Memory: Poor recent  Insight and Judgment: No  Insight and Judgment: Poor judgment, Poor insight    Tobacco Screening:  Practical Counseling, on admission, esthela X, if applicable and completed (first 3 are required if patient doesn't refuse:            ( ) Recognizing danger situations (included triggers and roadblocks)                    ( ) Coping skills (new ways to manage stress,relaxation techniques, changing routine, distraction)

## (undated) DEVICE — GUIDEWIRE URO L150CM DIA0.035IN STIFF NIT HYDRPHLC STR TIP

## (undated) DEVICE — PACK PROCEDURE SURG CYSTO SVMMC LF

## (undated) DEVICE — CATHETER URET 5FR L70CM TIP 8FR OPN END CONE TIP INJ HUB

## (undated) DEVICE — GLOVE SURG SZ 65 THK91MIL LTX FREE SYN POLYISOPRENE

## (undated) DEVICE — DRAPE,REIN 53X77,STERILE: Brand: MEDLINE

## (undated) DEVICE — GLOVE ORANGE PI 7 1/2   MSG9075

## (undated) DEVICE — CONE TIP URETERAL CATHETER WITH OPEN-END: Brand: CONE TIP